# Patient Record
Sex: FEMALE | Race: WHITE | NOT HISPANIC OR LATINO | Employment: OTHER | URBAN - METROPOLITAN AREA
[De-identification: names, ages, dates, MRNs, and addresses within clinical notes are randomized per-mention and may not be internally consistent; named-entity substitution may affect disease eponyms.]

---

## 2017-01-25 ENCOUNTER — PATIENT OUTREACH (OUTPATIENT)
Dept: HEALTH INFORMATION MANAGEMENT | Facility: OTHER | Age: 67
End: 2017-01-25

## 2017-01-26 NOTE — PROGRESS NOTES
01/25/17 SCHEDULED AWV,     Health Maintenance Due   Topic Date Due   • IMM ZOSTER VACCINE  02/08/2008   • PFT SCREENING-FEV1 AND FEV/FVC RATIO / SPIROMETRY SHOULD BE PERFORMED ANNUALLY  04/10/2016

## 2017-02-07 ENCOUNTER — TELEPHONE (OUTPATIENT)
Dept: MEDICAL GROUP | Age: 67
End: 2017-02-07

## 2017-02-07 NOTE — TELEPHONE ENCOUNTER
ANNUAL WELLNESS VISIT PRE-VISIT PLANNING     1.  Reviewed last PCP office visit assessment and plan notes: Yes    2.  If any orders were placed last visit do we have Results/Consult Notes?        •  Labs? Yes order TSH       •  Imaging? Yes complete       •  Referrals? Yes complete    3.  Patient Care Coordination Note was updated with diagnosis information:  Yes    4.  Patient is due for these Health Maintenance Topics:   Health Maintenance Due   Topic Date Due   • IMM ZOSTER VACCINE  02/08/2008   • PFT SCREENING-FEV1 AND FEV/FVC RATIO / SPIROMETRY SHOULD BE PERFORMED ANNUALLY  04/10/2016             5.  Immunizations were updated in [employer] using [employer]?: Yes       •  Web [employer] Recommendations:  Td, Hep A, Hep B, Zoster       •  Is patient due for Tdap/Shingles? Yes.  If yes, was patient alerted of copay? Yes    6.  Patient has:       •   Diabetes: no       •   COPD: yes       •   CHF: no       •   Depression: no    7.  Updated Care Team with [employer] and all specialists?        •   Gait devices, O2, CPAP, etc: yes        •   Eye professional: yes       •   Other specialists (GYN, cardiology, endo, etc): yes    8.  Is patient in need of any refills prior to office visit? No       •    Separate refill encounter created?: no    9.  Patient was informed to arrive 15 min prior to their scheduled appointment and bring in their medication bottles? yes    10.  Patient was advised: “This is a free wellness visit. The provider will screen for medical conditions to help you stay healthy. If you have other concerns to address you may be asked to discuss these at a separate visit or there may be an additional fee.”  Yes

## 2017-02-13 ENCOUNTER — OFFICE VISIT (OUTPATIENT)
Dept: MEDICAL GROUP | Age: 67
End: 2017-02-13
Payer: MEDICARE

## 2017-02-13 VITALS
SYSTOLIC BLOOD PRESSURE: 102 MMHG | HEART RATE: 67 BPM | DIASTOLIC BLOOD PRESSURE: 78 MMHG | BODY MASS INDEX: 31.71 KG/M2 | HEIGHT: 63 IN | TEMPERATURE: 96.9 F | WEIGHT: 179 LBS | OXYGEN SATURATION: 92 %

## 2017-02-13 DIAGNOSIS — N81.6 RECTOCELE: ICD-10-CM

## 2017-02-13 DIAGNOSIS — H91.93 DECREASED HEARING, BILATERAL: ICD-10-CM

## 2017-02-13 DIAGNOSIS — E66.9 OBESITY (BMI 30-39.9): ICD-10-CM

## 2017-02-13 DIAGNOSIS — Z00.00 MEDICARE ANNUAL WELLNESS VISIT, SUBSEQUENT: Primary | ICD-10-CM

## 2017-02-13 DIAGNOSIS — E03.4 HYPOTHYROIDISM DUE TO ACQUIRED ATROPHY OF THYROID: ICD-10-CM

## 2017-02-13 DIAGNOSIS — J44.0 CHRONIC OBSTRUCTIVE PULMONARY DISEASE WITH ACUTE LOWER RESPIRATORY INFECTION (HCC): ICD-10-CM

## 2017-02-13 DIAGNOSIS — K57.90 DIVERTICULOSIS OF INTESTINE WITHOUT BLEEDING, UNSPECIFIED INTESTINAL TRACT LOCATION: ICD-10-CM

## 2017-02-13 DIAGNOSIS — E78.5 DYSLIPIDEMIA: ICD-10-CM

## 2017-02-13 PROCEDURE — 99999 PR NO CHARGE: CPT | Performed by: PHYSICIAN ASSISTANT

## 2017-02-13 PROCEDURE — G0439 PPPS, SUBSEQ VISIT: HCPCS | Performed by: PHYSICIAN ASSISTANT

## 2017-02-13 PROCEDURE — 1036F TOBACCO NON-USER: CPT | Performed by: PHYSICIAN ASSISTANT

## 2017-02-13 ASSESSMENT — PATIENT HEALTH QUESTIONNAIRE - PHQ9: CLINICAL INTERPRETATION OF PHQ2 SCORE: 0

## 2017-02-13 ASSESSMENT — PAIN SCALES - GENERAL: PAINLEVEL: NO PAIN

## 2017-02-13 NOTE — PROGRESS NOTES
Chief Complaint   Patient presents with   • Annual Wellness Visit         HPI:  Vaishali is a 67 y.o. female here for Medicare Annual Wellness Visit      URI  New problem since 2/4/17.  Recent illness 102-103 fever with nasal congestion and cough.   Taking benadryl and breathing treatments. Feels it almost gone.  Feels still a little congested.   +productive cough.       Patient Active Problem List    Diagnosis Date Noted   • Dyslipidemia 02/13/2017   • Obesity (BMI 30-39.9) 09/17/2016   • Diverticulosis 04/23/2015   • Rectocele 04/10/2015   • Cystocele 04/10/2015   • Decreased hearing 01/22/2015   • Hypothyroidism due to acquired atrophy of thyroid 01/21/2013   • COPD (chronic obstructive pulmonary disease) (CMS-[facility]) 01/20/2013       Current Outpatient Prescriptions   Medication Sig Dispense Refill   • levothyroxine (SYNTHROID) 75 MCG Tab Take 1 Tab by mouth every morning before breakfast. 90 Tab 1   • Cyanocobalamin (VITAMIN B-12) 1000 MCG Tab Take 1,000 mcg by mouth every day.     • vitamin D (CHOLECALCIFEROL) 1000 UNIT Tab Take 1,000 Units by mouth every day.     • IRON PO Take  by mouth.     • ipratropium-albuterol (DUONEB) 0.5-2.5 (3) MG/3ML nebulizer solution 3 mL by Nebulization route every four hours as needed for Shortness of Breath. 100 Vial 3   • albuterol (PROAIR HFA) 108 (90 BASE) MCG/ACT Aero Soln inhalation aerosol Inhale 2 Puffs by mouth every four hours as needed for Shortness of Breath. 1 Inhaler 3   • albuterol (PROVENTIL) 2.5mg/0.5ml NEBU 2.5 mg by Nebulization route every four hours as needed.       No current facility-administered medications for this visit.      The patient reports adherence to this regimen   Current supplements as per medication list.   Chronic narcotic pain medicines: no    Allergies: Nkda    Current social contact/activities: goes to ball games, sporting events, takes a ride, camp out with kids     Is patient current with immunizations?  no   If no, due for Shingles declines      She  reports that she quit smoking about 10 years ago. Her smoking use included Cigarettes. She has a 40 pack-year smoking history. She has never used smokeless tobacco. She reports that she drinks about 1.0 oz of alcohol per week. She reports that she does not use illicit drugs.  Counseling given: Yes        DPA/Advanced Directive:  Patient does not have an advanced directive. If no advanced directive exists, a packet and workshop information was provided    ROS:    Gait: Uses no assistive device   Ostomy: no   Other tubes: no   Amputations: no   Chronic oxygen use no   Last eye exam 2015   : Denies incontinence.       Screening:    COPD  1. Is patient under the care of a pulmonologist? no        2. Has patient ever completed a PFT or spirometry? yes       a. If yes, when? Can't remember    3.  If applicable, was CareTeam updated with oxygen/CPAP supplier? N\A    4. Has patient ever had instruction on inhaler technique by health care professional? Yes    5. Does patient have an action plan for when they have trouble breathing? N\A    6. Is patient interested in a referral to respiratory therapy for more information on COPD, inhaler technique, and/or information on establishing an action plan?  no      Depression Screening    Little interest or pleasure in doing things?  0 - not at all  Feeling down, depressed, or hopeless?  0 - not at all  Patient Health Questionnaire Score: 0    If depressive symptoms identified deferred to follow up visit unless specifically addressed in assessment and plan.    Screening for Cognitive Impairment    Three Minute Recall:  3/3 Apple, temitope, table  Draw clock face with all 12 numbers set to the hand to show 10 minutes past 11 o'clock  1 5/5  Cognitive concerns identified deferred for follow up unless specifically addressed in assessment and plan.    Fall Risk Assessment    Has the patient had two or more falls in the last year or any fall with injury in the last year?   No    Safety Assessment    Throw rugs on floor.  No  Handrails on all stairs.  Yes  Good lighting in all hallways.  Yes  Difficulty hearing.  Yes  Patient counseled about all safety risks that were identified.    Functional Assessment ADLs    Are there any barriers preventing you from cooking for yourself or meeting nutritional needs?  No.    Are there any barriers preventing you from driving safely or obtaining transportation?  No.    Are there any barriers preventing you from using a telephone or calling for help?  No.    Are there any barriers preventing you from shopping?  No.    Are there any barriers preventing you from taking care of your own finances?  No.    Are there any barriers preventing you from managing your medications?  No.    Are currently engaging any exercise or physical activity?  Yes.  Uses treadmill and stationary bike.    Health Maintenance Summary                IMM ZOSTER VACCINE Overdue 2/8/2008     PFT SCREENING-FEV1 AND FEV/FVC RATIO / SPIROMETRY SHOULD BE PERFORMED ANNUALLY Overdue 4/10/2016      Done 4/10/2015 See pulm consult in media-- 4/10/15 PFT performed in office.    Annual Wellness Visit Next Due 3/9/2017      Done 3/8/2016 Visit Dx: Medicare annual wellness visit, subsequent     Patient has more history with this topic...    MAMMOGRAM Next Due 11/1/2017      Done 11/1/2016 MA-MAMMO SCREENING BILAT W/TOMOSYNTHESIS W/CAD     Patient has more history with this topic...    BONE DENSITY Next Due 4/10/2020      Done 4/10/2015 DS-BONE DENSITY STUDY (DEXA)    IMM DTaP/Tdap/Td Vaccine Next Due 4/10/2025      Done 4/10/2015 Imm Admin: Tdap Vaccine    COLONOSCOPY Next Due 4/23/2025      Done 4/23/2015 AMB REFERRAL TO GI FOR COLONOSCOPY          Patient Care Team:  Vesna Davis PA-C as PCP - General (Family Medicine)  Skylar Nick M.D. (OB/Gyn)  Jared ORTIZ M.D. as Consulting Physician (Gastroenterology)  [facility] Phys[facility] ([facility])    Social  "History   Substance Use Topics   • Smoking status: Former Smoker -- 1.00 packs/day for 40 years     Types: Cigarettes     Quit date: 01/22/2007   • Smokeless tobacco: Never Used      Comment: 4 days   • Alcohol Use: 1.0 oz/week     2 Standard drinks or equivalent per week      Comment: wine     Family History   Problem Relation Age of Onset   • Alcohol/Drug Neg Hx    • Cancer Maternal Uncle    • Cancer Paternal Uncle    • Diabetes Sister      DMI   • Diabetes Brother      DM2. no meds   • Stroke Father    • Arthritis Son      Arthritis, gout   • Asthma Son    • Cancer Maternal Grandmother      Uterine   • Diabetes Paternal Grandmother      She  has a past medical history of Hypothyroid; Emphysema; Pneumonia; Osteoporosis; Urinary retention; and Anemia.   Past Surgical History   Procedure Laterality Date   • Other orthopedic surgery       R shoulder surgery           Exam:     Blood pressure 102/78, pulse 67, temperature 36.1 °C (96.9 °F), height 1.588 m (5' 2.5\"), weight 81.194 kg (179 lb), SpO2 92 %. Body mass index is 32.2 kg/(m^2).    Hearing poor.    Dentition fair  Alert, oriented in no acute distress.  Lungs CTAB without adventitious sounds but with decreased breath sounds  Eye contact is good, speech goal directed, affect calm      Assessment and Plan. The following treatment and monitoring plan is recommended:    1. Medicare annual wellness visit, subsequent -- Reviewed above screenings with patient. Discussed advanced directives at length. Packet given to complete along with information on workshops.     2. Chronic obstructive pulmonary disease with acute lower respiratory infection (CMS-HCC) -Stable. Discussed Spiriva--not using, not interested. Only needs neb with URIs. Prior to current URI--hasn't used in a year. CBC WITH DIFFERENTIAL  COMP METABOLIC PANEL   3. Cystocele, unspecified cystocele location - stable. Follow-up with gyn as directed.    4. Diverticulosis of intestine without bleeding, " unspecified intestinal tract location     5. Hypothyroidism due to acquired atrophy of thyroid -Stable. Continue current medicines. Monitor labs regularly. TSH WITH REFLEX TO FT4   6. Obesity (BMI 30-39.9) -Counseled on diet modification and exercise.  Patient identified as having weight management issue.  Appropriate orders and counseling given.   7. Rectocele  - stable. Follow-up with gyn as directed.    8. Decreased hearing, bilateral-- hearing aid on right . Stable follow-up with audiology as needed    9. Dyslipidemia -Stable. Continue lifestyle modifications. Monitor labs regularly. COMP METABOLIC PANEL    LIPID PROFILE     Services needed: No services needed at this time  Health Care Screening recommendations as per orders if indicated.  Referrals offered: PT/OT/Nutrition counseling/Behavioral Health/Smoking cessation as per orders if indicated.    Discussion today about general wellness and lifestyle habits:    · Prevent falls and reduce trip hazards; Cautioned about securing or removing rugs.  · Have a working fire alarm and carbon monoxide detector;   · Engage in regular physical activity and social activitie    Follow-up: Return in about 6 months (around 8/13/2017) for follow-up on labs. sooner if needed.

## 2017-02-13 NOTE — MR AVS SNAPSHOT
"        Vaishali Soares   2017 11:00 AM   Office Visit   MRN: [id number]    Department:  [address] [place] [address]   Dept Phone:  [phone]    Description:  Female : 1950   Provider:  Vesna Davis PA-C; [facility]            Reason for Visit     Annual Wellness Visit           Allergies as of 2017     Allergen Noted Reactions    Nkda [No Known Drug Allergy] 2008         You were diagnosed with     Medicare annual wellness visit, subsequent   [657690]  -  Primary     Chronic obstructive pulmonary disease with acute lower respiratory infection (CMS-Formerly [facility])   [067258]       Cystocele, unspecified cystocele location   [8111009]       Diverticulosis of intestine without bleeding, unspecified intestinal tract location   [0563139]       Hypothyroidism due to acquired atrophy of thyroid   [6918255]       Obesity (BMI 30-39.9)   [042623]       Rectocele   [618.04.ICD-9-CM]       Decreased hearing, bilateral   [2152275]       Dyslipidemia   [577489]         Vital Signs     Blood Pressure Pulse Temperature Height Weight Body Mass Index    102/78 mmHg 67 36.1 °C (96.9 °F) 1.588 m (5' 2.5\") 81.194 kg (179 lb) 32.20 kg/m2    Oxygen Saturation Smoking Status                92% Former Smoker          Basic Information     Date Of Birth Sex Race Ethnicity Preferred Language    1950 Female White Non- English      Your appointments     Aug 14, 2017  8:30 AM   Established Patient with Vesna Davis PA-C   [address] [address] [phone]           You will be receiving a confirmation call a few days before your appointment from our automated call confirmation system.              Problem List              ICD-10-CM Priority Class Noted - Resolved    COPD (chronic obstructive pulmonary disease) (CMS-Formerly [facility]) J44.9   2013 - Present    Hypothyroidism due to acquired atrophy of thyroid E03.4   2013 - Present    Decreased hearing " H91.90   1/22/2015 - Present    Rectocele N81.6   4/10/2015 - Present    Cystocele N81.10   4/10/2015 - Present    Diverticulosis K57.90   4/23/2015 - Present    Obesity (BMI 30-39.9) E66.9   9/17/2016 - Present    Dyslipidemia E78.5   2/13/2017 - Present      Health Maintenance        Date Due Completion Dates    IMM ZOSTER VACCINE 2/8/2008 ---    MAMMOGRAM 11/1/2017 11/1/2016, 5/15/2015, 4/10/2015, 1/22/2011 (Done)    Override on 1/22/2011: Done    BONE DENSITY 4/10/2020 4/10/2015    IMM DTaP/Tdap/Td Vaccine (2 - Td) 4/10/2025 4/10/2015    COLONOSCOPY 4/23/2025 4/23/2015            Current Immunizations     13-VALENT PCV PREVNAR 3/10/2015    Influenza TIV (IM) 1/20/2013  5:51 PM    Influenza Vaccine Adult HD 10/18/2016 11:12 AM, 11/2/2015  3:00 PM, 9/27/2014    Pneumococcal Vaccine (UF)Historical Data 10/1/2011    Pneumococcal polysaccharide vaccine (PPSV-23) 9/13/2016 10:59 AM    Tdap Vaccine 4/10/2015      Below and/or attached are the medications your provider expects you to take. Review all of your home medications and newly ordered medications with your provider and/or pharmacist. Follow medication instructions as directed by your provider and/or pharmacist. Please keep your medication list with you and share with your provider. Update the information when medications are discontinued, doses are changed, or new medications (including over-the-counter products) are added; and carry medication information at all times in the event of emergency situations     Allergies:  NKDA - (reactions not documented)               Medications  Valid as of: February 13, 2017 - 11:35 AM    Generic Name Brand Name Tablet Size Instructions for use    Albuterol Sulfate (Nebu Soln) PROVENTIL 2.5mg/0.5ml 2.5 mg by Nebulization route every four hours as needed.        Albuterol Sulfate (Aero Soln) albuterol 108 (90 BASE) MCG/ACT Inhale 2 Puffs by mouth every four hours as needed for Shortness of Breath.        Cholecalciferol (Tab)  cholecalciferol 1000 UNIT Take 1,000 Units by mouth every day.        Cyanocobalamin (Tab) vitamin B-12 1000 MCG Take 1,000 mcg by mouth every day.        Ipratropium-Albuterol (Solution) DUONEB 0.5-2.5 (3) MG/3ML 3 mL by Nebulization route every four hours as needed for Shortness of Breath.        Iron   Take  by mouth.        Levothyroxine Sodium (Tab) SYNTHROID 75 MCG Take 1 Tab by mouth every morning before breakfast.        .                 Medicines prescribed today were sent to:     [facility] [address] [place] [address], [place] - [address] [place] [address]    Phone: [phone] Fax: [phone]    Open 24 Hours?: No      Medication refill instructions:       If your prescription bottle indicates you have medication refills left, it is not necessary to call your provider’s office. Please contact your pharmacy and they will refill your medication.    If your prescription bottle indicates you do not have any refills left, you may request refills at any time through one of the following ways: The online [employer] system (except Urgent Care), by calling your provider’s office, or by asking your pharmacy to contact your provider’s office with a refill request. Medication refills are processed only during regular business hours and may not be available until the next business day. Your provider may request additional information or to have a follow-up visit with you prior to refilling your medication.   *Please Note: Medication refills are assigned a new Rx number when refilled electronically. Your pharmacy may indicate that no refills were authorized even though a new prescription for the same medication is available at the pharmacy. Please request the medicine by name with the pharmacy before contacting your provider for a refill.        Your To Do List     Future Labs/Procedures Complete By Expires    CBC WITH DIFFERENTIAL  As directed 2/14/2018    COMP METABOLIC PANEL  As directed 2/14/2018    LIPID  PROFILE  As directed 2/14/2018    TSH WITH REFLEX TO FT4  As directed 2/13/2018      Other Notes About Your Plan                           MyChart Status: Patient Declined

## 2017-05-02 DIAGNOSIS — E03.9 HYPOTHYROIDISM, UNSPECIFIED TYPE: ICD-10-CM

## 2017-05-02 RX ORDER — LEVOTHYROXINE SODIUM 0.07 MG/1
75 TABLET ORAL
Qty: 90 TAB | Refills: 0 | Status: SHIPPED | OUTPATIENT
Start: 2017-05-02 | End: 2017-08-14 | Stop reason: SDUPTHER

## 2017-08-08 ENCOUNTER — HOSPITAL ENCOUNTER (OUTPATIENT)
Dept: LAB | Facility: MEDICAL CENTER | Age: 67
End: 2017-08-08
Attending: PHYSICIAN ASSISTANT
Payer: MEDICARE

## 2017-08-08 DIAGNOSIS — E78.5 DYSLIPIDEMIA: ICD-10-CM

## 2017-08-08 DIAGNOSIS — J44.0 CHRONIC OBSTRUCTIVE PULMONARY DISEASE WITH ACUTE LOWER RESPIRATORY INFECTION (HCC): ICD-10-CM

## 2017-08-08 DIAGNOSIS — E03.4 HYPOTHYROIDISM DUE TO ACQUIRED ATROPHY OF THYROID: ICD-10-CM

## 2017-08-08 LAB
ALBUMIN SERPL BCP-MCNC: 4.1 G/DL (ref 3.2–4.9)
ALBUMIN/GLOB SERPL: 1.2 G/DL
ALP SERPL-CCNC: 59 U/L (ref 30–99)
ALT SERPL-CCNC: 15 U/L (ref 2–50)
ANION GAP SERPL CALC-SCNC: 8 MMOL/L (ref 0–11.9)
AST SERPL-CCNC: 18 U/L (ref 12–45)
BASOPHILS # BLD AUTO: 0.9 % (ref 0–1.8)
BASOPHILS # BLD: 0.05 K/UL (ref 0–0.12)
BILIRUB SERPL-MCNC: 0.9 MG/DL (ref 0.1–1.5)
BUN SERPL-MCNC: 12 MG/DL (ref 8–22)
CALCIUM SERPL-MCNC: 9.8 MG/DL (ref 8.5–10.5)
CHLORIDE SERPL-SCNC: 103 MMOL/L (ref 96–112)
CHOLEST SERPL-MCNC: 206 MG/DL (ref 100–199)
CO2 SERPL-SCNC: 27 MMOL/L (ref 20–33)
CREAT SERPL-MCNC: 0.66 MG/DL (ref 0.5–1.4)
EOSINOPHIL # BLD AUTO: 0.17 K/UL (ref 0–0.51)
EOSINOPHIL NFR BLD: 3 % (ref 0–6.9)
ERYTHROCYTE [DISTWIDTH] IN BLOOD BY AUTOMATED COUNT: 45.3 FL (ref 35.9–50)
GFR SERPL CREATININE-BSD FRML MDRD: >60 ML/MIN/1.73 M 2
GLOBULIN SER CALC-MCNC: 3.5 G/DL (ref 1.9–3.5)
GLUCOSE SERPL-MCNC: 86 MG/DL (ref 65–99)
HCT VFR BLD AUTO: 42 % (ref 37–47)
HDLC SERPL-MCNC: 46 MG/DL
HGB BLD-MCNC: 13.7 G/DL (ref 12–16)
IMM GRANULOCYTES # BLD AUTO: 0.01 K/UL (ref 0–0.11)
IMM GRANULOCYTES NFR BLD AUTO: 0.2 % (ref 0–0.9)
LDLC SERPL CALC-MCNC: 124 MG/DL
LYMPHOCYTES # BLD AUTO: 2.3 K/UL (ref 1–4.8)
LYMPHOCYTES NFR BLD: 40.6 % (ref 22–41)
MCH RBC QN AUTO: 30.5 PG (ref 27–33)
MCHC RBC AUTO-ENTMCNC: 32.6 G/DL (ref 33.6–35)
MCV RBC AUTO: 93.5 FL (ref 81.4–97.8)
MONOCYTES # BLD AUTO: 0.45 K/UL (ref 0–0.85)
MONOCYTES NFR BLD AUTO: 8 % (ref 0–13.4)
NEUTROPHILS # BLD AUTO: 2.68 K/UL (ref 2–7.15)
NEUTROPHILS NFR BLD: 47.3 % (ref 44–72)
NRBC # BLD AUTO: 0 K/UL
NRBC BLD AUTO-RTO: 0 /100 WBC
PLATELET # BLD AUTO: 273 K/UL (ref 164–446)
PMV BLD AUTO: 11.1 FL (ref 9–12.9)
POTASSIUM SERPL-SCNC: 4.3 MMOL/L (ref 3.6–5.5)
PROT SERPL-MCNC: 7.6 G/DL (ref 6–8.2)
RBC # BLD AUTO: 4.49 M/UL (ref 4.2–5.4)
SODIUM SERPL-SCNC: 138 MMOL/L (ref 135–145)
TRIGL SERPL-MCNC: 181 MG/DL (ref 0–149)
TSH SERPL DL<=0.005 MIU/L-ACNC: 2 UIU/ML (ref 0.3–3.7)
WBC # BLD AUTO: 5.7 K/UL (ref 4.8–10.8)

## 2017-08-08 PROCEDURE — 80053 COMPREHEN METABOLIC PANEL: CPT

## 2017-08-08 PROCEDURE — 80061 LIPID PANEL: CPT

## 2017-08-08 PROCEDURE — 85025 COMPLETE CBC W/AUTO DIFF WBC: CPT

## 2017-08-08 PROCEDURE — 36415 COLL VENOUS BLD VENIPUNCTURE: CPT

## 2017-08-08 PROCEDURE — 84443 ASSAY THYROID STIM HORMONE: CPT

## 2017-08-14 ENCOUNTER — OFFICE VISIT (OUTPATIENT)
Dept: MEDICAL GROUP | Age: 67
End: 2017-08-14
Payer: MEDICARE

## 2017-08-14 VITALS
BODY MASS INDEX: 31.54 KG/M2 | HEART RATE: 68 BPM | DIASTOLIC BLOOD PRESSURE: 68 MMHG | WEIGHT: 178 LBS | HEIGHT: 63 IN | RESPIRATION RATE: 19 BRPM | TEMPERATURE: 97.9 F | OXYGEN SATURATION: 91 % | SYSTOLIC BLOOD PRESSURE: 116 MMHG

## 2017-08-14 DIAGNOSIS — E66.9 OBESITY (BMI 30-39.9): ICD-10-CM

## 2017-08-14 DIAGNOSIS — E78.5 DYSLIPIDEMIA: ICD-10-CM

## 2017-08-14 DIAGNOSIS — Z12.11 COLON CANCER SCREENING: ICD-10-CM

## 2017-08-14 DIAGNOSIS — J42 CHRONIC BRONCHITIS, UNSPECIFIED CHRONIC BRONCHITIS TYPE (HCC): ICD-10-CM

## 2017-08-14 DIAGNOSIS — E03.9 HYPOTHYROIDISM, UNSPECIFIED TYPE: ICD-10-CM

## 2017-08-14 DIAGNOSIS — E03.4 HYPOTHYROIDISM DUE TO ACQUIRED ATROPHY OF THYROID: ICD-10-CM

## 2017-08-14 PROCEDURE — 99214 OFFICE O/P EST MOD 30 MIN: CPT | Performed by: PHYSICIAN ASSISTANT

## 2017-08-14 RX ORDER — LEVOTHYROXINE SODIUM 0.07 MG/1
75 TABLET ORAL
Qty: 90 TAB | Refills: 3 | Status: SHIPPED | OUTPATIENT
Start: 2017-08-14 | End: 2018-08-20 | Stop reason: SDUPTHER

## 2017-08-14 NOTE — MR AVS SNAPSHOT
"        Vaishali Leonond   2017 8:30 AM   Office Visit   MRN: [id number]    Department:  [address] [place] [address]   Dept Phone:  [phone]    Description:  Female : 1950   Provider:  Vesna Davis PA-C           Reason for Visit     Follow-Up 6mo fv    COPD     Thyroid Problem     Results Labs done 17      Allergies as of 2017     Allergen Noted Reactions    Nkda [No Known Drug Allergy] 2008         You were diagnosed with     Hypothyroidism due to acquired atrophy of thyroid   [7282424]       Dyslipidemia   [004092]       Chronic bronchitis, unspecified chronic bronchitis type (CMS-[facility])   [3410090]       Obesity (BMI 30-39.9)   [355596]       Hypothyroidism, unspecified type   [1098872]       Colon cancer screening   [415641]         Vital Signs     Blood Pressure Pulse Temperature Respirations Height Weight    116/68 mmHg 68 36.6 °C (97.9 °F) 19 1.588 m (5' 2.5\") 80.74 kg (178 lb)    Body Mass Index Oxygen Saturation Smoking Status             32.02 kg/m2 91% Former Smoker         Basic Information     Date Of Birth Sex Race Ethnicity Preferred Language    1950 Female White Non- English      Your appointments     Feb 15, 2018  9:30 AM   ANNUAL WELLNESS with Vesna Davis PA-C, [facility]    [address] [id number] [phone]              Problem List              ICD-10-CM Priority Class Noted - Resolved    COPD (chronic obstructive pulmonary disease) (CMS-HCC) J44.9   2013 - Present    Hypothyroidism due to acquired atrophy of thyroid E03.4   2013 - Present    Decreased hearing H91.90   2015 - Present    Rectocele N81.6   4/10/2015 - Present    Cystocele N81.10   4/10/2015 - Present    Diverticulosis K57.90   2015 - Present    Obesity (BMI 30-39.9) E66.9   2016 - Present    Dyslipidemia E78.5   2017 - Present      Health Maintenance        Date Due Completion Dates    COLON " CANCER SCREENING ANNUAL FIT 2/8/1998 ---    IMM ZOSTER VACCINE 2/8/2008 ---    IMM INFLUENZA (1) 9/1/2017 10/18/2016, 11/2/2015, 9/27/2014, 1/20/2013    MAMMOGRAM 11/1/2017 11/1/2016, 5/15/2015, 4/10/2015, 1/22/2011 (Done)    Override on 1/22/2011: Done    BONE DENSITY 4/10/2020 4/10/2015    IMM DTaP/Tdap/Td Vaccine (2 - Td) 4/10/2025 4/10/2015            Current Immunizations     13-VALENT PCV PREVNAR 3/10/2015    Influenza TIV (IM) 1/20/2013  5:51 PM    Influenza Vaccine Adult HD 10/18/2016 11:12 AM, 11/2/2015  3:00 PM, 9/27/2014    Pneumococcal Vaccine (UF)Historical Data 10/1/2011    Pneumococcal polysaccharide vaccine (PPSV-23) 9/13/2016 10:59 AM    Tdap Vaccine 4/10/2015      Below and/or attached are the medications your provider expects you to take. Review all of your home medications and newly ordered medications with your provider and/or pharmacist. Follow medication instructions as directed by your provider and/or pharmacist. Please keep your medication list with you and share with your provider. Update the information when medications are discontinued, doses are changed, or new medications (including over-the-counter products) are added; and carry medication information at all times in the event of emergency situations     Allergies:  NKDA - (reactions not documented)               Medications  Valid as of: August 14, 2017 -  9:15 AM    Generic Name Brand Name Tablet Size Instructions for use    Albuterol Sulfate (Nebu Soln) PROVENTIL 2.5mg/0.5ml 2.5 mg by Nebulization route every four hours as needed.        Albuterol Sulfate (Aero Soln) albuterol 108 (90 BASE) MCG/ACT Inhale 2 Puffs by mouth every four hours as needed for Shortness of Breath.        Cholecalciferol (Tab) cholecalciferol 1000 UNIT Take 1,000 Units by mouth every day.        Cyanocobalamin (Tab) vitamin B-12 1000 MCG Take 1,000 mcg by mouth every day.        Ipratropium-Albuterol (Solution) DUONEB 0.5-2.5 (3) MG/3ML 3 mL by Nebulization  route every four hours as needed for Shortness of Breath.        Iron   Take  by mouth.        Levothyroxine Sodium (Tab) SYNTHROID 75 MCG Take 1 Tab by mouth every morning before breakfast.        .                 Medicines prescribed today were sent to:     [facility] - [place], [place] - [address] [place] [address]    Phone: [phone] Fax: [phone]    Open 24 Hours?: No      Medication refill instructions:       If your prescription bottle indicates you have medication refills left, it is not necessary to call your provider’s office. Please contact your pharmacy and they will refill your medication.    If your prescription bottle indicates you do not have any refills left, you may request refills at any time through one of the following ways: The online [employer] system (except Urgent Care), by calling your provider’s office, or by asking your pharmacy to contact your provider’s office with a refill request. Medication refills are processed only during regular business hours and may not be available until the next business day. Your provider may request additional information or to have a follow-up visit with you prior to refilling your medication.   *Please Note: Medication refills are assigned a new Rx number when refilled electronically. Your pharmacy may indicate that no refills were authorized even though a new prescription for the same medication is available at the pharmacy. Please request the medicine by name with the pharmacy before contacting your provider for a refill.        Your To Do List     Future Labs/Procedures Complete By Expires    OCCULT BLOOD FECES IMMUNOASSAY  As directed 8/15/2018    TSH WITH REFLEX TO FT4  As directed 8/14/2018      Other Notes About Your Plan                           MyChart Status: Patient Declined

## 2017-08-14 NOTE — PROGRESS NOTES
"Subjective:     Chief Complaint   Patient presents with   • Follow-Up     6mo fv   • COPD   • Thyroid Problem   • Results     Labs done 8/8/17     Vaishali Soares is a 67 y.o. female here today for evaluation and management of:    Hypothyroidism due to acquired atrophy of thyroid  Stable. Currently taking levothyroxine 75 mcg daily as prescribed.  Denies palpitations, skin changes, temperature intolerance, or changes in bowel habits    Dyslipidemia/obesity  Reports diet is good but occasionally cheating with \"gravy.\" High in veggies  She is exercising regularly on stationary bike three times a day x 15 minutes. \"taking it slow\" for now but has intent to increase intensity.   She is not taking ASA daily.  She denies dizziness, claudication, or chest pain.    Chronic bronchitis, unspecified chronic bronchitis type (CMS-HCC)  Stable. Refuses to use inhalers  She does have nebulizer at home with duoneb bullets. Also has an albuterol HFA but not using.  She checks her oxygen regularly with her own pulse ox. Reports she sits around 92% regularly.  Environmental smoke has been bothering her some      ROS   Denies any recent fevers or chills. No nausea or vomiting. No diarrhea. No chest pains.  Some occasional shortness of breath. No lower extremity edema.    Allergies   Allergen Reactions   • Nkda [No Known Drug Allergy]        Current medicines (including changes today)  Current Outpatient Prescriptions   Medication Sig Dispense Refill   • levothyroxine (SYNTHROID) 75 MCG Tab Take 1 Tab by mouth every morning before breakfast. 90 Tab 0   • vitamin D (CHOLECALCIFEROL) 1000 UNIT Tab Take 1,000 Units by mouth every day.     • IRON PO Take  by mouth.     • albuterol (PROVENTIL) 2.5mg/0.5ml NEBU 2.5 mg by Nebulization route every four hours as needed.     • Cyanocobalamin (VITAMIN B-12) 1000 MCG Tab Take 1,000 mcg by mouth every day.     • ipratropium-albuterol (DUONEB) 0.5-2.5 (3) MG/3ML nebulizer solution 3 mL by " "Nebulization route every four hours as needed for Shortness of Breath. 100 Vial 3   • albuterol (PROAIR HFA) 108 (90 BASE) MCG/ACT Aero Soln inhalation aerosol Inhale 2 Puffs by mouth every four hours as needed for Shortness of Breath. 1 Inhaler 3     No current facility-administered medications for this visit.       Patient Active Problem List    Diagnosis Date Noted   • Dyslipidemia 02/13/2017   • Obesity (BMI 30-39.9) 09/17/2016   • Diverticulosis 04/23/2015   • Rectocele 04/10/2015   • Cystocele 04/10/2015   • Decreased hearing 01/22/2015   • Hypothyroidism due to acquired atrophy of thyroid 01/21/2013   • COPD (chronic obstructive pulmonary disease) (CMS-HCC) 01/20/2013       Family History   Problem Relation Age of Onset   • Alcohol/Drug Neg Hx    • Cancer Maternal Uncle    • Cancer Paternal Uncle    • Diabetes Sister      DMI   • Diabetes Brother      DM2. no meds   • Stroke Father    • Arthritis Son      Arthritis, gout   • Asthma Son    • Cancer Maternal Grandmother      Uterine   • Diabetes Paternal Grandmother           Objective:     Blood pressure 116/68, pulse 69, temperature 36.6 °C (97.9 °F), resp. rate 19, height 1.626 m (5' 4\"), weight 80.74 kg (178 lb), SpO2 88 %. Body mass index is 32.02 kg/(m^2).     Physical Exam:  Gen: Well developed, well nourished in no acute distress. Obese female.   Skin: Pink, warm, and dry  HEENT: conjunctiva non-injected, sclera non-icteric. EOMs intact.   Nasal mucosa without edema nor erythema. No facial tenderness  Pinna normal. TM pearly gray.   Oral mucous membranes pink and moist with no lesions.  Neck: Supple, trachea midline. No adenopathy or masses in the neck or supraclavicular regions.  Lungs: Effort is normal. Diminished breath sounds diffusely with scattered rhonchi. Declines neb treatment in office.  CV: regular rate and rhythm.  Abdomen: soft, nontender, + BS.   Ext: no edema, color normal, vascularity normal, temperature normal  Alert and oriented Eye " contact is good, speech goal directed, affect calm      Component      Latest Ref Rng 8/8/2017   WBC      4.8 - 10.8 K/uL 5.7   RBC      4.20 - 5.40 M/uL 4.49   Hemoglobin      12.0 - 16.0 g/dL 13.7   Hematocrit      37.0 - 47.0 % 42.0   MCV      81.4 - 97.8 fL 93.5   MCH      27.0 - 33.0 pg 30.5   MCHC      33.6 - 35.0 g/dL 32.6 (L)   RDW      35.9 - 50.0 fL 45.3   Platelet Count      164 - 446 K/uL 273   MPV      9.0 - 12.9 fL 11.1   Neutrophils-Polys      44.00 - 72.00 % 47.30   Lymphocytes      22.00 - 41.00 % 40.60   Monocytes      0.00 - 13.40 % 8.00   Eosinophils      0.00 - 6.90 % 3.00   Basophils      0.00 - 1.80 % 0.90   Immature Granulocytes      0.00 - 0.90 % 0.20   Nucleated RBC       0.00   Neutrophils (Absolute)      2.00 - 7.15 K/uL 2.68   Lymphs (Absolute)      1.00 - 4.80 K/uL 2.30   Monos (Absolute)      0.00 - 0.85 K/uL 0.45   Eos (Absolute)      0.00 - 0.51 K/uL 0.17   Baso (Absolute)      0.00 - 0.12 K/uL 0.05   Immature Granulocytes (abs)      0.00 - 0.11 K/uL 0.01   NRBC (Absolute)       0.00   Sodium      135 - 145 mmol/L 138   Potassium      3.6 - 5.5 mmol/L 4.3   Chloride      96 - 112 mmol/L 103   Co2      20 - 33 mmol/L 27   Anion Gap      0.0 - 11.9 8.0   Glucose      65 - 99 mg/dL 86   Bun      8 - 22 mg/dL 12   Creatinine      0.50 - 1.40 mg/dL 0.66   Calcium      8.5 - 10.5 mg/dL 9.8   AST(SGOT)      12 - 45 U/L 18   ALT(SGPT)      2 - 50 U/L 15   Alkaline Phosphatase      30 - 99 U/L 59   Total Bilirubin      0.1 - 1.5 mg/dL 0.9   Albumin      3.2 - 4.9 g/dL 4.1   Total Protein      6.0 - 8.2 g/dL 7.6   Globulin      1.9 - 3.5 g/dL 3.5   A-G Ratio       1.2   Cholesterol,Tot      100 - 199 mg/dL 206 (H)   Triglycerides      0 - 149 mg/dL 181 (H)   HDL      >=40 mg/dL 46   LDL      <100 mg/dL 124 (H)   GFR If African American      >60 mL/min/1.73 m 2 >60   GFR If Non African American      >60 mL/min/1.73 m 2 >60   TSH      0.300 - 3.700 uIU/mL 2.000   Reviewed and discussed above  labs with patient in office.      Assessment and Plan:   The following treatment plan was discussed:     1. Hypothyroidism due to acquired atrophy of thyroid -Stable. Continue current medicines. Monitor labs regularly.    2. Dyslipidemia -Stable. Continue lifestyle modifications. Monitor labs regularly.    3. Chronic bronchitis, unspecified chronic bronchitis type (CMS-HCC) - stable. Pt refuses inhalers. Discussed importance at great length. Advised she needs to be seen at first signs of illness as she is very susceptible to pneumonia. She verbalizes understanding but continues to refuse inhalers and PFT.    4. Obesity (BMI 30-39.9) -- Counseled on diet modification and exercise.   Offered medicare obesity referral--not interested at this time.  Patient identified as having weight management issue.  Appropriate orders and counseling given.   5. Colorectal cancer screening- declines repeat colonoscopy she had done in 2016 with poor prep. Interested in FIT. FIT ordered      - HM: Not interested in Shingles vaccination. Declines PFT.  -Any change or worsening of signs or symptoms, patient encouraged to follow-up or report to emergency room for further evaluation. Patient verbalizes understanding and agrees.    Followup: Return in about 6 months (around 2/14/2018) for AWV. sooner if needed.

## 2017-08-23 ENCOUNTER — HOSPITAL ENCOUNTER (OUTPATIENT)
Facility: MEDICAL CENTER | Age: 67
End: 2017-08-23
Attending: PHYSICIAN ASSISTANT
Payer: MEDICARE

## 2017-08-23 PROCEDURE — 82274 ASSAY TEST FOR BLOOD FECAL: CPT

## 2017-08-26 DIAGNOSIS — Z12.11 COLON CANCER SCREENING: ICD-10-CM

## 2017-08-27 LAB — HEMOCCULT STL QL IA: NEGATIVE

## 2017-10-10 ENCOUNTER — APPOINTMENT (OUTPATIENT)
Dept: SOCIAL WORK | Facility: CLINIC | Age: 67
End: 2017-10-10
Payer: MEDICARE

## 2017-10-10 PROCEDURE — 90662 IIV NO PRSV INCREASED AG IM: CPT | Performed by: REGISTERED NURSE

## 2017-10-10 PROCEDURE — G0008 ADMIN INFLUENZA VIRUS VAC: HCPCS | Performed by: REGISTERED NURSE

## 2018-03-06 ENCOUNTER — TELEPHONE (OUTPATIENT)
Dept: MEDICAL GROUP | Age: 68
End: 2018-03-06

## 2018-03-06 NOTE — TELEPHONE ENCOUNTER
Future Appointments       Provider Department Center    3/12/2018 3:10 PM Vesna Davis P.A.-C.; [facility] [address]          ANNUAL WELLNESS VISIT PRE-VISIT PLANNING WITHOUT OUTREACH    1.  Reviewed note from last office visit with PCP: YES    2.  If any orders were placed at last visit, do we have Results/Consult Notes?        •  Labs - Labs were not ordered at last office visit.  Note: If patient appointment is for lab review and patient did not complete labs, check with provider if OK to reschedule patient until labs completed.       •  Imaging - Imaging was not ordered at last office visit.       •  Referrals - No referrals were ordered at last office visit.    3.  Immunizations were updated in Epic using WebIZ?: Epic matches WebIZ       •  WebIZ Recommendations: ZOSTAVAX (Shingles)        •  Is patient due for Tdap? NO       •  Is patient due for Shingles? YES. Patient was notified of copay/out of pocket cost.     4.  Patient is due for the following Health Maintenance Topics:   Health Maintenance Due   Topic Date Due   • IMM ZOSTER VACCINE  02/08/2008   • PFT SCREENING-FEV1 AND FEV/FVC RATIO / SPIROMETRY SHOULD BE PERFORMED ANNUALLY  04/10/2016   • MAMMOGRAM  11/01/2017   • Annual Wellness Visit  02/14/2018           5.  Reviewed/Updated the following with patient:       •   Preferred Pharmacy? YES       •   Preferred Lab? YES       •   Preferred Communication? YES       •   Allergies? YES       •   Medications? YES. Was Abstract Encounter opened and chart updated? YES       •   Social History? YES. Was Abstract Encounter opened and chart updated? YES       •   Family History (document living status of immediate family members and if + hx of  cancer, diabetes, hypertension, hyperlipidemia, heart attack, stroke) YES. Was Abstract Encounter opened and chart updated? YES    6.  Care Team Updated:       •   DME Company (gait device, O2, CPAP, etc.): YES       •   Other  Specialists (eye doctor, derm, GYN, cardiology, endo, etc): YES    7.  Patient has the following Care Path diagnoses on Problem List:  COPD    1.  Is patient under the care of a pulmonologist? No.  2.  Has patient ever completed a PFT or spirometry? Yes, on 4/10/15.  3.  Is patient on oxygen or CPAP? No.  4.  Has patient ever had instruction on inhaler technique by health care professional? Yes  5.  Is patient interested in a referral to respiratory therapy for more information on COPD, inhaler technique, and/or information on establishing an action plan?  No, patient is NOT interested.      8.  MDX printed and highlighted for Provider? YES    9.  Patient was advised: “This is a free wellness visit. The provider will screen for medical conditions to help you stay healthy. If you have other concerns to address you may be asked to discuss these at a separate visit or there may be an additional fee.”     10.  Patient was informed to arrive 15 min prior to their scheduled appointment and bring in their medication bottles.

## 2018-03-07 ENCOUNTER — HOSPITAL ENCOUNTER (OUTPATIENT)
Dept: LAB | Facility: MEDICAL CENTER | Age: 68
End: 2018-03-07
Attending: PHYSICIAN ASSISTANT
Payer: MEDICARE

## 2018-03-07 DIAGNOSIS — E03.4 HYPOTHYROIDISM DUE TO ACQUIRED ATROPHY OF THYROID: ICD-10-CM

## 2018-03-07 LAB — TSH SERPL DL<=0.005 MIU/L-ACNC: 2.59 UIU/ML (ref 0.38–5.33)

## 2018-03-07 PROCEDURE — 36415 COLL VENOUS BLD VENIPUNCTURE: CPT

## 2018-03-07 PROCEDURE — 84443 ASSAY THYROID STIM HORMONE: CPT

## 2018-03-12 ENCOUNTER — OFFICE VISIT (OUTPATIENT)
Dept: MEDICAL GROUP | Age: 68
End: 2018-03-12
Payer: MEDICARE

## 2018-03-12 ENCOUNTER — HOSPITAL ENCOUNTER (OUTPATIENT)
Facility: MEDICAL CENTER | Age: 68
End: 2018-03-12
Attending: PHYSICIAN ASSISTANT
Payer: MEDICARE

## 2018-03-12 VITALS
SYSTOLIC BLOOD PRESSURE: 118 MMHG | BODY MASS INDEX: 33.42 KG/M2 | OXYGEN SATURATION: 92 % | WEIGHT: 181.6 LBS | DIASTOLIC BLOOD PRESSURE: 74 MMHG | TEMPERATURE: 99.3 F | HEIGHT: 62 IN | HEART RATE: 110 BPM

## 2018-03-12 DIAGNOSIS — N81.10 CYSTOCELE WITH RECTOCELE: ICD-10-CM

## 2018-03-12 DIAGNOSIS — K57.90 DIVERTICULOSIS OF INTESTINE WITHOUT BLEEDING, UNSPECIFIED INTESTINAL TRACT LOCATION: ICD-10-CM

## 2018-03-12 DIAGNOSIS — R82.998 SWEET URINE ODOR: ICD-10-CM

## 2018-03-12 DIAGNOSIS — Z12.31 ENCOUNTER FOR SCREENING MAMMOGRAM FOR BREAST CANCER: ICD-10-CM

## 2018-03-12 DIAGNOSIS — J41.8 MIXED SIMPLE AND MUCOPURULENT CHRONIC BRONCHITIS (HCC): ICD-10-CM

## 2018-03-12 DIAGNOSIS — N81.6 CYSTOCELE WITH RECTOCELE: ICD-10-CM

## 2018-03-12 DIAGNOSIS — Z00.00 MEDICARE ANNUAL WELLNESS VISIT, SUBSEQUENT: Primary | ICD-10-CM

## 2018-03-12 DIAGNOSIS — E03.4 HYPOTHYROIDISM DUE TO ACQUIRED ATROPHY OF THYROID: ICD-10-CM

## 2018-03-12 DIAGNOSIS — E66.9 OBESITY (BMI 30-39.9): ICD-10-CM

## 2018-03-12 DIAGNOSIS — E78.5 DYSLIPIDEMIA: ICD-10-CM

## 2018-03-12 DIAGNOSIS — H91.93 DECREASED HEARING OF BOTH EARS: ICD-10-CM

## 2018-03-12 LAB
APPEARANCE UR: NORMAL
BILIRUB UR STRIP-MCNC: NEGATIVE MG/DL
COLOR UR AUTO: YELLOW
GLUCOSE UR STRIP.AUTO-MCNC: NEGATIVE MG/DL
KETONES UR STRIP.AUTO-MCNC: NEGATIVE MG/DL
LEUKOCYTE ESTERASE UR QL STRIP.AUTO: NORMAL
NITRITE UR QL STRIP.AUTO: NEGATIVE
PH UR STRIP.AUTO: 5 [PH] (ref 5–8)
PROT UR QL STRIP: NEGATIVE MG/DL
RBC UR QL AUTO: NORMAL
SP GR UR STRIP.AUTO: 1
UROBILINOGEN UR STRIP-MCNC: NEGATIVE MG/DL

## 2018-03-12 PROCEDURE — 81002 URINALYSIS NONAUTO W/O SCOPE: CPT | Performed by: PHYSICIAN ASSISTANT

## 2018-03-12 PROCEDURE — G0439 PPPS, SUBSEQ VISIT: HCPCS | Performed by: PHYSICIAN ASSISTANT

## 2018-03-12 PROCEDURE — 87086 URINE CULTURE/COLONY COUNT: CPT

## 2018-03-12 ASSESSMENT — ACTIVITIES OF DAILY LIVING (ADL): BATHING_REQUIRES_ASSISTANCE: 0

## 2018-03-12 ASSESSMENT — PATIENT HEALTH QUESTIONNAIRE - PHQ9: CLINICAL INTERPRETATION OF PHQ2 SCORE: 0

## 2018-03-12 ASSESSMENT — PAIN SCALES - GENERAL: PAINLEVEL: NO PAIN

## 2018-03-12 NOTE — PROGRESS NOTES
Chief Complaint   Patient presents with   • Annual Wellness Visit     SCP         HPI:  Vaishali is a 68 y.o. here for Medicare Annual Wellness Visit    Urine  Reports she has some concerns regarding DM. States AM urine has sweet odor.     Patient Active Problem List    Diagnosis Date Noted   • Dyslipidemia 02/13/2017   • Obesity (BMI 30-39.9) 09/17/2016   • Diverticulosis 04/23/2015   • Rectocele 04/10/2015   • Cystocele 04/10/2015   • Decreased hearing 01/22/2015   • Hypothyroidism due to acquired atrophy of thyroid 01/21/2013   • COPD (chronic obstructive pulmonary disease) (CMS-[facility]) 01/20/2013       Current Outpatient Prescriptions   Medication Sig Dispense Refill   • levothyroxine (SYNTHROID) 75 MCG Tab Take 1 Tab by mouth every morning before breakfast. 90 Tab 3   • Cyanocobalamin (VITAMIN B-12) 1000 MCG Tab Take 1,000 mcg by mouth every day.     • vitamin D (CHOLECALCIFEROL) 1000 UNIT Tab Take 1,000 Units by mouth every day.     • IRON PO Take  by mouth.     • ipratropium-albuterol (DUONEB) 0.5-2.5 (3) MG/3ML nebulizer solution 3 mL by Nebulization route every four hours as needed for Shortness of Breath. 100 Vial 3   • albuterol (PROAIR HFA) 108 (90 BASE) MCG/ACT Aero Soln inhalation aerosol Inhale 2 Puffs by mouth every four hours as needed for Shortness of Breath. 1 Inhaler 3   • albuterol (PROVENTIL) 2.5mg/0.5ml NEBU 2.5 mg by Nebulization route every four hours as needed.       No current facility-administered medications for this visit.         Patient is taking medications as noted in medication list.  Current supplements as per medication list.     Allergies: Nkda [no known drug allergy]    Current social contact/activities: go out. Visit friends   Patient's perception of their health: good    Is patient current with immunizations? No, due for TDAP. Patient is interested in receiving NONE today.    She  reports that she quit smoking about 11 years ago. Her smoking use included Cigarettes. She has a  40.00 pack-year smoking history. She has never used smokeless tobacco. She reports that she drinks about 1.8 oz of alcohol per week . She reports that she does not use drugs.  Counseling given: Not Answered        DPA/Advanced directive: Patient does not have an Advanced Directive.  A packet and workshop information was given on Advanced Directives.    ROS:    Gait: Uses no assistive device   Ostomy: no   Other tubes: no   Amputations: no   Chronic oxygen use no   Last eye exam 2017   Wears hearing aids: no   : Denies any urinary leakage during the last 6 months      Screening:      Depression Screening  Little interest or pleasure in doing things?  0 - not at all  Feeling down, depressed, or hopeless? 0 - not at all  Patient Health Questionnaire Score: 0    No depressive symptoms identified.    Screening for Cognitive Impairment  Three Minute Recall (apple, watch, temitope)   /3 Table leader sunset  3/3  Draw clock face with all 12 numbers set to the hand to show 10 minutes past 11 o'clock  1 5/5  No cognitive concerns identified.    Fall Risk Assessment  Has the patient had two or more falls in the last year or any fall with injury in the last year?  No  No fall risk identified.    Safety Assessment  Throw rugs on floor.  Yes  Handrails on all stairs.  Yes  Good lighting in all hallways.  Yes  Difficulty hearing.  No  Patient counseled about all safety risks that were identified.    Functional Assessment ADLs  Are there any barriers preventing you from cooking for yourself or meeting nutritional needs?  No.    Are there any barriers preventing you from driving safely or obtaining transportation?  No.    Are there any barriers preventing you from using a telephone or calling for help?  No.    Are there any barriers preventing you from shopping?  No.    Are there any barriers preventing you from taking care of your own finances?  No.    Are there any barriers preventing you from managing your medications?  No.    Are  there any barriers preventing you from showering, bathing or dressing yourself?  No.    Are you currently engaging any exercise or physical activity?  Yes.  Bike and treadmill    Health Maintenance Summary                IMM ZOSTER VACCINE Overdue 2/8/2008     PFT SCREENING-FEV1 AND FEV/FVC RATIO / SPIROMETRY SHOULD BE PERFORMED ANNUALLY Overdue 4/10/2016      Done 4/10/2015 See pulm consult in media-- 4/10/15 PFT performed in office.    MAMMOGRAM Overdue 11/1/2017      Done 11/1/2016 MA-MAMMO SCREENING BILAT W/TOMOSYNTHESIS W/CAD     Patient has more history with this topic...    Annual Wellness Visit Overdue 2/14/2018      Done 2/13/2017 Visit Dx: Medicare annual wellness visit, subsequent     Patient has more history with this topic...    COLON CANCER SCREENING ANNUAL FIT Next Due 8/23/2018      Done 8/23/2017 OCCULT BLOOD FECES IMMUNOASSAY    BONE DENSITY Next Due 4/10/2020      Done 4/10/2015 DS-BONE DENSITY STUDY (DEXA)    IMM DTaP/Tdap/Td Vaccine Next Due 4/10/2025      Done 4/10/2015 Imm Admin: Tdap Vaccine          Patient Care Team:  V[facility]na Davis P.A.-C. as PCP - General (Family Medicine)  Skylar Ace M.D. (OB/Gyn)  Jared ORTIZ M.D. (Inactive) as Consulting Physician (Gastroenterology)  [facility] as Consulting Physician (Optometry)    Social History   Substance Use Topics   • Smoking status: Former Smoker     Packs/day: 1.00     Years: 40.00     Types: Cigarettes     Quit date: 1/22/2007   • Smokeless tobacco: Never Used      Comment: 4 days   • Alcohol use 1.8 oz/week     2 Standard drinks or equivalent, 1 Glasses of wine per week      Comment: wine     Family History   Problem Relation Age of Onset   • Diabetes Sister      DMI   • Diabetes Brother      DM2. no meds   • Stroke Father    • Cancer Maternal Grandmother      Uterine   • Diabetes Paternal Grandmother    • Cancer Maternal Uncle    • Cancer Paternal Uncle    • Arthritis Son      Arthritis, gout   • Asthma Son    •  "Alcohol/Drug Neg Hx      She  has a past medical history of Anemia; Emphysema; Hypothyroid; Osteoporosis; Pneumonia; and Urinary retention.   Past Surgical History:   Procedure Laterality Date   • OTHER ORTHOPEDIC SURGERY      R shoulder surgery           Exam:     Blood pressure 118/74, pulse (!) 110, temperature 37.4 °C (99.3 °F), height 1.575 m (5' 2\"), weight 82.4 kg (181 lb 9.6 oz), SpO2 92 %. Body mass index is 33.22 kg/m².    Hearing poor.    Dentition fair  Alert, oriented in no acute distress.  Eye contact is good, speech goal directed, affect calm      Assessment and Plan. The following treatment and monitoring plan is recommended:    1. Medicare annual wellness visit, subsequent-- Reviewed above screenings with patient. Discussed advanced directives at length. Packet given to complete along with information on workshops.     Annual Wellness Visit - Includes PPPS Subsequent ()   2. Chronic bronchitis, unspecified chronic bronchitis type (CMS-HCC) -Stable. Refuses to be on any sort of inhaler.  We will try and get her in for a pulmonary function test.   Annual Wellness Visit - Includes PPPS Subsequent ()   3. Sweet urine odor - UA normal in office.  We will send it off for culture as patient is concerned with the smell and cloudiness in the morning.   Annual Wellness Visit - Includes PPPS Subsequent ()   4. Dyslipidemia  Annual Wellness Visit - Includes PPPS Subsequent ()   5. Obesity (BMI 30-39.9) - Reports been exercising about 20 minutes minimum a day (bike or treadmill). Reports diet high in veggies, fish and chicken.  Patient identified as having weight management issue.  Appropriate orders and counseling given.    Annual Wellness Visit - Includes PPPS Subsequent ()   6. Diverticulosis of intestine without bleeding, unspecified intestinal tract location - stable.  Continue with high-fiber diet and water intake.   Annual Wellness Visit - Includes PPPS Subsequent ()   7. " Decreased hearing of both ears - Stable.  Refuses to do anything further about this.   Annual Wellness Visit - Includes PPPS Subsequent ()   8. Hypothyroidism due to acquired atrophy of thyroid -Stable. Continue current medicines. Monitor labs regularly.   Annual Wellness Visit - Includes PPPS Subsequent ()   9. Cystocele with rectocele - stable.  No longer using pessary as she is no longer doing any heavy lifting.   Annual Wellness Visit - Includes PPPS Subsequent ()       Services suggested: No services needed at this time  Health Care Screening: Age-appropriate preventive services recommended by USPTF and ACIP covered by Medicare were discussed today. Services ordered if indicated and agreed upon by the patient.  Referrals offered: PT/OT/Nutrition counseling/Behavioral Health/Smoking cessation as per orders if indicated.    Discussion today about general wellness and lifestyle habits:    · Prevent falls and reduce trip hazards; Cautioned about securing or removing rugs.  · Have a working fire alarm and carbon monoxide detector;   · Engage in regular physical activity and social activities       Follow-up: Return in about 6 months (around 9/12/2018) for lab review, sooner if needed.

## 2018-03-15 LAB
BACTERIA UR CULT: NORMAL
SIGNIFICANT IND 70042: NORMAL
SITE SITE: NORMAL
SOURCE SOURCE: NORMAL

## 2018-08-20 DIAGNOSIS — E03.9 HYPOTHYROIDISM, UNSPECIFIED TYPE: ICD-10-CM

## 2018-08-20 RX ORDER — LEVOTHYROXINE SODIUM 0.07 MG/1
75 TABLET ORAL
Qty: 90 TAB | Refills: 1 | Status: SHIPPED | OUTPATIENT
Start: 2018-08-20 | End: 2018-10-02 | Stop reason: SDUPTHER

## 2018-08-21 NOTE — TELEPHONE ENCOUNTER
Was the patient seen in the last year in this department? Yes    Does patient have an active prescription for medications requested? No     Received Request Via: Patient     Phone Number Called: [phone] (home)     Message: Pt left a voicemail requesting a refill on her thyroid medication. I have called her back and informed her that I have sent her request to [facility]. Once the medication is sent to the pharmacy she would like a call back notifying her that this has been done.    Left Message for patient to call back: no

## 2018-09-01 ENCOUNTER — HOSPITAL ENCOUNTER (EMERGENCY)
Facility: MEDICAL CENTER | Age: 68
End: 2018-09-01
Attending: EMERGENCY MEDICINE
Payer: MEDICARE

## 2018-09-01 ENCOUNTER — APPOINTMENT (OUTPATIENT)
Dept: RADIOLOGY | Facility: MEDICAL CENTER | Age: 68
End: 2018-09-01
Attending: EMERGENCY MEDICINE
Payer: MEDICARE

## 2018-09-01 VITALS
TEMPERATURE: 97.4 F | DIASTOLIC BLOOD PRESSURE: 66 MMHG | SYSTOLIC BLOOD PRESSURE: 142 MMHG | RESPIRATION RATE: 20 BRPM | WEIGHT: 175.71 LBS | BODY MASS INDEX: 32.33 KG/M2 | OXYGEN SATURATION: 90 % | HEART RATE: 101 BPM | HEIGHT: 62 IN

## 2018-09-01 DIAGNOSIS — N30.00 ACUTE CYSTITIS WITHOUT HEMATURIA: ICD-10-CM

## 2018-09-01 DIAGNOSIS — K80.20 CALCULUS OF GALLBLADDER WITHOUT CHOLECYSTITIS WITHOUT OBSTRUCTION: ICD-10-CM

## 2018-09-01 LAB
ALBUMIN SERPL BCP-MCNC: 4.2 G/DL (ref 3.2–4.9)
ALBUMIN/GLOB SERPL: 1.2 G/DL
ALP SERPL-CCNC: 65 U/L (ref 30–99)
ALT SERPL-CCNC: 27 U/L (ref 2–50)
ANION GAP SERPL CALC-SCNC: 10 MMOL/L (ref 0–11.9)
APPEARANCE UR: ABNORMAL
APTT PPP: 26.5 SEC (ref 24.7–36)
AST SERPL-CCNC: 32 U/L (ref 12–45)
BACTERIA #/AREA URNS HPF: ABNORMAL /HPF
BASOPHILS # BLD AUTO: 0.3 % (ref 0–1.8)
BASOPHILS # BLD: 0.04 K/UL (ref 0–0.12)
BILIRUB SERPL-MCNC: 1.6 MG/DL (ref 0.1–1.5)
BILIRUB UR QL STRIP.AUTO: NEGATIVE
BNP SERPL-MCNC: 48 PG/ML (ref 0–100)
BUN SERPL-MCNC: 12 MG/DL (ref 8–22)
CALCIUM SERPL-MCNC: 9.2 MG/DL (ref 8.4–10.2)
CASTS URNS QL MICRO: ABNORMAL /LPF
CHLORIDE SERPL-SCNC: 101 MMOL/L (ref 96–112)
CO2 SERPL-SCNC: 26 MMOL/L (ref 20–33)
COLOR UR: YELLOW
CREAT SERPL-MCNC: 0.75 MG/DL (ref 0.5–1.4)
EKG IMPRESSION: NORMAL
EOSINOPHIL # BLD AUTO: 0.13 K/UL (ref 0–0.51)
EOSINOPHIL NFR BLD: 1.1 % (ref 0–6.9)
ERYTHROCYTE [DISTWIDTH] IN BLOOD BY AUTOMATED COUNT: 42.8 FL (ref 35.9–50)
GLOBULIN SER CALC-MCNC: 3.5 G/DL (ref 1.9–3.5)
GLUCOSE SERPL-MCNC: 115 MG/DL (ref 65–99)
GLUCOSE UR STRIP.AUTO-MCNC: NEGATIVE MG/DL
HCT VFR BLD AUTO: 42.1 % (ref 37–47)
HGB BLD-MCNC: 13.9 G/DL (ref 12–16)
IMM GRANULOCYTES # BLD AUTO: 0.04 K/UL (ref 0–0.11)
IMM GRANULOCYTES NFR BLD AUTO: 0.3 % (ref 0–0.9)
INR PPP: 1.01 (ref 0.87–1.13)
KETONES UR STRIP.AUTO-MCNC: NEGATIVE MG/DL
LEUKOCYTE ESTERASE UR QL STRIP.AUTO: ABNORMAL
LIPASE SERPL-CCNC: 24 U/L (ref 7–58)
LYMPHOCYTES # BLD AUTO: 2.3 K/UL (ref 1–4.8)
LYMPHOCYTES NFR BLD: 20.1 % (ref 22–41)
MCH RBC QN AUTO: 30.2 PG (ref 27–33)
MCHC RBC AUTO-ENTMCNC: 33 G/DL (ref 33.6–35)
MCV RBC AUTO: 91.3 FL (ref 81.4–97.8)
MICRO URNS: ABNORMAL
MONOCYTES # BLD AUTO: 0.56 K/UL (ref 0–0.85)
MONOCYTES NFR BLD AUTO: 4.9 % (ref 0–13.4)
MUCOUS THREADS #/AREA URNS HPF: ABNORMAL /HPF
NEUTROPHILS # BLD AUTO: 8.39 K/UL (ref 2–7.15)
NEUTROPHILS NFR BLD: 73.3 % (ref 44–72)
NITRITE UR QL STRIP.AUTO: POSITIVE
NRBC # BLD AUTO: 0 K/UL
NRBC BLD-RTO: 0 /100 WBC
PH UR STRIP.AUTO: 5 [PH]
PLATELET # BLD AUTO: 287 K/UL (ref 164–446)
PMV BLD AUTO: 10.3 FL (ref 9–12.9)
POTASSIUM SERPL-SCNC: 3.8 MMOL/L (ref 3.6–5.5)
PROT SERPL-MCNC: 7.7 G/DL (ref 6–8.2)
PROT UR QL STRIP: NEGATIVE MG/DL
PROTHROMBIN TIME: 13.2 SEC (ref 12–14.6)
RBC # BLD AUTO: 4.61 M/UL (ref 4.2–5.4)
RBC UR QL AUTO: NEGATIVE
SODIUM SERPL-SCNC: 137 MMOL/L (ref 135–145)
SP GR UR STRIP.AUTO: 1.02
TROPONIN I SERPL-MCNC: <0.02 NG/ML (ref 0–0.04)
UNIDENT CRYS URNS QL MICRO: ABNORMAL /HPF
WBC # BLD AUTO: 11.5 K/UL (ref 4.8–10.8)
WBC #/AREA URNS HPF: ABNORMAL /HPF

## 2018-09-01 PROCEDURE — 84484 ASSAY OF TROPONIN QUANT: CPT

## 2018-09-01 PROCEDURE — 36415 COLL VENOUS BLD VENIPUNCTURE: CPT

## 2018-09-01 PROCEDURE — 83880 ASSAY OF NATRIURETIC PEPTIDE: CPT

## 2018-09-01 PROCEDURE — 700105 HCHG RX REV CODE 258: Performed by: EMERGENCY MEDICINE

## 2018-09-01 PROCEDURE — 85730 THROMBOPLASTIN TIME PARTIAL: CPT

## 2018-09-01 PROCEDURE — 80053 COMPREHEN METABOLIC PANEL: CPT

## 2018-09-01 PROCEDURE — 85025 COMPLETE CBC W/AUTO DIFF WBC: CPT

## 2018-09-01 PROCEDURE — 81001 URINALYSIS AUTO W/SCOPE: CPT

## 2018-09-01 PROCEDURE — 83690 ASSAY OF LIPASE: CPT

## 2018-09-01 PROCEDURE — 85610 PROTHROMBIN TIME: CPT

## 2018-09-01 PROCEDURE — 99284 EMERGENCY DEPT VISIT MOD MDM: CPT

## 2018-09-01 PROCEDURE — 93005 ELECTROCARDIOGRAM TRACING: CPT | Performed by: EMERGENCY MEDICINE

## 2018-09-01 PROCEDURE — 76705 ECHO EXAM OF ABDOMEN: CPT

## 2018-09-01 RX ORDER — CEFDINIR 300 MG/1
300 CAPSULE ORAL 2 TIMES DAILY
Qty: 14 CAP | Refills: 0 | Status: SHIPPED | OUTPATIENT
Start: 2018-09-01 | End: 2018-09-08

## 2018-09-01 RX ORDER — SODIUM CHLORIDE 9 MG/ML
500 INJECTION, SOLUTION INTRAVENOUS ONCE
Status: COMPLETED | OUTPATIENT
Start: 2018-09-01 | End: 2018-09-01

## 2018-09-01 RX ADMIN — SODIUM CHLORIDE 500 ML: 9 INJECTION, SOLUTION INTRAVENOUS at 15:20

## 2018-09-01 ASSESSMENT — PAIN SCALES - GENERAL: PAINLEVEL_OUTOF10: 3

## 2018-09-01 NOTE — ED PROVIDER NOTES
ED Provider Note    CHIEF COMPLAINT  Chief Complaint   Patient presents with   • Abdominal Pain        HPI  Vaishali Soares is a 68 y.o. female who presents to the ED with complaints of abdominal pain.  Patient states over the past 3 weeks she has been having intermittent episodes of abdominal pain primary in the epigastric region especially after she eats something.  Today she had an episode after she had a little bit of eggs and seemed to radiate straight to her back.  Because of this the patient was concerned and presented to the ED for evaluation.  Upon arrival she still describes a mild to over 10 type discomfort located centrally in her abdomen with radiation to her back.  Denies any nausea vomiting fevers chills or any other symptoms.  The patient did take 2 aspirins prior to arrival.    REVIEW OF SYSTEMS  See HPI for further details. All other systems are negative.     PAST MEDICAL HISTORY  Past Medical History:   Diagnosis Date   • Anemia    • Emphysema    • Hypothyroid    • Osteoporosis    • Pneumonia    • Urinary retention        FAMILY HISTORY  Family History   Problem Relation Age of Onset   • Diabetes Sister         DMI   • Diabetes Brother         DM2. no meds   • Stroke Father    • Cancer Maternal Grandmother         Uterine   • Diabetes Paternal Grandmother    • Cancer Maternal Uncle    • Cancer Paternal Uncle    • Arthritis Son         Arthritis, gout   • Asthma Son    • Alcohol/Drug Neg Hx      Patient's family history has been discussed and is been found to be noncontributory to his present illness  SOCIAL HISTORY  Social History     Social History   • Marital status: Single     Spouse name: N/A   • Number of children: N/A   • Years of education: N/A     Social History Main Topics   • Smoking status: Former Smoker     Packs/day: 1.00     Years: 40.00     Types: Cigarettes     Quit date: 1/22/2007   • Smokeless tobacco: Never Used      Comment: 4 days   • Alcohol use 1.8 oz/week     1 Glasses  "of wine, 2 Standard drinks or equivalent per week      Comment: Occasionally   • Drug use: No   • Sexual activity: No     Other Topics Concern   • Not on file     Social History Narrative    Lives by herself.     .     Has 4 children; one son has arthritis, gout and asthma.     Work: worker      [employer]  use      SURGICAL HISTORY  Past Surgical History:   Procedure Laterality Date   • OTHER ORTHOPEDIC SURGERY      R shoulder surgery       CURRENT MEDICATIONS  Home Medications     Reviewed by Jacoby Mohan (Pharmacy Tech) on 09/01/18 at 1431  Med List Status: Complete   Medication Last Dose Status   levothyroxine (SYNTHROID) 75 MCG Tab 9/1/2018 Active                ALLERGIES  Allergies   Allergen Reactions   • Nkda [No Known Drug Allergy]        PHYSICAL EXAM  VITAL SIGNS: /66   Pulse (!) 101   Temp 36.3 °C (97.4 °F)   Resp 20   Ht 1.575 m (5' 2\")   Wt 79.7 kg (175 lb 11.3 oz)   SpO2 90%   BMI 32.14 kg/m²    Pulse Oximetry was obtained. It showed a reading of Pulse Oximetry: 90 %.  I interpreted this as nonhypoxic.     Constitutional: Well developed, Well nourished, No acute distress, Non-toxic appearance.   HENT: Normocephalic, Atraumatic, Bilateral external ears normal, bilateral tympanic membranes normal, Oropharynx dry mucous membranes, No oral exudates, Nose normal.   Eyes: Pupils are equal round and react to light, extraocular motions are intact, conjunctiva is normal, there are no signs of exudate.   Neck: Supple, no cervical lymphadenopathy, no meningeal signs..   Lymphatic: No lymphadenopathy noted.   Cardiovascular: Regular rate and rhythm without murmurs gallops or rubs.   Thorax & Lungs: Lungs are clear to auscultation bilaterally, there are mild wheezes at the bases but otherwise no rales. Chest wall is nontender.  Abdomen: Soft mildly tender in the right upper quadrant region negative Camilo sign bowel sounds are present  Skin: Warm, Dry, No erythema,   Back: " No tenderness, No CVA tenderness.   Extremities: Intact distal pulses, no clubbing, no cyanosis, no edema, nontender.  Neurologic: Alert & oriented x 3, Normal motor function, Normal sensory function, No focal deficits noted.       Twelve-lead EKG interpreted below by myself  RADIOLOGY/PROCEDURES  US-GALLBLADDER   Final Result      Cholelithiasis.      Echogenic liver suggesting fatty infiltration.          Results for orders placed or performed during the hospital encounter of 09/01/18   CBC WITH DIFFERENTIAL   Result Value Ref Range    WBC 11.5 (H) 4.8 - 10.8 K/uL    RBC 4.61 4.20 - 5.40 M/uL    Hemoglobin 13.9 12.0 - 16.0 g/dL    Hematocrit 42.1 37.0 - 47.0 %    MCV 91.3 81.4 - 97.8 fL    MCH 30.2 27.0 - 33.0 pg    MCHC 33.0 (L) 33.6 - 35.0 g/dL    RDW 42.8 35.9 - 50.0 fL    Platelet Count 287 164 - 446 K/uL    MPV 10.3 9.0 - 12.9 fL    Neutrophils-Polys 73.30 (H) 44.00 - 72.00 %    Lymphocytes 20.10 (L) 22.00 - 41.00 %    Monocytes 4.90 0.00 - 13.40 %    Eosinophils 1.10 0.00 - 6.90 %    Basophils 0.30 0.00 - 1.80 %    Immature Granulocytes 0.30 0.00 - 0.90 %    Nucleated RBC 0.00 /100 WBC    Neutrophils (Absolute) 8.39 (H) 2.00 - 7.15 K/uL    Lymphs (Absolute) 2.30 1.00 - 4.80 K/uL    Monos (Absolute) 0.56 0.00 - 0.85 K/uL    Eos (Absolute) 0.13 0.00 - 0.51 K/uL    Baso (Absolute) 0.04 0.00 - 0.12 K/uL    Immature Granulocytes (abs) 0.04 0.00 - 0.11 K/uL    NRBC (Absolute) 0.00 K/uL   COMP METABOLIC PANEL   Result Value Ref Range    Sodium 137 135 - 145 mmol/L    Potassium 3.8 3.6 - 5.5 mmol/L    Chloride 101 96 - 112 mmol/L    Co2 26 20 - 33 mmol/L    Anion Gap 10.0 0.0 - 11.9    Glucose 115 (H) 65 - 99 mg/dL    Bun 12 8 - 22 mg/dL    Creatinine 0.75 0.50 - 1.40 mg/dL    Calcium 9.2 8.4 - 10.2 mg/dL    AST(SGOT) 32 12 - 45 U/L    ALT(SGPT) 27 2 - 50 U/L    Alkaline Phosphatase 65 30 - 99 U/L    Total Bilirubin 1.6 (H) 0.1 - 1.5 mg/dL    Albumin 4.2 3.2 - 4.9 g/dL    Total Protein 7.7 6.0 - 8.2 g/dL    Globulin  3.5 1.9 - 3.5 g/dL    A-G Ratio 1.2 g/dL   LIPASE   Result Value Ref Range    Lipase 24 7 - 58 U/L   URINALYSIS,CULTURE IF INDICATED   Result Value Ref Range    Color Yellow     Character Hazy (A)     Specific Gravity 1.025 <1.035    Ph 5.0 5.0 - 8.0    Glucose Negative Negative mg/dL    Ketones Negative Negative mg/dL    Protein Negative Negative mg/dL    Bilirubin Negative Negative    Nitrite Positive (A) Negative    Leukocyte Esterase Small (A) Negative    Occult Blood Negative Negative    Micro Urine Req Microscopic    TROPONIN   Result Value Ref Range    Troponin I <0.02 0.00 - 0.04 ng/mL   BNP   Result Value Ref Range    B Natriuretic Peptide 48 0 - 100 pg/mL   PT/INR   Result Value Ref Range    PT 13.2 12.0 - 14.6 sec    INR 1.01 0.87 - 1.13   PTT   Result Value Ref Range    APTT 26.5 24.7 - 36.0 sec   ESTIMATED GFR   Result Value Ref Range    GFR If African American >60 >60 mL/min/1.73 m 2    GFR If Non African American >60 >60 mL/min/1.73 m 2   URINE MICROSCOPIC (W/UA)   Result Value Ref Range    WBC 5-10 (A) /hpf    Bacteria Many (A) None /hpf    Mucous Threads Few /hpf    Urine Crystals Few Amorphous /hpf    Urine Casts 0-2 Hyaline /lpf   EKG (ER)   Result Value Ref Range    Report       [facility]    Test Date:  2018  Pt Name:    AMADOR CHEUNG                Department: [facility]  MRN:        [id number]                      [facility]  Gender:     Female                       Technician: [id number]  :        [id number]                   Requested By:AVIVA SERNA  Order #:    [id number]                    Reading MD: AVIVA SERNA MD    Measurements  Intervals                                Axis  Rate:       61                           P:          45  MN:         177                          QRS:        -5  QRSD:       110                          T:          -6  QT:         418  QTc:        421    Interpretive Statements  Sinus rhythm  Probable  left ventricular hypertrophy  Borderline T abnormalities, inferior leads  No previous ECG available for comparison  no acute findings  Electronically Signed On 9-1-2018 20:35:43 PDT by AVIVA SERNA MD           COURSE & MEDICAL DECISION MAKING  Pertinent Labs & Imaging studies reviewed. (See chart for details)  Patient presents ED for evaluation.  Initial concerns are for cholecystitis.  Ultrasound was obtained does show cholelithiasis but no other abnormalities.  Urine does have nitrate positive so she does appear to have urinary tract infection on further investigation she states that she has had some burning urine over the past couple days.  I did start the patient with a liter bolus of normal saline due to signs of DI duration she is feeling improved after treatment.  She has no abdominal pain at this time's at this point do not feel that she has cholecystitis.  I will start the patient on Omnicef for urinary tract infection.  Recommend for the patient to follow-up with Dr. Gordillo who is on for general surgery for outpatient evaluation of her cholelithiasis and possible cholecystectomy.  At this point patient is to return if there is any worsening symptoms otherwise follow-up as above.    FINAL IMPRESSION  1. Calculus of gallbladder without cholecystitis without obstruction    2. Acute cystitis without hematuria           The patient will return for new or worsening symptoms and is stable at the time of discharge.    The patient is referred to a primary physician for blood pressure management, diabetic screening, and for all other preventative health concerns.        DISPOSITION:  Patient will be discharged home in stable condition.    FOLLOW UP:  Mahesh Gordillo M.D.  [address]  [place] [address]  [phone]    Schedule an appointment as soon as possible for a visit  For further evaluation, Return if any symptoms worsen      OUTPATIENT MEDICATIONS:  Discharge Medication List as  of 9/1/2018  6:43 PM      START taking these medications    Details   cefdinir (OMNICEF) 300 MG Cap Take 1 Cap by mouth 2 times a day for 7 days., Disp-14 Cap, R-0, Print Rx Paper               Electronically signed by: Nghia Sheikh, 9/1/2018 2:41 PM

## 2018-09-01 NOTE — ED NOTES
Pt c/o RUQ pain radiating to back starting last night.  Pt c/o feeling nauseated, worse after eating or drinking anything.

## 2018-09-01 NOTE — ED NOTES
"C/O acute onset of diffuse abdominal pian and discomfort \"between my shoulder blades\" since this AM.  She denies N/V.   "

## 2018-09-02 NOTE — ED NOTES
Reviewed discharge instructions and prescription for Omnicef w/ pt, verbalized understanding to information provided including follow up care w/ Surgeon and return precautions, denied questions/concerns.  Pt ambulated from ED w/ family member.

## 2018-09-02 NOTE — DISCHARGE INSTRUCTIONS
Cholelithiasis  Cholelithiasis is a form of gallbladder disease in which gallstones form in the gallbladder. The gallbladder is an organ that stores bile. Bile is made in the liver, and it helps to digest fats. Gallstones begin as small crystals and slowly grow into stones. They may cause no symptoms until the gallbladder tightens (contracts) and a gallstone is blocking the duct (gallbladder attack), which can cause pain. Cholelithiasis is also referred to as gallstones.  There are two main types of gallstones:  · Cholesterol stones. These are made of hardened cholesterol and are usually yellow-green in color. They are the most common type of gallstone. Cholesterol is a white, waxy, fat-like substance that is made in the liver.  · Pigment stones. These are dark in color and are made of a red-yellow substance that forms when hemoglobin from red blood cells breaks down (bilirubin).  What are the causes?  This condition may be caused by an imbalance in the substances that bile is made of. This can happen if the bile:  · Has too much bilirubin.  · Has too much cholesterol.  · Does not have enough bile salts. These salts help the body absorb and digest fats.  In some cases, this condition can also be caused by the gallbladder not emptying completely or often enough.  What increases the risk?  The following factors may make you more likely to develop this condition:  · Being female.  · Having multiple pregnancies. Health care providers sometimes advise removing diseased gallbladders before future pregnancies.  · Eating a diet that is heavy in fried foods, fat, and refined carbohydrates, like white bread and white rice.  · Being obese.  · Being older than age 40.  · Prolonged use of medicines that contain female hormones (estrogen).  · Having diabetes mellitus.  · Rapidly losing weight.  · Having a family history of gallstones.  · Being of  or [place] descent.  · Having an intestinal disease such as Crohn  disease.  · Having metabolic syndrome.  · Having cirrhosis.  · Having severe types of anemia such as sickle cell anemia.  What are the signs or symptoms?  In most cases, there are no symptoms. These are known as silent gallstones. If a gallstone blocks the bile ducts, it can cause a gallbladder attack. The main symptom of a gallbladder attack is sudden pain in the upper right abdomen. The pain usually comes at night or after eating a large meal. The pain can last for one or several hours and can spread to the right shoulder or chest.  If the bile duct is blocked for more than a few hours, it can cause infection or inflammation of the gallbladder, liver, or pancreas, which may cause:  · Nausea.  · Vomiting.  · Abdominal pain that lasts for 5 hours or more.  · Fever or chills.  · Yellowing of the skin or the whites of the eyes (jaundice).  · Dark urine.  · Light-colored stools.  How is this diagnosed?  This condition may be diagnosed based on:  · A physical exam.  · Your medical history.  · An ultrasound of your gallbladder.  · CT scan.  · MRI.  · Blood tests to check for signs of infection or inflammation.  · A scan of your gallbladder and bile ducts (biliary system) using nonharmful radioactive material and special cameras that can see the radioactive material (cholescintigram). This test checks to see how your gallbladder contracts and whether bile ducts are blocked.  · Inserting a small tube with a camera on the end (endoscope) through your mouth to inspect bile ducts and check for blockages (endoscopic retrograde cholangiopancreatogram).  How is this treated?  Treatment for gallstones depends on the severity of the condition. Silent gallstones do not need treatment. If the gallstones cause a gallbladder attack or other symptoms, treatment may be required. Options for treatment include:  · Surgery to remove the gallbladder (cholecystectomy). This is the most common treatment.  · Medicines to dissolve gallstones.  These are most effective at treating small gallstones. You may need to take medicines for up to 6-12 months.  · Shock wave treatment (extracorporeal biliary lithotripsy). In this treatment, an ultrasound machine sends shock waves to the gallbladder to break gallstones into smaller pieces. These pieces can then be passed into the intestines or be dissolved by medicine. This is rarely used.  · Removing gallstones through endoscopic retrograde cholangiopancreatogram. A small basket can be attached to the endoscope and used to capture and remove gallstones.  Follow these instructions at home:  · Take over-the-counter and prescription medicines only as told by your health care provider.  · Maintain a healthy weight and follow a healthy diet. This includes:  ¨ Reducing fatty foods, such as fried food.  ¨ Reducing refined carbohydrates, like white bread and white rice.  ¨ Increasing fiber. Aim for foods like almonds, fruit, and beans.  · Keep all follow-up visits as told by your health care provider. This is important.  Contact a health care provider if:  · You think you have had a gallbladder attack.  · You have been diagnosed with silent gallstones and you develop abdominal pain or indigestion.  Get help right away if:  · You have pain from a gallbladder attack that lasts for more than 2 hours.  · You have abdominal pain that lasts for more than 5 hours.  · You have a fever or chills.  · You have persistent nausea and vomiting.  · You develop jaundice.  · You have dark urine or light-colored stools.  Summary  · Cholelithiasis (also called gallstones) is a form of gallbladder disease in which gallstones form in the gallbladder.  · This condition is caused by an imbalance in the substances that make up bile. This can happen if the bile has too much cholesterol, too much bilirubin, or not enough bile salts.  · You are more likely to develop this condition if you are female, pregnant, using medicines with estrogen, obese,  older than age 40, or have a family history of gallstones. You may also develop gallstones if you have diabetes, an intestinal disease, cirrhosis, or metabolic syndrome.  · Treatment for gallstones depends on the severity of the condition. Silent gallstones do not need treatment.  · If gallstones cause a gallbladder attack or other symptoms, treatment may be needed. The most common treatment is surgery to remove the gallbladder.  This information is not intended to replace advice given to you by your health care provider. Make sure you discuss any questions you have with your health care provider.  Document Released: 12/14/2006 Document Revised: 09/03/2017 Document Reviewed: 09/03/2017  [employer] Interactive Patient Education © 2017 [employer]    Urinary Tract Infection, Adult  Introduction  A urinary tract infection (UTI) is an infection of any part of the urinary tract. The urinary tract includes the:  · Kidneys.  · Ureters.  · Bladder.  · Urethra.  These organs make, store, and get rid of pee (urine) in the body.  Follow these instructions at home:  · Take over-the-counter and prescription medicines only as told by your doctor.  · If you were prescribed an antibiotic medicine, take it as told by your doctor. Do not stop taking the antibiotic even if you start to feel better.  · Avoid the following drinks:  ¨ Alcohol.  ¨ Caffeine.  ¨ Tea.  ¨ Carbonated drinks.  · Drink enough fluid to keep your pee clear or pale yellow.  · Keep all follow-up visits as told by your doctor. This is important.  · Make sure to:  ¨ Empty your bladder often and completely. Do not to hold pee for long periods of time.  ¨ Empty your bladder before and after sex.  ¨ Wipe from front to back after a bowel movement if you are female. Use each tissue one time when you wipe.  Contact a doctor if:  · You have back pain.  · You have a fever.  · You feel sick to your stomach (nauseous).  · You throw up (vomit).  · Your symptoms do not get better  after 3 days.  · Your symptoms go away and then come back.  Get help right away if:  · You have very bad back pain.  · You have very bad lower belly (abdominal) pain.  · You are throwing up and cannot keep down any medicines or water.  This information is not intended to replace advice given to you by your health care provider. Make sure you discuss any questions you have with your health care provider.  Document Released: 06/05/2009 Document Revised: 05/25/2017 Document Reviewed: 11/07/2016  © 2017 Elsevier

## 2018-09-10 ENCOUNTER — APPOINTMENT (OUTPATIENT)
Dept: ADMISSIONS | Facility: MEDICAL CENTER | Age: 68
End: 2018-09-10
Attending: SURGERY
Payer: MEDICARE

## 2018-09-10 NOTE — OR NURSING
Preadmit appt:  Patient instructed to continue regularly prescribed medications through day before surgery.  Instructed to take the following medications the day of surgery with a sip of water per anesthesia protocol: levothyroxine

## 2018-09-11 ENCOUNTER — HOSPITAL ENCOUNTER (OUTPATIENT)
Facility: MEDICAL CENTER | Age: 68
End: 2018-09-11
Attending: SURGERY | Admitting: SURGERY
Payer: MEDICARE

## 2018-09-11 VITALS
TEMPERATURE: 97.7 F | HEIGHT: 64 IN | BODY MASS INDEX: 29.47 KG/M2 | HEART RATE: 59 BPM | OXYGEN SATURATION: 91 % | RESPIRATION RATE: 16 BRPM | WEIGHT: 172.62 LBS

## 2018-09-11 LAB — PATHOLOGY CONSULT NOTE: NORMAL

## 2018-09-11 PROCEDURE — 160048 HCHG OR STATISTICAL LEVEL 1-5: Performed by: SURGERY

## 2018-09-11 PROCEDURE — 501583 HCHG TROCAR, THRD CAN&SEAL 5X100: Performed by: SURGERY

## 2018-09-11 PROCEDURE — 88304 TISSUE EXAM BY PATHOLOGIST: CPT

## 2018-09-11 PROCEDURE — 160035 HCHG PACU - 1ST 60 MINS PHASE I: Performed by: SURGERY

## 2018-09-11 PROCEDURE — 160028 HCHG SURGERY MINUTES - 1ST 30 MINS LEVEL 3: Performed by: SURGERY

## 2018-09-11 PROCEDURE — 502571 HCHG PACK, LAP CHOLE: Performed by: SURGERY

## 2018-09-11 PROCEDURE — 501838 HCHG SUTURE GENERAL: Performed by: SURGERY

## 2018-09-11 PROCEDURE — 501576 HCHG TROCAR, SMTH CAN&SEAL12: Performed by: SURGERY

## 2018-09-11 PROCEDURE — 160002 HCHG RECOVERY MINUTES (STAT): Performed by: SURGERY

## 2018-09-11 PROCEDURE — 501568 HCHG TROCAR, BLUNTPORT 12MM: Performed by: SURGERY

## 2018-09-11 PROCEDURE — 501582 HCHG TROCAR, THRD BLADED: Performed by: SURGERY

## 2018-09-11 PROCEDURE — 500800 HCHG LAPAROSCOPIC J/L HOOK: Performed by: SURGERY

## 2018-09-11 PROCEDURE — 160039 HCHG SURGERY MINUTES - EA ADDL 1 MIN LEVEL 3: Performed by: SURGERY

## 2018-09-11 PROCEDURE — A6402 STERILE GAUZE <= 16 SQ IN: HCPCS | Performed by: SURGERY

## 2018-09-11 PROCEDURE — A9270 NON-COVERED ITEM OR SERVICE: HCPCS

## 2018-09-11 PROCEDURE — 700102 HCHG RX REV CODE 250 W/ 637 OVERRIDE(OP)

## 2018-09-11 PROCEDURE — 160009 HCHG ANES TIME/MIN: Performed by: SURGERY

## 2018-09-11 PROCEDURE — 501572 HCHG TROCAR, SHIELD OBTU 5X100: Performed by: SURGERY

## 2018-09-11 PROCEDURE — 160046 HCHG PACU - 1ST 60 MINS PHASE II: Performed by: SURGERY

## 2018-09-11 PROCEDURE — 160025 RECOVERY II MINUTES (STATS): Performed by: SURGERY

## 2018-09-11 PROCEDURE — 700111 HCHG RX REV CODE 636 W/ 250 OVERRIDE (IP)

## 2018-09-11 PROCEDURE — A6407 PACKING STRIPS, NON-IMPREG: HCPCS | Performed by: SURGERY

## 2018-09-11 PROCEDURE — 700101 HCHG RX REV CODE 250

## 2018-09-11 PROCEDURE — 160036 HCHG PACU - EA ADDL 30 MINS PHASE I: Performed by: SURGERY

## 2018-09-11 RX ORDER — SODIUM CHLORIDE, SODIUM LACTATE, POTASSIUM CHLORIDE, CALCIUM CHLORIDE 600; 310; 30; 20 MG/100ML; MG/100ML; MG/100ML; MG/100ML
1000 INJECTION, SOLUTION INTRAVENOUS
Status: COMPLETED | OUTPATIENT
Start: 2018-09-11 | End: 2018-09-11

## 2018-09-11 RX ORDER — BUPIVACAINE HYDROCHLORIDE AND EPINEPHRINE 5; 5 MG/ML; UG/ML
INJECTION, SOLUTION EPIDURAL; INTRACAUDAL; PERINEURAL
Status: DISCONTINUED | OUTPATIENT
Start: 2018-09-11 | End: 2018-09-11 | Stop reason: HOSPADM

## 2018-09-11 RX ORDER — ALBUTEROL SULFATE 90 UG/1
AEROSOL, METERED RESPIRATORY (INHALATION)
Status: DISCONTINUED
Start: 2018-09-11 | End: 2018-09-11 | Stop reason: HOSPADM

## 2018-09-11 RX ADMIN — SODIUM CHLORIDE, SODIUM LACTATE, POTASSIUM CHLORIDE, CALCIUM CHLORIDE 1000 ML: 600; 310; 30; 20 INJECTION, SOLUTION INTRAVENOUS at 10:15

## 2018-09-11 ASSESSMENT — PAIN SCALES - GENERAL
PAINLEVEL_OUTOF10: 2
PAINLEVEL_OUTOF10: 0
PAINLEVEL_OUTOF10: ASSUMED PAIN PRESENT
PAINLEVEL_OUTOF10: 2
PAINLEVEL_OUTOF10: 0
PAINLEVEL_OUTOF10: 2
PAINLEVEL_OUTOF10: 2

## 2018-09-11 NOTE — OR SURGEON
Immediate Post OP Note    PreOp Diagnosis: symptomatic gallstones    PostOp Diagnosis:   same    Procedure(s):  AFSANEH BY LAPAROSCOPY - Wound Class: Clean Contaminated    Surgeon(s):  JAYA Vanegas M.D.    Anesthesiologist/Type of Anesthesia:  Anesthesiologist: Karl Ellington M.D./General    Surgical Staff:  Circulator: Gris Landaverde R.N.  Scrub Person: Jv Blake    Specimens removed if any:  * No specimens in log *    Estimated Blood Loss:       Findings:       Complications:           9/11/2018 12:03 PM Mahesh Gordillo M.D.

## 2018-09-11 NOTE — OR NURSING
1217- Report received from JAZMINE Santiago. Surgical dressings x 4 to abdomen C/D/I. Respirations even and unlabored. Airway in place. Not arousable on calling. VSS, see flowsheets.   1242- Patient waking up. Reporting soreness to abdominal only when coughing. No nausea at this time.   1314- Patient continues to decline need for pain medication, reporting slight soreness to abdomen. Education provided on breathing exercises. Patient demonstrates deep breathing and coughing exercise correctly. IS provided.   1328- Report to JAZMINE Muñiz. VSS, see flowsheets.

## 2018-09-11 NOTE — OR NURSING
1208 Pt to PACU from OR via gurney, respirations spontaneous and non-labored via OPA. Abdominal  surgical sites clean, dry and intact, pt not arousable on calling, VSS.  1217 Report to JAZMINE Key

## 2018-09-11 NOTE — DISCHARGE INSTRUCTIONS
ACTIVITY: Rest and take it easy for the first 24 hours.  A responsible adult is recommended to remain with you during that time.  It is normal to feel sleepy.  We encourage you to not do anything that requires balance, judgment or coordination.    MILD FLU-LIKE SYMPTOMS ARE NORMAL. YOU MAY EXPERIENCE GENERALIZED MUSCLE ACHES, THROAT IRRITATION, HEADACHE AND/OR SOME NAUSEA.    FOR 24 HOURS DO NOT:  Drive, operate machinery or run household appliances.  Drink beer or alcoholic beverages.   Make important decisions or sign legal documents.    SPECIAL INSTRUCTIONS: None.    DIET: To avoid nausea, slowly advance diet as tolerated, avoiding spicy or greasy foods for the first day.  Add more substantial food to your diet according to your physician's instructions.  INCREASE FLUIDS AND FIBER TO AVOID CONSTIPATION.    SURGICAL DRESSING/BATHING: Keep dressing clean, dry and intact until further instructed by surgeon.     FOLLOW-UP APPOINTMENT:  A follow-up appointment should be arranged with your doctor in 1-2 weeks; call to schedule.    You should CALL YOUR PHYSICIAN if you develop:  Fever greater than 101 degrees F.  Pain not relieved by medication, or persistent nausea or vomiting.  Excessive bleeding (blood soaking through dressing) or unexpected drainage from the wound.  Extreme redness or swelling around the incision site, drainage of pus or foul smelling drainage.  Inability to urinate or empty your bladder within 8 hours.  Problems with breathing or chest pain.    You should call 911 if you develop problems with breathing or chest pain.  If you are unable to contact your doctor or surgical center, you should go to the nearest emergency room or urgent care center.  Physician's telephone #: [phone] Dr. Gordillo    If any questions arise, call your doctor.  If your doctor is not available, please feel free to call the Surgical Center at [phone].  The Center is open Monday through Friday from 7AM to 7PM.   You can also call the HEALTH HOTLINE open 24 hours/day, 7 days/week and speak to a nurse at [phone], or toll free at [phone].    A registered nurse may call you a few days after your surgery to see how you are doing after your procedure.    MEDICATIONS: Resume taking daily medication.  Take prescribed pain medication with food.  If no medication is prescribed, you may take non-aspirin pain medication if needed.  PAIN MEDICATION CAN BE VERY CONSTIPATING.  Take a stool softener or laxative such as senokot, pericolace, or milk of magnesia if needed.    Prescription given for Norco.  Last pain medication given at N/A.    If your physician has prescribed pain medication that includes Acetaminophen (Tylenol), do not take additional Acetaminophen (Tylenol) while taking the prescribed medication.    Depression / Suicide Risk    As you are discharged from this [facility] facility, it is important to learn how to keep safe from harming yourself.    Recognize the warning signs:  · Abrupt changes in personality, positive or negative- including increase in energy   · Giving away possessions  · Change in eating patterns- significant weight changes-  positive or negative  · Change in sleeping patterns- unable to sleep or sleeping all the time   · Unwillingness or inability to communicate  · Depression  · Unusual sadness, discouragement and loneliness  · Talk of wanting to die  · Neglect of personal appearance   · Rebelliousness- reckless behavior  · Withdrawal from people/activities they love  · Confusion- inability to concentrate     If you or a loved one observes any of these behaviors or has concerns about self-harm, here's what you can do:  · Talk about it- your feelings and reasons for harming yourself  · Remove any means that you might use to hurt yourself (examples: pills, rope, extension cords, firearm)  · Get professional help from the community (Mental Health, Substance Abuse, psychological  counseling)  · Do not be alone:Call your Safe Contact- someone whom you trust who will be there for you.  · Call your local CRISIS HOTLINE 170-9995 or 958-682-6946  · Call your local Children's Mobile Crisis Response Team [place] (263) 759-0233 or www.Hitch  · Call the toll free National Suicide Prevention Hotlines   · National Suicide Prevention Lifeline 218-978-RYQJ (8730)  · National Hope Line Network 800-SUICIDE (725-3485)

## 2018-09-11 NOTE — OP REPORT
DATE OF SERVICE:  09/11/2018    OPERATING SURGEON:  Mahesh Jcaobson MD    ASSISTANT:  Tristan Potter MD    PROCEDURE:  Laparoscopic cholecystectomy.    ANESTHESIA:  General.    ESTIMATED BLOOD LOSS:  Less than 5 mL.    PREOPERATIVE DIAGNOSIS:  Symptomatic gallstones.    POSTOPERATIVE DIAGNOSIS:  Symptomatic gallstones.    SUMMARY:  This 68-year-old female recently went to the emergency room with   upper abdominal pain and was diagnosed as having symptomatic gallstones.  She   was referred for correction.    The risks have been explained.    DESCRIPTION OF PROCEDURE:  The patient was taken to the operating room and   satisfactory general anesthesia was induced via endotracheal intubation.    Patient was prepped and draped, local was instilled in the infraumbilical   position, a small transverse incision made.  A Veress needle was used for   insufflation.  A 12 mm port was inserted here as well as in the subxiphoid   position, two 5 mm ports in the midclavicular and midaxillary line.  The   gallbladder was retracted superiorly and laterally and dissection at the   safety window demonstrated cystic duct and artery, which were clipped and   divided.  The gallbladder was removed from the gallbladder fossa with the   cautery and removed from the abdominal cavity with an EndoCatch bag.  Right   upper quadrant was irrigated, fulgurated and sucked dry.  Ascencion was placed in   the gallbladder fossa.  All ports were removed.  The midline closed with 0   Vicryl.  The skin was closed with running 4-0 Vicryl subcuticular skin   stitches and Steri-Strips.  Wounds were dressed.  Patient sent to recovery   room in good condition.  Specimen sent to pathology labeled gallbladder.       ____________________________________     MAHESH JACOBSON MD    TER / NTS    DD:  09/11/2018 12:03:28  DT:  09/11/2018 12:17:28    D#:  [id number]  Job#:  [id number]    cc: Tristan Potter MD

## 2018-09-11 NOTE — OR NURSING
1337 in stage 2 alert x 3, states pain minimal, assisted with dressing encouraged deep breathing as sats 90-91 on room air.   1345 discharge instructions given to pt and her daughter, noted sats still 90-91 and pt stated she had her own pulse ox at home and that is what she always runs and sometimes in hi 80's, encouraged her to f/u with pcp for annual check and she agreed. Meets criteria for dc stage 2.

## 2018-09-27 ENCOUNTER — HOSPITAL ENCOUNTER (OUTPATIENT)
Dept: LAB | Facility: MEDICAL CENTER | Age: 68
End: 2018-09-27
Attending: PHYSICIAN ASSISTANT
Payer: MEDICARE

## 2018-09-27 DIAGNOSIS — J41.8 MIXED SIMPLE AND MUCOPURULENT CHRONIC BRONCHITIS (HCC): ICD-10-CM

## 2018-09-27 DIAGNOSIS — E78.5 DYSLIPIDEMIA: ICD-10-CM

## 2018-09-27 DIAGNOSIS — E03.4 HYPOTHYROIDISM DUE TO ACQUIRED ATROPHY OF THYROID: ICD-10-CM

## 2018-09-27 LAB
ALBUMIN SERPL BCP-MCNC: 4.2 G/DL (ref 3.2–4.9)
ALBUMIN/GLOB SERPL: 1 G/DL
ALP SERPL-CCNC: 94 U/L (ref 30–99)
ALT SERPL-CCNC: 28 U/L (ref 2–50)
ANION GAP SERPL CALC-SCNC: 10 MMOL/L (ref 0–11.9)
AST SERPL-CCNC: 29 U/L (ref 12–45)
BASOPHILS # BLD AUTO: 0.9 % (ref 0–1.8)
BASOPHILS # BLD: 0.05 K/UL (ref 0–0.12)
BILIRUB SERPL-MCNC: 0.9 MG/DL (ref 0.1–1.5)
BUN SERPL-MCNC: 13 MG/DL (ref 8–22)
CALCIUM SERPL-MCNC: 10 MG/DL (ref 8.5–10.5)
CHLORIDE SERPL-SCNC: 104 MMOL/L (ref 96–112)
CHOLEST SERPL-MCNC: 181 MG/DL (ref 100–199)
CO2 SERPL-SCNC: 28 MMOL/L (ref 20–33)
CREAT SERPL-MCNC: 0.73 MG/DL (ref 0.5–1.4)
EOSINOPHIL # BLD AUTO: 0.18 K/UL (ref 0–0.51)
EOSINOPHIL NFR BLD: 3.4 % (ref 0–6.9)
ERYTHROCYTE [DISTWIDTH] IN BLOOD BY AUTOMATED COUNT: 43.7 FL (ref 35.9–50)
FASTING STATUS PATIENT QL REPORTED: NORMAL
GLOBULIN SER CALC-MCNC: 4.3 G/DL (ref 1.9–3.5)
GLUCOSE SERPL-MCNC: 114 MG/DL (ref 65–99)
HCT VFR BLD AUTO: 42.6 % (ref 37–47)
HDLC SERPL-MCNC: 38 MG/DL
HGB BLD-MCNC: 13.8 G/DL (ref 12–16)
IMM GRANULOCYTES # BLD AUTO: 0.01 K/UL (ref 0–0.11)
IMM GRANULOCYTES NFR BLD AUTO: 0.2 % (ref 0–0.9)
LDLC SERPL CALC-MCNC: 111 MG/DL
LYMPHOCYTES # BLD AUTO: 1.98 K/UL (ref 1–4.8)
LYMPHOCYTES NFR BLD: 37.4 % (ref 22–41)
MCH RBC QN AUTO: 29.9 PG (ref 27–33)
MCHC RBC AUTO-ENTMCNC: 32.4 G/DL (ref 33.6–35)
MCV RBC AUTO: 92.4 FL (ref 81.4–97.8)
MONOCYTES # BLD AUTO: 0.5 K/UL (ref 0–0.85)
MONOCYTES NFR BLD AUTO: 9.4 % (ref 0–13.4)
NEUTROPHILS # BLD AUTO: 2.58 K/UL (ref 2–7.15)
NEUTROPHILS NFR BLD: 48.7 % (ref 44–72)
NRBC # BLD AUTO: 0 K/UL
NRBC BLD-RTO: 0 /100 WBC
PLATELET # BLD AUTO: 372 K/UL (ref 164–446)
PMV BLD AUTO: 10.5 FL (ref 9–12.9)
POTASSIUM SERPL-SCNC: 4.2 MMOL/L (ref 3.6–5.5)
PROT SERPL-MCNC: 8.5 G/DL (ref 6–8.2)
RBC # BLD AUTO: 4.61 M/UL (ref 4.2–5.4)
SODIUM SERPL-SCNC: 142 MMOL/L (ref 135–145)
T4 FREE SERPL-MCNC: 1.03 NG/DL (ref 0.53–1.43)
TRIGL SERPL-MCNC: 159 MG/DL (ref 0–149)
TSH SERPL DL<=0.005 MIU/L-ACNC: 0.23 UIU/ML (ref 0.38–5.33)
WBC # BLD AUTO: 5.3 K/UL (ref 4.8–10.8)

## 2018-09-27 PROCEDURE — 84439 ASSAY OF FREE THYROXINE: CPT

## 2018-09-27 PROCEDURE — 80061 LIPID PANEL: CPT

## 2018-09-27 PROCEDURE — 36415 COLL VENOUS BLD VENIPUNCTURE: CPT

## 2018-09-27 PROCEDURE — 84443 ASSAY THYROID STIM HORMONE: CPT

## 2018-09-27 PROCEDURE — 80053 COMPREHEN METABOLIC PANEL: CPT

## 2018-09-27 PROCEDURE — 85025 COMPLETE CBC W/AUTO DIFF WBC: CPT

## 2018-10-01 ENCOUNTER — TELEPHONE (OUTPATIENT)
Dept: MEDICAL GROUP | Age: 68
End: 2018-10-01

## 2018-10-01 NOTE — TELEPHONE ENCOUNTER
ESTABLISHED PATIENT PRE-VISIT PLANNING     Note: Patient will not be contacted if there is no indication to call.     1.  Reviewed notes from the last few office visits within the medical group: Yes    2.  If any orders were placed at last visit or intended to be done for this visit (i.e. 6 mos follow-up), do we have Results/Consult Notes?        •  Labs - Labs ordered, completed on 9/27/18 and results are in chart.   Note: If patient appointment is for lab review and patient did not complete labs, check with provider if OK to reschedule patient until labs completed.       •  Imaging - Imaging ordered, NOT completed. Patient advised to complete prior to next appointment.       •  Referrals - No referrals were ordered at last office visit.    3. Is this appointment scheduled as a Hospital Follow-Up? No    4.  Immunizations were updated in Epic using WebIZ?: Epic matches WebIZ       •  Web Iz Recommendations: FLU and ZOSTAVAX (Shingles)    5.  Patient is due for the following Health Maintenance Topics:   Health Maintenance Due   Topic Date Due   • IMM ZOSTER VACCINES (1 of 2) 02/08/1998   • MAMMOGRAM  11/01/2017   • COLON CANCER SCREENING ANNUAL FIT  08/23/2018   • IMM INFLUENZA (1) 09/01/2018           6.  MDX printed for Provider? NO    7.  Patient was NOT informed to arrive 15 min prior to their scheduled appointment and bring in their medication bottles.

## 2018-10-02 ENCOUNTER — OFFICE VISIT (OUTPATIENT)
Dept: MEDICAL GROUP | Age: 68
End: 2018-10-02
Payer: MEDICARE

## 2018-10-02 VITALS
HEART RATE: 98 BPM | OXYGEN SATURATION: 88 % | SYSTOLIC BLOOD PRESSURE: 110 MMHG | WEIGHT: 169.2 LBS | DIASTOLIC BLOOD PRESSURE: 78 MMHG | TEMPERATURE: 98.2 F | HEIGHT: 62 IN | BODY MASS INDEX: 31.14 KG/M2

## 2018-10-02 DIAGNOSIS — Z12.12 SCREENING FOR COLORECTAL CANCER: ICD-10-CM

## 2018-10-02 DIAGNOSIS — E66.9 OBESITY (BMI 30-39.9): ICD-10-CM

## 2018-10-02 DIAGNOSIS — E03.9 HYPOTHYROIDISM, UNSPECIFIED TYPE: ICD-10-CM

## 2018-10-02 DIAGNOSIS — Z12.11 SCREENING FOR COLORECTAL CANCER: ICD-10-CM

## 2018-10-02 DIAGNOSIS — J44.1 COPD WITH ACUTE EXACERBATION (HCC): Primary | ICD-10-CM

## 2018-10-02 DIAGNOSIS — Z12.31 ENCOUNTER FOR SCREENING MAMMOGRAM FOR BREAST CANCER: ICD-10-CM

## 2018-10-02 PROCEDURE — 99214 OFFICE O/P EST MOD 30 MIN: CPT | Performed by: PHYSICIAN ASSISTANT

## 2018-10-02 RX ORDER — AMOXICILLIN 500 MG/1
1000 CAPSULE ORAL 3 TIMES DAILY
Qty: 42 CAP | Refills: 0 | Status: SHIPPED | OUTPATIENT
Start: 2018-10-02 | End: 2018-10-09

## 2018-10-02 RX ORDER — PREDNISONE 10 MG/1
TABLET ORAL
Qty: 32 TAB | Refills: 0 | Status: SHIPPED | OUTPATIENT
Start: 2018-10-02 | End: 2018-10-17

## 2018-10-02 RX ORDER — AZITHROMYCIN 250 MG/1
TABLET, FILM COATED ORAL
Qty: 6 TAB | Refills: 0 | Status: SHIPPED | OUTPATIENT
Start: 2018-10-02 | End: 2018-10-08

## 2018-10-02 RX ORDER — LEVOTHYROXINE SODIUM 0.05 MG/1
50 TABLET ORAL
Qty: 90 TAB | Refills: 1 | Status: SHIPPED | OUTPATIENT
Start: 2018-10-02 | End: 2019-01-07 | Stop reason: SDUPTHER

## 2018-10-02 NOTE — PROGRESS NOTES
"  Chief Complaint   Patient presents with   • Hypothyroidism     Lab review   • Fever     x3 days   • Cough     x3 days   • Nasal Congestion     x3 days        HPI: Patient is a 68 y.o. female complaining of 3 days of illness including: cough, shortness of breath, fever.   Mucus is: thick.  Similarly ill exposures: yes--mother has been at hospital.   Treatments tried: none  She  reports that she quit smoking about 11 years ago. Her smoking use included Cigarettes. She has a 40.00 pack-year smoking history. She has never used smokeless tobacco..     Hypothyroidism-- taking levothyroxine 75 mcg daily. Reports some tachycardia.    ROS:  No fever, nausea, changes in bowel movements or skin rash.   Reports some racing heart symptoms.     I reviewed the patient's medications, allergies and medical history:  Current Outpatient Prescriptions   Medication Sig Dispense Refill   • amoxicillin (AMOXIL) 500 MG Cap Take 2 Caps by mouth 3 times a day for 7 days. 42 Cap 0   • azithromycin (ZITHROMAX Z-SARAH) 250 MG Tab Take 2 tabs (500 mg) on day one, then 1 tab (250 mg) on days 2-5 6 Tab 0   • predniSONE (DELTASONE) 10 MG Tab Take orally as follows: 40mg X 3 Days, 30mg X 3 Days, 20mg X 3 Days, 10mg X 3 Days, 5mg X 3 Days, then discontinue. 32 Tab 0   • levothyroxine (SYNTHROID) 75 MCG Tab Take 1 Tab by mouth every morning before breakfast. 90 Tab 1     No current facility-administered medications for this visit.      Nkda [no known drug allergy]  Past Medical History:   Diagnosis Date   • Anemia    • Anesthesia     PONV   • Emphysema     COPD   • Hypothyroid    • Osteoporosis    • Pneumonia 2015   • Urinary retention         EXAM:  Vitals:    10/02/18 0826 10/02/18 0846   BP: 110/78    BP Location: Right arm    Patient Position: Sitting    BP Cuff Size: Adult    Pulse: (!) 101 98   Temp: 36.8 °C (98.2 °F)    TempSrc: Temporal    SpO2: (!) 86% 88%   Weight: 76.7 kg (169 lb 3.2 oz)    Height: 1.575 m (5' 2\")      Gen: alert, No " conversational dyspnea. No audible wheeze, nontoxic.  PERRL, conjunctiva noninjected. No photophobia. No eye discharge.  Ears: normal pinnae. TM: pearly gray bilaterally  Nose: Nares patent with thin mucus. Mucosa edematous with erythema.  Sinuses non tender over maxillary / frontal sinuses  Throat: erythematous injection. No exudate.   Neck: supple, with  no adenopathy in the neck or supraclavicular regions  CV: Regular rate and rhythm.  Lungs: Effort is normal.  diminished breath sounds diffusely. Declines neb Tx in office  Abdomen soft. No HSM.   Skin: warm and dry. No rash.     Results for orders placed or performed during the hospital encounter of 09/27/18   CBC WITH DIFFERENTIAL   Result Value Ref Range    WBC 5.3 4.8 - 10.8 K/uL    RBC 4.61 4.20 - 5.40 M/uL    Hemoglobin 13.8 12.0 - 16.0 g/dL    Hematocrit 42.6 37.0 - 47.0 %    MCV 92.4 81.4 - 97.8 fL    MCH 29.9 27.0 - 33.0 pg    MCHC 32.4 (L) 33.6 - 35.0 g/dL    RDW 43.7 35.9 - 50.0 fL    Platelet Count 372 164 - 446 K/uL    MPV 10.5 9.0 - 12.9 fL    Neutrophils-Polys 48.70 44.00 - 72.00 %    Lymphocytes 37.40 22.00 - 41.00 %    Monocytes 9.40 0.00 - 13.40 %    Eosinophils 3.40 0.00 - 6.90 %    Basophils 0.90 0.00 - 1.80 %    Immature Granulocytes 0.20 0.00 - 0.90 %    Nucleated RBC 0.00 /100 WBC    Neutrophils (Absolute) 2.58 2.00 - 7.15 K/uL    Lymphs (Absolute) 1.98 1.00 - 4.80 K/uL    Monos (Absolute) 0.50 0.00 - 0.85 K/uL    Eos (Absolute) 0.18 0.00 - 0.51 K/uL    Baso (Absolute) 0.05 0.00 - 0.12 K/uL    Immature Granulocytes (abs) 0.01 0.00 - 0.11 K/uL    NRBC (Absolute) 0.00 K/uL   LIPID PROFILE   Result Value Ref Range    Cholesterol,Tot 181 100 - 199 mg/dL    Triglycerides 159 (H) 0 - 149 mg/dL    HDL 38 (A) >=40 mg/dL     (H) <100 mg/dL   TSH WITH REFLEX TO FT4   Result Value Ref Range    TSH 0.230 (L) 0.380 - 5.330 uIU/mL   COMP METABOLIC PANEL   Result Value Ref Range    Sodium 142 135 - 145 mmol/L    Potassium 4.2 3.6 - 5.5 mmol/L     Chloride 104 96 - 112 mmol/L    Co2 28 20 - 33 mmol/L    Anion Gap 10.0 0.0 - 11.9    Glucose 114 (H) 65 - 99 mg/dL    Bun 13 8 - 22 mg/dL    Creatinine 0.73 0.50 - 1.40 mg/dL    Calcium 10.0 8.5 - 10.5 mg/dL    AST(SGOT) 29 12 - 45 U/L    ALT(SGPT) 28 2 - 50 U/L    Alkaline Phosphatase 94 30 - 99 U/L    Total Bilirubin 0.9 0.1 - 1.5 mg/dL    Albumin 4.2 3.2 - 4.9 g/dL    Total Protein 8.5 (H) 6.0 - 8.2 g/dL    Globulin 4.3 (H) 1.9 - 3.5 g/dL    A-G Ratio 1.0 g/dL   FASTING STATUS   Result Value Ref Range    Fasting Status Fasting    ESTIMATED GFR   Result Value Ref Range    GFR If African American >60 >60 mL/min/1.73 m 2    GFR If Non African American >60 >60 mL/min/1.73 m 2   FREE THYROXINE   Result Value Ref Range    Free T-4 1.03 0.53 - 1.43 ng/dL     Reviewed and discussed above labs with patient in office.      ASSESSMENT/PLAN:     1. COPD with acute exacerbation (HCC) -1. Educated patient that majority of upper respiratory infections are viral and do not need antibiotics, however, with her untreated COPD (by choice)will treat with antibiotics and steroids.  2. Follow-up in office or urgent care for worsening symptoms, difficulty breathing, lack of expected recovery, or should new symptoms or problems arise.    2. Hypothyroidism, unspecified type -Overtreated. Will decrease levothyroxine to 50 mcg and recheck labs in 6 weeks.   levothyroxine (SYNTHROID) 50 MCG Tab    TSH WITH REFLEX TO FT4   3. Screening for colorectal cancer -Educated on importance of colonoscopies for screening of colon cancer. Patient declines. Cologuard discussed and ordered.   COLOGUARD (FIT DNA)   4. Encounter for screening mammogram for breast cancer -Educated on importance of mammograms for breast cancer screening and current recommendations. Mammogram ordered. MA-SCREENING MAMMO BILAT W/TOMOSYNTHESIS W/CAD   5. Obesity (BMI 30-39.9) -Counseled on diet modification and return to exercise once she is feeling better.    Patient  identified as having weight management issue.  Appropriate orders and counseling given.     Follow-up: Return in about 2 months (around 12/2/2018) for lab review, sooner if needed.

## 2018-10-25 ENCOUNTER — IMMUNIZATION (OUTPATIENT)
Dept: SOCIAL WORK | Facility: CLINIC | Age: 68
End: 2018-10-25
Payer: MEDICARE

## 2018-10-25 DIAGNOSIS — Z23 NEED FOR VACCINATION: ICD-10-CM

## 2018-10-25 PROCEDURE — G0008 ADMIN INFLUENZA VIRUS VAC: HCPCS | Performed by: REGISTERED NURSE

## 2018-10-25 PROCEDURE — 90662 IIV NO PRSV INCREASED AG IM: CPT | Performed by: REGISTERED NURSE

## 2018-10-30 DIAGNOSIS — R19.5 POSITIVE COLORECTAL CANCER SCREENING USING COLOGUARD TEST: ICD-10-CM

## 2018-11-01 ENCOUNTER — TELEPHONE (OUTPATIENT)
Dept: MEDICAL GROUP | Age: 68
End: 2018-11-01

## 2018-11-01 NOTE — TELEPHONE ENCOUNTER
Please advise pt to discuss with GI-- since this is a diagnostic colonoscopy, they will have a consultation with her first.

## 2018-11-01 NOTE — TELEPHONE ENCOUNTER
Notes recorded by Jassi Rodriguez'dar on 10/30/2018 at 2:39 PM PDT  Pt notified of results.    After speaking with patient she informed she would need something to put her to sleep if she has colonoscopy done. She previously attempted to have one done and she didn't last 10 min in the procedure. Can she get something to help her?     ------    Notes recorded by Vesna Davis P.A.-C. on 10/30/2018 at 10:56 AM PDT  Please call and let pt know her cologuard screening was positive. This means she needs a diagnostic colonoscopy to rule out cancer. I have sent a referral to GI for positive test.

## 2018-11-01 NOTE — TELEPHONE ENCOUNTER
Phone Number Called: [phone] (home)       Message: Called spoke with patient in regards to Vesna Davis message to speak with GI first. Patient is aware and will wait until they contact her. I explained to patient to make sure she follows up with GI as well and not wait until they contact her.     Left Message for patient to call back: no

## 2019-01-05 ENCOUNTER — TELEPHONE (OUTPATIENT)
Dept: MEDICAL GROUP | Age: 69
End: 2019-01-05

## 2019-01-05 NOTE — TELEPHONE ENCOUNTER
ESTABLISHED PATIENT PRE-VISIT PLANNING     Patient was NOT contacted to complete PVP.     Note: Patient will not be contacted if there is no indication to call.     1.  Reviewed notes from the last few office visits within the medical group: Yes    2.  If any orders were placed at last visit or intended to be done for this visit (i.e. 6 mos follow-up), do we have Results/Consult Notes?        •  Labs - Patient is coming in for cough   Note: If patient appointment is for lab review and patient did not complete labs, check with provider if OK to reschedule patient until labs completed.       •  Imaging - Imaging ordered, NOT completed. Patient advised to complete prior to next appointment.       •  Referrals - Referral ordered, patient was seen and consult notes are in chart. Care Teams updated  YES.    3. Is this appointment scheduled as a Hospital Follow-Up? No    4.  Immunizations were updated in Epic using WebIZ?: Epic matches WebIZ       •  Web Iz Recommendations: SHINGRIX (Shingles)    5.  Patient is due for the following Health Maintenance Topics:   Health Maintenance Due   Topic Date Due   • IMM ZOSTER VACCINES (1 of 2) 02/08/1998   • MAMMOGRAM  11/01/2017           6. Orders for overdue Health Maintenance topics pended in Pre-Charting? Order already placed on 10/18/18    7.  AHA (MDX) form printed for Provider? YES    8.  Patient was NOT informed to arrive 15 min prior to their scheduled appointment and bring in their medication bottles.

## 2019-01-07 ENCOUNTER — OFFICE VISIT (OUTPATIENT)
Dept: MEDICAL GROUP | Age: 69
End: 2019-01-07
Payer: MEDICARE

## 2019-01-07 ENCOUNTER — HOSPITAL ENCOUNTER (OUTPATIENT)
Dept: RADIOLOGY | Facility: MEDICAL CENTER | Age: 69
End: 2019-01-07
Attending: INTERNAL MEDICINE
Payer: MEDICARE

## 2019-01-07 VITALS
BODY MASS INDEX: 30.91 KG/M2 | TEMPERATURE: 97.3 F | HEART RATE: 107 BPM | SYSTOLIC BLOOD PRESSURE: 126 MMHG | WEIGHT: 168 LBS | DIASTOLIC BLOOD PRESSURE: 78 MMHG | OXYGEN SATURATION: 93 % | HEIGHT: 62 IN

## 2019-01-07 DIAGNOSIS — E66.9 OBESITY (BMI 30-39.9): ICD-10-CM

## 2019-01-07 DIAGNOSIS — J20.9 ACUTE BRONCHITIS, UNSPECIFIED ORGANISM: ICD-10-CM

## 2019-01-07 DIAGNOSIS — R05.9 COUGH: ICD-10-CM

## 2019-01-07 DIAGNOSIS — H91.90 DECREASED HEARING, UNSPECIFIED LATERALITY: ICD-10-CM

## 2019-01-07 DIAGNOSIS — E03.4 HYPOTHYROIDISM DUE TO ACQUIRED ATROPHY OF THYROID: ICD-10-CM

## 2019-01-07 DIAGNOSIS — J41.8 MIXED SIMPLE AND MUCOPURULENT CHRONIC BRONCHITIS (HCC): ICD-10-CM

## 2019-01-07 DIAGNOSIS — E78.5 DYSLIPIDEMIA: ICD-10-CM

## 2019-01-07 PROBLEM — R19.5 POSITIVE COLORECTAL CANCER SCREENING USING COLOGUARD TEST: Status: RESOLVED | Noted: 2018-10-30 | Resolved: 2019-01-07

## 2019-01-07 LAB
FLUAV+FLUBV AG SPEC QL IA: NEGATIVE
INT CON NEG: NEGATIVE
INT CON POS: POSITIVE

## 2019-01-07 PROCEDURE — 87804 INFLUENZA ASSAY W/OPTIC: CPT | Performed by: INTERNAL MEDICINE

## 2019-01-07 PROCEDURE — 8041 PR SCP AHA: Performed by: INTERNAL MEDICINE

## 2019-01-07 PROCEDURE — 71046 X-RAY EXAM CHEST 2 VIEWS: CPT

## 2019-01-07 PROCEDURE — 99214 OFFICE O/P EST MOD 30 MIN: CPT | Performed by: INTERNAL MEDICINE

## 2019-01-07 RX ORDER — LEVOTHYROXINE SODIUM 0.05 MG/1
50 TABLET ORAL
Qty: 90 TAB | Refills: 1 | Status: SHIPPED | OUTPATIENT
Start: 2019-01-07 | End: 2019-03-28 | Stop reason: SDUPTHER

## 2019-01-07 RX ORDER — AMOXICILLIN AND CLAVULANATE POTASSIUM 875; 125 MG/1; MG/1
1 TABLET, FILM COATED ORAL 2 TIMES DAILY
Qty: 20 TAB | Refills: 1 | Status: SHIPPED | OUTPATIENT
Start: 2019-01-07 | End: 2019-05-14

## 2019-01-07 RX ORDER — METHYLPREDNISOLONE 4 MG/1
TABLET ORAL
Qty: 1 KIT | Refills: 0 | Status: SHIPPED | OUTPATIENT
Start: 2019-01-07 | End: 2019-05-14

## 2019-01-07 ASSESSMENT — ENCOUNTER SYMPTOMS
COUGH: 1
WHEEZING: 1
NEUROLOGICAL NEGATIVE: 1
EYES NEGATIVE: 1
BACK PAIN: 1
ABDOMINAL PAIN: 1
NAUSEA: 1
CARDIOVASCULAR NEGATIVE: 1
PSYCHIATRIC NEGATIVE: 1
SORE THROAT: 0
FEVER: 1

## 2019-01-07 ASSESSMENT — PATIENT HEALTH QUESTIONNAIRE - PHQ9: CLINICAL INTERPRETATION OF PHQ2 SCORE: 0

## 2019-01-07 NOTE — PATIENT INSTRUCTIONS
Today, your Healthcare Provider discussed the following recommendations:    1. Exercise and Physical Activity  According to the American Heart Association, it is recommended to engage in physical activity regularly and to aim for 150 minutes of moderate-intensity aerobic activity per week.  Your Healthcare Provider may have recommended taking the stairs instead of the elevator, starting or maintaining a walking program or strength-training program.    2. Emotional Well-being  Mental and emotional well-being is essential to overall health.  Your Healthcare Provider may have encouraged you to build strong, positive relationships with family and friends, become more involved in your community (by volunteering or joining a spiritual community), or focus on self-care.    3. Fall and Injury Prevention  To prevent falls and injuries and also improve your balance, your Healthcare Provider may have suggested that you use a cane or walker, start an exercise of physical therapy program, or have your vision and/or hearing tested.    4. Urinary Leakage (Urinary Incontinence)  To control or manage the leakage of urine, your Healthcare Provider may have recommended you start bladder training exercises (such as Kegel exercises), a trial of a medication or a referral to see a specialist to discuss surgical options.

## 2019-01-07 NOTE — PROGRESS NOTES
Annual Health Assessment Questions:    1.  Are you currently engaging in any exercise or physical activity? Yes    2.  How would you describe your mood or emotional well-being today? good    3.  Have you had any falls in the last year? No    4.  Have you noticed any problems with your balance or had difficulty walking? No    5.  In the last six months have you experienced any leakage of urine? No    6. DPA/Advanced Directive: Patient does not have an Advanced Directive.  A packet and workshop information was given on Advanced Directives.

## 2019-01-07 NOTE — PROGRESS NOTES
"Subjective:     Vaishali Soares is a 68 y.o. Female patient of CYRUS Waters, who presents with Cough (productive x1 month)    HPI  The patient presents today for evaluation of a productive cough onset one month ago. Associated symptoms include intermittent, low-grade fevers ( °F) upper back pain, wheezing, nausea and abdominal pain. Patient has a breathing machine at home which she uses to help her breathe. She states that her nausea and abdominal pain is triggered after eating. Additionally, she states that her back pain feels \"heavy.\" She denies any sore throat. She has already received her influenza vaccination this year. Patient recently had a colonoscopy which was negative. Patient quit smoking over ten years ago. She reports that she was also ill in October, 2018 and was prescribed antibiotics and steroids by Vesna Davis which resolved all her symptoms. Patient uses 1 hearing aid in her right ear and reports that she can hear a little bit out of her left ear.     In addition, the patient is here for follow up of chronic medical problems listed below. The patient is compliant with their medications and is having no side effects from them.      History of hypothyroidism currently treated with levothyroxine 50 mcg QD. TSH drawn on 9/27/18 reported a decreased value of 0.230.     History of dyslipidemia not currently controlled with any medications. Lipid panel performed on 9/27/18 reported a cholesterol of 181, elevated triglycerides of 159, decreased HDL of 38, and elevated LDL of 111.      Patient Active Problem List   Diagnosis   • Mixed simple and mucopurulent chronic bronchitis (HCC)   • Hypothyroidism due to acquired atrophy of thyroid   • Decreased hearing- uses hearing aids   • Cystocele with rectocele   • Diverticulosis   • Obesity (BMI 30-39.9)   • Dyslipidemia       Outpatient Medications Prior to Visit   Medication Sig Dispense Refill   • levothyroxine (SYNTHROID) 50 MCG Tab Take 1 Tab " "by mouth every morning before breakfast. 90 Tab 1     No facility-administered medications prior to visit.         Allergies   Allergen Reactions   • Nkda [No Known Drug Allergy]        Review of Systems   Constitutional: Positive for fever (Low Grade).   HENT: Negative for sore throat.    Eyes: Negative.    Respiratory: Positive for cough and wheezing.    Cardiovascular: Negative.    Gastrointestinal: Positive for abdominal pain and nausea.   Genitourinary: Negative.    Musculoskeletal: Positive for back pain (Upper).   Skin: Negative.    Neurological: Negative.    Endo/Heme/Allergies: Negative.    Psychiatric/Behavioral: Negative.    All other systems reviewed and are negative.         Objective:     /78 (BP Location: Right arm, Patient Position: Sitting)   Pulse (!) 107   Temp 36.3 °C (97.3 °F) (Temporal)   Ht 1.575 m (5' 2.01\")   Wt 76.2 kg (168 lb)   SpO2 93%   BMI 30.72 kg/m²     Physical Exam   Constitutional: Oriented to person, place, and time. Appears well-developed and well-nourished. No distress.   Head: Normocephalic and atraumatic.   Right Ear: External ear normal.   Left Ear: External ear normal.   Nose: Nose normal.   Mouth/Throat: Oropharynx is clear and moist. No oropharyngeal exudate.   Eyes: Pupils are equal, round, and reactive to light. Conjunctivae and EOM are normal. Right eye exhibits no discharge. Left eye exhibits no discharge. No scleral icterus.   Neck: Normal range of motion. Neck supple. No JVD present. No tracheal deviation present. No thyromegaly present.   Cardiovascular: Normal rate, regular rhythm, normal heart sounds and intact distal pulses.  Exam reveals no gallop and no friction rub.    No murmur heard.  Pulmonary/Chest: Effort normal. No stridor. Wheezes present. No rales. No tenderness.   Abdominal: Soft. Bowel sounds are normal. No distension and no mass. There is no tenderness. There is no rebound and no guarding. No hernia.   Musculoskeletal: Normal range of " motion No edema or tenderness.   Lymphadenopathy: No cervical adenopathy.   Neurological: Alert and oriented to person, place, and time. Normal reflexes. Normal reflexes. No cranial nerve deficit. Normal muscle tone. Coordination normal.   Skin: Skin is warm and dry. No rash noted. Not diaphoretic. No erythema. No pallor.   Psychiatric: Normal mood and affect. Behavior is normal. Judgment and thought content normal.   Nursing note and vitals reviewed.    No visits with results within 1 Month(s) from this visit.   Latest known visit with results is:   Hospital Outpatient Visit on 09/27/2018   Component Date Value   • WBC 09/27/2018 5.3    • RBC 09/27/2018 4.61    • Hemoglobin 09/27/2018 13.8    • Hematocrit 09/27/2018 42.6    • MCV 09/27/2018 92.4    • MCH 09/27/2018 29.9    • MCHC 09/27/2018 32.4*   • RDW 09/27/2018 43.7    • Platelet Count 09/27/2018 372    • MPV 09/27/2018 10.5    • Neutrophils-Polys 09/27/2018 48.70    • Lymphocytes 09/27/2018 37.40    • Monocytes 09/27/2018 9.40    • Eosinophils 09/27/2018 3.40    • Basophils 09/27/2018 0.90    • Immature Granulocytes 09/27/2018 0.20    • Nucleated RBC 09/27/2018 0.00    • Neutrophils (Absolute) 09/27/2018 2.58    • Lymphs (Absolute) 09/27/2018 1.98    • Monos (Absolute) 09/27/2018 0.50    • Eos (Absolute) 09/27/2018 0.18    • Baso (Absolute) 09/27/2018 0.05    • Immature Granulocytes (a* 09/27/2018 0.01    • NRBC (Absolute) 09/27/2018 0.00    • Cholesterol,Tot 09/27/2018 181    • Triglycerides 09/27/2018 159*   • HDL 09/27/2018 38*   • LDL 09/27/2018 111*   • TSH 09/27/2018 0.230*   • Sodium 09/27/2018 142    • Potassium 09/27/2018 4.2    • Chloride 09/27/2018 104    • Co2 09/27/2018 28    • Anion Gap 09/27/2018 10.0    • Glucose 09/27/2018 114*   • Bun 09/27/2018 13    • Creatinine 09/27/2018 0.73    • Calcium 09/27/2018 10.0    • AST(SGOT) 09/27/2018 29    • ALT(SGPT) 09/27/2018 28    • Alkaline Phosphatase 09/27/2018 94    • Total Bilirubin 09/27/2018 0.9   "  • Albumin 09/27/2018 4.2    • Total Protein 09/27/2018 8.5*   • Globulin 09/27/2018 4.3*   • A-G Ratio 09/27/2018 1.0    • Fasting Status 09/27/2018 Fasting    • GFR If  09/27/2018 >60    • GFR If Non  [employer]i* 09/27/2018 >60    • Free T-4 09/27/2018 1.03       Lab Results   Component Value Date/Time    HBA1C 5.9 01/22/2013 03:40 AM     Lab Results   Component Value Date/Time    SODIUM 142 09/27/2018 08:39 AM    POTASSIUM 4.2 09/27/2018 08:39 AM    CHLORIDE 104 09/27/2018 08:39 AM    CO2 28 09/27/2018 08:39 AM    GLUCOSE 114 (H) 09/27/2018 08:39 AM    BUN 13 09/27/2018 08:39 AM    CREATININE 0.73 09/27/2018 08:39 AM    CREATININE 0.7 05/13/2006 08:40 PM    ALKPHOSPHAT 94 09/27/2018 08:39 AM    ASTSGOT 29 09/27/2018 08:39 AM    ALTSGPT 28 09/27/2018 08:39 AM    TBILIRUBIN 0.9 09/27/2018 08:39 AM     Lab Results   Component Value Date/Time    INR 1.01 09/01/2018 02:20 PM    INR 1.04 05/13/2006 08:40 PM     Lab Results   Component Value Date/Time    CHOLSTRLTOT 181 09/27/2018 08:39 AM     (H) 09/27/2018 08:39 AM    HDL 38 (A) 09/27/2018 08:39 AM    TRIGLYCERIDE 159 (H) 09/27/2018 08:39 AM       No results found for: TESTOSTERONE  No results found for: TSH  Lab Results   Component Value Date/Time    FREET4 1.03 09/27/2018 08:39 AM    FREET4 0.76 04/14/2016 09:38 AM     No results found for: URICACID  No components found for: VITB12  Lab Results   Component Value Date/Time    25HYDROXY 40 09/16/2014 09:50 AM    25HYDROXY 40 06/06/2013 12:26 PM          Assessment/Plan:     1. cough fever  Ordered chest x-ray to evaluate patient's complaints of a \"heavy, upper back pain.\"   - POCT Influenza A/B  - DX-CHEST-2 VIEWS; Future    2. Acute bronchitis, unspecified organism  Patient will be treated with antibiotics and steroids.   - amoxicillin-clavulanate (AUGMENTIN) 875-125 MG Tab; Take 1 Tab by mouth 2 times a day.  Dispense: 20 Tab; Refill: 1  - MethylPREDNISolone (MEDROL DOSEPAK) 4 MG Tablet " Therapy Pack; Medrol dosepack as directed  Dispense: 1 Kit; Refill: 0  - DX-CHEST-2 VIEWS; Future    3. Mixed simple and mucopurulent chronic bronchitis (HCC)  Under good control. Continue same regimen.     4. Hypothyroidism due to acquired atrophy of thyroid  Under good control. Continue same regimen. TSH drawn on 9/27/18 reported a decreased value of 0.230.   - levothyroxine (SYNTHROID) 50 MCG Tab; Take 1 Tab by mouth every morning before breakfast.  Dispense: 90 Tab; Refill: 1  - TSH; Future    5. Obesity (BMI 30-39.9)  Patient counseled to exercise, diet, and lose 15 lbs.      6. Dyslipidemia  Under good control. Continue same regimen. Lipid panel performed on 9/27/18 reported a cholesterol of 181, elevated triglycerides of 159, decreased HDL of 38, and elevated LDL of 111. Patient counseled to exercise, diet, and lose 15 lbs.    - COMP METABOLIC PANEL; Future  - Lipid Profile; Future  - CBC WITH DIFFERENTIAL; Future    7. Decreased hearing, unspecified laterality  Under good control. Continue same regimen.      Follow-up in 4 months with CYRUS Houser.     40 minute face-to-face encounter took place today.  More than half of this time was spent in the coordination of care of the above problems, as well as counseling.    ISalas (Scribe), am scribing for, and in the presence of, Josiah Valle M.D.    Electronically signed by: Salas Santacruz (Scribe), 1/7/2019    Josiah HANSON M.D., personally performed the services described in this documentation, as scribed by Salas Santacruz in my presence, and it is both accurate and complete.

## 2019-01-08 ENCOUNTER — TELEPHONE (OUTPATIENT)
Dept: MEDICAL GROUP | Age: 69
End: 2019-01-08

## 2019-01-08 NOTE — TELEPHONE ENCOUNTER
Phone Number Called: [phone] (home)       Message: LVM for patient to call us back. Patient needs to be notified of providers result     Left Message for patient to call back: yes      ----- Message from Josiah Valle M.D. sent at 1/7/2019  6:24 PM PST -----  Unremarkable chest x-ray except for chronic scarring in the left lower lobe consistent with old scarring, and which is been present since at least 2013 when it was seen on CT of her chest, and evaluated by the pulmonary doctors..  If her cough persists she will need to be referred back to the pulmonary doctors.  This may be secondary to a chronic bronchiectasis which is a chronic distention of the bronchial tubes in that part of the lung from old scarring or infection.

## 2019-03-28 DIAGNOSIS — E03.4 HYPOTHYROIDISM DUE TO ACQUIRED ATROPHY OF THYROID: ICD-10-CM

## 2019-03-28 RX ORDER — LEVOTHYROXINE SODIUM 0.05 MG/1
50 TABLET ORAL
Qty: 90 TAB | Refills: 0 | Status: SHIPPED | OUTPATIENT
Start: 2019-03-28 | End: 2019-05-14 | Stop reason: SDUPTHER

## 2019-04-22 ENCOUNTER — HOSPITAL ENCOUNTER (OUTPATIENT)
Dept: RADIOLOGY | Facility: MEDICAL CENTER | Age: 69
End: 2019-04-22
Attending: PHYSICIAN ASSISTANT
Payer: MEDICARE

## 2019-04-22 DIAGNOSIS — Z12.31 ENCOUNTER FOR SCREENING MAMMOGRAM FOR BREAST CANCER: ICD-10-CM

## 2019-04-22 PROCEDURE — 77063 BREAST TOMOSYNTHESIS BI: CPT

## 2019-05-09 ENCOUNTER — HOSPITAL ENCOUNTER (OUTPATIENT)
Dept: LAB | Facility: MEDICAL CENTER | Age: 69
End: 2019-05-09
Attending: INTERNAL MEDICINE
Payer: MEDICARE

## 2019-05-09 DIAGNOSIS — E03.4 HYPOTHYROIDISM DUE TO ACQUIRED ATROPHY OF THYROID: ICD-10-CM

## 2019-05-09 DIAGNOSIS — E78.5 DYSLIPIDEMIA: ICD-10-CM

## 2019-05-09 LAB
ALBUMIN SERPL BCP-MCNC: 4.4 G/DL (ref 3.2–4.9)
ALBUMIN/GLOB SERPL: 1.4 G/DL
ALP SERPL-CCNC: 74 U/L (ref 30–99)
ALT SERPL-CCNC: 18 U/L (ref 2–50)
ANION GAP SERPL CALC-SCNC: 5 MMOL/L (ref 0–11.9)
AST SERPL-CCNC: 18 U/L (ref 12–45)
BASOPHILS # BLD AUTO: 0.9 % (ref 0–1.8)
BASOPHILS # BLD: 0.06 K/UL (ref 0–0.12)
BILIRUB SERPL-MCNC: 0.9 MG/DL (ref 0.1–1.5)
BUN SERPL-MCNC: 15 MG/DL (ref 8–22)
CALCIUM SERPL-MCNC: 9.6 MG/DL (ref 8.5–10.5)
CHLORIDE SERPL-SCNC: 105 MMOL/L (ref 96–112)
CHOLEST SERPL-MCNC: 225 MG/DL (ref 100–199)
CO2 SERPL-SCNC: 28 MMOL/L (ref 20–33)
CREAT SERPL-MCNC: 0.79 MG/DL (ref 0.5–1.4)
EOSINOPHIL # BLD AUTO: 0.17 K/UL (ref 0–0.51)
EOSINOPHIL NFR BLD: 2.5 % (ref 0–6.9)
ERYTHROCYTE [DISTWIDTH] IN BLOOD BY AUTOMATED COUNT: 45.3 FL (ref 35.9–50)
FASTING STATUS PATIENT QL REPORTED: NORMAL
GLOBULIN SER CALC-MCNC: 3.2 G/DL (ref 1.9–3.5)
GLUCOSE SERPL-MCNC: 108 MG/DL (ref 65–99)
HCT VFR BLD AUTO: 43.6 % (ref 37–47)
HDLC SERPL-MCNC: 41 MG/DL
HGB BLD-MCNC: 13.8 G/DL (ref 12–16)
IMM GRANULOCYTES # BLD AUTO: 0.01 K/UL (ref 0–0.11)
IMM GRANULOCYTES NFR BLD AUTO: 0.1 % (ref 0–0.9)
LDLC SERPL CALC-MCNC: 125 MG/DL
LYMPHOCYTES # BLD AUTO: 2.67 K/UL (ref 1–4.8)
LYMPHOCYTES NFR BLD: 38.7 % (ref 22–41)
MCH RBC QN AUTO: 30.2 PG (ref 27–33)
MCHC RBC AUTO-ENTMCNC: 31.7 G/DL (ref 33.6–35)
MCV RBC AUTO: 95.4 FL (ref 81.4–97.8)
MONOCYTES # BLD AUTO: 0.5 K/UL (ref 0–0.85)
MONOCYTES NFR BLD AUTO: 7.2 % (ref 0–13.4)
NEUTROPHILS # BLD AUTO: 3.49 K/UL (ref 2–7.15)
NEUTROPHILS NFR BLD: 50.6 % (ref 44–72)
NRBC # BLD AUTO: 0 K/UL
NRBC BLD-RTO: 0 /100 WBC
PLATELET # BLD AUTO: 295 K/UL (ref 164–446)
PMV BLD AUTO: 10.4 FL (ref 9–12.9)
POTASSIUM SERPL-SCNC: 4.2 MMOL/L (ref 3.6–5.5)
PROT SERPL-MCNC: 7.6 G/DL (ref 6–8.2)
RBC # BLD AUTO: 4.57 M/UL (ref 4.2–5.4)
SODIUM SERPL-SCNC: 138 MMOL/L (ref 135–145)
TRIGL SERPL-MCNC: 295 MG/DL (ref 0–149)
TSH SERPL DL<=0.005 MIU/L-ACNC: 18.14 UIU/ML (ref 0.38–5.33)
WBC # BLD AUTO: 6.9 K/UL (ref 4.8–10.8)

## 2019-05-09 PROCEDURE — 36415 COLL VENOUS BLD VENIPUNCTURE: CPT

## 2019-05-09 PROCEDURE — 80061 LIPID PANEL: CPT

## 2019-05-09 PROCEDURE — 85025 COMPLETE CBC W/AUTO DIFF WBC: CPT

## 2019-05-09 PROCEDURE — 80053 COMPREHEN METABOLIC PANEL: CPT

## 2019-05-09 PROCEDURE — 84443 ASSAY THYROID STIM HORMONE: CPT

## 2019-05-14 ENCOUNTER — OFFICE VISIT (OUTPATIENT)
Dept: MEDICAL GROUP | Age: 69
End: 2019-05-14
Payer: MEDICARE

## 2019-05-14 VITALS
BODY MASS INDEX: 33.71 KG/M2 | TEMPERATURE: 98.7 F | OXYGEN SATURATION: 94 % | SYSTOLIC BLOOD PRESSURE: 115 MMHG | HEART RATE: 81 BPM | DIASTOLIC BLOOD PRESSURE: 72 MMHG | HEIGHT: 62 IN | WEIGHT: 183.2 LBS

## 2019-05-14 DIAGNOSIS — Z00.00 MEDICARE ANNUAL WELLNESS VISIT, SUBSEQUENT: Primary | ICD-10-CM

## 2019-05-14 DIAGNOSIS — E03.4 HYPOTHYROIDISM DUE TO ACQUIRED ATROPHY OF THYROID: ICD-10-CM

## 2019-05-14 DIAGNOSIS — R73.01 IFG (IMPAIRED FASTING GLUCOSE): ICD-10-CM

## 2019-05-14 DIAGNOSIS — J41.8 MIXED SIMPLE AND MUCOPURULENT CHRONIC BRONCHITIS (HCC): ICD-10-CM

## 2019-05-14 DIAGNOSIS — E66.9 OBESITY (BMI 30-39.9): ICD-10-CM

## 2019-05-14 DIAGNOSIS — H91.93 DECREASED HEARING OF BOTH EARS: ICD-10-CM

## 2019-05-14 DIAGNOSIS — E78.5 DYSLIPIDEMIA: ICD-10-CM

## 2019-05-14 DIAGNOSIS — F43.21 GRIEF: ICD-10-CM

## 2019-05-14 DIAGNOSIS — K57.90 DIVERTICULOSIS OF INTESTINE WITHOUT BLEEDING, UNSPECIFIED INTESTINAL TRACT LOCATION: ICD-10-CM

## 2019-05-14 DIAGNOSIS — M85.89 OSTEOPENIA OF MULTIPLE SITES: ICD-10-CM

## 2019-05-14 PROCEDURE — 99214 OFFICE O/P EST MOD 30 MIN: CPT | Mod: 25 | Performed by: PHYSICIAN ASSISTANT

## 2019-05-14 PROCEDURE — G0439 PPPS, SUBSEQ VISIT: HCPCS | Performed by: PHYSICIAN ASSISTANT

## 2019-05-14 RX ORDER — LEVOTHYROXINE SODIUM 0.07 MG/1
75 TABLET ORAL
Qty: 90 TAB | Refills: 0 | Status: SHIPPED | OUTPATIENT
Start: 2019-05-14 | End: 2019-07-15 | Stop reason: SDUPTHER

## 2019-05-14 ASSESSMENT — PATIENT HEALTH QUESTIONNAIRE - PHQ9: CLINICAL INTERPRETATION OF PHQ2 SCORE: 0

## 2019-05-14 ASSESSMENT — PAIN SCALES - GENERAL: PAINLEVEL: NO PAIN

## 2019-05-14 ASSESSMENT — ACTIVITIES OF DAILY LIVING (ADL): BATHING_REQUIRES_ASSISTANCE: 0

## 2019-05-14 ASSESSMENT — ENCOUNTER SYMPTOMS: GENERAL WELL-BEING: GOOD

## 2019-05-14 NOTE — PROGRESS NOTES
Chief Complaint   Patient presents with   • Annual Wellness Visit     annual         HPI:  Vaishali Soares is a 69 y.o. here for Medicare Annual Wellness Visit     Hypothyroid/Dyslipidemia/Grief  uncontrolled. Currently taking levothyroxine 50 mcg daily as prescribed.  Denies palpitations, skin changes, temperature intolerance, or changes in bowel habits. + weight gain and fatigue. No depression, however, still very angry and grieving loss of her mother in the fall. She has not been exercising. Diet is fair.  She denies dizziness, claudication, or chest pain.  Depression Screen (PHQ-2/PHQ-9) 3/12/2018 1/7/2019 5/14/2019   PHQ-2 Total Score - - -   PHQ-2 Total Score 0 0 0       Interpretation of PHQ-9 Total Score   Score Severity   1-4 No Depression   5-9 Mild Depression   10-14 Moderate Depression   15-19 Moderately Severe Depression   20-27 Severe Depression              Patient Active Problem List    Diagnosis Date Noted   • Osteopenia of multiple sites 05/14/2019   • IFG (impaired fasting glucose) 05/14/2019   • Grief 05/14/2019   • Dyslipidemia 02/13/2017   • Obesity (BMI 30-39.9) 09/17/2016   • Diverticulosis 04/23/2015   • Cystocele with rectocele 04/10/2015   • Decreased hearing of both ears--hearing aids 01/22/2015   • Hypothyroidism due to acquired atrophy of thyroid 01/21/2013   • Mixed simple and mucopurulent chronic bronchitis (HCC) 01/20/2013       Current Outpatient Prescriptions   Medication Sig Dispense Refill   • levothyroxine (SYNTHROID) 75 MCG Tab Take 1 Tab by mouth Every morning on an empty stomach. 90 Tab 0     No current facility-administered medications for this visit.             Current supplements as per medication list.       Allergies: Nkda [no known drug allergy] and Seasonal    Current social contact/activities: spends time with family, goes out with friends and out to eat     She  reports that she quit smoking about 12 years ago. Her smoking use included Cigarettes. She has a 40.00  pack-year smoking history. She has never used smokeless tobacco. She reports that she drinks about 1.8 oz of alcohol per week . She reports that she does not use drugs.      DPA/Advanced Directive:  Patient does not have an Advanced Directive.  A packet and workshop information was given on Advanced Directives.    ROS:    Gait: Uses no assistive device  Ostomy: No  Other tubes: No  Amputations: No  Chronic oxygen use: No  Last eye exam: 2017  Wears hearing aids: Yes -- only on right ear today. Using mother's for left on occasion because too expensive.  : Denies any urinary leakage during the last 6 months    Screening:    Depression Screening    Little interest or pleasure in doing things?  0 - not at all  Feeling down, depressed , or hopeless? 0 - not at all  Patient Health Questionnaire Score:  0    No depressive symptoms identified. She is grieving the loss of her mother. More angry than anything.      Screening for Cognitive Impairment  Three Minute Recall (village, kitchen, baby) 3/3    [place] clock face with all 12 numbers and set the hands to show 10 past 10.  Yes    No cognitive concerns identified.    Fall Risk Assessment  Has the patient had two or more falls in the last year or any fall with injury in the last year?  No    Safety Assessment  Throw rugs on floor.  No  Handrails on all stairs.  No  Good lighting in all hallways.  Yes  Difficulty hearing.  Yes  Patient counseled about all safety risks that were identified.    Functional Assessment ADLs    Are there any barriers preventing you from cooking for yourself or meeting nutritional needs?  No.    Are there any barriers preventing you from driving safely or obtaining transportation?  No.    Are there any barriers preventing you from using a telephone or calling for help?  No.    Are there any barriers preventing you from shopping?  No.    Are there any barriers preventing you from taking care of your own finances?  No.    Are there any barriers  preventing you from managing your medications?  No.    Are there any barriers preventing you from showering, bathing or dressing yourself? No.    Are you currently engaging in any exercise or physical activity?  Yes.  Yard work, walks  What is your perception of your health?  Good.      Health Maintenance Summary                IMM ZOSTER VACCINES Overdue 2/8/1998     Annual Wellness Visit Overdue 3/13/2019      Done 3/12/2018 Visit Dx: Medicare annual wellness visit, subsequent     Patient has more history with this topic...    BONE DENSITY Next Due 4/10/2020      Done 4/10/2015 DS-BONE DENSITY STUDY (DEXA)    MAMMOGRAM Next Due 4/22/2020      Done 4/22/2019 MA-SCREENING MAMMO BILAT W/TOMOSYNTHESIS W/CAD     Patient has more history with this topic...    COLOGUARD STOOL DNA Next Due 10/15/2021      Done 10/15/2018 COLOGUARD COLON CANCER SCREENING    IMM DTaP/Tdap/Td Vaccine Next Due 4/10/2025      Done 4/10/2015 Imm Admin: Tdap Vaccine          Patient Care Team:  Vesna Davis P.A.-C. as PCP - General (Family Medicine)  Skylar Nick M.D. (OB/Gyn)  Jared ORTIZ M.D. (Inactive) as Consulting Physician (Gastroenterology)  [facility] as Consulting Physician (Optometry)      Social History   Substance Use Topics   • Smoking status: Former Smoker     Packs/day: 1.00     Years: 40.00     Types: Cigarettes     Quit date: 1/22/2007   • Smokeless tobacco: Never Used      Comment: 4 days   • Alcohol use 1.8 oz/week     1 Glasses of wine, 2 Standard drinks or equivalent per week      Comment: rare glass of wine     Family History   Problem Relation Age of Onset   • Diabetes Sister         DMI   • Diabetes Brother         DM2. no meds   • Stroke Father    • Cancer Maternal Grandmother         Uterine   • Diabetes Paternal Grandmother    • Cancer Maternal Uncle    • Cancer Paternal Uncle    • Arthritis Son         Arthritis, gout   • Asthma Son    • Alcohol/Drug Neg Hx      She  has a past medical  "history of Anemia; Anesthesia; Emphysema; Hypothyroid; Osteoporosis; Pneumonia (2015); and Urinary retention.   Past Surgical History:   Procedure Laterality Date   • AFSANEH BY LAPAROSCOPY Bilateral 9/11/2018    Procedure: AFSANEH BY LAPAROSCOPY;  Surgeon: Mahesh Gordillo M.D.;  Location: SURGERY [facility];  Service: General   • OTHER ORTHOPEDIC SURGERY      R shoulder surgery       Exam:   /72 (BP Location: Left arm, Patient Position: Sitting, BP Cuff Size: Adult)   Pulse 81   Temp 37.1 °C (98.7 °F)   Ht 1.575 m (5' 2\")   Wt 83.1 kg (183 lb 3.2 oz)   SpO2 94%  Body mass index is 33.51 kg/m².    Hearing poor.    Dentition fair    Gen: Well developed, well nourished in no acute distress.   Skin: Pink, warm, and dry  HEENT: conjunctiva non-injected, sclera non-icteric. EOMs intact.   Nasal mucosa without edema nor erythema. No facial tenderness  Pinna normal. TM pearly gray on left. Hearing aid intact on right.  Oral mucous membranes pink and moist with no lesions.  Neck: Supple, trachea midline. No adenopathy or masses in the neck or supraclavicular regions.  Lungs: Effort is normal.  Diminished breath sounds diffusely.  Some expiratory wheezing present bilaterally   CV: regular rate and rhythm.  Ext: no edema, color normal, vascularity normal, temperature normal  Alert and oriented Eye contact is good, speech goal directed, affect calm      Assessment and Plan. The following treatment and monitoring plan is recommended:    1. Medicare annual wellness visit, subsequent -- Reviewed above screenings with patient. Discussed advanced directives at length. Packet given to complete along with information on workshops.     2. Dyslipidemia - worsening. Likely secondary to thyroid uncontrolled. Continue to monitor.     3. Hypothyroidism due to acquired atrophy of thyroid - uncontrolled. Increase levothyroxine to 75 mcg. Labs in 6 weeks. levothyroxine (SYNTHROID) 75 MCG Tab    TSH WITH REFLEX TO FT4    FREE " THYROXINE    TRIIDOTHYRONINE    THYROID PEROXIDASE  (TPO) AB   4. Mixed simple and mucopurulent chronic bronchitis (HCC) - stable. Declines treatment with inhalers.  Declines PFT    5. Obesity (BMI 30-39.9) -Patient's body mass index is 33.51 kg/m². Exercise and nutrition counseling were performed at this visit.discussed weight loss will be easier once we get her thyroid back under control    6. Decreased hearing of both ears -continue using hearing aids.  She is not interested in further studies.    7. Diverticulosis of intestine without bleeding, unspecified intestinal tract location -stable.  Continue high-fiber diet and regular water intake.    8. Osteopenia of multiple sites -stable..osteo    9. IFG (impaired fasting glucose) -Stable. Continue lifestyle modifications. Monitor labs regularly.    10. Grief - stable.  Patient is grieving the loss of her mother.  She declines any therapeutic services at this time.  She declines any need for intervention.        Services suggested: No services needed at this time  Health Care Screening: Age-appropriate preventive services recommended by USPTF and ACIP covered by Medicare were discussed today. Services ordered if indicated and agreed upon by the patient.  Referrals offered: Community-based lifestyle interventions to reduce health risks and promote self-management and wellness, fall prevention, nutrition, physical activity, tobacco-use cessation, weight loss, and mental health services as per orders if indicated.    Discussion today about general wellness and lifestyle habits:    · Prevent falls and reduce trip hazards; Cautioned about securing or removing rugs.  · Have a working fire alarm and carbon monoxide detector;   · Engage in regular physical activity and social activities     Follow-up: Return in about 2 months (around 7/14/2019). for follow-up on thyroid and grief, sooner if needed.

## 2019-06-03 ENCOUNTER — TELEPHONE (OUTPATIENT)
Dept: MEDICAL GROUP | Age: 69
End: 2019-06-03

## 2019-06-03 NOTE — TELEPHONE ENCOUNTER
1. Caller Name: Vaishali                                         Call Back Number: [phone] (home)       Patient approves a detailed voicemail message: yes    2. What are the patient's symptoms (location & severity)? Vertigo, dizzy spells off and on, also coughing. Patient requesting antibiotics and steroid (if needed).     3. Is this a new symptom No    4. When did it start? Ongoing since last appointment, per patient Vesna said if she needed anything to call the office.     5. Action taken per Active Symptom Guide: Patient is willing to come in for an appointment if Vesna would like her to be seen, please advise.    6. Patient agrees to recommended action per Active Symptom Escalation Protocol.

## 2019-06-03 NOTE — TELEPHONE ENCOUNTER
Phone Number Called: [phone] (home)     Call outcome: spoke to patient regarding message below    Message: Patient schedule with Vesna for the appt below.    Future Appointments       Provider Department Center    6/4/2019 9:00 AM Vesna Davis P.A.-C. [facility] [address]

## 2019-06-04 ENCOUNTER — OFFICE VISIT (OUTPATIENT)
Dept: MEDICAL GROUP | Age: 69
End: 2019-06-04
Payer: MEDICARE

## 2019-06-04 VITALS
HEART RATE: 72 BPM | WEIGHT: 181.2 LBS | OXYGEN SATURATION: 96 % | HEIGHT: 62 IN | DIASTOLIC BLOOD PRESSURE: 80 MMHG | TEMPERATURE: 97.5 F | BODY MASS INDEX: 33.34 KG/M2 | SYSTOLIC BLOOD PRESSURE: 120 MMHG

## 2019-06-04 DIAGNOSIS — J01.40 ACUTE NON-RECURRENT PANSINUSITIS: ICD-10-CM

## 2019-06-04 DIAGNOSIS — J44.1 COPD EXACERBATION (HCC): ICD-10-CM

## 2019-06-04 PROCEDURE — 94761 N-INVAS EAR/PLS OXIMETRY MLT: CPT | Performed by: PHYSICIAN ASSISTANT

## 2019-06-04 PROCEDURE — 94640 AIRWAY INHALATION TREATMENT: CPT | Performed by: PHYSICIAN ASSISTANT

## 2019-06-04 PROCEDURE — 99214 OFFICE O/P EST MOD 30 MIN: CPT | Mod: 25 | Performed by: PHYSICIAN ASSISTANT

## 2019-06-04 RX ORDER — ALBUTEROL SULFATE 2.5 MG/3ML
1.25 SOLUTION RESPIRATORY (INHALATION) ONCE
Status: COMPLETED | OUTPATIENT
Start: 2019-06-04 | End: 2019-06-04

## 2019-06-04 RX ORDER — DOXYCYCLINE 100 MG/1
100 CAPSULE ORAL 2 TIMES DAILY
Qty: 10 CAP | Refills: 0 | Status: SHIPPED | OUTPATIENT
Start: 2019-06-04 | End: 2019-06-09

## 2019-06-04 RX ORDER — PREDNISONE 10 MG/1
TABLET ORAL
Qty: 32 TAB | Refills: 0 | Status: SHIPPED | OUTPATIENT
Start: 2019-06-04 | End: 2019-06-19

## 2019-06-04 RX ORDER — IPRATROPIUM BROMIDE AND ALBUTEROL SULFATE 2.5; .5 MG/3ML; MG/3ML
3 SOLUTION RESPIRATORY (INHALATION) ONCE
Status: DISCONTINUED | OUTPATIENT
Start: 2019-06-04 | End: 2019-06-04

## 2019-06-04 RX ADMIN — Medication 0.13 MG: at 09:37

## 2019-06-04 RX ADMIN — ALBUTEROL SULFATE 1.25 MG: 2.5 SOLUTION RESPIRATORY (INHALATION) at 09:36

## 2019-06-04 NOTE — PROGRESS NOTES
"  SUBJECTIVE  Chief Complaint   Patient presents with   • Cough     x2 days    • Headache     Pt states right side pain   • Nasal Congestion     x3 weeks       HPI: Vaishali Soares is a 69 y.o. female presenting with Cough (x2 days ); Headache (Pt states right side pain); and Nasal Congestion (x3 weeks)       This is a new problem.   Reports nasal congestion for a few weeks.  Right sided head/ facial pressure over the last two days. States getting some vertigo as a result of congestion.  States had vertigo last year without illness--resolved on its own. Wondering why it happens.  Reports productive cough over the last two days. Feels sinus infection is \"dropping\" into chest.     Treatments tried: none      ROS  See HPI  No fever.   No productive cough.   No skin rashes.  No N/V/D    She  reports that she quit smoking about 12 years ago. Her smoking use included Cigarettes. She has a 40.00 pack-year smoking history. She has never used smokeless tobacco.    Allergies   Allergen Reactions   • Nkda [No Known Drug Allergy]    • Seasonal      Sneezing and runny nose       Current Outpatient Prescriptions on File Prior to Visit   Medication Sig Dispense Refill   • levothyroxine (SYNTHROID) 75 MCG Tab Take 1 Tab by mouth Every morning on an empty stomach. 90 Tab 0     No current facility-administered medications on file prior to visit.          OBJECTIVE  Vitals:    06/04/19 0904 06/04/19 0932   BP: 120/80    BP Location: Left arm    Patient Position: Sitting    BP Cuff Size: Adult    Pulse: 73 72   Temp: 36.4 °C (97.5 °F)    TempSrc: Temporal    SpO2: 92% 96%   Weight: 82.2 kg (181 lb 3.2 oz)    Height: 1.575 m (5' 2\")      Body mass index is 33.14 kg/m².       Physical Exam  Gen: alert, No conversational dyspnea. No audible wheeze, nontoxic. Obese female.   PERRL, conjunctiva noninjected. No photophobia. No eye discharge.  Ears: normal pinnae. TM: pearly gray bilaterally  Nose: Nares patent with thin mucus. Mucosa " edematous with erythema.  Sinuses tender over maxillary / frontal sinuses on right side  Throat: erythematous injection. No exudate.   Neck: supple, with  no adenopathy in the neck or supraclavicular regions  CV: Regular rate and rhythm.  Lungs: Effort is normal.  Diffuse rhonchi. Nebulizer: Nebulized DuoNebtreatment in office. Patient tolerated procedure well. Lungs with improvement in rhonchi and breath sounds following treatment. Patient reports improvement in dyspnea. Oxygen saturation improved from 92% pre to 96% post.  Abdomen soft. No HSM.   Skin: warm and dry. No rash.    A/P  1. Acute non-recurrent pansinusitis  - Discussed over-the-counter medications to use for symptomatic relief. Keep well-hydrated and rest.  - Twice daily use of nasal saline rinse or Neti-Pot encouraged.  - OTC anti-pyretics and decongestants as needed.  - Follow-up for worsening symptoms, difficulty breathing, lack of expected recovery, or should new symptoms or problems arise.  - doxycycline (MONODOX) 100 MG capsule; Take 1 Cap by mouth 2 times a day for 5 days.  Dispense: 10 Cap; Refill: 0    2. COPD exacerbation (HCC)  - Prednisnose taper, doxy (sinusitis and pneumonia coverage). Tolerated duoneb well in office--encouraged home neb use. She will not use inhalers but will consider neb.   - predniSONE (DELTASONE) 10 MG Tab; Take orally as follows: 40mg X 3 Days, 30mg X 3 Days, 20mg X 3 Days, 10mg X 3 Days, 5mg X 3 Days, then discontinue.  Dispense: 32 Tab; Refill: 0  - doxycycline (MONODOX) 100 MG capsule; Take 1 Cap by mouth 2 times a day for 5 days.  Dispense: 10 Cap; Refill: 0    Followup if no improvement in symptoms in 1-2 weeks, sooner if any worsening symptoms.

## 2019-07-11 ENCOUNTER — HOSPITAL ENCOUNTER (OUTPATIENT)
Dept: LAB | Facility: MEDICAL CENTER | Age: 69
End: 2019-07-11
Attending: PHYSICIAN ASSISTANT
Payer: MEDICARE

## 2019-07-11 DIAGNOSIS — E03.9 HYPOTHYROIDISM, UNSPECIFIED TYPE: ICD-10-CM

## 2019-07-11 DIAGNOSIS — E03.4 HYPOTHYROIDISM DUE TO ACQUIRED ATROPHY OF THYROID: ICD-10-CM

## 2019-07-11 LAB
T3 SERPL-MCNC: 89.9 NG/DL (ref 60–181)
T4 FREE SERPL-MCNC: 0.82 NG/DL (ref 0.53–1.43)
THYROPEROXIDASE AB SERPL-ACNC: 97.8 IU/ML (ref 0–9)
TSH SERPL DL<=0.005 MIU/L-ACNC: 2.68 UIU/ML (ref 0.38–5.33)

## 2019-07-11 PROCEDURE — 84480 ASSAY TRIIODOTHYRONINE (T3): CPT

## 2019-07-11 PROCEDURE — 36415 COLL VENOUS BLD VENIPUNCTURE: CPT

## 2019-07-11 PROCEDURE — 84443 ASSAY THYROID STIM HORMONE: CPT

## 2019-07-11 PROCEDURE — 86376 MICROSOMAL ANTIBODY EACH: CPT

## 2019-07-11 PROCEDURE — 84439 ASSAY OF FREE THYROXINE: CPT

## 2019-07-12 ENCOUNTER — TELEPHONE (OUTPATIENT)
Dept: MEDICAL GROUP | Age: 69
End: 2019-07-12

## 2019-07-12 NOTE — TELEPHONE ENCOUNTER
ESTABLISHED PATIENT PRE-VISIT PLANNING     Patient was NOT contacted to complete PVP.     Note: Patient will not be contacted if there is no indication to call.     1.  Reviewed notes from the last few office visits within the medical group: Yes    2.  If any orders were placed at last visit or intended to be done for this visit (i.e. 6 mos follow-up), do we have Results/Consult Notes?        •  Labs - Labs ordered, completed on 7/19 and results are in chart.   Note: If patient appointment is for lab review and patient did not complete labs, check with provider if OK to reschedule patient until labs completed.       •  Imaging - Imaging was not ordered at last office visit.       •  Referrals - No referrals were ordered at last office visit.    3. Is this appointment scheduled as a Hospital Follow-Up? No    4.  Immunizations were updated in Epic using WebIZ?: Epic matches WebIZ       •  Web Iz Recommendations: SHINGRIX (Shingles)    5.  Patient is due for the following Health Maintenance Topics:   Health Maintenance Due   Topic Date Due   • HEPATITIS C SCREENING  1950   • IMM ZOSTER VACCINES (1 of 2) 02/08/1998       - Patient is up-to-date on all Health Maintenance topics. No records have been requested at this time.    6. Orders for overdue Health Maintenance topics pended in Pre-Charting? N\A    7.  AHA (MDX) form printed for Provider? No, already completed    8.  Patient was NOT informed to arrive 15 min prior to their scheduled appointment and bring in their medication bottles.

## 2019-07-15 ENCOUNTER — OFFICE VISIT (OUTPATIENT)
Dept: MEDICAL GROUP | Age: 69
End: 2019-07-15
Payer: MEDICARE

## 2019-07-15 VITALS
OXYGEN SATURATION: 91 % | HEART RATE: 83 BPM | BODY MASS INDEX: 33.16 KG/M2 | WEIGHT: 180.2 LBS | DIASTOLIC BLOOD PRESSURE: 80 MMHG | TEMPERATURE: 97 F | HEIGHT: 62 IN | SYSTOLIC BLOOD PRESSURE: 118 MMHG

## 2019-07-15 DIAGNOSIS — F43.21 GRIEF: ICD-10-CM

## 2019-07-15 DIAGNOSIS — E78.5 DYSLIPIDEMIA: ICD-10-CM

## 2019-07-15 DIAGNOSIS — Z11.59 NEED FOR HEPATITIS C SCREENING TEST: ICD-10-CM

## 2019-07-15 DIAGNOSIS — J41.8 MIXED SIMPLE AND MUCOPURULENT CHRONIC BRONCHITIS (HCC): ICD-10-CM

## 2019-07-15 DIAGNOSIS — R73.01 IFG (IMPAIRED FASTING GLUCOSE): ICD-10-CM

## 2019-07-15 DIAGNOSIS — E03.4 HYPOTHYROIDISM DUE TO ACQUIRED ATROPHY OF THYROID: ICD-10-CM

## 2019-07-15 PROCEDURE — 99214 OFFICE O/P EST MOD 30 MIN: CPT | Performed by: PHYSICIAN ASSISTANT

## 2019-07-15 RX ORDER — LEVOTHYROXINE SODIUM 0.07 MG/1
75 TABLET ORAL
Qty: 90 TAB | Refills: 1 | Status: SHIPPED | OUTPATIENT
Start: 2019-07-15 | End: 2020-03-24 | Stop reason: SDUPTHER

## 2019-07-15 NOTE — PROGRESS NOTES
Subjective:     Chief Complaint   Patient presents with   • Hypothyroidism     Follow up on dose adjustment     Vaishali Soares is a 69 y.o. female here today for evaluation and management of:    Hypothyroidism due to acquired atrophy of thyroid  Stable. Currently taking levothyroxine levothyroxine 75 mcg Mcg daily as prescribed.  Denies palpitations, skin changes, temperature intolerance, or changes in bowel habits.  Reports she is hoping to lose some weight soon.  Wt Readings from Last 4 Encounters:   07/15/19 81.7 kg (180 lb 3.2 oz)   06/04/19 82.2 kg (181 lb 3.2 oz)   05/14/19 83.1 kg (183 lb 3.2 oz)   01/07/19 76.2 kg (168 lb)        IFG (impaired fasting glucose)/ Dyslipidemia  Not currently taking any medications for her cholesterol.  Reports diet has been fair  She is not exercising regularly but has been active in her yard. Has plans to restart exercise.  She denies dizziness, claudication, or chest pain.      COPD  Adequately controlled.  She is not taking any inhalers or using any medications.  States she does have some rhinitis type symptoms since she is going to use Benadryl as that works best for her.  She denies any sedation from the Benadryl.  She will not take any medications.  She is unsure if she tried Singulair in the past but is not interested.  Denies recent or current exacerbation.  She reports some shortness of breath when she is trying to overly exert and reports productive cough.  No chest pain.  She admits to an audible wheeze.  She refuses oxygen therapy.    Grief  She is still very upset about the loss of her mother.  Her children are pushing her to take care of herself and start exercising.  Denies SI/HI.     ROS   Denies any recent fevers or chills. No nausea or vomiting. No diarrhea. No chest pains or shortness of breath. No lower extremity edema.    Allergies   Allergen Reactions   • Nkda [No Known Drug Allergy]    • Seasonal      Sneezing and runny nose       Current medicines  "(including changes today)  Current Outpatient Prescriptions   Medication Sig Dispense Refill   • levothyroxine (SYNTHROID) 75 MCG Tab Take 1 Tab by mouth Every morning on an empty stomach. 90 Tab 1     No current facility-administered medications for this visit.        Patient Active Problem List    Diagnosis Date Noted   • Osteopenia of multiple sites 05/14/2019   • IFG (impaired fasting glucose) 05/14/2019   • Grief 05/14/2019   • Dyslipidemia 02/13/2017   • Obesity (BMI 30-39.9) 09/17/2016   • Diverticulosis 04/23/2015   • Cystocele with rectocele 04/10/2015   • Decreased hearing of both ears--hearing aids 01/22/2015   • Hypothyroidism due to acquired atrophy of thyroid 01/21/2013   • Mixed simple and mucopurulent chronic bronchitis (HCC) 01/20/2013       Family History   Problem Relation Age of Onset   • Diabetes Sister         DMI   • Diabetes Brother         DM2. no meds   • Stroke Father    • Cancer Maternal Grandmother         Uterine   • Diabetes Paternal Grandmother    • Cancer Maternal Uncle    • Cancer Paternal Uncle    • Arthritis Son         Arthritis, gout   • Asthma Son    • Alcohol/Drug Neg Hx           Objective:     Vitals:    07/15/19 1031   BP: 118/80   BP Location: Left arm   Patient Position: Sitting   BP Cuff Size: Adult   Pulse: 83   Temp: 36.1 °C (97 °F)   TempSrc: Temporal   SpO2: 91%   Weight: 81.7 kg (180 lb 3.2 oz)   Height: 1.575 m (5' 2\")     Body mass index is 32.96 kg/m².     Physical Exam:  Gen: Well developed, well nourished in no acute distress. Obese female.  Skin: Pink, warm, and dry  HEENT: conjunctiva non-injected, sclera non-icteric. EOMs intact.   Nasal mucosa without edema nor erythema. No facial tenderness  Pinna normal. TM pearly gray.   Oral mucous membranes pink and moist with no lesions.  Neck: Supple, trachea midline. No adenopathy or masses in the neck or supraclavicular regions.  Lungs: Effort is normal. Diffuse wheezing throughout.  Abdomen: soft, nontender, + BS. " No HSM.  No CVAT  Ext: no edema, color normal, vascularity normal, temperature normal  Alert and oriented Eye contact is good, speech goal directed, affect calm    Results for orders placed or performed during the hospital encounter of 07/11/19   TSH WITH REFLEX TO FT4   Result Value Ref Range    TSH 2.680 0.380 - 5.330 uIU/mL   FREE THYROXINE   Result Value Ref Range    Free T-4 0.82 0.53 - 1.43 ng/dL   TRIIDOTHYRONINE   Result Value Ref Range    T3 89.9 60.0 - 181.0 ng/dL   THYROID PEROXIDASE  (TPO) AB   Result Value Ref Range    Microsomal -Tpo- Abs 97.8 (H) 0.0 - 9.0 IU/mL     Reviewed and discussed above labs with patient in office.      Assessment and Plan:   The following treatment plan was discussed:     1. Hypothyroidism due to acquired atrophy of thyroid  -Stable. Continue current medicines. Monitor labs regularly.  - Prior to this recent dose adjustment she was on 50 mcg for just a very short time when we discover that was not therapeutic.  She was on 75 mcg prior to that and was slightly overtreated.  If this occurs again, we will consider keeping her on the 75 mcg and just eliminating a day out of the regimen.  - TSH WITH REFLEX TO FT4; Future  - levothyroxine (SYNTHROID) 75 MCG Tab; Take 1 Tab by mouth Every morning on an empty stomach.  Dispense: 90 Tab; Refill: 1    2. IFG (impaired fasting glucose)  - Stable. Encouraged her to restart exercising--she plans to. She will watch her diet as well.   - Comp Metabolic Panel; Future    3. Dyslipidemia  - Stable. Encouraged her to restart exercising--she plans to. She will watch her diet as well.   - Comp Metabolic Panel; Future  - CBC WITH DIFFERENTIAL; Future  - Lipid Profile; Future    4. Grief  - she is still grieving the loss of her mother. She is doing better and ready to start taking care of herself.   -provided her with phone number to call at [facility] regarding her concerns with her mom's care towards the end. She hasn't felt closure on this yet.  -  "offered grief classes--not interested.  - continue to monitor for depression.    5. Mixed simple and mucopurulent chronic bronchitis (HCC)  - uncontrolled. She refuses to treat. She strongly believes either the inhalers do not help her or they make her worse. Trying to get her to take singulair at least for her allergies with some hope of overlap to her lungs.   - \"I will think about it.\"      - HM: hep c screening with next labs.  -Any change or worsening of signs or symptoms, patient encouraged to follow-up or report to emergency room for further evaluation. Patient verbalizes understanding and agrees.    Followup: Return in about 6 months (around 1/15/2020) for AWV. and lab review. Sooner if needed.             "

## 2019-10-05 ENCOUNTER — IMMUNIZATION (OUTPATIENT)
Dept: SOCIAL WORK | Facility: CLINIC | Age: 69
End: 2019-10-05
Payer: MEDICARE

## 2019-10-05 DIAGNOSIS — Z23 NEED FOR VACCINATION: ICD-10-CM

## 2019-10-05 PROCEDURE — 90662 IIV NO PRSV INCREASED AG IM: CPT | Performed by: REGISTERED NURSE

## 2019-10-05 PROCEDURE — G0008 ADMIN INFLUENZA VIRUS VAC: HCPCS | Performed by: REGISTERED NURSE

## 2019-11-07 ENCOUNTER — PATIENT OUTREACH (OUTPATIENT)
Dept: HEALTH INFORMATION MANAGEMENT | Facility: OTHER | Age: 69
End: 2019-11-07

## 2019-11-07 NOTE — PROGRESS NOTES
called mbr to introduce myself to her as her SCP PA and unable to review chart as she was not home. requested I send her my information to her address.

## 2020-01-10 ENCOUNTER — HOSPITAL ENCOUNTER (OUTPATIENT)
Dept: LAB | Facility: MEDICAL CENTER | Age: 70
End: 2020-01-10
Attending: PHYSICIAN ASSISTANT
Payer: MEDICARE

## 2020-01-10 DIAGNOSIS — E03.4 HYPOTHYROIDISM DUE TO ACQUIRED ATROPHY OF THYROID: ICD-10-CM

## 2020-01-10 DIAGNOSIS — R73.01 IFG (IMPAIRED FASTING GLUCOSE): ICD-10-CM

## 2020-01-10 DIAGNOSIS — E78.5 DYSLIPIDEMIA: ICD-10-CM

## 2020-01-10 DIAGNOSIS — Z11.59 NEED FOR HEPATITIS C SCREENING TEST: ICD-10-CM

## 2020-01-10 LAB
ALBUMIN SERPL BCP-MCNC: 4.3 G/DL (ref 3.2–4.9)
ALBUMIN/GLOB SERPL: 1.4 G/DL
ALP SERPL-CCNC: 66 U/L (ref 30–99)
ALT SERPL-CCNC: 17 U/L (ref 2–50)
ANION GAP SERPL CALC-SCNC: 12 MMOL/L (ref 0–11.9)
AST SERPL-CCNC: 18 U/L (ref 12–45)
BASOPHILS # BLD AUTO: 0.9 % (ref 0–1.8)
BASOPHILS # BLD: 0.06 K/UL (ref 0–0.12)
BILIRUB SERPL-MCNC: 1.2 MG/DL (ref 0.1–1.5)
BUN SERPL-MCNC: 12 MG/DL (ref 8–22)
CALCIUM SERPL-MCNC: 9 MG/DL (ref 8.5–10.5)
CHLORIDE SERPL-SCNC: 102 MMOL/L (ref 96–112)
CHOLEST SERPL-MCNC: 211 MG/DL (ref 100–199)
CO2 SERPL-SCNC: 27 MMOL/L (ref 20–33)
CREAT SERPL-MCNC: 0.78 MG/DL (ref 0.5–1.4)
EOSINOPHIL # BLD AUTO: 0.12 K/UL (ref 0–0.51)
EOSINOPHIL NFR BLD: 1.7 % (ref 0–6.9)
ERYTHROCYTE [DISTWIDTH] IN BLOOD BY AUTOMATED COUNT: 45.1 FL (ref 35.9–50)
FASTING STATUS PATIENT QL REPORTED: NORMAL
GLOBULIN SER CALC-MCNC: 3.1 G/DL (ref 1.9–3.5)
GLUCOSE SERPL-MCNC: 91 MG/DL (ref 65–99)
HCT VFR BLD AUTO: 43.2 % (ref 37–47)
HDLC SERPL-MCNC: 39 MG/DL
HGB BLD-MCNC: 13.6 G/DL (ref 12–16)
IMM GRANULOCYTES # BLD AUTO: 0.01 K/UL (ref 0–0.11)
IMM GRANULOCYTES NFR BLD AUTO: 0.1 % (ref 0–0.9)
LDLC SERPL CALC-MCNC: 133 MG/DL
LYMPHOCYTES # BLD AUTO: 2.4 K/UL (ref 1–4.8)
LYMPHOCYTES NFR BLD: 34.6 % (ref 22–41)
MCH RBC QN AUTO: 29.7 PG (ref 27–33)
MCHC RBC AUTO-ENTMCNC: 31.5 G/DL (ref 33.6–35)
MCV RBC AUTO: 94.3 FL (ref 81.4–97.8)
MONOCYTES # BLD AUTO: 0.49 K/UL (ref 0–0.85)
MONOCYTES NFR BLD AUTO: 7.1 % (ref 0–13.4)
NEUTROPHILS # BLD AUTO: 3.86 K/UL (ref 2–7.15)
NEUTROPHILS NFR BLD: 55.6 % (ref 44–72)
NRBC # BLD AUTO: 0 K/UL
NRBC BLD-RTO: 0 /100 WBC
PLATELET # BLD AUTO: 315 K/UL (ref 164–446)
PMV BLD AUTO: 10.1 FL (ref 9–12.9)
POTASSIUM SERPL-SCNC: 4 MMOL/L (ref 3.6–5.5)
PROT SERPL-MCNC: 7.4 G/DL (ref 6–8.2)
RBC # BLD AUTO: 4.58 M/UL (ref 4.2–5.4)
SODIUM SERPL-SCNC: 141 MMOL/L (ref 135–145)
TRIGL SERPL-MCNC: 194 MG/DL (ref 0–149)
TSH SERPL DL<=0.005 MIU/L-ACNC: 3.87 UIU/ML (ref 0.38–5.33)
WBC # BLD AUTO: 6.9 K/UL (ref 4.8–10.8)

## 2020-01-10 PROCEDURE — 85025 COMPLETE CBC W/AUTO DIFF WBC: CPT

## 2020-01-10 PROCEDURE — 36415 COLL VENOUS BLD VENIPUNCTURE: CPT

## 2020-01-10 PROCEDURE — 80061 LIPID PANEL: CPT

## 2020-01-10 PROCEDURE — 80053 COMPREHEN METABOLIC PANEL: CPT

## 2020-01-10 PROCEDURE — 84443 ASSAY THYROID STIM HORMONE: CPT

## 2020-01-10 PROCEDURE — 86803 HEPATITIS C AB TEST: CPT

## 2020-01-11 LAB — HCV AB SER QL: NEGATIVE

## 2020-01-14 ENCOUNTER — OFFICE VISIT (OUTPATIENT)
Dept: MEDICAL GROUP | Age: 70
End: 2020-01-14
Payer: MEDICARE

## 2020-01-14 VITALS
BODY MASS INDEX: 32.97 KG/M2 | DIASTOLIC BLOOD PRESSURE: 72 MMHG | HEIGHT: 62 IN | HEART RATE: 96 BPM | TEMPERATURE: 99 F | SYSTOLIC BLOOD PRESSURE: 110 MMHG | OXYGEN SATURATION: 94 % | WEIGHT: 179.2 LBS

## 2020-01-14 DIAGNOSIS — E66.9 OBESITY (BMI 30-39.9): ICD-10-CM

## 2020-01-14 DIAGNOSIS — J01.40 ACUTE NON-RECURRENT PANSINUSITIS: ICD-10-CM

## 2020-01-14 DIAGNOSIS — E78.5 DYSLIPIDEMIA: ICD-10-CM

## 2020-01-14 DIAGNOSIS — E03.4 HYPOTHYROIDISM DUE TO ACQUIRED ATROPHY OF THYROID: ICD-10-CM

## 2020-01-14 DIAGNOSIS — J41.8 MIXED SIMPLE AND MUCOPURULENT CHRONIC BRONCHITIS (HCC): ICD-10-CM

## 2020-01-14 PROBLEM — R73.01 IFG (IMPAIRED FASTING GLUCOSE): Status: RESOLVED | Noted: 2019-05-14 | Resolved: 2020-01-14

## 2020-01-14 PROCEDURE — 99214 OFFICE O/P EST MOD 30 MIN: CPT | Performed by: PHYSICIAN ASSISTANT

## 2020-01-14 PROCEDURE — 8041 PR SCP AHA: Performed by: PHYSICIAN ASSISTANT

## 2020-01-14 RX ORDER — AMOXICILLIN AND CLAVULANATE POTASSIUM 875; 125 MG/1; MG/1
1 TABLET, FILM COATED ORAL 2 TIMES DAILY
Qty: 10 TAB | Refills: 0 | Status: SHIPPED | OUTPATIENT
Start: 2020-01-14 | End: 2021-03-29 | Stop reason: SDUPTHER

## 2020-01-14 SDOH — HEALTH STABILITY: MENTAL HEALTH: HOW OFTEN DO YOU HAVE A DRINK CONTAINING ALCOHOL?: MONTHLY OR LESS

## 2020-01-14 SDOH — HEALTH STABILITY: MENTAL HEALTH: HOW OFTEN DO YOU HAVE 6 OR MORE DRINKS ON ONE OCCASION?: NEVER

## 2020-01-14 SDOH — HEALTH STABILITY: MENTAL HEALTH: HOW MANY STANDARD DRINKS CONTAINING ALCOHOL DO YOU HAVE ON A TYPICAL DAY?: 1 OR 2

## 2020-01-14 ASSESSMENT — PATIENT HEALTH QUESTIONNAIRE - PHQ9: CLINICAL INTERPRETATION OF PHQ2 SCORE: 0

## 2020-01-14 NOTE — PROGRESS NOTES
Subjective:     Chief Complaint   Patient presents with   • Follow-Up   • Thyroid Problem     Vaishali Soares is a 69 y.o. female here today for evaluation and management of:    Acute sinusitis  Reports 'severe' nasal congestion and sinus pain.  Requesting antibiotic. States she has had a fever. She has sinus headaches.   Cough is nonproductive; denies shortness of breath.  Took benadryl this morning--helps dry it up some.    Mixed simple and mucopurulent chronic bronchitis (HCC)  She is not taking any inhalers or using any medications.   Denies recent or current exacerbation even with sinusitis.  She reports some shortness of breath when she is trying to overly exert and reports productive cough but does not wish to do anything about his.  No chest pain.  She admits to an audible wheeze most of the time.    She refuses oxygen therapy.  Declines PFT.    Dyslipidemia/Obesity (BMI 30-39.9)  Not currently taking any medications for her cholesterol nor is she interested.  Reports diet has been fair-- carb, protein and some veggies. Veggies has been slacking some. States she has had more fast food over the last few months.   She is not exercising regularly reports she has been busy with her kids. Plans to restart the treadmill and bicycle at home.   She denies dizziness, claudication, or chest pain.    Hypothyroidism due to acquired atrophy of thyroid  Stable. Currently taking levothyroxine 75 mcg daily as prescribed.  Denies palpitations, skin changes, temperature intolerance, or changes in bowel habits    ROS   Denies any recent fevers or chills.   No nausea or vomiting. No diarrhea.   No chest pains.   No lower extremity edema.    Allergies   Allergen Reactions   • Nkda [No Known Drug Allergy]    • Seasonal      Sneezing and runny nose       Current medicines (including changes today)  Current Outpatient Medications   Medication Sig Dispense Refill   • Cyanocobalamin (VITAMIN B-12 PO) Take  by mouth.     •  "Cholecalciferol (VITAMIN D PO) Take  by mouth.     • amoxicillin-clavulanate (AUGMENTIN) 875-125 MG Tab Take 1 Tab by mouth 2 times a day for 5 days. 10 Tab 0   • levothyroxine (SYNTHROID) 75 MCG Tab Take 1 Tab by mouth Every morning on an empty stomach. 90 Tab 1     No current facility-administered medications for this visit.        Patient Active Problem List    Diagnosis Date Noted   • Osteopenia of multiple sites 05/14/2019   • Grief 05/14/2019   • Dyslipidemia 02/13/2017   • Obesity (BMI 30-39.9) 09/17/2016   • Diverticulosis 04/23/2015   • Cystocele with rectocele 04/10/2015   • Decreased hearing of both ears--hearing aids 01/22/2015   • Hypothyroidism due to acquired atrophy of thyroid 01/21/2013   • Mixed simple and mucopurulent chronic bronchitis (HCC) 01/20/2013       Family History   Problem Relation Age of Onset   • Diabetes Sister         DMI   • Diabetes Brother         DM2. no meds   • Stroke Father    • Cancer Maternal Grandmother         Uterine   • Diabetes Paternal Grandmother    • Cancer Maternal Uncle    • Cancer Paternal Uncle    • Arthritis Son         Arthritis, gout   • Asthma Son    • Alcohol/Drug Neg Hx           Objective:     Vitals:    01/14/20 1100 01/14/20 1102   BP: 110/72    BP Location: Right arm    Patient Position: Sitting    BP Cuff Size: Adult    Pulse: (!) 104 96   Temp: 37.2 °C (99 °F)    TempSrc: Temporal    SpO2: 94%    Weight: 81.3 kg (179 lb 3.2 oz)    Height: 1.575 m (5' 2\")      Body mass index is 32.78 kg/m².     Physical Exam:  Gen: alert, No conversational dyspnea. No audible wheeze, nontoxic.  PERRL, conjunctiva noninjected. No photophobia. No eye discharge.  Ears: normal pinnae. TM: pearly gray bilaterally  Nose: Nares patent with thin,  yellow mucus. Mucosa edematous with erythema.  Sinuses non tender over maxillary / frontal sinuses  Throat: erythematous injection. No exudate.   Neck: supple, with  no adenopathy in the neck or supraclavicular regions  CV: Regular " rate and rhythm.  Lungs: Effort is normal.  diminished breath sounds diffusely but without adventitious sounds.  Abdomen soft. No HSM.   Skin: warm and dry. No rash.    Results for orders placed or performed during the hospital encounter of 01/10/20   HEP C VIRUS ANTIBODY   Result Value Ref Range    Hepatitis C Antibody Negative Negative   TSH WITH REFLEX TO FT4   Result Value Ref Range    TSH 3.870 0.380 - 5.330 uIU/mL   Lipid Profile   Result Value Ref Range    Cholesterol,Tot 211 (H) 100 - 199 mg/dL    Triglycerides 194 (H) 0 - 149 mg/dL    HDL 39 (A) >=40 mg/dL     (H) <100 mg/dL   CBC WITH DIFFERENTIAL   Result Value Ref Range    WBC 6.9 4.8 - 10.8 K/uL    RBC 4.58 4.20 - 5.40 M/uL    Hemoglobin 13.6 12.0 - 16.0 g/dL    Hematocrit 43.2 37.0 - 47.0 %    MCV 94.3 81.4 - 97.8 fL    MCH 29.7 27.0 - 33.0 pg    MCHC 31.5 (L) 33.6 - 35.0 g/dL    RDW 45.1 35.9 - 50.0 fL    Platelet Count 315 164 - 446 K/uL    MPV 10.1 9.0 - 12.9 fL    Neutrophils-Polys 55.60 44.00 - 72.00 %    Lymphocytes 34.60 22.00 - 41.00 %    Monocytes 7.10 0.00 - 13.40 %    Eosinophils 1.70 0.00 - 6.90 %    Basophils 0.90 0.00 - 1.80 %    Immature Granulocytes 0.10 0.00 - 0.90 %    Nucleated RBC 0.00 /100 WBC    Neutrophils (Absolute) 3.86 2.00 - 7.15 K/uL    Lymphs (Absolute) 2.40 1.00 - 4.80 K/uL    Monos (Absolute) 0.49 0.00 - 0.85 K/uL    Eos (Absolute) 0.12 0.00 - 0.51 K/uL    Baso (Absolute) 0.06 0.00 - 0.12 K/uL    Immature Granulocytes (abs) 0.01 0.00 - 0.11 K/uL    NRBC (Absolute) 0.00 K/uL   Comp Metabolic Panel   Result Value Ref Range    Sodium 141 135 - 145 mmol/L    Potassium 4.0 3.6 - 5.5 mmol/L    Chloride 102 96 - 112 mmol/L    Co2 27 20 - 33 mmol/L    Anion Gap 12.0 (H) 0.0 - 11.9    Glucose 91 65 - 99 mg/dL    Bun 12 8 - 22 mg/dL    Creatinine 0.78 0.50 - 1.40 mg/dL    Calcium 9.0 8.5 - 10.5 mg/dL    AST(SGOT) 18 12 - 45 U/L    ALT(SGPT) 17 2 - 50 U/L    Alkaline Phosphatase 66 30 - 99 U/L    Total Bilirubin 1.2 0.1 - 1.5  mg/dL    Albumin 4.3 3.2 - 4.9 g/dL    Total Protein 7.4 6.0 - 8.2 g/dL    Globulin 3.1 1.9 - 3.5 g/dL    A-G Ratio 1.4 g/dL   FASTING STATUS   Result Value Ref Range    Fasting Status Fasting    ESTIMATED GFR   Result Value Ref Range    GFR If African American >60 >60 mL/min/1.73 m 2    GFR If Non African American >60 >60 mL/min/1.73 m 2   Reviewed and discussed above labs with patient in office.      Assessment and Plan:   The following treatment plan was discussed:     1. Mixed simple and mucopurulent chronic bronchitis (HCC)  Not adequately controlled but patient refuses any intervention. She does have nebulizer at home if needed emergently. Refuses steroids at this time.    2. Obesity (BMI 30-39.9)  - Encouraged diet high in fruits, vegetables, and fiber. And a diet low in salt, refined carbohydrates, cholesterol, saturated fat, and trans fatty acids.    - Encouraged  a minimum of 30 minutes of moderate intensity aerobic exercise (eg, brisk walking) is recommended on five days each week. Or 20 minutes of vigorous-intensity aerobic exercise (eg, jogging) on three days each week.   - Patient identified as having weight management issue.  Appropriate orders and counseling given.    3. Dyslipidemia  Not adequately controlled. Plans to make lifestyle modification. Discussed primary prevent of ASCVD for the next 4 years but would rather make lifestyle changes.  - REVIEWED: The 10-year ASCVD risk score ([place] ALEXANDER Jr., et al., 2013) is: 8.5%    4. Hypothyroidism due to acquired atrophy of thyroid  Stable. Continue current medicines. Monitor labs regularly.    5. Acute non-recurrent pansinusitis  - Discussed over-the-counter medications to use for symptomatic relief. Keep well-hydrated and rest.  - Educated patient that on natural course of benign viral URI's.   - Twice daily use of nasal saline rinse or Neti-Pot encouraged.  - OTC anti-pyretics and decongestants as needed.  - Follow-up for worsening symptoms, difficulty  breathing, lack of expected recovery, or should new symptoms or problems arise.  -  amoxicillin-clavulanate (AUGMENTIN) 875-125 MG Tab; Take 1 Tab by mouth 2 times a day for 5 days.  Dispense: 10 Tab; Refill: 0    Health Maintenance: Shingrix unavailable--not interested.  Declines PFT.    Followup: Return in about 6 months (around 7/14/2020) for lab review, sooner if needed.

## 2020-03-24 DIAGNOSIS — E03.4 HYPOTHYROIDISM DUE TO ACQUIRED ATROPHY OF THYROID: ICD-10-CM

## 2020-03-24 RX ORDER — LEVOTHYROXINE SODIUM 0.07 MG/1
75 TABLET ORAL
Qty: 90 TAB | Refills: 1 | Status: SHIPPED | OUTPATIENT
Start: 2020-03-24 | End: 2020-10-01 | Stop reason: SDUPTHER

## 2020-08-06 ENCOUNTER — HOSPITAL ENCOUNTER (OUTPATIENT)
Dept: LAB | Facility: MEDICAL CENTER | Age: 70
End: 2020-08-06
Attending: PHYSICIAN ASSISTANT
Payer: MEDICARE

## 2020-08-06 DIAGNOSIS — J01.40 ACUTE NON-RECURRENT PANSINUSITIS: ICD-10-CM

## 2020-08-06 DIAGNOSIS — E78.5 DYSLIPIDEMIA: ICD-10-CM

## 2020-08-06 DIAGNOSIS — J41.8 MIXED SIMPLE AND MUCOPURULENT CHRONIC BRONCHITIS (HCC): ICD-10-CM

## 2020-08-06 DIAGNOSIS — E03.4 HYPOTHYROIDISM DUE TO ACQUIRED ATROPHY OF THYROID: ICD-10-CM

## 2020-08-06 LAB
ALBUMIN SERPL BCP-MCNC: 4.2 G/DL (ref 3.2–4.9)
ALBUMIN/GLOB SERPL: 1.3 G/DL
ALP SERPL-CCNC: 76 U/L (ref 30–99)
ALT SERPL-CCNC: 24 U/L (ref 2–50)
ANION GAP SERPL CALC-SCNC: 12 MMOL/L (ref 7–16)
AST SERPL-CCNC: 21 U/L (ref 12–45)
BASOPHILS # BLD AUTO: 0.8 % (ref 0–1.8)
BASOPHILS # BLD: 0.05 K/UL (ref 0–0.12)
BILIRUB SERPL-MCNC: 0.8 MG/DL (ref 0.1–1.5)
BUN SERPL-MCNC: 13 MG/DL (ref 8–22)
CALCIUM SERPL-MCNC: 9.2 MG/DL (ref 8.5–10.5)
CHLORIDE SERPL-SCNC: 103 MMOL/L (ref 96–112)
CHOLEST SERPL-MCNC: 183 MG/DL (ref 100–199)
CO2 SERPL-SCNC: 25 MMOL/L (ref 20–33)
CREAT SERPL-MCNC: 0.59 MG/DL (ref 0.5–1.4)
EOSINOPHIL # BLD AUTO: 0.13 K/UL (ref 0–0.51)
EOSINOPHIL NFR BLD: 2.1 % (ref 0–6.9)
ERYTHROCYTE [DISTWIDTH] IN BLOOD BY AUTOMATED COUNT: 46.6 FL (ref 35.9–50)
FASTING STATUS PATIENT QL REPORTED: NORMAL
GLOBULIN SER CALC-MCNC: 3.2 G/DL (ref 1.9–3.5)
GLUCOSE SERPL-MCNC: 103 MG/DL (ref 65–99)
HCT VFR BLD AUTO: 42.9 % (ref 37–47)
HDLC SERPL-MCNC: 36 MG/DL
HGB BLD-MCNC: 13.3 G/DL (ref 12–16)
IMM GRANULOCYTES # BLD AUTO: 0.01 K/UL (ref 0–0.11)
IMM GRANULOCYTES NFR BLD AUTO: 0.2 % (ref 0–0.9)
LDLC SERPL CALC-MCNC: 107 MG/DL
LYMPHOCYTES # BLD AUTO: 2.48 K/UL (ref 1–4.8)
LYMPHOCYTES NFR BLD: 39.4 % (ref 22–41)
MCH RBC QN AUTO: 29.6 PG (ref 27–33)
MCHC RBC AUTO-ENTMCNC: 31 G/DL (ref 33.6–35)
MCV RBC AUTO: 95.5 FL (ref 81.4–97.8)
MONOCYTES # BLD AUTO: 0.44 K/UL (ref 0–0.85)
MONOCYTES NFR BLD AUTO: 7 % (ref 0–13.4)
NEUTROPHILS # BLD AUTO: 3.18 K/UL (ref 2–7.15)
NEUTROPHILS NFR BLD: 50.5 % (ref 44–72)
NRBC # BLD AUTO: 0 K/UL
NRBC BLD-RTO: 0 /100 WBC
PLATELET # BLD AUTO: 311 K/UL (ref 164–446)
PMV BLD AUTO: 10.6 FL (ref 9–12.9)
POTASSIUM SERPL-SCNC: 4.1 MMOL/L (ref 3.6–5.5)
PROT SERPL-MCNC: 7.4 G/DL (ref 6–8.2)
RBC # BLD AUTO: 4.49 M/UL (ref 4.2–5.4)
SODIUM SERPL-SCNC: 140 MMOL/L (ref 135–145)
TRIGL SERPL-MCNC: 199 MG/DL (ref 0–149)
TSH SERPL DL<=0.005 MIU/L-ACNC: 0.81 UIU/ML (ref 0.38–5.33)
WBC # BLD AUTO: 6.3 K/UL (ref 4.8–10.8)

## 2020-08-06 PROCEDURE — 85025 COMPLETE CBC W/AUTO DIFF WBC: CPT

## 2020-08-06 PROCEDURE — 36415 COLL VENOUS BLD VENIPUNCTURE: CPT

## 2020-08-06 PROCEDURE — 84443 ASSAY THYROID STIM HORMONE: CPT

## 2020-08-06 PROCEDURE — 80061 LIPID PANEL: CPT

## 2020-08-06 PROCEDURE — 80053 COMPREHEN METABOLIC PANEL: CPT

## 2020-08-10 ENCOUNTER — OFFICE VISIT (OUTPATIENT)
Dept: MEDICAL GROUP | Age: 70
End: 2020-08-10
Payer: MEDICARE

## 2020-08-10 VITALS
DIASTOLIC BLOOD PRESSURE: 76 MMHG | HEIGHT: 62 IN | SYSTOLIC BLOOD PRESSURE: 110 MMHG | HEART RATE: 88 BPM | WEIGHT: 182.2 LBS | OXYGEN SATURATION: 90 % | TEMPERATURE: 99 F | BODY MASS INDEX: 33.53 KG/M2

## 2020-08-10 DIAGNOSIS — E66.9 OBESITY (BMI 30-39.9): ICD-10-CM

## 2020-08-10 DIAGNOSIS — R73.01 IFG (IMPAIRED FASTING GLUCOSE): ICD-10-CM

## 2020-08-10 DIAGNOSIS — K04.7 DENTAL ABSCESS: ICD-10-CM

## 2020-08-10 DIAGNOSIS — E78.5 DYSLIPIDEMIA: ICD-10-CM

## 2020-08-10 DIAGNOSIS — Z12.31 ENCOUNTER FOR SCREENING MAMMOGRAM FOR BREAST CANCER: ICD-10-CM

## 2020-08-10 DIAGNOSIS — N95.1 MENOPAUSAL STATE: ICD-10-CM

## 2020-08-10 DIAGNOSIS — E03.4 HYPOTHYROIDISM DUE TO ACQUIRED ATROPHY OF THYROID: ICD-10-CM

## 2020-08-10 DIAGNOSIS — Z78.0 POSTMENOPAUSAL STATUS (AGE-RELATED) (NATURAL): ICD-10-CM

## 2020-08-10 DIAGNOSIS — J41.8 MIXED SIMPLE AND MUCOPURULENT CHRONIC BRONCHITIS (HCC): ICD-10-CM

## 2020-08-10 PROCEDURE — 99214 OFFICE O/P EST MOD 30 MIN: CPT | Performed by: PHYSICIAN ASSISTANT

## 2020-08-10 RX ORDER — AMOXICILLIN 500 MG/1
500 CAPSULE ORAL 3 TIMES DAILY
Qty: 21 CAP | Refills: 0 | Status: SHIPPED | OUTPATIENT
Start: 2020-08-10 | End: 2020-08-17

## 2020-08-10 ASSESSMENT — FIBROSIS 4 INDEX: FIB4 SCORE: 0.99

## 2020-08-10 NOTE — PROGRESS NOTES
Subjective:     Chief Complaint   Patient presents with   • Follow-Up   • Thyroid Problem   • Dyslipidemia   • Results     Labs done 8/6/20     Vaishali Soares is a 70 y.o. female here today for evaluation and management of:     Dental abscess  New problem  Reports a couple days of tooth pain and swelling in area.  No fever or chills.  States pain radiates to ear and down to enlarged lymphnode in neck on right side (just distal to tooth). Needs work done, trying to find a new dentist.    Hypothyroidism due to acquired atrophy of thyroid  Stable. Currently taking levothyroxine 75mcg daily as prescribed. Different manufacture so concerned.   Denies palpitations, skin changes, temperature intolerance, or changes in bowel habits    Mixed simple and mucopurulent chronic bronchitis (HCC)  Not using inhalers or any medications. .  Denies recent exacerbation.   Denies current shortness of breath, chest pain, or cough.  She is not on oxygen therapy.  Last PFT: refuses    Obesity (BMI 30-39.9)/Dyslipidemia/IFG (impaired fasting glucose)  Diet has been fair. Has been eating more veggies. Not exercising.  Denies dizziness, claudication, chest pain.   Denies polyuria, polydipsia, blurred vision, or neuropathies.    Wt Readings from Last 4 Encounters:   08/10/20 82.6 kg (182 lb 3.2 oz)   01/14/20 81.3 kg (179 lb 3.2 oz)   07/15/19 81.7 kg (180 lb 3.2 oz)   06/04/19 82.2 kg (181 lb 3.2 oz)         ROS   No recent illness. No headache. No diarrhea. No chest pains or shortness of breath. No lower extremity edema.    No Known Allergies    Current medicines (including changes today)  Current Outpatient Medications   Medication Sig Dispense Refill   • amoxicillin (AMOXIL) 500 MG Cap Take 1 Cap by mouth 3 times a day for 7 days. 21 Cap 0   • levothyroxine (SYNTHROID) 75 MCG Tab Take 1 Tab by mouth Every morning on an empty stomach. 90 Tab 1   • Cyanocobalamin (VITAMIN B-12 PO) Take  by mouth.     • Cholecalciferol (VITAMIN D PO)  "Take  by mouth.       No current facility-administered medications for this visit.        Patient Active Problem List    Diagnosis Date Noted   • Osteopenia of multiple sites 05/14/2019   • Grief 05/14/2019   • Dyslipidemia 02/13/2017   • Obesity (BMI 30-39.9) 09/17/2016   • Diverticulosis 04/23/2015   • Cystocele with rectocele 04/10/2015   • Decreased hearing of both ears--hearing aids 01/22/2015   • Hypothyroidism due to acquired atrophy of thyroid 01/21/2013   • Mixed simple and mucopurulent chronic bronchitis (HCC) 01/20/2013       Family History   Problem Relation Age of Onset   • Diabetes Sister         DMI   • Diabetes Brother         DM2. no meds   • Stroke Father    • Cancer Maternal Grandmother         Uterine   • Diabetes Paternal Grandmother    • Cancer Maternal Uncle    • Cancer Paternal Uncle    • Arthritis Son         Arthritis, gout   • Asthma Son    • Alcohol/Drug Neg Hx           Objective:     Vitals:    08/10/20 1120   BP: 110/76   BP Location: Right arm   Patient Position: Sitting   BP Cuff Size: Adult   Pulse: 88   Temp: 37.2 °C (99 °F)   TempSrc: Temporal   SpO2: 90%   Weight: 82.6 kg (182 lb 3.2 oz)   Height: 1.575 m (5' 2\")     Body mass index is 33.32 kg/m².     Physical Exam:  Gen: Well developed, well nourished in no acute distress. Obese female.  Skin: Pink, warm, and dry  HEENT: conjunctiva non-injected, sclera non-icteric. EOMs intact.   Nasal mucosa without edema nor erythema. No facial tenderness  Pinna normal. TM pearly gray.   Significant erythema surrounding bottom molar on right. Otherwise oral mucous membranes pink and moist with no lesions.  Neck: Supple, trachea midline. No adenopathy or masses in the neck or supraclavicular regions.  Lungs: Effort is normal. Clear to auscultation bilaterally with good excursion.  CV: regular rate and rhythm.  Abdomen: soft, nontender, + BS.  Ext: no edema, color normal, vascularity normal, temperature normal  Alert and oriented Eye contact " is good, speech goal directed, affect calm    Results for orders placed or performed during the hospital encounter of 08/06/20   TSH WITH REFLEX TO FT4   Result Value Ref Range    TSH 0.815 0.380 - 5.330 uIU/mL   Lipid Profile   Result Value Ref Range    Cholesterol,Tot 183 100 - 199 mg/dL    Triglycerides 199 (H) 0 - 149 mg/dL    HDL 36 (A) >=40 mg/dL     (H) <100 mg/dL   Comp Metabolic Panel   Result Value Ref Range    Sodium 140 135 - 145 mmol/L    Potassium 4.1 3.6 - 5.5 mmol/L    Chloride 103 96 - 112 mmol/L    Co2 25 20 - 33 mmol/L    Anion Gap 12.0 7.0 - 16.0    Glucose 103 (H) 65 - 99 mg/dL    Bun 13 8 - 22 mg/dL    Creatinine 0.59 0.50 - 1.40 mg/dL    Calcium 9.2 8.5 - 10.5 mg/dL    AST(SGOT) 21 12 - 45 U/L    ALT(SGPT) 24 2 - 50 U/L    Alkaline Phosphatase 76 30 - 99 U/L    Total Bilirubin 0.8 0.1 - 1.5 mg/dL    Albumin 4.2 3.2 - 4.9 g/dL    Total Protein 7.4 6.0 - 8.2 g/dL    Globulin 3.2 1.9 - 3.5 g/dL    A-G Ratio 1.3 g/dL   CBC WITH DIFFERENTIAL   Result Value Ref Range    WBC 6.3 4.8 - 10.8 K/uL    RBC 4.49 4.20 - 5.40 M/uL    Hemoglobin 13.3 12.0 - 16.0 g/dL    Hematocrit 42.9 37.0 - 47.0 %    MCV 95.5 81.4 - 97.8 fL    MCH 29.6 27.0 - 33.0 pg    MCHC 31.0 (L) 33.6 - 35.0 g/dL    RDW 46.6 35.9 - 50.0 fL    Platelet Count 311 164 - 446 K/uL    MPV 10.6 9.0 - 12.9 fL    Neutrophils-Polys 50.50 44.00 - 72.00 %    Lymphocytes 39.40 22.00 - 41.00 %    Monocytes 7.00 0.00 - 13.40 %    Eosinophils 2.10 0.00 - 6.90 %    Basophils 0.80 0.00 - 1.80 %    Immature Granulocytes 0.20 0.00 - 0.90 %    Nucleated RBC 0.00 /100 WBC    Neutrophils (Absolute) 3.18 2.00 - 7.15 K/uL    Lymphs (Absolute) 2.48 1.00 - 4.80 K/uL    Monos (Absolute) 0.44 0.00 - 0.85 K/uL    Eos (Absolute) 0.13 0.00 - 0.51 K/uL    Baso (Absolute) 0.05 0.00 - 0.12 K/uL    Immature Granulocytes (abs) 0.01 0.00 - 0.11 K/uL    NRBC (Absolute) 0.00 K/uL   FASTING STATUS   Result Value Ref Range    Fasting Status Fasting    ESTIMATED GFR    Result Value Ref Range    GFR If African American >60 >60 mL/min/1.73 m 2    GFR If Non African American >60 >60 mL/min/1.73 m 2     Reviewed and discussed above labs with patient in office.      Assessment and Plan:   The following treatment plan was discussed:     1. Dental abscess  Antibiotics sent to pharmacy. Must get in with dentist urgently.  - amoxicillin (AMOXIL) 500 MG Cap; Take 1 Cap by mouth 3 times a day for 7 days.  Dispense: 21 Cap; Refill: 0  Any change or worsening of signs or symptoms, patient encouraged to follow-up or report to emergency room for further evaluation. Patient verbalizes understanding and agrees.     2. Hypothyroidism due to acquired atrophy of thyroid  Stable. Continue current medicines. Monitor labs regularly.  - TSH WITH REFLEX TO FT4; Future    3. Mixed simple and mucopurulent chronic bronchitis (HCC)  Not adequately controlled, however, patient will not allow any additional treatment.  - CBC WITH DIFFERENTIAL; Future    4. Obesity (BMI 30-39.9)  - Encouraged diet high in fruits, vegetables, and fiber. And a diet low in salt, refined carbohydrates, cholesterol, saturated fat, and trans fatty acids.    - Encouraged  a minimum of 30 minutes of moderate intensity aerobic exercise (eg, brisk walking) is recommended on five days each week. Or 20 minutes of vigorous-intensity aerobic exercise (eg, jogging) on three days each week.       5. Dyslipidemia  Lifestyle modifications encouraged.  The 10-year ASCVD risk score ([place] DC Jr., et al., 2013) is: 9.1%    - Comp Metabolic Panel; Future  - Lipid Profile; Future    6. IFG (impaired fasting glucose)  See #4  - Comp Metabolic Panel; Future    7. Postmenopausal status (age-related) (natural)  8. Menopausal state  DEXA to be done with mammo.  -Discussed weight bearing exercises, taking calcium 1500 mg daily, adequate vitamin D.   - DS-BONE DENSITY STUDY (DEXA)    9. Encounter for screening mammogram for breast cancer  Educated on  importance of mammograms for breast cancer screening and current recommendations. Mammogram ordered.  - MA-SCREENING MAMMO BILAT W/TOMOSYNTHESIS W/CAD; Future    Health Maintenance: Shingrix unavailable.       Followup: Return in about 6 months (around 2/10/2021) for lab review, sooner if needed.

## 2020-10-01 DIAGNOSIS — E03.4 HYPOTHYROIDISM DUE TO ACQUIRED ATROPHY OF THYROID: ICD-10-CM

## 2020-10-01 RX ORDER — LEVOTHYROXINE SODIUM 0.07 MG/1
75 TABLET ORAL
Qty: 90 TAB | Refills: 3 | Status: SHIPPED | OUTPATIENT
Start: 2020-10-01 | End: 2021-09-14 | Stop reason: SDUPTHER

## 2021-01-15 DIAGNOSIS — Z23 NEED FOR VACCINATION: ICD-10-CM

## 2021-02-12 ENCOUNTER — HOSPITAL ENCOUNTER (OUTPATIENT)
Dept: LAB | Facility: MEDICAL CENTER | Age: 71
End: 2021-02-12
Attending: PHYSICIAN ASSISTANT
Payer: MEDICARE

## 2021-02-12 DIAGNOSIS — E78.5 DYSLIPIDEMIA: ICD-10-CM

## 2021-02-12 DIAGNOSIS — J41.8 MIXED SIMPLE AND MUCOPURULENT CHRONIC BRONCHITIS (HCC): ICD-10-CM

## 2021-02-12 DIAGNOSIS — R73.01 IFG (IMPAIRED FASTING GLUCOSE): ICD-10-CM

## 2021-02-12 DIAGNOSIS — E03.4 HYPOTHYROIDISM DUE TO ACQUIRED ATROPHY OF THYROID: ICD-10-CM

## 2021-02-12 LAB
ALBUMIN SERPL BCP-MCNC: 4.4 G/DL (ref 3.2–4.9)
ALBUMIN/GLOB SERPL: 1.2 G/DL
ALP SERPL-CCNC: 84 U/L (ref 30–99)
ALT SERPL-CCNC: 18 U/L (ref 2–50)
ANION GAP SERPL CALC-SCNC: 10 MMOL/L (ref 7–16)
AST SERPL-CCNC: 21 U/L (ref 12–45)
BASOPHILS # BLD AUTO: 0.8 % (ref 0–1.8)
BASOPHILS # BLD: 0.07 K/UL (ref 0–0.12)
BILIRUB SERPL-MCNC: 0.9 MG/DL (ref 0.1–1.5)
BUN SERPL-MCNC: 13 MG/DL (ref 8–22)
CALCIUM SERPL-MCNC: 9.7 MG/DL (ref 8.5–10.5)
CHLORIDE SERPL-SCNC: 102 MMOL/L (ref 96–112)
CHOLEST SERPL-MCNC: 221 MG/DL (ref 100–199)
CO2 SERPL-SCNC: 28 MMOL/L (ref 20–33)
CREAT SERPL-MCNC: 0.77 MG/DL (ref 0.5–1.4)
EOSINOPHIL # BLD AUTO: 0.22 K/UL (ref 0–0.51)
EOSINOPHIL NFR BLD: 2.6 % (ref 0–6.9)
ERYTHROCYTE [DISTWIDTH] IN BLOOD BY AUTOMATED COUNT: 45 FL (ref 35.9–50)
FASTING STATUS PATIENT QL REPORTED: NORMAL
GLOBULIN SER CALC-MCNC: 3.7 G/DL (ref 1.9–3.5)
GLUCOSE SERPL-MCNC: 106 MG/DL (ref 65–99)
HCT VFR BLD AUTO: 45.2 % (ref 37–47)
HDLC SERPL-MCNC: 41 MG/DL
HGB BLD-MCNC: 14.2 G/DL (ref 12–16)
IMM GRANULOCYTES # BLD AUTO: 0.03 K/UL (ref 0–0.11)
IMM GRANULOCYTES NFR BLD AUTO: 0.4 % (ref 0–0.9)
LDLC SERPL CALC-MCNC: 132 MG/DL
LYMPHOCYTES # BLD AUTO: 3.1 K/UL (ref 1–4.8)
LYMPHOCYTES NFR BLD: 36.3 % (ref 22–41)
MCH RBC QN AUTO: 29.5 PG (ref 27–33)
MCHC RBC AUTO-ENTMCNC: 31.4 G/DL (ref 33.6–35)
MCV RBC AUTO: 94 FL (ref 81.4–97.8)
MONOCYTES # BLD AUTO: 0.6 K/UL (ref 0–0.85)
MONOCYTES NFR BLD AUTO: 7 % (ref 0–13.4)
NEUTROPHILS # BLD AUTO: 4.53 K/UL (ref 2–7.15)
NEUTROPHILS NFR BLD: 52.9 % (ref 44–72)
NRBC # BLD AUTO: 0 K/UL
NRBC BLD-RTO: 0 /100 WBC
PLATELET # BLD AUTO: 360 K/UL (ref 164–446)
PMV BLD AUTO: 10.7 FL (ref 9–12.9)
POTASSIUM SERPL-SCNC: 4.3 MMOL/L (ref 3.6–5.5)
PROT SERPL-MCNC: 8.1 G/DL (ref 6–8.2)
RBC # BLD AUTO: 4.81 M/UL (ref 4.2–5.4)
SODIUM SERPL-SCNC: 140 MMOL/L (ref 135–145)
TRIGL SERPL-MCNC: 240 MG/DL (ref 0–149)
TSH SERPL DL<=0.005 MIU/L-ACNC: 3.44 UIU/ML (ref 0.38–5.33)
WBC # BLD AUTO: 8.6 K/UL (ref 4.8–10.8)

## 2021-02-12 PROCEDURE — 84443 ASSAY THYROID STIM HORMONE: CPT

## 2021-02-12 PROCEDURE — 85025 COMPLETE CBC W/AUTO DIFF WBC: CPT

## 2021-02-12 PROCEDURE — 36415 COLL VENOUS BLD VENIPUNCTURE: CPT

## 2021-02-12 PROCEDURE — 80053 COMPREHEN METABOLIC PANEL: CPT

## 2021-02-12 PROCEDURE — 80061 LIPID PANEL: CPT

## 2021-02-12 NOTE — PROGRESS NOTES
ESTABLISHED PATIENT PRE-VISIT PLANNING     Called and spoke to patient. Offered Virtual Visit-pt declined. Pt ok'd AWV. Advised office visit scheduled Tuesday, 2/16/21@8:00AM Provider Vesna May -[address]. Labs done.    Patient was contacted to complete PVP.     Note: Patient will not be contacted if there is no indication to call.     1.  Reviewed notes from the last few office visits within the medical group: Yes    2.  If any orders were placed at last visit or intended to be done for this visit (i.e. 6 mos follow-up), do we have Results/Consult Notes?         •  Labs - Labs ordered, completed on 02/12/21 and results are in chart. In process  Note: If patient appointment is for lab review and patient did not complete labs, check with provider if OK to reschedule patient until labs completed.       •  Imaging - Imaging ordered, NOT completed. Patient advised to complete prior to next appointment.       •  Referrals - No referrals were ordered at last office visit.    3. Is this appointment scheduled as a Hospital Follow-Up? No    4.  Immunizations were updated in Epic using Reconcile Outside Information activity? Yes    5.  Patient is due for the following Health Maintenance Topics:   Health Maintenance Due   Topic Date Due   • COVID-19 Vaccine (1 of 2) 02/08/1966   • IMM ZOSTER VACCINES (1 of 2) 02/08/1998   • Annual Pulmonary Function Test / Spirometry  04/10/2016   • BONE DENSITY  04/10/2020   • MAMMOGRAM  04/22/2020   • Annual Wellness Visit  05/14/2020   • IMM INFLUENZA (1) 09/01/2020       6.  AHA (Pulse8) form printed for Provider? Yes

## 2021-02-16 ENCOUNTER — OFFICE VISIT (OUTPATIENT)
Dept: MEDICAL GROUP | Age: 71
End: 2021-02-16
Payer: MEDICARE

## 2021-02-16 VITALS
HEIGHT: 62 IN | SYSTOLIC BLOOD PRESSURE: 122 MMHG | BODY MASS INDEX: 32.97 KG/M2 | WEIGHT: 179.2 LBS | HEART RATE: 88 BPM | OXYGEN SATURATION: 96 % | DIASTOLIC BLOOD PRESSURE: 78 MMHG | TEMPERATURE: 98.2 F

## 2021-02-16 DIAGNOSIS — E78.5 DYSLIPIDEMIA: ICD-10-CM

## 2021-02-16 DIAGNOSIS — J41.8 MIXED SIMPLE AND MUCOPURULENT CHRONIC BRONCHITIS (HCC): ICD-10-CM

## 2021-02-16 DIAGNOSIS — H91.93 DECREASED HEARING OF BOTH EARS: ICD-10-CM

## 2021-02-16 DIAGNOSIS — Z23 NEED FOR VACCINATION: ICD-10-CM

## 2021-02-16 DIAGNOSIS — Z12.31 ENCOUNTER FOR SCREENING MAMMOGRAM FOR BREAST CANCER: ICD-10-CM

## 2021-02-16 DIAGNOSIS — R73.01 IFG (IMPAIRED FASTING GLUCOSE): ICD-10-CM

## 2021-02-16 DIAGNOSIS — E66.9 OBESITY (BMI 30-39.9): ICD-10-CM

## 2021-02-16 DIAGNOSIS — E03.4 HYPOTHYROIDISM DUE TO ACQUIRED ATROPHY OF THYROID: ICD-10-CM

## 2021-02-16 DIAGNOSIS — M85.89 OSTEOPENIA OF MULTIPLE SITES: ICD-10-CM

## 2021-02-16 DIAGNOSIS — Z00.00 MEDICARE ANNUAL WELLNESS VISIT, SUBSEQUENT: Primary | ICD-10-CM

## 2021-02-16 PROCEDURE — G0008 ADMIN INFLUENZA VIRUS VAC: HCPCS | Performed by: PHYSICIAN ASSISTANT

## 2021-02-16 PROCEDURE — 90662 IIV NO PRSV INCREASED AG IM: CPT | Performed by: PHYSICIAN ASSISTANT

## 2021-02-16 PROCEDURE — G0439 PPPS, SUBSEQ VISIT: HCPCS | Performed by: PHYSICIAN ASSISTANT

## 2021-02-16 ASSESSMENT — ACTIVITIES OF DAILY LIVING (ADL): BATHING_REQUIRES_ASSISTANCE: 0

## 2021-02-16 ASSESSMENT — FIBROSIS 4 INDEX: FIB4 SCORE: 1

## 2021-02-16 ASSESSMENT — ENCOUNTER SYMPTOMS: GENERAL WELL-BEING: GOOD

## 2021-02-16 ASSESSMENT — PATIENT HEALTH QUESTIONNAIRE - PHQ9: CLINICAL INTERPRETATION OF PHQ2 SCORE: 0

## 2021-02-16 NOTE — PATIENT INSTRUCTIONS
To sign up for COVID vaccine:  A [facility] account is preferred to assure notification, consent, scheduling, reminders and documentation for vaccine distribution. If you or your loved one does not have a computer or cell phone, and needs special assistance, we are pleased to provide support at [phone] to assist them in scheduling an appointment to receive the vaccine.   Because [facility]’s appointments are based on vaccine availability, our Call Center will only be able to schedule if appointment times are currently available. Thank you for your patience as call volumes are very high during this time.

## 2021-02-16 NOTE — PROGRESS NOTES
Chief Complaint   Patient presents with   • Annual Wellness Visit         HPI:  Vaishali is a 71 y.o. here for Medicare Annual Wellness Visit    Reports doing well.   Hasn't been eating as many veggies as she was in the summer. Denies any physical activity.  Has been staying home due to COVID. Reports unsure about if she will receive the vaccine due to past reactions vaccines when she was younger (small pox, polio, etc.)    Reports breathing has been good. Not using any albuterol--has neb at home.      Patient Active Problem List    Diagnosis Date Noted   • Osteopenia of multiple sites 05/14/2019   • Dyslipidemia 02/13/2017   • Obesity (BMI 30-39.9) 09/17/2016   • Diverticulosis 04/23/2015   • Cystocele with rectocele 04/10/2015   • Decreased hearing of both ears--hearing aids 01/22/2015   • Hypothyroidism due to acquired atrophy of thyroid 01/21/2013   • Mixed simple and mucopurulent chronic bronchitis (HCC) 01/20/2013       Current Outpatient Medications   Medication Sig Dispense Refill   • diphenhydrAMINE HCl (BENADRYL ALLERGY PO) Take  by mouth as needed.     • levothyroxine (SYNTHROID) 75 MCG Tab Take 1 Tab by mouth Every morning on an empty stomach. 90 Tab 3   • Cyanocobalamin (VITAMIN B-12 PO) Take  by mouth.     • Cholecalciferol (VITAMIN D PO) Take  by mouth.       No current facility-administered medications for this visit.        Patient is taking medications as noted in medication list.  Current supplements as per medication list.     Allergies: Seasonal    Current social contact/activities: Visiting with neighbors and family      Is patient current with immunizations? No, due for FLU and SHINGRIX (Shingles). Patient is interested in receiving FLU today.    She  reports that she quit smoking about 14 years ago. Her smoking use included cigarettes. She has a 40.00 pack-year smoking history. She has never used smokeless tobacco. She reports previous alcohol use of about 1.8 oz of alcohol per week. She  reports that she does not use drugs.    DPA/Advanced directive: Patient does not have an Advanced Directive.  A packet and workshop information was given on Advanced Directives.    ROS:    Gait: Uses no assistive device   Ostomy: No   Other tubes: No   Amputations: No   Chronic oxygen use No   Last eye exam 1/2018   Wears hearing aids: Yes   : Denies any urinary leakage during the last 6 months    Screening:  Depression Screening  Little interest or pleasure in doing things?  0 - not at all  Feeling down, depressed, or hopeless? 0 - not at all  Patient Health Questionnaire Score: 0  No depressive symptoms identified.    Screening for Cognitive Impairment  Three Minute Recall (river, stepan, finger)  3/3    Abdon clock face with all 12 numbers and set the hands to show 10 past 11.  Yes 5/5  No cognitive concerns identified.    Fall Risk Assessment  Has the patient had two or more falls in the last year or any fall with injury in the last year?  No  No fall risk identified.    Safety Assessment  Throw rugs on floor.  Yes  Handrails on all stairs.  Yes  Good lighting in all hallways.  Yes  Difficulty hearing.  No  Patient counseled about all safety risks that were identified.    Functional Assessment ADLs  Are there any barriers preventing you from cooking for yourself or meeting nutritional needs?  No.    Are there any barriers preventing you from driving safely or obtaining transportation?  No.    Are there any barriers preventing you from using a telephone or calling for help?  No.    Are there any barriers preventing you from shopping?  No.    Are there any barriers preventing you from taking care of your own finances?  No.    Are there any barriers preventing you from managing your medications?  No.    Are there any barriers preventing you from showering, bathing or dressing yourself?  No.    Are you currently engaging in any exercise or physical activity?  Yes.  Stationary bike and treadmill    What is your  perception of your health?  Good.    Health Maintenance Summary                COVID-19 Vaccine Overdue 1966     IMM ZOSTER VACCINES Overdue 1998     Annual Pulmonary Function Test / Spirometry Overdue 4/10/2016      Done 4/10/2015 See pulm consult in media-- 4/10/15 PFT performed in office.    BONE DENSITY Overdue 4/10/2020      Done 4/10/2015 DS-BONE DENSITY STUDY (DEXA)    MAMMOGRAM Overdue 2020      Done 2019 MA-SCREENING MAMMO BILAT W/TOMOSYNTHESIS W/CAD     Patient has more history with this topic...    Annual Wellness Visit Overdue 2020      Done 2019 LOS: [place] ANNUAL WELLNESS VISIT-INCLUDES PPPS SUBSEQUE*     Patient has more history with this topic...    IMM INFLUENZA Overdue 2020      Done 10/5/2019 Imm Admin: Influenza Vaccine Adult HD     Patient has more history with this topic...    COLOGUARD STOOL DNA Next Due 10/15/2021      Done 10/15/2018 COLOGUARD COLON CANCER SCREENING    IMM DTaP/Tdap/Td Vaccine Next Due 4/10/2025      Done 4/10/2015 Imm Admin: Tdap Vaccine          Patient Care Team:  Vesna Davis P.A.-C. as PCP - General (Family Medicine)  Skylar Nick M.D. (OB/Gyn)  Jared ORTIZ M.D. (Inactive) as Consulting Physician (Gastroenterology)  [facility] as Consulting Physician (Optometry)  Leonie Emmanuel    Social History     Tobacco Use   • Smoking status: Former Smoker     Packs/day: 1.00     Years: 40.00     Pack years: 40.00     Types: Cigarettes     Quit date: 2007     Years since quittin.0   • Smokeless tobacco: Never Used   Substance Use Topics   • Alcohol use: Not Currently     Alcohol/week: 1.8 oz     Types: 1 Glasses of wine, 2 Standard drinks or equivalent per week     Comment: rare glass of wine   • Drug use: No     Family History   Problem Relation Age of Onset   • Diabetes Sister         DMI   • Other Sister         Kidney Failure   • Diabetes Brother         DM2. no meds   • Stroke Father 54   • Cancer Maternal  "Grandmother         Uterine   • Diabetes Paternal Grandmother    • No Known Problems Mother    • No Known Problems Maternal Grandfather    • No Known Problems Paternal Grandfather    • Cancer Maternal Uncle    • Cancer Paternal Uncle    • Arthritis Son         Arthritis, gout   • Asthma Son    • Alcohol/Drug Neg Hx      She  has a past medical history of Anemia, Anesthesia, Emphysema, Hypothyroid, Osteoporosis, Pneumonia (2015), and Urinary retention.   Past Surgical History:   Procedure Laterality Date   • AFSANEH BY LAPAROSCOPY Bilateral 9/11/2018    Procedure: AFSANEH BY LAPAROSCOPY;  Surgeon: Mahesh Gordillo M.D.;  Location: SURGERY [facility];  Service: General   • OTHER ORTHOPEDIC SURGERY      R shoulder surgery         Exam:   /78 (BP Location: Left arm, Patient Position: Sitting, BP Cuff Size: Adult)   Pulse 88   Temp 36.8 °C (98.2 °F) (Temporal)   Ht 1.575 m (5' 2\")   Wt 81.3 kg (179 lb 3.2 oz)   SpO2 96%  Body mass index is 32.78 kg/m².    Hearing poor.  Hearing aids in place  Dentition fair  Constitutional: Alert, no distress, well-groomed.  Skin: Warm, dry, good turgor.  Eye: Equal, round and reactive, conjunctiva clear, lids normal.  ENMT: Lips without lesions, good dentition, moist mucous membranes.  Neck: Trachea midline, no masses, no thyromegaly.  Cardiovascular: Regular rate and rhythm.  Chest: Effort normal. Right side clear to ausculation. Left side with scattered rhonchi.  Psych: Alert and oriented x3, normal affect and mood.    Results for orders placed or performed during the hospital encounter of 02/12/21   TSH WITH REFLEX TO FT4   Result Value Ref Range    TSH 3.440 0.380 - 5.330 uIU/mL   Lipid Profile   Result Value Ref Range    Cholesterol,Tot 221 (H) 100 - 199 mg/dL    Triglycerides 240 (H) 0 - 149 mg/dL    HDL 41 >=40 mg/dL     (H) <100 mg/dL   CBC WITH DIFFERENTIAL   Result Value Ref Range    WBC 8.6 4.8 - 10.8 K/uL    RBC 4.81 4.20 - 5.40 M/uL    Hemoglobin " 14.2 12.0 - 16.0 g/dL    Hematocrit 45.2 37.0 - 47.0 %    MCV 94.0 81.4 - 97.8 fL    MCH 29.5 27.0 - 33.0 pg    MCHC 31.4 (L) 33.6 - 35.0 g/dL    RDW 45.0 35.9 - 50.0 fL    Platelet Count 360 164 - 446 K/uL    MPV 10.7 9.0 - 12.9 fL    Neutrophils-Polys 52.90 44.00 - 72.00 %    Lymphocytes 36.30 22.00 - 41.00 %    Monocytes 7.00 0.00 - 13.40 %    Eosinophils 2.60 0.00 - 6.90 %    Basophils 0.80 0.00 - 1.80 %    Immature Granulocytes 0.40 0.00 - 0.90 %    Nucleated RBC 0.00 /100 WBC    Neutrophils (Absolute) 4.53 2.00 - 7.15 K/uL    Lymphs (Absolute) 3.10 1.00 - 4.80 K/uL    Monos (Absolute) 0.60 0.00 - 0.85 K/uL    Eos (Absolute) 0.22 0.00 - 0.51 K/uL    Baso (Absolute) 0.07 0.00 - 0.12 K/uL    Immature Granulocytes (abs) 0.03 0.00 - 0.11 K/uL    NRBC (Absolute) 0.00 K/uL   Comp Metabolic Panel   Result Value Ref Range    Sodium 140 135 - 145 mmol/L    Potassium 4.3 3.6 - 5.5 mmol/L    Chloride 102 96 - 112 mmol/L    Co2 28 20 - 33 mmol/L    Anion Gap 10.0 7.0 - 16.0    Glucose 106 (H) 65 - 99 mg/dL    Bun 13 8 - 22 mg/dL    Creatinine 0.77 0.50 - 1.40 mg/dL    Calcium 9.7 8.5 - 10.5 mg/dL    AST(SGOT) 21 12 - 45 U/L    ALT(SGPT) 18 2 - 50 U/L    Alkaline Phosphatase 84 30 - 99 U/L    Total Bilirubin 0.9 0.1 - 1.5 mg/dL    Albumin 4.4 3.2 - 4.9 g/dL    Total Protein 8.1 6.0 - 8.2 g/dL    Globulin 3.7 (H) 1.9 - 3.5 g/dL    A-G Ratio 1.2 g/dL   FASTING STATUS   Result Value Ref Range    Fasting Status Fasting    ESTIMATED GFR   Result Value Ref Range    GFR If African American >60 >60 mL/min/1.73 m 2    GFR If Non African American >60 >60 mL/min/1.73 m 2    Reviewed and discussed above labs with patient in office.      Assessment and Plan. The following treatment and monitoring plan is recommended:      1. Medicare annual wellness visit, subsequent  - Reviewed above screenings with patient. Discussed advanced directives at length. Packet given to complete along with information on workshops.      2. Mixed simple and  mucopurulent chronic bronchitis (HCC)  Chronic, stable. Patient declines treatment with inhalers (she does albuterol neb at home). She declines PFT.  - CBC WITH DIFFERENTIAL; Future    3. Hypothyroidism due to acquired atrophy of thyroid  Stable. Continue current medicines. Monitor labs regularly.   - TSH WITH REFLEX TO FT4; Future    4. Obesity (BMI 30-39.9)  - Encouraged diet high in fruits, vegetables, and fiber. And a diet low in salt, refined carbohydrates, cholesterol, saturated fat, and trans fatty acids.    - Encouraged  a minimum of 30 minutes of moderate intensity aerobic exercise (eg, brisk walking) is recommended on five days each week. Or 20 minutes of vigorous-intensity aerobic exercise (eg, jogging) on three days each week.    - Patient identified as having weight management issue.  Appropriate orders and counseling given.    5. IFG (impaired fasting glucose)  Stable. Continue lifestyle modifications. Monitor labs regularly.   - Comp Metabolic Panel; Future  - HEMOGLOBIN A1C; Future    6. Dyslipidemia  Stable. Continue lifestyle modifications. Monitor labs regularly. She will work on increasing veggie and fiber intake. Decrease sugars in diet. Increase physical activity--she has exercise bike and treadmill at home.  The 10-year ASCVD risk score ([place] DC Jr., et al., 2013) is: 12.4% --declines treatment.  - Comp Metabolic Panel; Future  - Lipid Profile; Future    7. Decreased hearing of both ears--hearing aids  Stable. Continue with hearing aid use regularly.     8. Osteopenia of multiple sites  Due for DEXA-- ordered last visit. Reminded to complete.  -Discussed weight bearing exercises, taking calcium 1500 mg daily, adequate vitamin D.      9. Need for vaccination  VIS given. Informed consent obtained. Vaccine administered in office.   - Influenza Vaccine, High Dose (65+ Only)  - Discussed COVID vaccine at great length. She does not wish to receive at this time. More info added to AVS if she changes  her mind.     10. Encounter for screening mammogram for breast cancer  Educated on importance of mammograms for breast cancer screening and current recommendations. Mammogram ordered.  - MA-SCREENING MAMMO BILAT W/TOMOSYNTHESIS W/CAD; Future    -AHA form completed for insurance    Services suggested: No services needed at this time  Health Care Screening recommendations as per orders if indicated.  Referrals offered: PT/OT/Nutrition counseling/Behavioral Health/Smoking cessation as per orders if indicated.    Discussion today about general wellness and lifestyle habits:    · Prevent falls and reduce trip hazards; Cautioned about securing or removing rugs.  · Have a working fire alarm and carbon monoxide detector;   · Engage in regular physical activity and social activities       Follow-up: Return in about 6 months (around 8/16/2021) for lab review, sooner if needed.

## 2021-03-29 DIAGNOSIS — J01.40 ACUTE NON-RECURRENT PANSINUSITIS: ICD-10-CM

## 2021-03-29 NOTE — TELEPHONE ENCOUNTER
Received request via: Patient    Was the patient seen in the last year in this department? Yes    Does the patient have an active prescription (recently filled or refills available) for medication(s) requested? No     Patient states that her tooth infection has come back and she has not been able to get into a dentist as of yet.

## 2021-03-30 RX ORDER — AMOXICILLIN AND CLAVULANATE POTASSIUM 875; 125 MG/1; MG/1
1 TABLET, FILM COATED ORAL 2 TIMES DAILY
Qty: 10 TABLET | Refills: 0 | Status: SHIPPED | OUTPATIENT
Start: 2021-03-30 | End: 2021-04-04

## 2021-04-23 ENCOUNTER — HOSPITAL ENCOUNTER (OUTPATIENT)
Dept: RADIOLOGY | Facility: MEDICAL CENTER | Age: 71
End: 2021-04-23
Attending: PHYSICIAN ASSISTANT
Payer: MEDICARE

## 2021-04-23 DIAGNOSIS — Z12.31 ENCOUNTER FOR SCREENING MAMMOGRAM FOR BREAST CANCER: ICD-10-CM

## 2021-04-23 PROCEDURE — 77063 BREAST TOMOSYNTHESIS BI: CPT

## 2021-04-23 PROCEDURE — 77080 DXA BONE DENSITY AXIAL: CPT

## 2021-06-22 ENCOUNTER — TELEPHONE (OUTPATIENT)
Dept: HEALTH INFORMATION MANAGEMENT | Facility: OTHER | Age: 71
End: 2021-06-22

## 2021-06-22 NOTE — TELEPHONE ENCOUNTER
Received request via: Patient    Was the patient seen in the last year in this department? Yes    Does the patient have an active prescription (recently filled or refills available) for medication(s) requested? No     Pt is requesting a refill of amoxicillin-clavulanate (AUGMENTIN) 875-125 MG tab for her tooth ache. She said the tooth ache radiates all the way up into her ear.

## 2021-06-23 RX ORDER — AMOXICILLIN 500 MG/1
500 CAPSULE ORAL 3 TIMES DAILY
Qty: 21 CAPSULE | Refills: 0 | Status: SHIPPED | OUTPATIENT
Start: 2021-06-23 | End: 2021-06-30

## 2021-06-23 NOTE — TELEPHONE ENCOUNTER
Sent over a prescription. If no improvement in 24-48 hours, she should be seen.  Catalina, can you review dental benefits with patient?

## 2021-06-23 NOTE — TELEPHONE ENCOUNTER
"Called and spoke to pt. She is c/o a dental abscess. She stated she has had this problem in the past and the amoxicillin clears it up. Pt stated she needs all of her bottom teeth pulled and needs dentures but is having a hard time finding a dentist to do this as it's very expensive and she is not sure she has the funds.   She stated some of her teeth on the bottom are very sensitive to temperature and the pain radiates down to her gums and jaw. She denies any fever, open wound in her mouth or drainage from mouth.   Pt is requesting amoxicillin. Has used before for dental abscess and \"worked wonders\"   "

## 2021-08-10 ENCOUNTER — TELEPHONE (OUTPATIENT)
Dept: MEDICAL GROUP | Age: 71
End: 2021-08-10

## 2021-08-10 NOTE — TELEPHONE ENCOUNTER
ESTABLISHED PATIENT PRE-VISIT PLANNING   Tuesday, 08/10/2021@3:32PM:    Phone Number Called: [phone] (home)   Call outcome: Spoke to patient regarding message below.  Message: Advised appointment scheduled with Provider Vesna May, Monday, 8/16/2021@1:30PM, [address]. Patient declined virtual Visit. Request patient arrive 10-15 minutes early for check-in. Patient states will complete 12-hour fasting labs at a [facility] on Wed. 8/10/2021.      Patient was contacted to complete PVP.     Note: Patient will not be contacted if there is no indication to call.     1.  Reviewed notes from the last few office visits within the medical group: Yes    2.  If any orders were placed at last visit or intended to be done for this visit (i.e. 6 mos follow-up), do we have Results/Consult Notes?         •  Labs - Labs ordered, NOT completed. Patient advised to complete prior to next appointment.  Note: If patient appointment is for lab review and patient did not complete labs, check with provider if OK to reschedule patient until labs completed.       •  Imaging - Imaging ordered, completed and results are in chart.       •  Referrals - No referrals were ordered at last office visit.    3. Is this appointment scheduled as a Hospital Follow-Up? No    4.  Immunizations were updated in Epic using Reconcile Outside Information activity? Yes    5.  Patient is due for the following Health Maintenance Topics:   Health Maintenance Due   Topic Date Due   • COVID-19 Vaccine (1) Never done   • IMM ZOSTER VACCINES (1 of 2) Never done   • Annual Pulmonary Function Test / Spirometry  04/10/2016     6.  AHA (Pulse8) form printed for Provider? No, patient does not have any open alerts

## 2021-08-11 ENCOUNTER — HOSPITAL ENCOUNTER (OUTPATIENT)
Dept: LAB | Facility: MEDICAL CENTER | Age: 71
End: 2021-08-11
Attending: PHYSICIAN ASSISTANT
Payer: MEDICARE

## 2021-08-11 DIAGNOSIS — E03.4 HYPOTHYROIDISM DUE TO ACQUIRED ATROPHY OF THYROID: ICD-10-CM

## 2021-08-11 DIAGNOSIS — E78.5 DYSLIPIDEMIA: ICD-10-CM

## 2021-08-11 DIAGNOSIS — R73.01 IFG (IMPAIRED FASTING GLUCOSE): ICD-10-CM

## 2021-08-11 DIAGNOSIS — J41.8 MIXED SIMPLE AND MUCOPURULENT CHRONIC BRONCHITIS (HCC): ICD-10-CM

## 2021-08-11 LAB
ALBUMIN SERPL BCP-MCNC: 4.5 G/DL (ref 3.2–4.9)
ALBUMIN/GLOB SERPL: 1.3 G/DL
ALP SERPL-CCNC: 90 U/L (ref 30–99)
ALT SERPL-CCNC: 25 U/L (ref 2–50)
ANION GAP SERPL CALC-SCNC: 12 MMOL/L (ref 7–16)
AST SERPL-CCNC: 24 U/L (ref 12–45)
BASOPHILS # BLD AUTO: 0.8 % (ref 0–1.8)
BASOPHILS # BLD: 0.06 K/UL (ref 0–0.12)
BILIRUB SERPL-MCNC: 0.9 MG/DL (ref 0.1–1.5)
BUN SERPL-MCNC: 9 MG/DL (ref 8–22)
CALCIUM SERPL-MCNC: 9.9 MG/DL (ref 8.5–10.5)
CHLORIDE SERPL-SCNC: 102 MMOL/L (ref 96–112)
CHOLEST SERPL-MCNC: 223 MG/DL (ref 100–199)
CO2 SERPL-SCNC: 27 MMOL/L (ref 20–33)
CREAT SERPL-MCNC: 0.67 MG/DL (ref 0.5–1.4)
EOSINOPHIL # BLD AUTO: 0.24 K/UL (ref 0–0.51)
EOSINOPHIL NFR BLD: 3.3 % (ref 0–6.9)
ERYTHROCYTE [DISTWIDTH] IN BLOOD BY AUTOMATED COUNT: 44.9 FL (ref 35.9–50)
EST. AVERAGE GLUCOSE BLD GHB EST-MCNC: 117 MG/DL
FASTING STATUS PATIENT QL REPORTED: NORMAL
GLOBULIN SER CALC-MCNC: 3.6 G/DL (ref 1.9–3.5)
GLUCOSE SERPL-MCNC: 103 MG/DL (ref 65–99)
HBA1C MFR BLD: 5.7 % (ref 4–5.6)
HCT VFR BLD AUTO: 45.2 % (ref 37–47)
HDLC SERPL-MCNC: 40 MG/DL
HGB BLD-MCNC: 14.6 G/DL (ref 12–16)
IMM GRANULOCYTES # BLD AUTO: 0.02 K/UL (ref 0–0.11)
IMM GRANULOCYTES NFR BLD AUTO: 0.3 % (ref 0–0.9)
LDLC SERPL CALC-MCNC: 126 MG/DL
LYMPHOCYTES # BLD AUTO: 3.15 K/UL (ref 1–4.8)
LYMPHOCYTES NFR BLD: 42.9 % (ref 22–41)
MCH RBC QN AUTO: 30.4 PG (ref 27–33)
MCHC RBC AUTO-ENTMCNC: 32.3 G/DL (ref 33.6–35)
MCV RBC AUTO: 94 FL (ref 81.4–97.8)
MONOCYTES # BLD AUTO: 0.5 K/UL (ref 0–0.85)
MONOCYTES NFR BLD AUTO: 6.8 % (ref 0–13.4)
NEUTROPHILS # BLD AUTO: 3.38 K/UL (ref 2–7.15)
NEUTROPHILS NFR BLD: 45.9 % (ref 44–72)
NRBC # BLD AUTO: 0 K/UL
NRBC BLD-RTO: 0 /100 WBC
PLATELET # BLD AUTO: 322 K/UL (ref 164–446)
PMV BLD AUTO: 10.4 FL (ref 9–12.9)
POTASSIUM SERPL-SCNC: 4.3 MMOL/L (ref 3.6–5.5)
PROT SERPL-MCNC: 8.1 G/DL (ref 6–8.2)
RBC # BLD AUTO: 4.81 M/UL (ref 4.2–5.4)
SODIUM SERPL-SCNC: 141 MMOL/L (ref 135–145)
TRIGL SERPL-MCNC: 283 MG/DL (ref 0–149)
TSH SERPL DL<=0.005 MIU/L-ACNC: 2.05 UIU/ML (ref 0.38–5.33)
WBC # BLD AUTO: 7.4 K/UL (ref 4.8–10.8)

## 2021-08-11 PROCEDURE — 83036 HEMOGLOBIN GLYCOSYLATED A1C: CPT

## 2021-08-11 PROCEDURE — 85025 COMPLETE CBC W/AUTO DIFF WBC: CPT

## 2021-08-11 PROCEDURE — 80053 COMPREHEN METABOLIC PANEL: CPT

## 2021-08-11 PROCEDURE — 80061 LIPID PANEL: CPT

## 2021-08-11 PROCEDURE — 36415 COLL VENOUS BLD VENIPUNCTURE: CPT

## 2021-08-11 PROCEDURE — 84443 ASSAY THYROID STIM HORMONE: CPT

## 2021-08-16 ENCOUNTER — PATIENT OUTREACH (OUTPATIENT)
Dept: HEALTH INFORMATION MANAGEMENT | Facility: OTHER | Age: 71
End: 2021-08-16

## 2021-08-16 ENCOUNTER — OFFICE VISIT (OUTPATIENT)
Dept: MEDICAL GROUP | Age: 71
End: 2021-08-16
Payer: MEDICARE

## 2021-08-16 VITALS
DIASTOLIC BLOOD PRESSURE: 60 MMHG | SYSTOLIC BLOOD PRESSURE: 110 MMHG | OXYGEN SATURATION: 93 % | HEART RATE: 99 BPM | BODY MASS INDEX: 32.2 KG/M2 | WEIGHT: 175 LBS | HEIGHT: 62 IN | TEMPERATURE: 97.6 F

## 2021-08-16 DIAGNOSIS — E03.4 HYPOTHYROIDISM DUE TO ACQUIRED ATROPHY OF THYROID: ICD-10-CM

## 2021-08-16 DIAGNOSIS — J41.8 MIXED SIMPLE AND MUCOPURULENT CHRONIC BRONCHITIS (HCC): ICD-10-CM

## 2021-08-16 DIAGNOSIS — E78.5 DYSLIPIDEMIA: ICD-10-CM

## 2021-08-16 DIAGNOSIS — M85.89 OSTEOPENIA OF MULTIPLE SITES: ICD-10-CM

## 2021-08-16 DIAGNOSIS — R73.01 IFG (IMPAIRED FASTING GLUCOSE): ICD-10-CM

## 2021-08-16 PROCEDURE — 99214 OFFICE O/P EST MOD 30 MIN: CPT | Performed by: PHYSICIAN ASSISTANT

## 2021-08-16 ASSESSMENT — FIBROSIS 4 INDEX: FIB4 SCORE: 1.09
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
## 2021-09-14 DIAGNOSIS — E03.4 HYPOTHYROIDISM DUE TO ACQUIRED ATROPHY OF THYROID: ICD-10-CM

## 2021-09-14 RX ORDER — LEVOTHYROXINE SODIUM 0.07 MG/1
75 TABLET ORAL
Qty: 90 TABLET | Refills: 3 | Status: SHIPPED | OUTPATIENT
Start: 2021-09-14 | End: 2022-09-14 | Stop reason: SDUPTHER

## 2021-10-21 ENCOUNTER — TELEPHONE (OUTPATIENT)
Dept: MEDICAL GROUP | Age: 71
End: 2021-10-21

## 2021-10-22 RX ORDER — AMOXICILLIN 500 MG/1
500 CAPSULE ORAL 3 TIMES DAILY
Qty: 21 CAPSULE | Refills: 0 | Status: SHIPPED | OUTPATIENT
Start: 2021-10-22 | End: 2021-10-29

## 2021-11-16 ENCOUNTER — TELEPHONE (OUTPATIENT)
Dept: MEDICAL GROUP | Age: 71
End: 2021-11-16

## 2021-11-16 RX ORDER — AMOXICILLIN 500 MG/1
500 CAPSULE ORAL 3 TIMES DAILY
Qty: 21 CAPSULE | Refills: 0 | Status: SHIPPED | OUTPATIENT
Start: 2021-11-16 | End: 2021-11-23

## 2021-11-16 NOTE — TELEPHONE ENCOUNTER
1. Caller Name: Vaishali Soares                        Call Back Number: [phone]         How would the patient prefer to be contacted with a response: Phone call OK to leave a detailed message    Patient is requesting antibiotics. She has a dentisit appointment on Friday and the dentist will not pull her tooth due to the swelling that she currently has in her mouth. Please advise

## 2021-11-16 NOTE — TELEPHONE ENCOUNTER
Done- please notify patient. Really need her to be seen in the future. Definitely if no improvement over the next 48 hours.

## 2021-11-17 NOTE — TELEPHONE ENCOUNTER
Phone Number Called: [phone]    Call outcome: Spoke to patient regarding message below.    Message: Informed pt of PCP note.

## 2022-01-17 ENCOUNTER — TELEPHONE (OUTPATIENT)
Dept: HEALTH INFORMATION MANAGEMENT | Facility: OTHER | Age: 72
End: 2022-01-17
Payer: MEDICARE

## 2022-02-07 ENCOUNTER — HOSPITAL ENCOUNTER (OUTPATIENT)
Facility: MEDICAL CENTER | Age: 72
End: 2022-02-07
Attending: FAMILY MEDICINE
Payer: MEDICARE

## 2022-02-07 ENCOUNTER — OFFICE VISIT (OUTPATIENT)
Dept: URGENT CARE | Facility: CLINIC | Age: 72
End: 2022-02-07
Payer: MEDICARE

## 2022-02-07 VITALS
BODY MASS INDEX: 31.92 KG/M2 | SYSTOLIC BLOOD PRESSURE: 118 MMHG | WEIGHT: 187 LBS | RESPIRATION RATE: 20 BRPM | TEMPERATURE: 98.2 F | DIASTOLIC BLOOD PRESSURE: 64 MMHG | HEIGHT: 64 IN | OXYGEN SATURATION: 90 % | HEART RATE: 111 BPM

## 2022-02-07 DIAGNOSIS — R05.9 COUGH: ICD-10-CM

## 2022-02-07 DIAGNOSIS — Z20.822 SUSPECTED COVID-19 VIRUS INFECTION: ICD-10-CM

## 2022-02-07 DIAGNOSIS — J22 ACUTE RESPIRATORY INFECTION: ICD-10-CM

## 2022-02-07 DIAGNOSIS — J44.1 COPD WITH ACUTE EXACERBATION (HCC): ICD-10-CM

## 2022-02-07 PROCEDURE — 99215 OFFICE O/P EST HI 40 MIN: CPT | Mod: CS | Performed by: FAMILY MEDICINE

## 2022-02-07 PROCEDURE — U0005 INFEC AGEN DETEC AMPLI PROBE: HCPCS

## 2022-02-07 PROCEDURE — U0003 INFECTIOUS AGENT DETECTION BY NUCLEIC ACID (DNA OR RNA); SEVERE ACUTE RESPIRATORY SYNDROME CORONAVIRUS 2 (SARS-COV-2) (CORONAVIRUS DISEASE [COVID-19]), AMPLIFIED PROBE TECHNIQUE, MAKING USE OF HIGH THROUGHPUT TECHNOLOGIES AS DESCRIBED BY CMS-2020-01-R: HCPCS

## 2022-02-07 RX ORDER — BENZONATATE 100 MG/1
100 CAPSULE ORAL 3 TIMES DAILY PRN
Qty: 30 CAPSULE | Refills: 0 | Status: SHIPPED | OUTPATIENT
Start: 2022-02-07 | End: 2022-04-26

## 2022-02-07 RX ORDER — METHYLPREDNISOLONE 4 MG/1
TABLET ORAL
Qty: 21 TABLET | Refills: 0 | Status: SHIPPED | OUTPATIENT
Start: 2022-02-07 | End: 2022-04-26

## 2022-02-07 RX ORDER — OXYCODONE HYDROCHLORIDE AND ACETAMINOPHEN 5; 325 MG/1; MG/1
TABLET ORAL
COMMUNITY
Start: 2021-12-03 | End: 2022-02-07

## 2022-02-07 RX ORDER — IBUPROFEN 600 MG/1
TABLET ORAL
COMMUNITY
Start: 2021-12-03 | End: 2022-10-20

## 2022-02-07 RX ORDER — AMOXICILLIN 500 MG/1
CAPSULE ORAL
COMMUNITY
Start: 2021-12-03 | End: 2022-02-07

## 2022-02-07 RX ORDER — DOXYCYCLINE HYCLATE 100 MG
100 TABLET ORAL 2 TIMES DAILY
Qty: 14 TABLET | Refills: 0 | Status: SHIPPED | OUTPATIENT
Start: 2022-02-07 | End: 2022-02-14

## 2022-02-07 ASSESSMENT — ENCOUNTER SYMPTOMS
DIZZINESS: 1
NAUSEA: 0
MYALGIAS: 1
VOMITING: 0
SORE THROAT: 0
CHILLS: 0
SHORTNESS OF BREATH: 0
WHEEZING: 0
COUGH: 1
FEVER: 1

## 2022-02-07 ASSESSMENT — FIBROSIS 4 INDEX: FIB4 SCORE: 1.09

## 2022-02-07 NOTE — PROGRESS NOTES
Subjective:   Vaishali Soares is a 71 y.o. female who presents for Fever (x1week), Headache (x1 week), Loss of Appetite (x1 week), and Cough (x1 week )        URI   Chronicity: Complains of cough, subjective fever, fatigue over the past week. The current episode started in the past 7 days. The problem has been unchanged. Maximum temperature: Subjective fever. Associated symptoms include congestion and coughing. Pertinent negatives include no nausea, rash, sore throat, vomiting or wheezing. Associated symptoms comments: There has been community-wide COVID-19 exposure, the patient reports known direct COVID-19 exposure    Patient reports a history of COPD with no recent changes and frequency of albuterol use, patient states baseline oxygen saturation 86-88 on room air and states this morning was 96%    States uses home oxygen generator intermittently, denies recent dyspnea or subjective wheezing    The patient reports previous symptoms with previous bronchitis and COPD exacerbation which resolved with antibiotics. She has tried acetaminophen (Relative rest, fluids) for the symptoms. The treatment provided mild relief.     PMH:  has a past medical history of Anemia, Anesthesia, Emphysema, Hypothyroid, Osteoporosis, Pneumonia (2015), and Urinary retention.  MEDS:   Current Outpatient Medications:   •  ibuprofen (MOTRIN) 600 MG Tab, , Disp: , Rfl:   •  doxycycline (VIBRAMYCIN) 100 MG Tab, Take 1 Tablet by mouth 2 times a day for 7 days., Disp: 14 Tablet, Rfl: 0  •  methylPREDNISolone (MEDROL DOSEPAK) 4 MG Tablet Therapy Pack, Follow schedule on package instructions., Disp: 21 Tablet, Rfl: 0  •  benzonatate (TESSALON) 100 MG Cap, Take 1 Capsule by mouth 3 times a day as needed for Cough., Disp: 30 Capsule, Rfl: 0  •  levothyroxine (SYNTHROID) 75 MCG Tab, Take 1 Tablet by mouth every morning on an empty stomach., Disp: 90 Tablet, Rfl: 3  •  diphenhydrAMINE HCl (BENADRYL ALLERGY PO), Take  by mouth as needed., Disp: ,  "Rfl:   •  Cyanocobalamin (VITAMIN B-12 PO), Take  by mouth., Disp: , Rfl:   •  Cholecalciferol (VITAMIN D PO), Take  by mouth., Disp: , Rfl:   •  oxyCODONE-acetaminophen (PERCOCET) 5-325 MG Tab, , Disp: , Rfl:   ALLERGIES: No Known Allergies  SURGHX:   Past Surgical History:   Procedure Laterality Date   • AFSANEH BY LAPAROSCOPY Bilateral 9/11/2018    Procedure: AFSANEH BY LAPAROSCOPY;  Surgeon: Mahesh Gordillo M.D.;  Location: SURGERY [facility];  Service: General   • OTHER ORTHOPEDIC SURGERY      R shoulder surgery     SOCHX:  reports that she quit smoking about 15 years ago. Her smoking use included cigarettes. She has a 40.00 pack-year smoking history. She has never used smokeless tobacco. She reports current alcohol use. She reports that she does not use drugs.  FH:   Family History   Problem Relation Age of Onset   • Diabetes Sister         DMI   • Other Sister         Kidney Failure   • Diabetes Brother         DM2. no meds   • Stroke Father 54   • Cancer Maternal Grandmother         Uterine   • Diabetes Paternal Grandmother    • No Known Problems Mother    • No Known Problems Maternal Grandfather    • No Known Problems Paternal Grandfather    • Cancer Maternal Uncle    • Cancer Paternal Uncle    • Arthritis Son         Arthritis, gout   • Asthma Son    • Alcohol/Drug Neg Hx      Review of Systems   Constitutional: Positive for fever and malaise/fatigue. Negative for chills.   HENT: Positive for congestion. Negative for sore throat.    Respiratory: Positive for cough. Negative for shortness of breath (Denies dyspnea) and wheezing.    Gastrointestinal: Negative for nausea and vomiting.   Musculoskeletal: Positive for myalgias.   Skin: Negative for rash.   Neurological: Positive for dizziness.        Objective:   /64   Pulse (!) 111   Temp 36.8 °C (98.2 °F) (Temporal)   Resp 20   Ht 1.626 m (5' 4\")   Wt 84.8 kg (187 lb)   SpO2 90%   BMI 32.10 kg/m²   Physical Exam  Vitals and nursing note " reviewed.   Constitutional:       General: She is not in acute distress.     Appearance: She is well-developed.   HENT:      Head: Normocephalic and atraumatic.      Right Ear: External ear normal.      Left Ear: External ear normal.      Nose: Rhinorrhea present.      Mouth/Throat:      Mouth: Mucous membranes are moist.      Pharynx: Posterior oropharyngeal erythema present. No oropharyngeal exudate.   Eyes:      Conjunctiva/sclera: Conjunctivae normal.   Cardiovascular:      Rate and Rhythm: Regular rhythm. Tachycardia present.   Pulmonary:      Effort: Pulmonary effort is normal. No respiratory distress.      Breath sounds: Wheezing (Expiratory) and rhonchi (Few) present.      Comments: Speaking full sentences  Abdominal:      General: There is no distension.   Musculoskeletal:         General: Normal range of motion.   Skin:     General: Skin is warm and dry.   Neurological:      General: No focal deficit present.      Mental Status: She is alert and oriented to person, place, and time. Mental status is at baseline.      Gait: Gait (gait at baseline) normal.   Psychiatric:         Judgment: Judgment normal.           Assessment/Plan:   1. Acute respiratory infection  - doxycycline (VIBRAMYCIN) 100 MG Tab; Take 1 Tablet by mouth 2 times a day for 7 days.  Dispense: 14 Tablet; Refill: 0    2. COPD with acute exacerbation (HCC)  - methylPREDNISolone (MEDROL DOSEPAK) 4 MG Tablet Therapy Pack; Follow schedule on package instructions.  Dispense: 21 Tablet; Refill: 0    3. Suspected COVID-19 virus infection  - SARS-CoV-2 PCR (24 hour In-House): Collect NP swab in VTM; Future    4. Cough  - benzonatate (TESSALON) 100 MG Cap; Take 1 Capsule by mouth 3 times a day as needed for Cough.  Dispense: 30 Capsule; Refill: 0    Other orders  - ibuprofen (MOTRIN) 600 MG Tab  - oxyCODONE-acetaminophen (PERCOCET) 5-325 MG Tab;  (Patient not taking: Reported on 2/7/2022)    Medical decision-making/course: The patient remained  afebrile, relatively hemodynamically with tachycardia noted and oxygen saturation at baseline and neurologically stable with no evidence of respiratory compromise throughout the urgent care course.  There was no immediate clinical indication for the necessity of emergency department evaluation or inpatient admission and the patient was amendable to a trial of outpatient management and specifically requested to defer inpatient management at this time.        Medical Decision Making/Course:  In the course of preparing for this visit with review of the pertinent past medical history, recent and past clinic visits, current medications, and performing chart, immunization, medical history and medication reconciliation, and in the further course of obtaining the current history pertinent to the clinic visit today, performing an exam and evaluation, ordering and independently evaluating labs, tests  , and/or procedures, prescribing any recommended new medications as noted above, providing any pertinent counseling and education and recommending further coordination of care, at least  40 minutes of total time were spent during this encounter.      Discussed close monitoring, return precautions, and supportive measures of maintaining adequate fluid hydration and caloric intake, relative rest and symptom management as needed for pain and/or fever.    Differential diagnosis, natural history, supportive care, and indications for immediate follow-up discussed.     Advised the patient to follow-up with the primary care physician for recheck, reevaluation, and consideration of further management.    Please note that this dictation was created using voice recognition software. I have worked with consultants from the vendor as well as technical experts from [employer] to optimize the interface. I have made every reasonable attempt to correct obvious errors, but I expect that there are errors of grammar and possibly content that I  did not discover before finalizing the note.

## 2022-02-07 NOTE — PATIENT INSTRUCTIONS
Bronchitis  Bronchitis is a problem of the air tubes leading to your lungs. This problem makes it hard for air to get in and out of the lungs. You may cough a lot because your air tubes are narrow. Going without care can cause lasting (chronic) bronchitis.  HOME CARE   · Drink enough fluids to keep your pee (urine) clear or pale yellow.  · Use a cool mist humidifier.  · Quit smoking if you smoke. If you keep smoking, the bronchitis might not get better.  · Only take medicine as told by your doctor.  GET HELP RIGHT AWAY IF:   · Coughing keeps you awake.  · You start to wheeze.  · You become more sick or weak.  · You have a hard time breathing or get short of breath.  · You cough up blood.  · Coughing lasts more than 2 weeks.  · You have a fever.  · Your baby is older than 3 months with a rectal temperature of 102° F (38.9° C) or higher.  · Your baby is 3 months old or younger with a rectal temperature of 100.4° F (38° C) or higher.  MAKE SURE YOU:  · Understand these instructions.  · Will watch your condition.  · Will get help right away if you are not doing well or get worse.  Document Released: 06/05/2009 Document Revised: 03/11/2013 Document Reviewed: 11/19/2010  [employer]® Patient Information ©2014 [employer] ([employer].

## 2022-02-08 DIAGNOSIS — Z20.822 SUSPECTED COVID-19 VIRUS INFECTION: ICD-10-CM

## 2022-02-08 LAB
COVID ORDER STATUS COVID19: NORMAL
SARS-COV-2 RNA RESP QL NAA+PROBE: DETECTED
SPECIMEN SOURCE: ABNORMAL

## 2022-02-21 ENCOUNTER — HOSPITAL ENCOUNTER (OUTPATIENT)
Facility: MEDICAL CENTER | Age: 72
End: 2022-02-21
Attending: OBSTETRICS & GYNECOLOGY | Admitting: OBSTETRICS & GYNECOLOGY
Payer: MEDICARE

## 2022-04-21 ENCOUNTER — HOSPITAL ENCOUNTER (OUTPATIENT)
Dept: LAB | Facility: MEDICAL CENTER | Age: 72
End: 2022-04-21
Attending: PHYSICIAN ASSISTANT
Payer: MEDICARE

## 2022-04-21 DIAGNOSIS — R73.01 IFG (IMPAIRED FASTING GLUCOSE): ICD-10-CM

## 2022-04-21 DIAGNOSIS — E78.5 DYSLIPIDEMIA: ICD-10-CM

## 2022-04-21 DIAGNOSIS — E03.4 HYPOTHYROIDISM DUE TO ACQUIRED ATROPHY OF THYROID: ICD-10-CM

## 2022-04-21 LAB
ALBUMIN SERPL BCP-MCNC: 4.5 G/DL (ref 3.2–4.9)
ALBUMIN/GLOB SERPL: 1.5 G/DL
ALP SERPL-CCNC: 83 U/L (ref 30–99)
ALT SERPL-CCNC: 25 U/L (ref 2–50)
ANION GAP SERPL CALC-SCNC: 11 MMOL/L (ref 7–16)
AST SERPL-CCNC: 30 U/L (ref 12–45)
BASOPHILS # BLD AUTO: 0.7 % (ref 0–1.8)
BASOPHILS # BLD: 0.05 K/UL (ref 0–0.12)
BILIRUB SERPL-MCNC: 0.8 MG/DL (ref 0.1–1.5)
BUN SERPL-MCNC: 13 MG/DL (ref 8–22)
CALCIUM SERPL-MCNC: 9.5 MG/DL (ref 8.5–10.5)
CHLORIDE SERPL-SCNC: 103 MMOL/L (ref 96–112)
CHOLEST SERPL-MCNC: 217 MG/DL (ref 100–199)
CO2 SERPL-SCNC: 26 MMOL/L (ref 20–33)
CREAT SERPL-MCNC: 0.57 MG/DL (ref 0.5–1.4)
CREAT UR-MCNC: 93.7 MG/DL
EOSINOPHIL # BLD AUTO: 0.12 K/UL (ref 0–0.51)
EOSINOPHIL NFR BLD: 1.8 % (ref 0–6.9)
ERYTHROCYTE [DISTWIDTH] IN BLOOD BY AUTOMATED COUNT: 46.3 FL (ref 35.9–50)
EST. AVERAGE GLUCOSE BLD GHB EST-MCNC: 126 MG/DL
FASTING STATUS PATIENT QL REPORTED: NORMAL
GFR SERPLBLD CREATININE-BSD FMLA CKD-EPI: 95 ML/MIN/1.73 M 2
GLOBULIN SER CALC-MCNC: 3.1 G/DL (ref 1.9–3.5)
GLUCOSE SERPL-MCNC: 100 MG/DL (ref 65–99)
HBA1C MFR BLD: 6 % (ref 4–5.6)
HCT VFR BLD AUTO: 45 % (ref 37–47)
HDLC SERPL-MCNC: 40 MG/DL
HGB BLD-MCNC: 14.2 G/DL (ref 12–16)
IMM GRANULOCYTES # BLD AUTO: 0.01 K/UL (ref 0–0.11)
IMM GRANULOCYTES NFR BLD AUTO: 0.1 % (ref 0–0.9)
LDLC SERPL CALC-MCNC: 132 MG/DL
LYMPHOCYTES # BLD AUTO: 2.57 K/UL (ref 1–4.8)
LYMPHOCYTES NFR BLD: 37.7 % (ref 22–41)
MCH RBC QN AUTO: 29.2 PG (ref 27–33)
MCHC RBC AUTO-ENTMCNC: 31.6 G/DL (ref 33.6–35)
MCV RBC AUTO: 92.6 FL (ref 81.4–97.8)
MICROALBUMIN UR-MCNC: <1.2 MG/DL
MICROALBUMIN/CREAT UR: NORMAL MG/G (ref 0–30)
MONOCYTES # BLD AUTO: 0.61 K/UL (ref 0–0.85)
MONOCYTES NFR BLD AUTO: 9 % (ref 0–13.4)
NEUTROPHILS # BLD AUTO: 3.45 K/UL (ref 2–7.15)
NEUTROPHILS NFR BLD: 50.7 % (ref 44–72)
NRBC # BLD AUTO: 0 K/UL
NRBC BLD-RTO: 0 /100 WBC
PLATELET # BLD AUTO: 340 K/UL (ref 164–446)
PMV BLD AUTO: 10 FL (ref 9–12.9)
POTASSIUM SERPL-SCNC: 4.5 MMOL/L (ref 3.6–5.5)
PROT SERPL-MCNC: 7.6 G/DL (ref 6–8.2)
RBC # BLD AUTO: 4.86 M/UL (ref 4.2–5.4)
SODIUM SERPL-SCNC: 140 MMOL/L (ref 135–145)
TRIGL SERPL-MCNC: 223 MG/DL (ref 0–149)
TSH SERPL DL<=0.005 MIU/L-ACNC: 0.69 UIU/ML (ref 0.38–5.33)
WBC # BLD AUTO: 6.8 K/UL (ref 4.8–10.8)

## 2022-04-21 PROCEDURE — 82043 UR ALBUMIN QUANTITATIVE: CPT

## 2022-04-21 PROCEDURE — 83036 HEMOGLOBIN GLYCOSYLATED A1C: CPT

## 2022-04-21 PROCEDURE — 84443 ASSAY THYROID STIM HORMONE: CPT

## 2022-04-21 PROCEDURE — 85025 COMPLETE CBC W/AUTO DIFF WBC: CPT

## 2022-04-21 PROCEDURE — 80061 LIPID PANEL: CPT

## 2022-04-21 PROCEDURE — 82570 ASSAY OF URINE CREATININE: CPT

## 2022-04-21 PROCEDURE — 80053 COMPREHEN METABOLIC PANEL: CPT

## 2022-04-21 PROCEDURE — 36415 COLL VENOUS BLD VENIPUNCTURE: CPT

## 2022-04-26 ENCOUNTER — OFFICE VISIT (OUTPATIENT)
Dept: MEDICAL GROUP | Age: 72
End: 2022-04-26
Payer: MEDICARE

## 2022-04-26 VITALS
OXYGEN SATURATION: 97 % | WEIGHT: 176 LBS | BODY MASS INDEX: 30.05 KG/M2 | HEIGHT: 64 IN | SYSTOLIC BLOOD PRESSURE: 112 MMHG | HEART RATE: 80 BPM | DIASTOLIC BLOOD PRESSURE: 64 MMHG | TEMPERATURE: 98.7 F

## 2022-04-26 DIAGNOSIS — J41.8 MIXED SIMPLE AND MUCOPURULENT CHRONIC BRONCHITIS (HCC): ICD-10-CM

## 2022-04-26 DIAGNOSIS — E78.5 DYSLIPIDEMIA: ICD-10-CM

## 2022-04-26 DIAGNOSIS — F43.21 GRIEF: ICD-10-CM

## 2022-04-26 DIAGNOSIS — Z12.31 ENCOUNTER FOR SCREENING MAMMOGRAM FOR BREAST CANCER: ICD-10-CM

## 2022-04-26 DIAGNOSIS — M85.89 OSTEOPENIA OF MULTIPLE SITES: ICD-10-CM

## 2022-04-26 DIAGNOSIS — D36.9 MULTIPLE ADENOMATOUS POLYPS: ICD-10-CM

## 2022-04-26 DIAGNOSIS — E66.9 OBESITY (BMI 30-39.9): ICD-10-CM

## 2022-04-26 DIAGNOSIS — E03.4 HYPOTHYROIDISM DUE TO ACQUIRED ATROPHY OF THYROID: ICD-10-CM

## 2022-04-26 DIAGNOSIS — R73.01 IFG (IMPAIRED FASTING GLUCOSE): ICD-10-CM

## 2022-04-26 PROCEDURE — 99214 OFFICE O/P EST MOD 30 MIN: CPT | Performed by: PHYSICIAN ASSISTANT

## 2022-04-26 RX ORDER — TIOTROPIUM BROMIDE 18 UG/1
18 CAPSULE ORAL; RESPIRATORY (INHALATION) DAILY
Qty: 90 CAPSULE | Refills: 3 | Status: SHIPPED | OUTPATIENT
Start: 2022-04-26 | End: 2022-05-09 | Stop reason: SDUPTHER

## 2022-04-26 ASSESSMENT — FIBROSIS 4 INDEX: FIB4 SCORE: 1.31

## 2022-04-26 ASSESSMENT — PATIENT HEALTH QUESTIONNAIRE - PHQ9: CLINICAL INTERPRETATION OF PHQ2 SCORE: 0

## 2022-04-26 NOTE — PROGRESS NOTES
Subjective:     Chief Complaint   Patient presents with   • Lab Results   • COPD     Vaishali Soares is a 72 y.o. female here today for evaluation and management of:    Hypothyroidism due to acquired atrophy of thyroid  Stable. Taking meds as prescribed. Tolerating well. No side effects.     Mixed simple and mucopurulent chronic bronchitis (HCC)  Denies any recent exacerbations. Not using any inhalers.  Declines inhaled steroids or spiriva.     States she is noticing increase in congestion with wind. Benadryl is helping some.   Reports COVID infection in February. 8 days of fever (102F).    IFG (impaired fasting glucose)/Dyslipidemia/Obesity  Admits to increase in candy.   States she is not interested in treating cholesterol at all.  Reports vegetable intake is good.   Reports she eats a lot of rice-- not so much bread or pasta.   Hasn't been exercising for quite some time. States she is planning to get back on her bike.  She is still grieving quite a bit between son and her mother's passing.     Osteopenia of multiple sites  She is supplementing Vitamin D.  No recent falls.   Balance-- She is not concerned. Declines intervention.   DEXA 4/23/21    Grief  Son passed away from MI in October.     Current medicines (including changes today)  Current Outpatient Medications   Medication Sig Dispense Refill   • tiotropium (SPIRIVA HANDIHALER) 18 MCG Cap Place 1 Capsule into inhaler and inhale every day. 90 Capsule 3   • fluticasone-salmeterol (ADVAIR) 500-50 MCG/DOSE AEROSOL POWDER, BREATH ACTIVATED Inhale 1 Puff every 12 hours. 60 Each 3   • ibuprofen (MOTRIN) 600 MG Tab      • levothyroxine (SYNTHROID) 75 MCG Tab Take 1 Tablet by mouth every morning on an empty stomach. 90 Tablet 3   • diphenhydrAMINE HCl (BENADRYL ALLERGY PO) Take  by mouth as needed.     • Cyanocobalamin (VITAMIN B-12 PO) Take  by mouth.     • Cholecalciferol (VITAMIN D PO) Take  by mouth.       No current facility-administered medications for  "this visit.        Objective:     Vitals:    04/26/22 0832   BP: 112/64   BP Location: Left arm   Patient Position: Sitting   BP Cuff Size: Adult   Pulse: 80   Temp: 37.1 °C (98.7 °F)   TempSrc: Temporal   SpO2: 97%   Weight: 79.8 kg (176 lb)   Height: 1.626 m (5' 4\")     Body mass index is 30.21 kg/m².     Physical Exam:  Constitutional: Alert, no distress, well-groomed.  Skin: Warm, dry, good turgor, no rashes in visible areas.  Eye: Equal, round and reactive, conjunctiva clear, lids normal.  ENMT: Lips without lesions, good dentition, moist mucous membranes.  Neck: Trachea midline, no masses, no thyromegaly.  Cardiovascular: Regular rate and rhythm.  Chest: Effort normal. Rhonchi and wheezing throughout.  Abdomen: Soft, no gross masses.  MSK: Normal gait, moves all extremities.  Neuro: Grossly non-focal. No cranial nerve deficit. Strength and sensation intact.   Psych: Alert and oriented x3, normal affect and mood.    Results for orders placed or performed during the hospital encounter of 04/21/22   HEMOGLOBIN A1C   Result Value Ref Range    Glycohemoglobin 6.0 (H) 4.0 - 5.6 %    Est Avg Glucose 126 mg/dL   MICROALBUMIN CREAT RATIO URINE   Result Value Ref Range    Creatinine, Urine 93.70 mg/dL    Microalbumin, Urine Random <1.2 mg/dL    Micro Alb Creat Ratio see below 0 - 30 mg/g   TSH WITH REFLEX TO FT4   Result Value Ref Range    TSH 0.690 0.380 - 5.330 uIU/mL   Lipid Profile   Result Value Ref Range    Cholesterol,Tot 217 (H) 100 - 199 mg/dL    Triglycerides 223 (H) 0 - 149 mg/dL    HDL 40 >=40 mg/dL     (H) <100 mg/dL   CBC WITH DIFFERENTIAL   Result Value Ref Range    WBC 6.8 4.8 - 10.8 K/uL    RBC 4.86 4.20 - 5.40 M/uL    Hemoglobin 14.2 12.0 - 16.0 g/dL    Hematocrit 45.0 37.0 - 47.0 %    MCV 92.6 81.4 - 97.8 fL    MCH 29.2 27.0 - 33.0 pg    MCHC 31.6 (L) 33.6 - 35.0 g/dL    RDW 46.3 35.9 - 50.0 fL    Platelet Count 340 164 - 446 K/uL    MPV 10.0 9.0 - 12.9 fL    Neutrophils-Polys 50.70 44.00 - " 72.00 %    Lymphocytes 37.70 22.00 - 41.00 %    Monocytes 9.00 0.00 - 13.40 %    Eosinophils 1.80 0.00 - 6.90 %    Basophils 0.70 0.00 - 1.80 %    Immature Granulocytes 0.10 0.00 - 0.90 %    Nucleated RBC 0.00 /100 WBC    Neutrophils (Absolute) 3.45 2.00 - 7.15 K/uL    Lymphs (Absolute) 2.57 1.00 - 4.80 K/uL    Monos (Absolute) 0.61 0.00 - 0.85 K/uL    Eos (Absolute) 0.12 0.00 - 0.51 K/uL    Baso (Absolute) 0.05 0.00 - 0.12 K/uL    Immature Granulocytes (abs) 0.01 0.00 - 0.11 K/uL    NRBC (Absolute) 0.00 K/uL   Comp Metabolic Panel   Result Value Ref Range    Sodium 140 135 - 145 mmol/L    Potassium 4.5 3.6 - 5.5 mmol/L    Chloride 103 96 - 112 mmol/L    Co2 26 20 - 33 mmol/L    Anion Gap 11.0 7.0 - 16.0    Glucose 100 (H) 65 - 99 mg/dL    Bun 13 8 - 22 mg/dL    Creatinine 0.57 0.50 - 1.40 mg/dL    Calcium 9.5 8.5 - 10.5 mg/dL    AST(SGOT) 30 12 - 45 U/L    ALT(SGPT) 25 2 - 50 U/L    Alkaline Phosphatase 83 30 - 99 U/L    Total Bilirubin 0.8 0.1 - 1.5 mg/dL    Albumin 4.5 3.2 - 4.9 g/dL    Total Protein 7.6 6.0 - 8.2 g/dL    Globulin 3.1 1.9 - 3.5 g/dL    A-G Ratio 1.5 g/dL   FASTING STATUS   Result Value Ref Range    Fasting Status Fasting    ESTIMATED GFR   Result Value Ref Range    GFR (CKD-EPI) 95 >60 mL/min/1.73 m 2      Reviewed and discussed above labs with patient in visit.      Assessment and Plan:   The following treatment plan was discussed:     1. Hypothyroidism due to acquired atrophy of thyroid  Chronic, stable. Continue current medicines. Monitor labs regularly.    2. Mixed simple and mucopurulent chronic bronchitis (HCC)  Chronic, uncontrolled. Discussed at great length the importance of treatment beyond albuterol. Will send in Spiriva and Advair.  Discussed potential side effects of medication with patient. We will follow-up in 1 month.  - tiotropium (SPIRIVA HANDIHALER) 18 MCG Cap; Place 1 Capsule into inhaler and inhale every day.  Dispense: 90 Capsule; Refill: 3  - fluticasone-salmeterol (ADVAIR)  500-50 MCG/DOSE AEROSOL POWDER, BREATH ACTIVATED; Inhale 1 Puff every 12 hours.  Dispense: 60 Each; Refill: 3     3. IFG (impaired fasting glucose)  Chronic, stable. Continue lifestyle modifications. Monitor labs regularly.     4. Dyslipidemia  Chronic, worsening. Patient declines any treatment. Counseled on diet modification and exercise.    5. Obesity  Obesity Counseling  - Patient identified as having weight management issue.  Appropriate orders and counseling given.       6. Osteopenia of multiple sites  Chronic, stable. Continue lifestyle modifications and supplements. Discussed fall prevention.    7. Grief  Stable. Watch for signs of depression. Encouraged her to start exercising to help with mental health.     8. Encounter for screening mammogram for breast cancer  Educated on importance of mammograms for breast cancer screening and current recommendations. Mammogram ordered.     9. Multiple Adenomatous Polyps  Overdue for diagnostic colonoscopy. Will work on getting her back in. Discussed importance of getting completed due to pre-cancerous polyps.   - REFERRAL TO GI FOR COLONOSCOPY    Health Maintenance: Declines vaccines and PFT. AHA form completed for health plan.    Followup: Return in about 1 month (around 5/26/2022) for follow-up on COPD (added inhalers), sooner if needed.

## 2022-04-27 ENCOUNTER — TELEPHONE (OUTPATIENT)
Dept: MEDICAL GROUP | Age: 72
End: 2022-04-27

## 2022-04-27 DIAGNOSIS — J41.8 MIXED SIMPLE AND MUCOPURULENT CHRONIC BRONCHITIS (HCC): ICD-10-CM

## 2022-04-27 NOTE — TELEPHONE ENCOUNTER
"1. Caller Name: Vaishali Soarse                          Call Back Number: [phone] (home)         How would the patient prefer to be contacted with a response: Phone call OK to leave a detailed message  SPOKE WITH PATIENT IN PERSON.    Per patient medication prescribed was \"too expensive, I cancelled the whole order\". Would like to know an alternative?     "

## 2022-04-28 NOTE — TELEPHONE ENCOUNTER
Phone Number Called: [phone] (home)       Call outcome: Spoke to patient regarding message below.    Message: Spoke to pt and asked her which inhaler was expensive she stated they were both too expensive and needs something else. Pt stated she is taking her son's albuterol inhaler and that, that is normally covered by insurance. Please advise.

## 2022-05-09 RX ORDER — TIOTROPIUM BROMIDE 18 UG/1
18 CAPSULE ORAL; RESPIRATORY (INHALATION) DAILY
Qty: 30 CAPSULE | Refills: 0 | Status: SHIPPED | OUTPATIENT
Start: 2022-05-09 | End: 2022-05-24

## 2022-05-09 RX ORDER — FLUTICASONE PROPIONATE AND SALMETEROL 500; 50 UG/1; UG/1
1 POWDER RESPIRATORY (INHALATION) EVERY 12 HOURS
Qty: 60 EACH | Refills: 0 | Status: SHIPPED | OUTPATIENT
Start: 2022-05-09 | End: 2022-06-20 | Stop reason: SDUPTHER

## 2022-05-24 ENCOUNTER — PHARMACY VISIT (OUTPATIENT)
Dept: PHARMACY | Facility: MEDICAL CENTER | Age: 72
End: 2022-05-24
Payer: COMMERCIAL

## 2022-05-24 ENCOUNTER — OFFICE VISIT (OUTPATIENT)
Dept: MEDICAL GROUP | Age: 72
End: 2022-05-24
Payer: MEDICARE

## 2022-05-24 VITALS
HEART RATE: 89 BPM | TEMPERATURE: 97.7 F | DIASTOLIC BLOOD PRESSURE: 70 MMHG | HEIGHT: 64 IN | SYSTOLIC BLOOD PRESSURE: 110 MMHG | WEIGHT: 179.6 LBS | OXYGEN SATURATION: 95 % | BODY MASS INDEX: 30.66 KG/M2

## 2022-05-24 DIAGNOSIS — J41.8 MIXED SIMPLE AND MUCOPURULENT CHRONIC BRONCHITIS (HCC): ICD-10-CM

## 2022-05-24 DIAGNOSIS — F43.21 GRIEF: ICD-10-CM

## 2022-05-24 DIAGNOSIS — J30.2 SEASONAL ALLERGIES: ICD-10-CM

## 2022-05-24 PROCEDURE — RXMED WILLOW AMBULATORY MEDICATION CHARGE: Performed by: PHYSICIAN ASSISTANT

## 2022-05-24 PROCEDURE — 99214 OFFICE O/P EST MOD 30 MIN: CPT | Performed by: PHYSICIAN ASSISTANT

## 2022-05-24 RX ORDER — TIOTROPIUM BROMIDE INHALATION SPRAY 3.12 UG/1
5 SPRAY, METERED RESPIRATORY (INHALATION) DAILY
Qty: 4 G | Refills: 0 | Status: SHIPPED | OUTPATIENT
Start: 2022-05-24 | End: 2022-06-20 | Stop reason: SDUPTHER

## 2022-05-24 RX ORDER — TIOTROPIUM BROMIDE 18 UG/1
18 CAPSULE ORAL; RESPIRATORY (INHALATION) DAILY
Qty: 30 CAPSULE | Refills: 0 | Status: SHIPPED | OUTPATIENT
Start: 2022-05-24 | End: 2022-05-24

## 2022-05-24 ASSESSMENT — FIBROSIS 4 INDEX: FIB4 SCORE: 1.31

## 2022-05-24 NOTE — PROGRESS NOTES
"  Subjective:     Chief Complaint   Patient presents with   • Medication Management     COPD inhalers   • COPD     Vaishali Soares is a 72 y.o. female here today for evaluation and management of:    Mixed simple and mucopurulent chronic bronchitis (HCC)/Seasonal allergies  Patient was not able to  spiriva or advair inhalers due to cost.   She has started using albuterol daily and has noticed improvement.  She is working more in the yard so reports a lot more seasonal allergies. Reports that she takes benadryl daily.     Grief  Reports that she has not been exercising over last few years--first grieving loss of mother and more recently her son.  Making it a point to get out and exercise.  Plans to restart formal exercise with her equipment.     Current medicines (including changes today)  Current Outpatient Medications   Medication Sig Dispense Refill   • tiotropium (SPIRIVA RESPIMAT) 2.5 mcg/Act Aero Soln Inhale 2 Inhalation every day. 4 g 0   • fluticasone-salmeterol (ADVAIR) 500-50 MCG/ACT AEROSOL POWDER, BREATH ACTIVATED Inhale 1 Puff every 12 hours. 60 Each 0   • ibuprofen (MOTRIN) 600 MG Tab      • levothyroxine (SYNTHROID) 75 MCG Tab Take 1 Tablet by mouth every morning on an empty stomach. 90 Tablet 3   • diphenhydrAMINE HCl (BENADRYL ALLERGY PO) Take  by mouth as needed.     • Cyanocobalamin (VITAMIN B-12 PO) Take  by mouth.     • Cholecalciferol (VITAMIN D PO) Take  by mouth.       No current facility-administered medications for this visit.        Objective:     Vitals:    05/24/22 0729   BP: 110/70   BP Location: Right arm   Patient Position: Sitting   BP Cuff Size: Adult   Pulse: 89   Temp: 36.5 °C (97.7 °F)   TempSrc: Temporal   SpO2: 95%   Weight: 81.5 kg (179 lb 9.6 oz)   Height: 1.626 m (5' 4\")     Body mass index is 30.83 kg/m².     Physical Exam:  Constitutional: Alert, no distress, well-groomed.  Skin: Warm, dry, good turgor, no rashes in visible areas.  Eye: Equal, round and reactive, " conjunctiva clear, lids normal.  ENMT: Lips without lesions, good dentition, moist mucous membranes.  Neck: Trachea midline, no masses, no thyromegaly.  Cardiovascular: Regular rate and rhythm.  Chest: Effort normal. Clear to auscultation throughout. Scattered rhonchi  Neuro: Grossly non-focal. No cranial nerve deficit. Strength and sensation intact.   Psych: Alert and oriented x3, normal affect and mood.    Assessment and Plan:   The following treatment plan was discussed:     1. Mixed simple and mucopurulent chronic bronchitis (HCC)  Chronic, exacerbated. Will at least get her a sample of spiriva from the [facility]. She will continue with albuterol use. Discussed difference between inhalers and importance of maintenance.   Follow-up 1 month.   - tiotropium (SPIRIVA RESPIMAT) 2.5 mcg/Act Aero Soln; Inhale 2 Inhalation every day.  Dispense: 4 g; Refill: 0    2. Seasonal allergies  Chronic, encouraged daily antihistamine. Nasal steroid if desired.    3. Grief  Chronic, improving. Encouraged her to take care of herself--healthy eating and more physical activity. She is maintaining social interactions with family.   Patient's body mass index is 30.83 kg/m². Exercise and nutrition counseling were performed at this visit.     Health Maintenance: Due for PFT. Will work on getting her scheduled. She is hesitant to a lot of intervention at this time. Reminded about breast and colon cancer screenings previously ordered.    Followup: Return in about 1 month (around 6/24/2022) for follow-up on spiriva, sooner if needed.

## 2022-06-20 ENCOUNTER — OFFICE VISIT (OUTPATIENT)
Dept: MEDICAL GROUP | Age: 72
End: 2022-06-20
Payer: MEDICARE

## 2022-06-20 VITALS
HEIGHT: 64 IN | BODY MASS INDEX: 30.87 KG/M2 | HEART RATE: 85 BPM | TEMPERATURE: 97.3 F | WEIGHT: 180.8 LBS | DIASTOLIC BLOOD PRESSURE: 72 MMHG | SYSTOLIC BLOOD PRESSURE: 108 MMHG | OXYGEN SATURATION: 94 %

## 2022-06-20 DIAGNOSIS — J41.8 MIXED SIMPLE AND MUCOPURULENT CHRONIC BRONCHITIS (HCC): ICD-10-CM

## 2022-06-20 PROCEDURE — 99213 OFFICE O/P EST LOW 20 MIN: CPT | Performed by: PHYSICIAN ASSISTANT

## 2022-06-20 RX ORDER — TIOTROPIUM BROMIDE INHALATION SPRAY 3.12 UG/1
5 SPRAY, METERED RESPIRATORY (INHALATION) DAILY
Qty: 8 G | Refills: 0 | Status: SHIPPED | OUTPATIENT
Start: 2022-06-20 | End: 2023-04-10

## 2022-06-20 RX ORDER — FLUTICASONE PROPIONATE AND SALMETEROL 500; 50 UG/1; UG/1
1 POWDER RESPIRATORY (INHALATION) EVERY 12 HOURS
Qty: 60 EACH | Refills: 3 | Status: SHIPPED | OUTPATIENT
Start: 2022-06-20 | End: 2023-04-10

## 2022-06-20 RX ORDER — TIOTROPIUM BROMIDE INHALATION SPRAY 3.12 UG/1
5 SPRAY, METERED RESPIRATORY (INHALATION) DAILY
Qty: 8 G | Refills: 0 | Status: SHIPPED | OUTPATIENT
Start: 2022-06-20 | End: 2022-06-20

## 2022-06-20 RX ORDER — FLUTICASONE PROPIONATE AND SALMETEROL 500; 50 UG/1; UG/1
1 POWDER RESPIRATORY (INHALATION) EVERY 12 HOURS
Qty: 60 EACH | Refills: 0 | Status: SHIPPED | OUTPATIENT
Start: 2022-06-20 | End: 2022-06-20

## 2022-06-20 ASSESSMENT — FIBROSIS 4 INDEX: FIB4 SCORE: 1.31

## 2022-06-20 NOTE — PROGRESS NOTES
"  Subjective:     Chief Complaint   Patient presents with   • COPD     Vaishali Soares is a 72 y.o. female here today for evaluation and management of:    Mixed simple and mucopurulent chronic bronchitis (HCC)  Patient reports she was able to  spiriva and is about prison through her samples. Noticing improvement in her breathing.  She is pleased with results.   States she has not been able to pick-up advair as it was not available when she went.   Stable. Currently using inhalers as prescribed.  She denies side effects.  Denies recent or current exacerbation.   Denies current shortness of breath, chest pain, or cough. She is noticing that wheezing is essentially gone.       Current medicines (including changes today)  Current Outpatient Medications   Medication Sig Dispense Refill   • fluticasone-salmeterol (ADVAIR DISKUS) 500-50 MCG/ACT AEROSOL POWDER, BREATH ACTIVATED Inhale 1 Puff every 12 hours. 60 Each 3   • tiotropium (SPIRIVA RESPIMAT) 2.5 mcg/Act Aero Soln Inhale 2 Inhalation every day. 8 g 0   • ibuprofen (MOTRIN) 600 MG Tab      • levothyroxine (SYNTHROID) 75 MCG Tab Take 1 Tablet by mouth every morning on an empty stomach. 90 Tablet 3   • diphenhydrAMINE HCl (BENADRYL ALLERGY PO) Take  by mouth as needed.     • Cyanocobalamin (VITAMIN B-12 PO) Take  by mouth.     • Cholecalciferol (VITAMIN D PO) Take  by mouth.       No current facility-administered medications for this visit.        Objective:     Vitals:    06/20/22 1219   BP: 108/72   BP Location: Right arm   Patient Position: Sitting   BP Cuff Size: Adult   Pulse: 85   Temp: 36.3 °C (97.3 °F)   TempSrc: Temporal   SpO2: 94%   Weight: 82 kg (180 lb 12.8 oz)   Height: 1.626 m (5' 4\")     Body mass index is 31.03 kg/m².     Physical Exam:  Constitutional: Alert, no distress, well-groomed.  Skin: Warm, dry, good turgor, no rashes in visible areas.  Eye: Equal, round and reactive, conjunctiva clear, lids normal.  ENMT: Lips without lesions, good " dentition, moist mucous membranes.  Neck: Trachea midline, no masses, no thyromegaly.  Cardiovascular: Regular rate and rhythm.  Chest: Effort normal. Clear to auscultation throughout. No adventitious sounds.   Abdomen: Soft, no gross masses.  MSK: Normal gait, moves all extremities.  Neuro: Grossly non-focal. No cranial nerve deficit. Strength and sensation intact.   Psych: Alert and oriented x3, normal affect and mood.      Assessment and Plan:   The following treatment plan was discussed:     1. Mixed simple and mucopurulent chronic bronchitis (HCC)  Chronic, stable/improved. Continue current medicines-- will add Advair. Checked with pharmacy should be cheaper than anticipated. More samples for spiriva sent to pharmacy .   - tiotropium (SPIRIVA RESPIMAT) 2.5 mcg/Act Aero Soln; Inhale 2 Inhalation every day.  Dispense: 8 g; Refill: 0  - fluticasone-salmeterol (ADVAIR DISKUS) 500-50 MCG/ACT AEROSOL POWDER, BREATH ACTIVATED; Inhale 1 Puff every 12 hours.  Dispense: 60 Each; Refill: 3    Health Maintenance: Reminded to complete colonoscopy and mammogram. She will schedule.     Followup: Return in about 3 months (around 9/20/2022) for follow-up on COPD, sooner if needed.

## 2022-07-25 ENCOUNTER — HOSPITAL ENCOUNTER (OUTPATIENT)
Dept: RADIOLOGY | Facility: MEDICAL CENTER | Age: 72
End: 2022-07-25
Attending: PHYSICIAN ASSISTANT
Payer: MEDICARE

## 2022-07-25 DIAGNOSIS — Z12.31 ENCOUNTER FOR SCREENING MAMMOGRAM FOR BREAST CANCER: ICD-10-CM

## 2022-07-25 PROCEDURE — 77063 BREAST TOMOSYNTHESIS BI: CPT

## 2022-08-10 ENCOUNTER — TELEPHONE (OUTPATIENT)
Dept: MEDICAL GROUP | Age: 72
End: 2022-08-10
Payer: MEDICARE

## 2022-08-10 DIAGNOSIS — K04.7 DENTAL INFECTION: ICD-10-CM

## 2022-08-10 NOTE — TELEPHONE ENCOUNTER
Walk-in pt:  1. Caller Name: Vaishali Soares                        Call Back Number: [phone] (home)     2. Message: Pt came in person stating that she has a tooth abscess and is requesting antibiotics from pcp note left on desk from [facility]. Please advise.    3. Patient approves office to leave a detailed voicemail/MyChart message: yes

## 2022-08-16 RX ORDER — AMOXICILLIN 500 MG/1
500 CAPSULE ORAL 3 TIMES DAILY
Qty: 21 CAPSULE | Refills: 0 | Status: SHIPPED | OUTPATIENT
Start: 2022-08-16 | End: 2022-08-23

## 2022-08-17 NOTE — TELEPHONE ENCOUNTER
Phone Number Called: [phone] (home)     Call outcome: Left detailed message for patient. Informed to call back with any additional questions.    Message: Detailed message left letting the patient know that an antibiotic was sent to her pharmacy.

## 2022-09-13 DIAGNOSIS — E03.4 HYPOTHYROIDISM DUE TO ACQUIRED ATROPHY OF THYROID: ICD-10-CM

## 2022-09-13 RX ORDER — LEVOTHYROXINE SODIUM 0.07 MG/1
75 TABLET ORAL
Qty: 90 TABLET | Refills: 0 | Status: CANCELLED | OUTPATIENT
Start: 2022-09-13

## 2022-09-13 NOTE — TELEPHONE ENCOUNTER
Received request via: Patient - Left a voicemail requesting the medication to be refilled.    Was the patient seen in the last year in this department? Yes    Does the patient have an active prescription (recently filled or refills available) for medication(s) requested? No

## 2022-09-14 DIAGNOSIS — E03.4 HYPOTHYROIDISM DUE TO ACQUIRED ATROPHY OF THYROID: ICD-10-CM

## 2022-09-14 RX ORDER — LEVOTHYROXINE SODIUM 0.07 MG/1
75 TABLET ORAL
Qty: 90 TABLET | Refills: 3 | Status: SHIPPED | OUTPATIENT
Start: 2022-09-14 | End: 2023-01-30 | Stop reason: SDUPTHER

## 2022-09-19 ENCOUNTER — OFFICE VISIT (OUTPATIENT)
Dept: MEDICAL GROUP | Age: 72
End: 2022-09-19
Payer: MEDICARE

## 2022-09-19 ENCOUNTER — TELEPHONE (OUTPATIENT)
Dept: MEDICAL GROUP | Age: 72
End: 2022-09-19

## 2022-09-19 VITALS
WEIGHT: 179 LBS | HEIGHT: 64 IN | SYSTOLIC BLOOD PRESSURE: 118 MMHG | DIASTOLIC BLOOD PRESSURE: 66 MMHG | BODY MASS INDEX: 30.56 KG/M2 | HEART RATE: 89 BPM | OXYGEN SATURATION: 91 % | TEMPERATURE: 97.7 F

## 2022-09-19 DIAGNOSIS — E03.4 HYPOTHYROIDISM DUE TO ACQUIRED ATROPHY OF THYROID: ICD-10-CM

## 2022-09-19 DIAGNOSIS — R73.01 IFG (IMPAIRED FASTING GLUCOSE): ICD-10-CM

## 2022-09-19 DIAGNOSIS — J41.8 MIXED SIMPLE AND MUCOPURULENT CHRONIC BRONCHITIS (HCC): ICD-10-CM

## 2022-09-19 DIAGNOSIS — E78.5 DYSLIPIDEMIA: ICD-10-CM

## 2022-09-19 DIAGNOSIS — K04.7 DENTAL INFECTION: ICD-10-CM

## 2022-09-19 PROCEDURE — 99214 OFFICE O/P EST MOD 30 MIN: CPT | Performed by: PHYSICIAN ASSISTANT

## 2022-09-19 ASSESSMENT — FIBROSIS 4 INDEX: FIB4 SCORE: 1.31
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
## 2022-09-28 NOTE — TELEPHONE ENCOUNTER
Hi, we don't have access to CRMs on the clinical side. Will you provide an update once CRM is resolved?

## 2022-10-19 ENCOUNTER — APPOINTMENT (OUTPATIENT)
Dept: RADIOLOGY | Facility: IMAGING CENTER | Age: 72
End: 2022-10-19
Attending: STUDENT IN AN ORGANIZED HEALTH CARE EDUCATION/TRAINING PROGRAM
Payer: MEDICARE

## 2022-10-19 ENCOUNTER — OFFICE VISIT (OUTPATIENT)
Dept: URGENT CARE | Facility: CLINIC | Age: 72
End: 2022-10-19
Payer: MEDICARE

## 2022-10-19 VITALS
SYSTOLIC BLOOD PRESSURE: 122 MMHG | OXYGEN SATURATION: 91 % | RESPIRATION RATE: 18 BRPM | TEMPERATURE: 97.9 F | HEART RATE: 113 BPM | BODY MASS INDEX: 30.73 KG/M2 | WEIGHT: 180 LBS | DIASTOLIC BLOOD PRESSURE: 88 MMHG | HEIGHT: 64 IN

## 2022-10-19 DIAGNOSIS — J44.1 COPD WITH ACUTE EXACERBATION (HCC): ICD-10-CM

## 2022-10-19 DIAGNOSIS — R05.1 ACUTE COUGH: ICD-10-CM

## 2022-10-19 LAB
EXTERNAL QUALITY CONTROL: NORMAL
FLUAV+FLUBV AG SPEC QL IA: NEGATIVE
INT CON NEG: NORMAL
INT CON NEG: NORMAL
INT CON POS: NORMAL
INT CON POS: NORMAL
SARS-COV+SARS-COV-2 AG RESP QL IA.RAPID: NEGATIVE

## 2022-10-19 PROCEDURE — 87804 INFLUENZA ASSAY W/OPTIC: CPT | Performed by: STUDENT IN AN ORGANIZED HEALTH CARE EDUCATION/TRAINING PROGRAM

## 2022-10-19 PROCEDURE — 87426 SARSCOV CORONAVIRUS AG IA: CPT | Performed by: STUDENT IN AN ORGANIZED HEALTH CARE EDUCATION/TRAINING PROGRAM

## 2022-10-19 PROCEDURE — 71046 X-RAY EXAM CHEST 2 VIEWS: CPT | Mod: TC,FY | Performed by: STUDENT IN AN ORGANIZED HEALTH CARE EDUCATION/TRAINING PROGRAM

## 2022-10-19 PROCEDURE — 99214 OFFICE O/P EST MOD 30 MIN: CPT | Mod: CS | Performed by: STUDENT IN AN ORGANIZED HEALTH CARE EDUCATION/TRAINING PROGRAM

## 2022-10-19 RX ORDER — METHYLPREDNISOLONE 4 MG/1
TABLET ORAL
Qty: 21 TABLET | Refills: 0 | Status: SHIPPED | OUTPATIENT
Start: 2022-10-19 | End: 2023-01-30

## 2022-10-19 RX ORDER — ALBUTEROL SULFATE 90 UG/1
2 AEROSOL, METERED RESPIRATORY (INHALATION) EVERY 4 HOURS PRN
Qty: 8.5 G | Refills: 0 | Status: SHIPPED | OUTPATIENT
Start: 2022-10-19 | End: 2023-04-10

## 2022-10-19 RX ORDER — AZITHROMYCIN 250 MG/1
TABLET, FILM COATED ORAL
Qty: 6 TABLET | Refills: 0 | Status: SHIPPED | OUTPATIENT
Start: 2022-10-19 | End: 2023-01-30

## 2022-10-19 ASSESSMENT — ENCOUNTER SYMPTOMS
SHORTNESS OF BREATH: 1
SORE THROAT: 0
COUGH: 1
CHILLS: 0
HEMOPTYSIS: 0
FEVER: 1
HEADACHES: 1

## 2022-10-19 ASSESSMENT — COPD QUESTIONNAIRES: COPD: 1

## 2022-10-19 ASSESSMENT — FIBROSIS 4 INDEX: FIB4 SCORE: 1.31

## 2022-10-19 NOTE — PROGRESS NOTES
"Subjective     Vaishali Soares is a 72 y.o. female who presents with Pneumonia (X4 days, cough, headache, sinus pressure )            Vaishali is a 72-year-old female who presents today with cough, sinus pressure, headache and fever for 4 days.  Cough has been productive with increased sputum production.  Patient states that she thinks it is pneumonia.  Patient states she gets pneumonia every fall.  Patient states fever has been as high as 101-102F at home. She has a history of PNA and COPD.  She has been experiencing some shortness of breath but states that she is normally short of breath in [place] to COPD.  Patient denies any sick contacts or close contacts experiencing similar symptoms.  Requesting chest x-ray as she believes she has pneumonia.    Cough  This is a new problem. The current episode started in the past 7 days. The problem has been unchanged. The cough is Productive of sputum. Associated symptoms include a fever, headaches, nasal congestion and shortness of breath. Pertinent negatives include no chest pain, chills, ear pain, hemoptysis or sore throat. Her past medical history is significant for COPD and pneumonia.     Review of Systems   Constitutional:  Positive for fever. Negative for chills and malaise/fatigue.   HENT:  Negative for congestion, ear pain and sore throat.    Respiratory:  Positive for cough, sputum production and shortness of breath. Negative for hemoptysis.    Cardiovascular:  Negative for chest pain, palpitations and orthopnea.   Neurological:  Positive for headaches.   All other systems reviewed and are negative.           Objective     /88   Pulse (!) 113   Temp 36.6 °C (97.9 °F) (Temporal)   Resp 18   Ht 1.626 m (5' 4\")   Wt 81.6 kg (180 lb)   SpO2 91%   BMI 30.90 kg/m²      Physical Exam  Vitals reviewed.   Constitutional:       Appearance: Normal appearance.   HENT:      Head: Normocephalic and atraumatic.      Right Ear: Tympanic membrane, ear canal and external " ear normal.      Left Ear: Tympanic membrane, ear canal and external ear normal.      Nose: Nose normal.      Mouth/Throat:      Mouth: Mucous membranes are moist.      Pharynx: Oropharynx is clear. Posterior oropharyngeal erythema present.   Eyes:      Extraocular Movements: Extraocular movements intact.      Conjunctiva/sclera: Conjunctivae normal.      Pupils: Pupils are equal, round, and reactive to light.   Cardiovascular:      Rate and Rhythm: Normal rate and regular rhythm.   Pulmonary:      Effort: Pulmonary effort is normal.      Breath sounds: Normal breath sounds.   Skin:     General: Skin is warm and dry.   Neurological:      General: No focal deficit present.      Mental Status: She is alert. Mental status is at baseline.                           RADIOLOGY RESULTS   DX-CHEST-2 VIEWS    Result Date: 10/19/2022  10/19/2022 2:44 PM HISTORY/REASON FOR EXAM:  Shortness of Breath, cough. TECHNIQUE/EXAM DESCRIPTION AND NUMBER OF VIEWS: Two views of the chest. COMPARISON:  1/7/2019 FINDINGS: The mediastinal and cardiac silhouette is unremarkable. The pulmonary vascularity is within normal limits. There is increased hazy parenchymal opacity in the left lower lobe posteriorly. There is diffuse interstitial prominence in the lower lung zones similar on the right side compared to the prior study. There is no significant pleural effusion. There is no visible pneumothorax. There are no acute bony abnormalities. There is postoperative change of the right proximal humerus.     1.  Interval increase in the hazy interstitial opacity in the left lower lobe suspicious for pneumonitis. 2.  There is underlying probable bibasilar interstitial lung disease which is similar on the right side.            Assessment & Plan        1. Acute cough  - POCT SARS-COV Antigen: NEGATIVE  - POCT Influenza A/B: NEGATIVE  - Referral to Pulmonary and Sleep Medicine    2. COPD with acute exacerbation (HCC)  - DX-CHEST-2 VIEWS  - Referral to  Pulmonary and Sleep Medicine  - azithromycin (ZITHROMAX) 250 MG Tab; Take 2 tablets (500 mg) PO on day 1 and then take 1 tablet (250 mg) daily on 2-5  Dispense: 6 Tablet; Refill: 0  - methylPREDNISolone (MEDROL DOSEPAK) 4 MG Tablet Therapy Pack; Per  directions on package  Dispense: 21 Tablet; Refill: 0  - albuterol (PROAIR HFA) 108 (90 Base) MCG/ACT Aero Soln inhalation aerosol; Inhale 2 Puffs every four hours as needed for Shortness of Breath.  Dispense: 8.5 g; Refill: 0     DX-CHEST-2 VIEWS PER RADIOLOGY:  1.  Interval increase in the hazy interstitial opacity in the left lower lobe suspicious for pneumonitis. 2.  There is underlying probable bibasilar interstitial lung disease which is similar on the right side    POCT SARS-COV Antigen: NEGATIVE  POCT Influenza A/B: NEGATIVE  SpO2 is at baseline form previous visits. 91% on room air.    Patient to start azithromycin and medrol dose pack and given prescription for albuterol inhaler to use as needed.  Patient given strict ER precautions.  Patient to follow-up with primary care provider.  Patient given referral to pulmonology.    I personally reviewed prior external notes and test results pertinent to today's visit.    Differential diagnoses, supportive care, and indications for immediate follow-up discussed with patient. Pathogenesis of diagnosis discussed including typical length and natural progression.      Instructed to return to urgent care or nearest emergency department if symptoms fail to improve, for any change in condition, further concerns, or new concerning symptoms.    Patient states understanding and agrees with the plan of care and discharge instructions.

## 2022-10-20 ASSESSMENT — ENCOUNTER SYMPTOMS
PALPITATIONS: 0
ORTHOPNEA: 0
SPUTUM PRODUCTION: 1

## 2022-12-09 ENCOUNTER — DOCUMENTATION (OUTPATIENT)
Dept: HEALTH INFORMATION MANAGEMENT | Facility: OTHER | Age: 72
End: 2022-12-09
Payer: MEDICARE

## 2023-01-26 ENCOUNTER — HOSPITAL ENCOUNTER (OUTPATIENT)
Dept: LAB | Facility: MEDICAL CENTER | Age: 73
End: 2023-01-26
Attending: PHYSICIAN ASSISTANT
Payer: MEDICARE

## 2023-01-26 DIAGNOSIS — E78.5 DYSLIPIDEMIA: ICD-10-CM

## 2023-01-26 DIAGNOSIS — R73.01 IFG (IMPAIRED FASTING GLUCOSE): ICD-10-CM

## 2023-01-26 DIAGNOSIS — E03.4 HYPOTHYROIDISM DUE TO ACQUIRED ATROPHY OF THYROID: ICD-10-CM

## 2023-01-26 DIAGNOSIS — J41.8 MIXED SIMPLE AND MUCOPURULENT CHRONIC BRONCHITIS (HCC): ICD-10-CM

## 2023-01-26 LAB
ALBUMIN SERPL BCP-MCNC: 4.3 G/DL (ref 3.2–4.9)
ALBUMIN/GLOB SERPL: 1.2 G/DL
ALP SERPL-CCNC: 81 U/L (ref 30–99)
ALT SERPL-CCNC: 19 U/L (ref 2–50)
ANION GAP SERPL CALC-SCNC: 9 MMOL/L (ref 7–16)
AST SERPL-CCNC: 23 U/L (ref 12–45)
BASOPHILS # BLD AUTO: 0.7 % (ref 0–1.8)
BASOPHILS # BLD: 0.06 K/UL (ref 0–0.12)
BILIRUB SERPL-MCNC: 0.8 MG/DL (ref 0.1–1.5)
BUN SERPL-MCNC: 10 MG/DL (ref 8–22)
CALCIUM ALBUM COR SERPL-MCNC: 9.4 MG/DL (ref 8.5–10.5)
CALCIUM SERPL-MCNC: 9.6 MG/DL (ref 8.5–10.5)
CHLORIDE SERPL-SCNC: 100 MMOL/L (ref 96–112)
CHOLEST SERPL-MCNC: 236 MG/DL (ref 100–199)
CO2 SERPL-SCNC: 29 MMOL/L (ref 20–33)
CREAT SERPL-MCNC: 0.65 MG/DL (ref 0.5–1.4)
EOSINOPHIL # BLD AUTO: 0.2 K/UL (ref 0–0.51)
EOSINOPHIL NFR BLD: 2.3 % (ref 0–6.9)
ERYTHROCYTE [DISTWIDTH] IN BLOOD BY AUTOMATED COUNT: 46.5 FL (ref 35.9–50)
EST. AVERAGE GLUCOSE BLD GHB EST-MCNC: 123 MG/DL
FASTING STATUS PATIENT QL REPORTED: NORMAL
GFR SERPLBLD CREATININE-BSD FMLA CKD-EPI: 92 ML/MIN/1.73 M 2
GLOBULIN SER CALC-MCNC: 3.5 G/DL (ref 1.9–3.5)
GLUCOSE SERPL-MCNC: 109 MG/DL (ref 65–99)
HBA1C MFR BLD: 5.9 % (ref 4–5.6)
HCT VFR BLD AUTO: 43 % (ref 37–47)
HDLC SERPL-MCNC: 46 MG/DL
HGB BLD-MCNC: 13.8 G/DL (ref 12–16)
IMM GRANULOCYTES # BLD AUTO: 0.02 K/UL (ref 0–0.11)
IMM GRANULOCYTES NFR BLD AUTO: 0.2 % (ref 0–0.9)
LDLC SERPL CALC-MCNC: 147 MG/DL
LYMPHOCYTES # BLD AUTO: 3.01 K/UL (ref 1–4.8)
LYMPHOCYTES NFR BLD: 34.8 % (ref 22–41)
MCH RBC QN AUTO: 29.7 PG (ref 27–33)
MCHC RBC AUTO-ENTMCNC: 32.1 G/DL (ref 33.6–35)
MCV RBC AUTO: 92.7 FL (ref 81.4–97.8)
MONOCYTES # BLD AUTO: 0.53 K/UL (ref 0–0.85)
MONOCYTES NFR BLD AUTO: 6.1 % (ref 0–13.4)
NEUTROPHILS # BLD AUTO: 4.82 K/UL (ref 2–7.15)
NEUTROPHILS NFR BLD: 55.9 % (ref 44–72)
NRBC # BLD AUTO: 0 K/UL
NRBC BLD-RTO: 0 /100 WBC
PLATELET # BLD AUTO: 370 K/UL (ref 164–446)
PMV BLD AUTO: 10 FL (ref 9–12.9)
POTASSIUM SERPL-SCNC: 4.1 MMOL/L (ref 3.6–5.5)
PROT SERPL-MCNC: 7.8 G/DL (ref 6–8.2)
RBC # BLD AUTO: 4.64 M/UL (ref 4.2–5.4)
SODIUM SERPL-SCNC: 138 MMOL/L (ref 135–145)
TRIGL SERPL-MCNC: 217 MG/DL (ref 0–149)
TSH SERPL DL<=0.005 MIU/L-ACNC: 4.48 UIU/ML (ref 0.38–5.33)
WBC # BLD AUTO: 8.6 K/UL (ref 4.8–10.8)

## 2023-01-26 PROCEDURE — 36415 COLL VENOUS BLD VENIPUNCTURE: CPT

## 2023-01-26 PROCEDURE — 80061 LIPID PANEL: CPT

## 2023-01-26 PROCEDURE — 84443 ASSAY THYROID STIM HORMONE: CPT

## 2023-01-26 PROCEDURE — 83036 HEMOGLOBIN GLYCOSYLATED A1C: CPT

## 2023-01-26 PROCEDURE — 80053 COMPREHEN METABOLIC PANEL: CPT

## 2023-01-26 PROCEDURE — 85025 COMPLETE CBC W/AUTO DIFF WBC: CPT

## 2023-01-30 ENCOUNTER — OFFICE VISIT (OUTPATIENT)
Dept: MEDICAL GROUP | Age: 73
End: 2023-01-30
Payer: MEDICARE

## 2023-01-30 VITALS
HEIGHT: 64 IN | SYSTOLIC BLOOD PRESSURE: 120 MMHG | TEMPERATURE: 98.4 F | BODY MASS INDEX: 31.07 KG/M2 | HEART RATE: 92 BPM | OXYGEN SATURATION: 95 % | RESPIRATION RATE: 16 BRPM | WEIGHT: 182 LBS | DIASTOLIC BLOOD PRESSURE: 78 MMHG

## 2023-01-30 DIAGNOSIS — E66.9 OBESITY (BMI 30-39.9): ICD-10-CM

## 2023-01-30 DIAGNOSIS — Z00.00 MEDICARE ANNUAL WELLNESS VISIT, SUBSEQUENT: ICD-10-CM

## 2023-01-30 DIAGNOSIS — R73.01 IFG (IMPAIRED FASTING GLUCOSE): ICD-10-CM

## 2023-01-30 DIAGNOSIS — Z86.010 HX OF COLONIC POLYPS: ICD-10-CM

## 2023-01-30 DIAGNOSIS — Z12.12 SCREENING FOR COLORECTAL CANCER: ICD-10-CM

## 2023-01-30 DIAGNOSIS — Z12.11 SCREENING FOR COLORECTAL CANCER: ICD-10-CM

## 2023-01-30 DIAGNOSIS — J41.8 MIXED SIMPLE AND MUCOPURULENT CHRONIC BRONCHITIS (HCC): ICD-10-CM

## 2023-01-30 DIAGNOSIS — E78.5 DYSLIPIDEMIA: ICD-10-CM

## 2023-01-30 DIAGNOSIS — E03.4 HYPOTHYROIDISM DUE TO ACQUIRED ATROPHY OF THYROID: ICD-10-CM

## 2023-01-30 PROCEDURE — G0439 PPPS, SUBSEQ VISIT: HCPCS | Performed by: PHYSICIAN ASSISTANT

## 2023-01-30 RX ORDER — LEVOTHYROXINE SODIUM 88 UG/1
88 TABLET ORAL
Qty: 30 TABLET | Refills: 1 | Status: SHIPPED | OUTPATIENT
Start: 2023-01-30 | End: 2023-03-21 | Stop reason: SDUPTHER

## 2023-01-30 ASSESSMENT — ENCOUNTER SYMPTOMS: GENERAL WELL-BEING: GOOD

## 2023-01-30 ASSESSMENT — PATIENT HEALTH QUESTIONNAIRE - PHQ9: CLINICAL INTERPRETATION OF PHQ2 SCORE: 0

## 2023-01-30 ASSESSMENT — ACTIVITIES OF DAILY LIVING (ADL): BATHING_REQUIRES_ASSISTANCE: 0

## 2023-01-30 ASSESSMENT — FIBROSIS 4 INDEX: FIB4 SCORE: 1.055312375804584123

## 2023-01-30 NOTE — PROGRESS NOTES
"Chief Complaint   Patient presents with    Annual Exam     Lab review        HPI:  Vaishali is a 72 y.o. here for Medicare Annual Wellness Visit    Never picked up inhalers--confusion due to cost. See enc from 10/12/22. Confusion with insurance. Breathing has been OK. Recent exacerbation 10/19/22. She is using some inhaler that her son received-- describes it as a \"powder\" doesn't use daily.    Reports some fatigue, depressed mood, hair falling out and constipation. Taking levothyroxine 75 mcg daily.     States diet has suffered. No exercise.      Patient Active Problem List    Diagnosis Date Noted    Osteopenia of multiple sites 05/14/2019    Dyslipidemia 02/13/2017    Obesity (BMI 30-39.9) 09/17/2016    Diverticulosis 04/23/2015    Cystocele with rectocele 04/10/2015    Decreased hearing of both ears--hearing aids 01/22/2015    Hypothyroidism due to acquired atrophy of thyroid 01/21/2013    Mixed simple and mucopurulent chronic bronchitis (HCC) 01/20/2013       Current Outpatient Medications   Medication Sig Dispense Refill    levothyroxine (SYNTHROID) 88 MCG Tab Take 1 Tablet by mouth every morning on an empty stomach. 30 Tablet 1    albuterol (PROAIR HFA) 108 (90 Base) MCG/ACT Aero Soln inhalation aerosol Inhale 2 Puffs every four hours as needed for Shortness of Breath. 8.5 g 0    fluticasone-salmeterol (ADVAIR DISKUS) 500-50 MCG/ACT AEROSOL POWDER, BREATH ACTIVATED Inhale 1 Puff every 12 hours. 60 Each 3    tiotropium (SPIRIVA RESPIMAT) 2.5 mcg/Act Aero Soln Inhale 2 Inhalation every day. 8 g 0    Cyanocobalamin (VITAMIN B-12 PO) Take  by mouth.      Cholecalciferol (VITAMIN D PO) Take  by mouth.       No current facility-administered medications for this visit.        Patient is taking medications as noted in medication list.  Current supplements as per medication list.     Allergies: Patient has no known allergies.    Current social contact/activities:      Is patient current with immunizations? Yes.    She  " reports that she quit smoking about 16 years ago. Her smoking use included cigarettes. She has a 40.00 pack-year smoking history. She has never used smokeless tobacco. She reports current alcohol use. She reports that she does not use drugs.  Counseling given: Not Answered      ROS:    Gait: Uses no assistive device   Ostomy: No   Other tubes: No   Amputations: No   Chronic oxygen use No   Last eye exam  unknown  Wears hearing aids: Yes   : Denies any urinary leakage during the last 6 months    Screening:    Depression Screening  Little interest or pleasure in doing things?  0 - not at all  Feeling down, depressed, or hopeless? 0 - not at all  Patient Health Questionnaire Score: 0    No depressive symptoms identified.     Screening for Cognitive Impairment  Three Minute Recall (daughter, heaven, mountain)  3/3    Abdon clock face with all 12 numbers and set the hands to show 10 past 11.  Yes    No cognitive concerns identified.     Fall Risk Assessment  Has the patient had two or more falls in the last year or any fall with injury in the last year?  No  No fall risk identified.     Safety Assessment  Throw rugs on floor.  No  Handrails on all stairs.  No  Good lighting in all hallways.  Yes  Difficulty hearing.  Yes  Patient counseled about all safety risks that were identified.    Functional Assessment ADLs  Are there any barriers preventing you from cooking for yourself or meeting nutritional needs?  No.    Are there any barriers preventing you from driving safely or obtaining transportation?  No.    Are there any barriers preventing you from using a telephone or calling for help?  No.    Are there any barriers preventing you from shopping?  No.    Are there any barriers preventing you from taking care of your own finances?  No.    Are there any barriers preventing you from managing your medications?  No.    Are there any barriers preventing you from showering, bathing or dressing yourself?  No.    Are you  currently engaging in any exercise or physical activity?  Yes.     What is your perception of your health?  Good.    Advance Care Planning  Do you have an Advance Directive, Living Will, Durable Power of , or POLST? No               Health Maintenance Summary            Ordered - COLORECTAL CANCER SCREENING (COLONOSCOPY - Every 3 Years) Ordered on 1/30/2023 11/29/2018  REFERRAL TO GI FOR COLONOSCOPY    10/15/2018  COLOGUARD COLON CANCER SCREENING    08/23/2017  OCCULT BLOOD FECES IMMUNOASSAY    04/23/2015  AMB REFERRAL TO GI FOR COLONOSCOPY              Postponed - COVID-19 Vaccine (1) Postponed until 4/26/2023      No completion history exists for this topic.              Postponed - IMM ZOSTER VACCINES (1 of 2) Postponed until 6/30/2023      No completion history exists for this topic.              Postponed - IMM INFLUENZA (1) Postponed until 9/19/2023 02/16/2021  Imm Admin: Influenza Vaccine Adult HD    10/05/2019  Imm Admin: Influenza Vaccine Adult HD    10/25/2018  Imm Admin: Influenza Vaccine Adult HD    10/10/2017  Imm Admin: Influenza Vaccine Adult HD    10/18/2016  Imm Admin: Influenza Vaccine Adult HD    Only the first 5 history entries have been loaded, but more history exists.              Postponed - Annual Pulmonary Function Test / Spirometry (Yearly) Postponed until 9/19/2023      04/10/2015  Done - See pulm consult in media-- 4/10/15 PFT performed in office.              BONE DENSITY (Every 2 Years) Next due on 4/23/2023 04/23/2021  DS-BONE DENSITY STUDY (DEXA)    04/10/2015  DS-BONE DENSITY STUDY (DEXA)              Annual Wellness Visit (Every 366 Days) Next due on 1/31/2024 01/30/2023  Visit Dx: Medicare annual wellness visit, subsequent    01/30/2023  Level of Service: ANNUAL WELLNESS VISIT-INCLUDES PPPS SUBSEQUE*    02/16/2021  Level of Service: [place] ANNUAL WELLNESS VISIT-INCLUDES PPPS SUBSEQUE*    02/16/2021  Visit Dx: Medicare annual wellness visit, subsequent     2019  Level of Service: [place] ANNUAL WELLNESS VISIT-INCLUDES PPPS SUBSEQUE*    Only the first 5 history entries have been loaded, but more history exists.              MAMMOGRAM (Every 2 Years) Next due on 2022  MA-SCREENING MAMMO BILAT W/TOMOSYNTHESIS W/CAD    2021  MA-SCREENING MAMMO BILAT W/TOMOSYNTHESIS W/CAD    2019  MA-SCREENING MAMMO BILAT W/TOMOSYNTHESIS W/CAD    2016  MA-MAMMO SCREENING BILAT W/KAYLEY W/CAD    05/15/2015  MA-DIAGNOSTIC DIGITAL MAMMO-UNILAT    Only the first 5 history entries have been loaded, but more history exists.              IMM DTaP/Tdap/Td Vaccine (2 - Td or Tdap) Next due on 4/10/2025      04/10/2015  Imm Admin: Tdap Vaccine              IMM PNEUMOCOCCAL VACCINE: 65+ Years (Series Information) Completed      2016  Imm Admin: Pneumococcal polysaccharide vaccine (PPSV-23)    03/10/2015  Imm Admin: Pneumococcal Conjugate Vaccine (Prevnar/PCV-13)    10/01/2011  Imm Admin: Pneumococcal Vaccine (UF) - HISTORICAL DATA              HEPATITIS C SCREENING  Completed      01/10/2020  HEP C VIRUS ANTIBODY              IMM MENINGOCOCCAL ACWY VACCINE (Series Information) Aged Out      No completion history exists for this topic.              Discontinued - IMM HEP B VACCINE  Discontinued      No completion history exists for this topic.                    Patient Care Team:  Vesna Davis P.A.-C. as PCP - General (Family Medicine)  Vesna Davis P.A.-C. as PCP - [facility] Paneled  Skylar Nick M.D. (Obstetrics & Gynecology)  Jared ORTIZ M.D. (Inactive) as Consulting Physician (Gastroenterology)  [facility] as Consulting Physician (Optometry)    Social History     Tobacco Use    Smoking status: Former     Packs/day: 1.00     Years: 40.00     Pack years: 40.00     Types: Cigarettes     Quit date: 2007     Years since quittin.0    Smokeless tobacco: Never   Vaping Use    Vaping Use: Never used   Substance Use Topics     Alcohol use: Yes     Comment: rare     Drug use: No     Family History   Problem Relation Age of Onset    Diabetes Sister         DMI    Other Sister         Kidney Failure    Diabetes Brother         DM2. no meds    Stroke Father 54    Cancer Maternal Grandmother         Uterine    Diabetes Paternal Grandmother     No Known Problems Mother     No Known Problems Maternal Grandfather     No Known Problems Paternal Grandfather     Cancer Maternal Uncle     Cancer Paternal Uncle     Arthritis Son         Arthritis, gout    Asthma Son     Alcohol/Drug Neg Hx      She  has a past medical history of Anemia, Anesthesia, Emphysema, Hypothyroid, Osteoporosis, Pneumonia (2015), and Urinary retention.   Past Surgical History:   Procedure Laterality Date    AFSANEH BY LAPAROSCOPY Bilateral 9/11/2018    Procedure: AFSANEH BY LAPAROSCOPY;  Surgeon: Mahesh Gordillo M.D.;  Location: SURGERY [facility];  Service: General    OTHER ORTHOPEDIC SURGERY      R shoulder surgery       Results for orders placed or performed during the hospital encounter of 01/26/23   HEMOGLOBIN A1C   Result Value Ref Range    Glycohemoglobin 5.9 (H) 4.0 - 5.6 %    Est Avg Glucose 123 mg/dL   TSH WITH REFLEX TO FT4   Result Value Ref Range    TSH 4.480 0.380 - 5.330 uIU/mL   Lipid Profile   Result Value Ref Range    Cholesterol,Tot 236 (H) 100 - 199 mg/dL    Triglycerides 217 (H) 0 - 149 mg/dL    HDL 46 >=40 mg/dL     (H) <100 mg/dL   CBC WITH DIFFERENTIAL   Result Value Ref Range    WBC 8.6 4.8 - 10.8 K/uL    RBC 4.64 4.20 - 5.40 M/uL    Hemoglobin 13.8 12.0 - 16.0 g/dL    Hematocrit 43.0 37.0 - 47.0 %    MCV 92.7 81.4 - 97.8 fL    MCH 29.7 27.0 - 33.0 pg    MCHC 32.1 (L) 33.6 - 35.0 g/dL    RDW 46.5 35.9 - 50.0 fL    Platelet Count 370 164 - 446 K/uL    MPV 10.0 9.0 - 12.9 fL    Neutrophils-Polys 55.90 44.00 - 72.00 %    Lymphocytes 34.80 22.00 - 41.00 %    Monocytes 6.10 0.00 - 13.40 %    Eosinophils 2.30 0.00 - 6.90 %    Basophils 0.70 0.00  "- 1.80 %    Immature Granulocytes 0.20 0.00 - 0.90 %    Nucleated RBC 0.00 /100 WBC    Neutrophils (Absolute) 4.82 2.00 - 7.15 K/uL    Lymphs (Absolute) 3.01 1.00 - 4.80 K/uL    Monos (Absolute) 0.53 0.00 - 0.85 K/uL    Eos (Absolute) 0.20 0.00 - 0.51 K/uL    Baso (Absolute) 0.06 0.00 - 0.12 K/uL    Immature Granulocytes (abs) 0.02 0.00 - 0.11 K/uL    NRBC (Absolute) 0.00 K/uL   Comp Metabolic Panel   Result Value Ref Range    Sodium 138 135 - 145 mmol/L    Potassium 4.1 3.6 - 5.5 mmol/L    Chloride 100 96 - 112 mmol/L    Co2 29 20 - 33 mmol/L    Anion Gap 9.0 7.0 - 16.0    Glucose 109 (H) 65 - 99 mg/dL    Bun 10 8 - 22 mg/dL    Creatinine 0.65 0.50 - 1.40 mg/dL    Calcium 9.6 8.5 - 10.5 mg/dL    AST(SGOT) 23 12 - 45 U/L    ALT(SGPT) 19 2 - 50 U/L    Alkaline Phosphatase 81 30 - 99 U/L    Total Bilirubin 0.8 0.1 - 1.5 mg/dL    Albumin 4.3 3.2 - 4.9 g/dL    Total Protein 7.8 6.0 - 8.2 g/dL    Globulin 3.5 1.9 - 3.5 g/dL    A-G Ratio 1.2 g/dL   FASTING STATUS   Result Value Ref Range    Fasting Status Fasting    CORRECTED CALCIUM   Result Value Ref Range    Correct Calcium 9.4 8.5 - 10.5 mg/dL   ESTIMATED GFR   Result Value Ref Range    GFR (CKD-EPI) 92 >60 mL/min/1.73 m 2      Reviewed and discussed above labs with patient in visit.      Exam:   /78 (BP Location: Right arm, Patient Position: Sitting, BP Cuff Size: Adult)   Pulse 92   Temp 36.9 °C (98.4 °F) (Temporal)   Resp 16   Ht 1.626 m (5' 4\")   Wt 82.6 kg (182 lb)   SpO2 95%  Body mass index is 31.24 kg/m².    Hearing good.    Dentition good  Alert, oriented in no acute distress.  Eye contact is good, speech goal directed, affect calm      Assessment and Plan. The following treatment and monitoring plan is recommended:    1. Medicare annual wellness visit, subsequent  - Reviewed above screenings with patient. Discussed advanced directives at length. Packet given to complete along with information on workshops.      2. Mixed simple and mucopurulent " chronic bronchitis (HCC)  Chronic, uncontrolled. She denies any intervention. Discussed costly inhalers through [facility] pharmacy (see pharmacy encounter from 10/12/22.) She does not wish to move forward with treatment at this time given her limited income. She reports that she has some inhaler she is using that was her son's. I advised that I do not know the inhaler is appropriate with her and should only take meds prescribed to her.     3. Hypothyroidism due to acquired atrophy of thyroid  Chronic, exacerbated. TSH of 4.480. Will increase levothyroxine from 75 mcg to 88 mcg for goal of TSH 1-2. Recheck TSH in 6 weeks after starting new dose.  - levothyroxine (SYNTHROID) 88 MCG Tab; Take 1 Tablet by mouth every morning on an empty stomach.  Dispense: 30 Tablet; Refill: 1  - TSH WITH REFLEX TO FT4; Future    4. Dyslipidemia  5. IFG (impaired fasting glucose)  6. Obesity (BMI 30-39.9)  Chronic, exacerbated. Encouraged lifestyle modifications. Monitor labs regularly.     7. Screening for colorectal cancer  8. Hx of colonic polyps  Educated on importance of colonoscopies for screening of colon cancer--she is on a 3 year recall due to precancerous polyps on 2018 colonoscopy.  Referral placed to GI for colonoscopy.    - Referral to GI for Colonoscopy      Services suggested: No services needed at this time  Health Care Screening recommendations as per orders if indicated.  Referrals offered: PT/OT/Nutrition counseling/Behavioral Health/Smoking cessation as per orders if indicated.    Discussion today about general wellness and lifestyle habits:    Prevent falls and reduce trip hazards; Cautioned about securing or removing rugs.  Have a working fire alarm and carbon monoxide detector;   Engage in regular physical activity and social activities     Follow-up: Return in about 6 weeks (around 3/13/2023) for lab review and to establish care with new PCP, sooner if needed.

## 2023-03-21 ENCOUNTER — HOSPITAL ENCOUNTER (OUTPATIENT)
Dept: LAB | Facility: MEDICAL CENTER | Age: 73
End: 2023-03-21
Attending: PHYSICIAN ASSISTANT
Payer: MEDICARE

## 2023-03-21 ENCOUNTER — OFFICE VISIT (OUTPATIENT)
Dept: MEDICAL GROUP | Age: 73
End: 2023-03-21
Payer: MEDICARE

## 2023-03-21 VITALS
WEIGHT: 191 LBS | BODY MASS INDEX: 32.61 KG/M2 | HEIGHT: 64 IN | HEART RATE: 98 BPM | SYSTOLIC BLOOD PRESSURE: 120 MMHG | DIASTOLIC BLOOD PRESSURE: 86 MMHG | OXYGEN SATURATION: 92 % | RESPIRATION RATE: 16 BRPM | TEMPERATURE: 97 F

## 2023-03-21 DIAGNOSIS — Z76.89 ESTABLISHING CARE WITH NEW DOCTOR, ENCOUNTER FOR: ICD-10-CM

## 2023-03-21 DIAGNOSIS — J41.8 MIXED SIMPLE AND MUCOPURULENT CHRONIC BRONCHITIS (HCC): ICD-10-CM

## 2023-03-21 DIAGNOSIS — R53.83 OTHER FATIGUE: ICD-10-CM

## 2023-03-21 DIAGNOSIS — E78.5 DYSLIPIDEMIA: ICD-10-CM

## 2023-03-21 DIAGNOSIS — R22.1 MASS OF NECK: ICD-10-CM

## 2023-03-21 DIAGNOSIS — E03.4 HYPOTHYROIDISM DUE TO ACQUIRED ATROPHY OF THYROID: ICD-10-CM

## 2023-03-21 LAB
25(OH)D3 SERPL-MCNC: 46 NG/ML (ref 30–100)
BASOPHILS # BLD AUTO: 0.7 % (ref 0–1.8)
BASOPHILS # BLD: 0.06 K/UL (ref 0–0.12)
EOSINOPHIL # BLD AUTO: 0.19 K/UL (ref 0–0.51)
EOSINOPHIL NFR BLD: 2.2 % (ref 0–6.9)
ERYTHROCYTE [DISTWIDTH] IN BLOOD BY AUTOMATED COUNT: 45.3 FL (ref 35.9–50)
HCT VFR BLD AUTO: 42.8 % (ref 37–47)
HGB BLD-MCNC: 13.4 G/DL (ref 12–16)
IMM GRANULOCYTES # BLD AUTO: 0.02 K/UL (ref 0–0.11)
IMM GRANULOCYTES NFR BLD AUTO: 0.2 % (ref 0–0.9)
LYMPHOCYTES # BLD AUTO: 2.66 K/UL (ref 1–4.8)
LYMPHOCYTES NFR BLD: 30.7 % (ref 22–41)
MCH RBC QN AUTO: 29.4 PG (ref 27–33)
MCHC RBC AUTO-ENTMCNC: 31.3 G/DL (ref 33.6–35)
MCV RBC AUTO: 93.9 FL (ref 81.4–97.8)
MONOCYTES # BLD AUTO: 0.65 K/UL (ref 0–0.85)
MONOCYTES NFR BLD AUTO: 7.5 % (ref 0–13.4)
NEUTROPHILS # BLD AUTO: 5.08 K/UL (ref 2–7.15)
NEUTROPHILS NFR BLD: 58.7 % (ref 44–72)
NRBC # BLD AUTO: 0 K/UL
NRBC BLD-RTO: 0 /100 WBC
PLATELET # BLD AUTO: 363 K/UL (ref 164–446)
PMV BLD AUTO: 10.1 FL (ref 9–12.9)
RBC # BLD AUTO: 4.56 M/UL (ref 4.2–5.4)
TSH SERPL DL<=0.005 MIU/L-ACNC: 0.92 UIU/ML (ref 0.38–5.33)
WBC # BLD AUTO: 8.7 K/UL (ref 4.8–10.8)

## 2023-03-21 PROCEDURE — 82306 VITAMIN D 25 HYDROXY: CPT

## 2023-03-21 PROCEDURE — 84443 ASSAY THYROID STIM HORMONE: CPT

## 2023-03-21 PROCEDURE — 36415 COLL VENOUS BLD VENIPUNCTURE: CPT

## 2023-03-21 PROCEDURE — 85025 COMPLETE CBC W/AUTO DIFF WBC: CPT

## 2023-03-21 PROCEDURE — 99214 OFFICE O/P EST MOD 30 MIN: CPT | Performed by: PHYSICIAN ASSISTANT

## 2023-03-21 RX ORDER — LEVOTHYROXINE SODIUM 88 UG/1
88 TABLET ORAL
Qty: 90 TABLET | Refills: 3 | Status: SHIPPED | OUTPATIENT
Start: 2023-03-21 | End: 2023-04-24 | Stop reason: SDUPTHER

## 2023-03-21 ASSESSMENT — FIBROSIS 4 INDEX: FIB4 SCORE: 1.07

## 2023-03-21 NOTE — PROGRESS NOTES
cc: Establish care nodule on neck    Subjective:     HPI  Vaishali Soares is a 73 y.o. female presenting to [facility].  Its been about 2 months since she has been seen by primary care provider.  Her hearing aids are broken, so she is having difficulties hearing, making our conversation a little bit difficult today.    She does have history of hypothyroidism.  Previous PCP increased levothyroxine from 75 mcg's to 88 mcg.  She has been on this dose for about 2 months now.  This was done as she was noting some fatigue.  She states she is convinced that the pharmacy is not putting levothyroxine in her tablets, as she is still feeling moderately fatigued.  She does not sleep well at night.  Is not interested in sleep aids.  States she does not sore, no witnessed apneic episodes.    She also has COPD.  She does have a Theron prescription on her med list, however this medication has not been affordable for her.  She uses an albuterol inhaler or nebulizer treatment when she becomes short of breath, this happens maybe once a week if that.    She has concerns of a nodule on the right side of her neck.  Feels like its been there for about 2 months.  Is convinced that the new medication is because of this.  Denies dysphagia.    Review of systems:  See above.       Current Outpatient Medications:     levothyroxine (SYNTHROID) 88 MCG Tab, Take 1 Tablet by mouth every morning on an empty stomach., Disp: 90 Tablet, Rfl: 3    albuterol (PROAIR HFA) 108 (90 Base) MCG/ACT Aero Soln inhalation aerosol, Inhale 2 Puffs every four hours as needed for Shortness of Breath., Disp: 8.5 g, Rfl: 0    fluticasone-salmeterol (ADVAIR DISKUS) 500-50 MCG/ACT AEROSOL POWDER, BREATH ACTIVATED, Inhale 1 Puff every 12 hours., Disp: 60 Each, Rfl: 3    tiotropium (SPIRIVA RESPIMAT) 2.5 mcg/Act Aero Soln, Inhale 2 Inhalation every day., Disp: 8 g, Rfl: 0    Cyanocobalamin (VITAMIN B-12 PO), Take  by mouth., Disp: , Rfl:     Cholecalciferol  "(VITAMIN D PO), Take  by mouth., Disp: , Rfl:     Allergies, past medical history, past surgical history, family history, social history reviewed and updated    Objective:     Vitals: /86 (BP Location: Left arm, Patient Position: Sitting, BP Cuff Size: Adult)   Pulse 98   Temp 36.1 °C (97 °F) (Temporal)   Resp 16   Ht 1.626 m (5' 4\")   Wt 86.6 kg (191 lb)   SpO2 92%   BMI 32.79 kg/m²   General: Alert, pleasant, NAD  HEENT: Normocephalic. Neck supple.  Fairly moderate sized nodule on the right side of the neck, just above the thyroid, similar-appearing mass on the left.  Possibly on the thyroid.   No cervical or supraclavicular lymphadenopathy. No carotid bruits   Heart: Regular rate and rhythm.  S1 and S2 normal.  No murmurs appreciated.  Respiratory: Normal respiratory effort.  Clear to auscultation bilaterally.  Skin: Warm, dry, no rashes.  Extremities: No leg edema.  Radial pulses 2+ symmetric  Psych:  Affect/mood is normal, judgement is good, memory is intact, grooming is appropriate.    Assessment/Plan:     Vaishali was seen today for establish care and thyroid problem.    Diagnoses and all orders for this visit:    Mixed simple and mucopurulent chronic bronchitis (HCC)  This point is not using any maintenance inhaler.  Continue with albuterol as needed.  Denies maintenance inhaler    Hypothyroidism due to acquired atrophy of thyroid  -Has been taking 88 mcg's of levothyroxine.  Will check TSH levels.  Will adjust medication if needed pending results  -     TSH; Future  -     levothyroxine (SYNTHROID) 88 MCG Tab; Take 1 Tablet by mouth every morning on an empty stomach.    Other fatigue  -Unsure of exact etiology.  Will check CBC and vitamin D, as well as thyroid level.  -     CBC WITH DIFFERENTIAL; Future  -     VITAMIN D,25 HYDROXY (DEFICIENCY); Future    Mass of neck  -She does have 2 palpable masses in the neck, questionably on the thyroid.  Will obtain ultrasound.  -     US-SOFT TISSUES OF HEAD " - NECK; Future  -     US-THYROID; Future    Establishing care with new doctor, encounter for  -Previous records reviewed    Dyslipidemia  -Mildly elevated.  Working on lifestyle modifications.      Return in about 6 weeks (around 5/2/2023) for Imaging/Lab Review.

## 2023-03-28 ENCOUNTER — HOSPITAL ENCOUNTER (OUTPATIENT)
Dept: RADIOLOGY | Facility: MEDICAL CENTER | Age: 73
End: 2023-03-28
Attending: PHYSICIAN ASSISTANT
Payer: MEDICARE

## 2023-03-28 DIAGNOSIS — R22.1 MASS OF NECK: ICD-10-CM

## 2023-03-28 PROCEDURE — 76536 US EXAM OF HEAD AND NECK: CPT

## 2023-04-04 ENCOUNTER — TELEPHONE (OUTPATIENT)
Dept: MEDICAL GROUP | Age: 73
End: 2023-04-04
Payer: MEDICARE

## 2023-04-04 NOTE — TELEPHONE ENCOUNTER
----- Message from Chantelle Beltran P.A.-C. sent at 3/23/2023  8:03 AM PDT -----  Please let patient know that her CBC is normal.  No signs of anemia.  Her thyroid level is within normal limits.  I would like her to continue on 88 mcg's of levothyroxine.  Vitamin D level is also normal.

## 2023-04-04 NOTE — TELEPHONE ENCOUNTER
Vaishali went to the office today, she asking to change her Levothyroxine prescription from 88 MG to 75 MG. She said her thyroid went bad. Please send to [facility] and if you have questions to call her.

## 2023-04-04 NOTE — TELEPHONE ENCOUNTER
Phone Number Called: [phone]    Call outcome: Did not leave a detailed message. Requested patient to call back.    Message: Called and left voicemail stating to give us a call back for lab results

## 2023-04-05 NOTE — TELEPHONE ENCOUNTER
Phone Number Called: [phone]      Call outcome: Spoke to patient regarding message below.    Message: patient called back and I informed her of her lab results. She stated that she wants to reduce her Levothyroxine to 75 instead of the 88.

## 2023-04-06 NOTE — TELEPHONE ENCOUNTER
Phone Number Called: [phone]      Call outcome: Spoke to patient regarding message below.    Message: Called and spoke with patient and informed her to continue the 88 mcg of her levothyroxine.

## 2023-04-10 ENCOUNTER — APPOINTMENT (OUTPATIENT)
Dept: RADIOLOGY | Facility: MEDICAL CENTER | Age: 73
DRG: 871 | End: 2023-04-10
Attending: PEDIATRICS
Payer: MEDICARE

## 2023-04-10 ENCOUNTER — APPOINTMENT (OUTPATIENT)
Dept: RADIOLOGY | Facility: MEDICAL CENTER | Age: 73
DRG: 871 | End: 2023-04-10
Attending: EMERGENCY MEDICINE
Payer: MEDICARE

## 2023-04-10 ENCOUNTER — HOSPITAL ENCOUNTER (INPATIENT)
Facility: MEDICAL CENTER | Age: 73
LOS: 2 days | DRG: 871 | End: 2023-04-12
Attending: EMERGENCY MEDICINE | Admitting: HOSPITALIST
Payer: MEDICARE

## 2023-04-10 DIAGNOSIS — J18.9 PNEUMONIA OF RIGHT LOWER LOBE DUE TO INFECTIOUS ORGANISM: ICD-10-CM

## 2023-04-10 DIAGNOSIS — N39.0 ACUTE UTI: ICD-10-CM

## 2023-04-10 DIAGNOSIS — A41.9 SEPSIS, DUE TO UNSPECIFIED ORGANISM, UNSPECIFIED WHETHER ACUTE ORGAN DYSFUNCTION PRESENT (HCC): ICD-10-CM

## 2023-04-10 DIAGNOSIS — J18.9 PNEUMONIA OF LEFT LOWER LOBE DUE TO INFECTIOUS ORGANISM: ICD-10-CM

## 2023-04-10 DIAGNOSIS — D72.828 OTHER ELEVATED WHITE BLOOD CELL (WBC) COUNT: ICD-10-CM

## 2023-04-10 DIAGNOSIS — R09.02 HYPOXIA: ICD-10-CM

## 2023-04-10 PROBLEM — J96.01 ACUTE RESPIRATORY FAILURE WITH HYPOXIA (HCC): Status: ACTIVE | Noted: 2023-04-10

## 2023-04-10 LAB
ALBUMIN SERPL BCP-MCNC: 4.3 G/DL (ref 3.2–4.9)
ALBUMIN/GLOB SERPL: 1.1 G/DL
ALP SERPL-CCNC: 107 U/L (ref 30–99)
ALT SERPL-CCNC: 24 U/L (ref 2–50)
ANION GAP SERPL CALC-SCNC: 13 MMOL/L (ref 7–16)
APPEARANCE UR: ABNORMAL
AST SERPL-CCNC: 29 U/L (ref 12–45)
BACTERIA #/AREA URNS HPF: ABNORMAL /HPF
BASOPHILS # BLD AUTO: 0.3 % (ref 0–1.8)
BASOPHILS # BLD: 0.06 K/UL (ref 0–0.12)
BILIRUB SERPL-MCNC: 1.5 MG/DL (ref 0.1–1.5)
BILIRUB UR QL STRIP.AUTO: ABNORMAL
BUN SERPL-MCNC: 13 MG/DL (ref 8–22)
CALCIUM ALBUM COR SERPL-MCNC: 9 MG/DL (ref 8.5–10.5)
CALCIUM SERPL-MCNC: 9.2 MG/DL (ref 8.4–10.2)
CHLORIDE SERPL-SCNC: 97 MMOL/L (ref 96–112)
CO2 SERPL-SCNC: 24 MMOL/L (ref 20–33)
COLOR UR: YELLOW
CREAT SERPL-MCNC: 0.77 MG/DL (ref 0.5–1.4)
EKG IMPRESSION: NORMAL
EOSINOPHIL # BLD AUTO: 0.01 K/UL (ref 0–0.51)
EOSINOPHIL NFR BLD: 0 % (ref 0–6.9)
EPI CELLS #/AREA URNS HPF: ABNORMAL /HPF
ERYTHROCYTE [DISTWIDTH] IN BLOOD BY AUTOMATED COUNT: 44.3 FL (ref 35.9–50)
FLUAV RNA SPEC QL NAA+PROBE: NEGATIVE
FLUBV RNA SPEC QL NAA+PROBE: NEGATIVE
GFR SERPLBLD CREATININE-BSD FMLA CKD-EPI: 80 ML/MIN/1.73 M 2
GLOBULIN SER CALC-MCNC: 3.8 G/DL (ref 1.9–3.5)
GLUCOSE SERPL-MCNC: 122 MG/DL (ref 65–99)
GLUCOSE UR STRIP.AUTO-MCNC: NEGATIVE MG/DL
HCT VFR BLD AUTO: 41.6 % (ref 37–47)
HGB BLD-MCNC: 13.7 G/DL (ref 12–16)
IMM GRANULOCYTES # BLD AUTO: 0.09 K/UL (ref 0–0.11)
IMM GRANULOCYTES NFR BLD AUTO: 0.4 % (ref 0–0.9)
KETONES UR STRIP.AUTO-MCNC: >=80 MG/DL
LACTATE SERPL-SCNC: 1.3 MMOL/L (ref 0.5–2)
LEUKOCYTE ESTERASE UR QL STRIP.AUTO: ABNORMAL
LYMPHOCYTES # BLD AUTO: 2.02 K/UL (ref 1–4.8)
LYMPHOCYTES NFR BLD: 8.7 % (ref 22–41)
MCH RBC QN AUTO: 30 PG (ref 27–33)
MCHC RBC AUTO-ENTMCNC: 32.9 G/DL (ref 33.6–35)
MCV RBC AUTO: 91.2 FL (ref 81.4–97.8)
MICRO URNS: ABNORMAL
MONOCYTES # BLD AUTO: 1.05 K/UL (ref 0–0.85)
MONOCYTES NFR BLD AUTO: 4.5 % (ref 0–13.4)
MUCOUS THREADS #/AREA URNS HPF: ABNORMAL /HPF
NEUTROPHILS # BLD AUTO: 19.98 K/UL (ref 2–7.15)
NEUTROPHILS NFR BLD: 86.1 % (ref 44–72)
NITRITE UR QL STRIP.AUTO: POSITIVE
NRBC # BLD AUTO: 0 K/UL
NRBC BLD-RTO: 0 /100 WBC
PH UR STRIP.AUTO: 5.5 [PH] (ref 5–8)
PLATELET # BLD AUTO: 331 K/UL (ref 164–446)
PMV BLD AUTO: 9.8 FL (ref 9–12.9)
POTASSIUM SERPL-SCNC: 4.1 MMOL/L (ref 3.6–5.5)
PROT SERPL-MCNC: 8.1 G/DL (ref 6–8.2)
PROT UR QL STRIP: NEGATIVE MG/DL
RBC # BLD AUTO: 4.56 M/UL (ref 4.2–5.4)
RBC # URNS HPF: ABNORMAL /HPF
RBC UR QL AUTO: ABNORMAL
RSV RNA SPEC QL NAA+PROBE: NEGATIVE
SARS-COV-2 RNA RESP QL NAA+PROBE: NOTDETECTED
SODIUM SERPL-SCNC: 134 MMOL/L (ref 135–145)
SP GR UR STRIP.AUTO: 1.02
SPECIMEN SOURCE: NORMAL
WBC # BLD AUTO: 23.2 K/UL (ref 4.8–10.8)
WBC #/AREA URNS HPF: ABNORMAL /HPF

## 2023-04-10 PROCEDURE — 700105 HCHG RX REV CODE 258: Performed by: HOSPITALIST

## 2023-04-10 PROCEDURE — 71045 X-RAY EXAM CHEST 1 VIEW: CPT

## 2023-04-10 PROCEDURE — A9270 NON-COVERED ITEM OR SERVICE: HCPCS | Performed by: EMERGENCY MEDICINE

## 2023-04-10 PROCEDURE — 96365 THER/PROPH/DIAG IV INF INIT: CPT

## 2023-04-10 PROCEDURE — 87077 CULTURE AEROBIC IDENTIFY: CPT

## 2023-04-10 PROCEDURE — 770020 HCHG ROOM/CARE - TELE (206)

## 2023-04-10 PROCEDURE — 85025 COMPLETE CBC W/AUTO DIFF WBC: CPT

## 2023-04-10 PROCEDURE — 94760 N-INVAS EAR/PLS OXIMETRY 1: CPT

## 2023-04-10 PROCEDURE — 0241U HCHG SARS-COV-2 COVID-19 NFCT DS RESP RNA 4 TRGT MIC: CPT

## 2023-04-10 PROCEDURE — 99223 1ST HOSP IP/OBS HIGH 75: CPT | Mod: AI | Performed by: HOSPITALIST

## 2023-04-10 PROCEDURE — 83605 ASSAY OF LACTIC ACID: CPT

## 2023-04-10 PROCEDURE — 81001 URINALYSIS AUTO W/SCOPE: CPT

## 2023-04-10 PROCEDURE — 99291 CRITICAL CARE FIRST HOUR: CPT

## 2023-04-10 PROCEDURE — 700105 HCHG RX REV CODE 258: Performed by: EMERGENCY MEDICINE

## 2023-04-10 PROCEDURE — 87186 SC STD MICRODIL/AGAR DIL: CPT

## 2023-04-10 PROCEDURE — 36415 COLL VENOUS BLD VENIPUNCTURE: CPT

## 2023-04-10 PROCEDURE — C9803 HOPD COVID-19 SPEC COLLECT: HCPCS | Performed by: HOSPITALIST

## 2023-04-10 PROCEDURE — 87040 BLOOD CULTURE FOR BACTERIA: CPT

## 2023-04-10 PROCEDURE — 700102 HCHG RX REV CODE 250 W/ 637 OVERRIDE(OP): Performed by: EMERGENCY MEDICINE

## 2023-04-10 PROCEDURE — 93005 ELECTROCARDIOGRAM TRACING: CPT | Performed by: EMERGENCY MEDICINE

## 2023-04-10 PROCEDURE — 87086 URINE CULTURE/COLONY COUNT: CPT

## 2023-04-10 PROCEDURE — 700111 HCHG RX REV CODE 636 W/ 250 OVERRIDE (IP): Performed by: EMERGENCY MEDICINE

## 2023-04-10 PROCEDURE — 80053 COMPREHEN METABOLIC PANEL: CPT

## 2023-04-10 PROCEDURE — 700111 HCHG RX REV CODE 636 W/ 250 OVERRIDE (IP): Performed by: HOSPITALIST

## 2023-04-10 RX ORDER — ACETAMINOPHEN 325 MG/1
650 TABLET ORAL EVERY 6 HOURS PRN
Status: DISCONTINUED | OUTPATIENT
Start: 2023-04-10 | End: 2023-04-12 | Stop reason: HOSPADM

## 2023-04-10 RX ORDER — POLYETHYLENE GLYCOL 3350 17 G/17G
1 POWDER, FOR SOLUTION ORAL
Status: DISCONTINUED | OUTPATIENT
Start: 2023-04-10 | End: 2023-04-12 | Stop reason: HOSPADM

## 2023-04-10 RX ORDER — SODIUM CHLORIDE, SODIUM LACTATE, POTASSIUM CHLORIDE, AND CALCIUM CHLORIDE .6; .31; .03; .02 G/100ML; G/100ML; G/100ML; G/100ML
500 INJECTION, SOLUTION INTRAVENOUS
Status: DISCONTINUED | OUTPATIENT
Start: 2023-04-10 | End: 2023-04-12 | Stop reason: HOSPADM

## 2023-04-10 RX ORDER — AZITHROMYCIN 250 MG/1
500 TABLET, FILM COATED ORAL DAILY
Status: COMPLETED | OUTPATIENT
Start: 2023-04-11 | End: 2023-04-12

## 2023-04-10 RX ORDER — LEVOTHYROXINE SODIUM 0.07 MG/1
75 TABLET ORAL
Status: DISCONTINUED | OUTPATIENT
Start: 2023-04-11 | End: 2023-04-12 | Stop reason: HOSPADM

## 2023-04-10 RX ORDER — LEVOTHYROXINE SODIUM 88 UG/1
88 TABLET ORAL
Status: DISCONTINUED | OUTPATIENT
Start: 2023-04-11 | End: 2023-04-10

## 2023-04-10 RX ORDER — ONDANSETRON 2 MG/ML
4 INJECTION INTRAMUSCULAR; INTRAVENOUS EVERY 4 HOURS PRN
Status: DISCONTINUED | OUTPATIENT
Start: 2023-04-10 | End: 2023-04-12 | Stop reason: HOSPADM

## 2023-04-10 RX ORDER — AMOXICILLIN 250 MG
2 CAPSULE ORAL 2 TIMES DAILY
Status: DISCONTINUED | OUTPATIENT
Start: 2023-04-10 | End: 2023-04-12 | Stop reason: HOSPADM

## 2023-04-10 RX ORDER — GUAIFENESIN/DEXTROMETHORPHAN 100-10MG/5
10 SYRUP ORAL EVERY 6 HOURS PRN
Status: DISCONTINUED | OUTPATIENT
Start: 2023-04-10 | End: 2023-04-12 | Stop reason: HOSPADM

## 2023-04-10 RX ORDER — ENOXAPARIN SODIUM 100 MG/ML
40 INJECTION SUBCUTANEOUS DAILY
Status: DISCONTINUED | OUTPATIENT
Start: 2023-04-10 | End: 2023-04-12 | Stop reason: HOSPADM

## 2023-04-10 RX ORDER — BISACODYL 10 MG
10 SUPPOSITORY, RECTAL RECTAL
Status: DISCONTINUED | OUTPATIENT
Start: 2023-04-10 | End: 2023-04-12 | Stop reason: HOSPADM

## 2023-04-10 RX ORDER — SODIUM CHLORIDE, SODIUM LACTATE, POTASSIUM CHLORIDE, AND CALCIUM CHLORIDE .6; .31; .03; .02 G/100ML; G/100ML; G/100ML; G/100ML
30 INJECTION, SOLUTION INTRAVENOUS ONCE
Status: COMPLETED | OUTPATIENT
Start: 2023-04-10 | End: 2023-04-10

## 2023-04-10 RX ORDER — AZITHROMYCIN 250 MG/1
500 TABLET, FILM COATED ORAL ONCE
Status: COMPLETED | OUTPATIENT
Start: 2023-04-10 | End: 2023-04-10

## 2023-04-10 RX ORDER — ONDANSETRON 4 MG/1
4 TABLET, ORALLY DISINTEGRATING ORAL EVERY 4 HOURS PRN
Status: DISCONTINUED | OUTPATIENT
Start: 2023-04-10 | End: 2023-04-12 | Stop reason: HOSPADM

## 2023-04-10 RX ORDER — SODIUM CHLORIDE 9 MG/ML
INJECTION, SOLUTION INTRAVENOUS CONTINUOUS
Status: DISCONTINUED | OUTPATIENT
Start: 2023-04-10 | End: 2023-04-12 | Stop reason: HOSPADM

## 2023-04-10 RX ORDER — SODIUM CHLORIDE, SODIUM LACTATE, POTASSIUM CHLORIDE, CALCIUM CHLORIDE 600; 310; 30; 20 MG/100ML; MG/100ML; MG/100ML; MG/100ML
INJECTION, SOLUTION INTRAVENOUS CONTINUOUS
Status: DISCONTINUED | OUTPATIENT
Start: 2023-04-10 | End: 2023-04-11

## 2023-04-10 RX ADMIN — AMPICILLIN AND SULBACTAM 3 G: 1; 2 INJECTION, POWDER, FOR SOLUTION INTRAMUSCULAR; INTRAVENOUS at 15:36

## 2023-04-10 RX ADMIN — AMPICILLIN AND SULBACTAM 3 G: 1; 2 INJECTION, POWDER, FOR SOLUTION INTRAMUSCULAR; INTRAVENOUS at 22:17

## 2023-04-10 RX ADMIN — SODIUM CHLORIDE, POTASSIUM CHLORIDE, SODIUM LACTATE AND CALCIUM CHLORIDE: 600; 310; 30; 20 INJECTION, SOLUTION INTRAVENOUS at 18:15

## 2023-04-10 RX ADMIN — AZITHROMYCIN 500 MG: 250 TABLET, FILM COATED ORAL at 15:33

## 2023-04-10 RX ADMIN — SODIUM CHLORIDE, POTASSIUM CHLORIDE, SODIUM LACTATE AND CALCIUM CHLORIDE 1641 ML: 600; 310; 30; 20 INJECTION, SOLUTION INTRAVENOUS at 16:03

## 2023-04-10 RX ADMIN — SODIUM CHLORIDE: 9 INJECTION, SOLUTION INTRAVENOUS at 22:17

## 2023-04-10 RX ADMIN — SODIUM CHLORIDE, POTASSIUM CHLORIDE, SODIUM LACTATE AND CALCIUM CHLORIDE: 600; 310; 30; 20 INJECTION, SOLUTION INTRAVENOUS at 20:24

## 2023-04-10 ASSESSMENT — COGNITIVE AND FUNCTIONAL STATUS - GENERAL
DRESSING REGULAR UPPER BODY CLOTHING: A LITTLE
DAILY ACTIVITIY SCORE: 22
MOBILITY SCORE: 21
MOVING TO AND FROM BED TO CHAIR: A LITTLE
CLIMB 3 TO 5 STEPS WITH RAILING: A LITTLE
TURNING FROM BACK TO SIDE WHILE IN FLAT BAD: A LITTLE
DRESSING REGULAR LOWER BODY CLOTHING: A LITTLE
SUGGESTED CMS G CODE MODIFIER MOBILITY: CJ
SUGGESTED CMS G CODE MODIFIER DAILY ACTIVITY: CJ

## 2023-04-10 ASSESSMENT — FIBROSIS 4 INDEX
FIB4 SCORE: 1.09
FIB4 SCORE: 1.34

## 2023-04-10 ASSESSMENT — PATIENT HEALTH QUESTIONNAIRE - PHQ9
SUM OF ALL RESPONSES TO PHQ9 QUESTIONS 1 AND 2: 0
1. LITTLE INTEREST OR PLEASURE IN DOING THINGS: NOT AT ALL
2. FEELING DOWN, DEPRESSED, IRRITABLE, OR HOPELESS: NOT AT ALL

## 2023-04-10 ASSESSMENT — LIFESTYLE VARIABLES
ALCOHOL_USE: NO
EVER FELT BAD OR GUILTY ABOUT YOUR DRINKING: NO
TOTAL SCORE: 0
TOTAL SCORE: 0
AVERAGE NUMBER OF DAYS PER WEEK YOU HAVE A DRINK CONTAINING ALCOHOL: 0
HAVE YOU EVER FELT YOU SHOULD CUT DOWN ON YOUR DRINKING: NO
CONSUMPTION TOTAL: NEGATIVE
ON A TYPICAL DAY WHEN YOU DRINK ALCOHOL HOW MANY DRINKS DO YOU HAVE: 0
HOW MANY TIMES IN THE PAST YEAR HAVE YOU HAD 5 OR MORE DRINKS IN A DAY: 0
HAVE PEOPLE ANNOYED YOU BY CRITICIZING YOUR DRINKING: NO
EVER HAD A DRINK FIRST THING IN THE MORNING TO STEADY YOUR NERVES TO GET RID OF A HANGOVER: NO
TOTAL SCORE: 0

## 2023-04-10 ASSESSMENT — COPD QUESTIONNAIRES
HAVE YOU SMOKED AT LEAST 100 CIGARETTES IN YOUR ENTIRE LIFE: YES
DURING THE PAST 4 WEEKS HOW MUCH DID YOU FEEL SHORT OF BREATH: SOME OF THE TIME
DO YOU EVER COUGH UP ANY MUCUS OR PHLEGM?: YES, EVERY DAY

## 2023-04-10 ASSESSMENT — ENCOUNTER SYMPTOMS
FLANK PAIN: 0
CHILLS: 0
BRUISES/BLEEDS EASILY: 0
VOMITING: 0
PALPITATIONS: 1
MYALGIAS: 0
SHORTNESS OF BREATH: 1
COUGH: 1
EYE DISCHARGE: 0
STRIDOR: 0
ABDOMINAL PAIN: 0
FEVER: 1
FOCAL WEAKNESS: 0
EYE REDNESS: 0
NERVOUS/ANXIOUS: 0

## 2023-04-10 ASSESSMENT — PAIN DESCRIPTION - PAIN TYPE: TYPE: ACUTE PAIN

## 2023-04-10 NOTE — H&P
Hospital Medicine History & Physical Note    Date of Service  4/10/2023    Primary Care Physician  Chantelle Beltran P.A.-C.    Consultants  None     Code Status  Full Code    Chief Complaint  Chief Complaint   Patient presents with    Fever    Shortness of Breath    N/V     Has been ill the last 3 -4 days    fevers, N/V  SOB     Cough     Cough      Weakness     History of Presenting Illness  Vaishali Soares is a 73 y.o. female with a past medical history of chronic obstructive pulmonary disease not on oxygen at baseline, hypothyroidismwho presented 4/10/2023 with generalized weakness and shortness of breath for the past 3 to 4 days.  Shortness of breath is mostly with exertion.  She denies having, extremity pain redness or swelling.  She reports having fevers and chills.       I discussed the plan of care with emergency department physician and the patient.     Review of Systems  Review of Systems   Constitutional:  Positive for fever and malaise/fatigue. Negative for chills.   Eyes:  Negative for discharge and redness.   Respiratory:  Positive for cough and shortness of breath. Negative for stridor.    Cardiovascular:  Positive for palpitations. Negative for chest pain and leg swelling.   Gastrointestinal:  Negative for abdominal pain and vomiting.   Genitourinary:  Negative for flank pain.   Musculoskeletal:  Negative for myalgias.   Skin: Negative.    Neurological:  Negative for focal weakness.   Endo/Heme/Allergies:  Does not bruise/bleed easily.   Psychiatric/Behavioral:  The patient is not nervous/anxious.      Past Medical History   has a past medical history of Anemia, Anesthesia, Emphysema, Hypothyroid, Osteoporosis, Pneumonia (2015), and Urinary retention.    Surgical History   has a past surgical history that includes other orthopedic surgery and vicki by laparoscopy (Bilateral, 9/11/2018).     Family History  family history includes Arthritis in her son; Asthma in her son; Cancer in her maternal  grandmother, maternal uncle, and paternal uncle; Diabetes in her brother, paternal grandmother, and sister; No Known Problems in her maternal grandfather, mother, and paternal grandfather; Other in her sister; Stroke (age of onset: 54) in her father.      Social History   reports that she quit smoking about 16 years ago. Her smoking use included cigarettes. She has a 40.00 pack-year smoking history. She has never used smokeless tobacco. She reports current alcohol use. She reports that she does not use drugs.    Allergies  No Known Allergies    Medications  Prior to Admission Medications   Prescriptions Last Dose Informant Patient Reported? Taking?   Cholecalciferol (VITAMIN D PO) 4/9/2023 at 1000 Patient Yes No   Sig: Take 1 Capsule by mouth every day.   Cyanocobalamin (VITAMIN B-12 PO) 4/9/2023 at 1000 Patient Yes No   Sig: Take 1 Tablet by mouth every day.   levothyroxine (SYNTHROID) 88 MCG Tab 4/10/2023 at 0630 Rx Bottle (For Med Information) No No   Sig: Take 1 Tablet by mouth every morning on an empty stomach.      Facility-Administered Medications: None     Physical Exam  Temp:  [39 °C (102.2 °F)-39.6 °C (103.3 °F)] 39 °C (102.2 °F)  Pulse:  [122-133] 122  Resp:  [16-25] 19  BP: (105-131)/(52-70) 113/68  SpO2:  [88 %-92 %] 92 %  Blood Pressure : 131/70   Temperature: (!) 39 °C (102.2 °F)   Pulse: (!) 129   Respiration: 18   Pulse Oximetry: 89 %     Physical Exam  Constitutional:       General: She is not in acute distress.  HENT:      Head: Normocephalic and atraumatic.      Right Ear: External ear normal.      Left Ear: External ear normal.      Nose: No congestion or rhinorrhea.      Mouth/Throat:      Mouth: Mucous membranes are moist.      Pharynx: No oropharyngeal exudate or posterior oropharyngeal erythema.   Eyes:      General: No scleral icterus.        Right eye: No discharge.         Left eye: No discharge.      Conjunctiva/sclera: Conjunctivae normal.      Pupils: Pupils are equal, round, and  reactive to light.   Cardiovascular:      Rate and Rhythm: Tachycardia present.      Heart sounds:     No friction rub. No gallop.   Pulmonary:      Effort: Pulmonary effort is normal.      Comments: Tachypnea.  Requiring 2 L of oxygen to achieve adequate saturation.  Not using accessory muscles of respiration.   Abdominal:      General: Abdomen is flat. There is no distension.      Tenderness: There is no guarding.   Musculoskeletal:         General: No swelling.      Cervical back: Neck supple. No rigidity. No muscular tenderness.      Right lower leg: No edema.      Left lower leg: No edema.   Skin:     Capillary Refill: Capillary refill takes 2 to 3 seconds.      Coloration: Skin is not jaundiced or pale.      Findings: No bruising or erythema.   Neurological:      Mental Status: She is alert and oriented to person, place, and time.   Psychiatric:         Mood and Affect: Mood normal.         Judgment: Judgment normal.     Laboratory:  Recent Labs     04/10/23  1434   WBC 23.2*   RBC 4.56   HEMOGLOBIN 13.7   HEMATOCRIT 41.6   MCV 91.2   MCH 30.0   MCHC 32.9*   RDW 44.3   PLATELETCT 331   MPV 9.8     Recent Labs     04/10/23  1434   SODIUM 134*   POTASSIUM 4.1   CHLORIDE 97   CO2 24   GLUCOSE 122*   BUN 13   CREATININE 0.77   CALCIUM 9.2     Recent Labs     04/10/23  1434   ALTSGPT 24   ASTSGOT 29   ALKPHOSPHAT 107*   TBILIRUBIN 1.5   GLUCOSE 122*         No results for input(s): NTPROBNP in the last 72 hours.      No results for input(s): TROPONINT in the last 72 hours.    Imaging:  DX-CHEST-PORTABLE (1 VIEW)   Final Result         Airspace opacity in the left lower lobe, similar to prior, atelectasis or pneumonia.        I personally reviewed patient CXR, shows LLL Lobar Pneumonia     Assessment/Plan:  Justification for Admission Status  I anticipate this patient will require at least two midnights for appropriate medical management, necessitating inpatient admission because patient has sepsis secondary to  pneumonia and will require intravenous antibiotics.    * Sepsis (HCC)- (present on admission)  Assessment & Plan  This is Sepsis Present on admission  SIRS criteria identified on my evaluation include: Fever, with temperature greater than 101 deg F, Tachycardia, with heart rate greater than 90 BPM and Leukocytosis, with WBC greater than 12,000  Source is pneumonia   Sepsis protocol initiated  Fluid resuscitation ordered per protocol  Crystalloid Fluid Administration: Fluid resuscitation ordered per standard protocol - 30 mL/kg per current or ideal body weight  IV antibiotics as appropriate for source of sepsis  Reassessment: I have reassessed the patient's hemodynamic status     Acute respiratory failure with hypoxia (HCC)- (present on admission)  Assessment & Plan  Secondary to pneumonia    Treating with antibiotics   Oxygen as needed, Respiratory protocol, Bronchodilators, Incentive spirometry     Pneumonia- (present on admission)  Assessment & Plan       I personally reviewed patient CXR, shows LLL Lobar Pneumonia  I will start Unasyn and azithromycin      Dyslipidemia- (present on admission)  Assessment & Plan  Cardiac diet     Hypothyroidism due to acquired atrophy of thyroid- (present on admission)  Assessment & Plan  Resume home levothyroxine     Mixed simple and mucopurulent chronic bronchitis (HCC)- (present on admission)  Assessment & Plan  Now with pneumonia   Oxygen as needed, Respiratory protocol, Bronchodilators, Incentive spirometry    VTE prophylaxis: SCDs/TEDs and enoxaparin ppx

## 2023-04-10 NOTE — ED PROVIDER NOTES
ED Provider Note    CHIEF COMPLAINT  Chief Complaint   Patient presents with    Fever    Shortness of Breath    N/V     Has been ill the last 3 -4 days    fevers, N/V  SOB     Cough     Cough      Weakness       EXTERNAL RECORDS REVIEWED  Outpatient Notes the patient was seen as an outpatient on 2023 to establish care, at that time the patient was not hypoxic on room air    HPI/ROS  LIMITATION TO HISTORY   Select: : None  OUTSIDE HISTORIAN(S):  Family patient's family members here with the patient helping with the history    Vaishali Soares is a 73 y.o. female who presents with past medical history significant for emphysema, osteoporosis, pneumonia, urinary retention, she presents with fever and cough with vomiting for the last 3 to 4 days.  She denies diarrhea.  She describes her symptoms as severe.    PAST MEDICAL HISTORY   has a past medical history of Anemia, Anesthesia, Emphysema, Hypothyroid, Osteoporosis, Pneumonia (), and Urinary retention.    SURGICAL HISTORY   has a past surgical history that includes other orthopedic surgery and vicki by laparoscopy (Bilateral, 2018).    FAMILY HISTORY  Family History   Problem Relation Age of Onset    Diabetes Sister         DMI    Other Sister         Kidney Failure    Diabetes Brother         DM2. no meds    Stroke Father 54    Cancer Maternal Grandmother         Uterine    Diabetes Paternal Grandmother     No Known Problems Mother     No Known Problems Maternal Grandfather     No Known Problems Paternal Grandfather     Cancer Maternal Uncle     Cancer Paternal Uncle     Arthritis Son         Arthritis, gout    Asthma Son     Alcohol/Drug Neg Hx        SOCIAL HISTORY  Social History     Tobacco Use    Smoking status: Former     Packs/day: 1.00     Years: 40.00     Pack years: 40.00     Types: Cigarettes     Quit date: 2007     Years since quittin.2    Smokeless tobacco: Never   Vaping Use    Vaping Use: Never used   Substance and Sexual  "Activity    Alcohol use: Yes     Comment: rare     Drug use: No    Sexual activity: Never       CURRENT MEDICATIONS  Home Medications       Reviewed by Jacoby Philip (Pharmacy Tech) on 04/10/23 at 1454  Med List Status: Complete     Medication Last Dose Status   Cholecalciferol (VITAMIN D PO) 4/9/2023 Active   Cyanocobalamin (VITAMIN B-12 PO) 4/9/2023 Active   levothyroxine (SYNTHROID) 88 MCG Tab 4/10/2023 Active                    ALLERGIES  No Known Allergies    PHYSICAL EXAM  VITAL SIGNS: /68   Pulse (!) 122   Temp (!) 39 °C (102.2 °F) (Temporal)   Resp 19   Ht 1.626 m (5' 4\")   Wt 83.1 kg (183 lb 3.2 oz)   SpO2 92%   BMI 31.45 kg/m²    Constitutional: Alert.  HENT: No signs of trauma, Bilateral external ears normal, Nose normal.   Eyes: Pupils are equal and reactive, Conjunctiva normal, Non-icteric.   Neck: Normal range of motion, No tenderness, Supple, No stridor.   Lymphatic: No lymphadenopathy noted.   Cardiovascular: Regular rate and rhythm, no murmurs.   Thorax & Lungs: Crackles both bases.  Abdomen: Bowel sounds normal, Soft, No tenderness, No peritoneal signs, No masses, No pulsatile masses.   Skin: Warm, Dry, No erythema, No rash.   Back: No bony tenderness, No CVA tenderness.   Extremities: Intact distal pulses, No edema, No tenderness, No cyanosis.  Musculoskeletal: Good range of motion in all major joints. No tenderness to palpation or major deformities noted.   Neurologic: Alert , Normal motor function, Normal sensory function, No focal deficits noted.   Psychiatric: Affect normal, Judgment normal, Mood normal.       DIAGNOSTIC STUDIES / PROCEDURES  EKG  I have independently interpreted this EKG  This is a twelve-lead EKG interpretation by myself.  It is sinus tachycardia at a rate of 119.  The axis is normal.  The intervals, ME and QRS are normal.  The QTc is prolonged 507.  There are nonspecific ST-T wave changes.  My impression of this EKG, sinus tachycardia, does not meet " "STEMI criteria at this time.    LABS  Labs Reviewed   CBC WITH DIFFERENTIAL - Abnormal; Notable for the following components:       Result Value    WBC 23.2 (*)     MCHC 32.9 (*)     Neutrophils-Polys 86.10 (*)     Lymphocytes 8.70 (*)     Neutrophils (Absolute) 19.98 (*)     Monos (Absolute) 1.05 (*)     All other components within normal limits   COMP METABOLIC PANEL - Abnormal; Notable for the following components:    Sodium 134 (*)     Glucose 122 (*)     Alkaline Phosphatase 107 (*)     Globulin 3.8 (*)     All other components within normal limits   URINALYSIS - Abnormal; Notable for the following components:    Character Cloudy (*)     Ketones >=80 (*)     Bilirubin Small (*)     Nitrite Positive (*)     Leukocyte Esterase Moderate (*)     Occult Blood Trace (*)     All other components within normal limits    Narrative:     Indication for culture:->Evaluation for sepsis without a  clear source of infection   URINE MICROSCOPIC (W/UA) - Abnormal; Notable for the following components:    WBC 20-50 (*)     Bacteria Moderate (*)     All other components within normal limits    Narrative:     Indication for culture:->Evaluation for sepsis without a  clear source of infection   LACTIC ACID    Narrative:     Repeat if initial lactic acid result is greater than 2   CORRECTED CALCIUM   ESTIMATED GFR   LACTIC ACID   LACTIC ACID   URINE CULTURE(NEW)    Narrative:     Indication for culture:->Evaluation for sepsis without a  clear source of infection   BLOOD CULTURE    Narrative:     Per Hospital Policy: Only change Specimen Src: to \"Line\" if  specified by physician order.   BLOOD CULTURE   LACTIC ACID         RADIOLOGY  I have independently interpreted the diagnostic imaging associated with this visit and am waiting the final reading from the radiologist.   My preliminary interpretation is as follows: Left lower lobe pneumonia.  Radiologist interpretation:     DX-CHEST-PORTABLE (1 VIEW)   Final Result         Airspace " opacity in the left lower lobe, similar to prior, atelectasis or pneumonia.            COURSE & MEDICAL DECISION MAKING    ED Observation Status? Yes; I am placing the patient in to an observation status due to a diagnostic uncertainty as well as therapeutic intensity. Patient placed in observation status at 2:27 PM, 4/10/2023.     Observation plan is as follows: The patient presents with fever, possible sepsis.  Labs ordered, chest x-ray was ordered.  Sepsis protocol was initiated.    Upon Reevaluation, the patient's condition has: not improved; and will be escalated to hospitalization.    Patient discharged from ED Observation status at 3:20 PM (Time) April 10, 2023 (Date).     INITIAL ASSESSMENT, COURSE AND PLAN  Care Narrative: The patient presents with fever, cough, she is a poor historian it is not clear the source of her infection.  Sepsis protocol was initiated.  She was given a 30 cc/kg IV LR bolus.        ADDITIONAL PROBLEM LIST  Emphysema  DISPOSITION AND DISCUSSIONS    2:56 PM the patient's white blood count is elevated 20,000.    3:20 PM the patient's chest x-ray demonstrates left lower lobe pneumonia.  I have ordered IV Zithromax and Unasyn.  The patient is hypoxic.  I spoke with  who will assess the patient for hospitalization.          I have discussed management of the patient with the following physicians and ALCIRA's:  Sanya hospitalist will assess the patient for hospitalization.    Discussion of management with other [facility] or appropriate source(s): None         Barriers to care at this time, including but not limited to:  none .     Decision tools and prescription drugs considered including, but not limited to: Antibiotics IV Unasyn and Zithromax .    FINAL DIAGNOSIS  1. Pneumonia of left lower lobe due to infectious organism    2. Other elevated white blood cell (WBC) count    3. Hypoxia    4. Sepsis, due to unspecified organism, unspecified whether acute organ dysfunction present  ([facility])    5. Acute UTI           Electronically signed by: Brock Child M.D., 4/10/2023 2:27 PM

## 2023-04-10 NOTE — ASSESSMENT & PLAN NOTE
"Continue with Unasyn and azithromycin    Sepsis parameters  Respiratory therapy  Supplemental oxygen as needed-patient does have oxygen at home, but only uses it when she \"feels like it.\"  "

## 2023-04-10 NOTE — ED NOTES
ERP at bedside. Pt agrees with plan of care discussed by ERP. AIDET acknowledged with patient. Karyn in low position, side rail up for pt safety. Call light within reach. Will continue to monitor.

## 2023-04-10 NOTE — ASSESSMENT & PLAN NOTE
Secondary to pneumonia    Treating with antibiotics   Oxygen as needed, Respiratory protocol, Bronchodilators, Incentive spirometry

## 2023-04-10 NOTE — ASSESSMENT & PLAN NOTE
Now with pneumonia   Oxygen as needed, Respiratory protocol, Bronchodilators, Incentive spirometry  Respiratory therapy

## 2023-04-10 NOTE — ED NOTES
"Continues to de sat to 87% RA, pt states \"this is my norm\"  RN applied 2 L NC and SPO2 improved to 92%.  ERP updated.    "

## 2023-04-10 NOTE — ASSESSMENT & PLAN NOTE
This is Sepsis Present on admission  SIRS criteria identified on my evaluation include: Fever, with temperature greater than 101 deg F, Tachycardia, with heart rate greater than 90 BPM and Leukocytosis, with WBC greater than 12,000  Source is pneumonia   Sepsis protocol initiated  Fluid resuscitation ordered per protocol  Crystalloid Fluid Administration: Fluid resuscitation ordered per standard protocol - 30 mL/kg per current or ideal body weight  IV antibiotics as appropriate for source of sepsis  Reassessment: I have reassessed the patient's hemodynamic status   Resolved

## 2023-04-10 NOTE — ED NOTES
Med rec updated and complete, per pt   Allergies reviewed, per pt  Pt had an RX bottle of of her LEVOTHYROXINE 88MCG, per pt reports that is the only medication that she takes. Returned RX bottle back to pt at bedside

## 2023-04-10 NOTE — ED TRIAGE NOTES
Pt comes in w/ son c/o getting ill started on Saturday   fever cough congestion fevers  Hx of Pnx   feeling terrible    Hx of COPD   sepsis noted on score 4   charge RN aware

## 2023-04-11 LAB
ALBUMIN SERPL BCP-MCNC: 3.4 G/DL (ref 3.2–4.9)
ALBUMIN/GLOB SERPL: 1.1 G/DL
ALP SERPL-CCNC: 83 U/L (ref 30–99)
ALT SERPL-CCNC: 19 U/L (ref 2–50)
ANION GAP SERPL CALC-SCNC: 9 MMOL/L (ref 7–16)
AST SERPL-CCNC: 23 U/L (ref 12–45)
BILIRUB SERPL-MCNC: 1.2 MG/DL (ref 0.1–1.5)
BUN SERPL-MCNC: 10 MG/DL (ref 8–22)
CALCIUM ALBUM COR SERPL-MCNC: 9.2 MG/DL (ref 8.5–10.5)
CALCIUM SERPL-MCNC: 8.7 MG/DL (ref 8.4–10.2)
CHLORIDE SERPL-SCNC: 105 MMOL/L (ref 96–112)
CO2 SERPL-SCNC: 25 MMOL/L (ref 20–33)
CREAT SERPL-MCNC: 0.56 MG/DL (ref 0.5–1.4)
ERYTHROCYTE [DISTWIDTH] IN BLOOD BY AUTOMATED COUNT: 44.2 FL (ref 35.9–50)
GFR SERPLBLD CREATININE-BSD FMLA CKD-EPI: 95 ML/MIN/1.73 M 2
GLOBULIN SER CALC-MCNC: 3.2 G/DL (ref 1.9–3.5)
GLUCOSE SERPL-MCNC: 102 MG/DL (ref 65–99)
HCT VFR BLD AUTO: 35.8 % (ref 37–47)
HGB BLD-MCNC: 11.5 G/DL (ref 12–16)
MAGNESIUM SERPL-MCNC: 1.9 MG/DL (ref 1.5–2.5)
MCH RBC QN AUTO: 29.6 PG (ref 27–33)
MCHC RBC AUTO-ENTMCNC: 32.1 G/DL (ref 33.6–35)
MCV RBC AUTO: 92 FL (ref 81.4–97.8)
PLATELET # BLD AUTO: 263 K/UL (ref 164–446)
PMV BLD AUTO: 9.9 FL (ref 9–12.9)
POTASSIUM SERPL-SCNC: 3.5 MMOL/L (ref 3.6–5.5)
PROT SERPL-MCNC: 6.6 G/DL (ref 6–8.2)
RBC # BLD AUTO: 3.89 M/UL (ref 4.2–5.4)
SODIUM SERPL-SCNC: 139 MMOL/L (ref 135–145)
WBC # BLD AUTO: 14.1 K/UL (ref 4.8–10.8)

## 2023-04-11 PROCEDURE — 94669 MECHANICAL CHEST WALL OSCILL: CPT

## 2023-04-11 PROCEDURE — 94760 N-INVAS EAR/PLS OXIMETRY 1: CPT

## 2023-04-11 PROCEDURE — A9270 NON-COVERED ITEM OR SERVICE: HCPCS | Performed by: HOSPITALIST

## 2023-04-11 PROCEDURE — 94640 AIRWAY INHALATION TREATMENT: CPT

## 2023-04-11 PROCEDURE — 700102 HCHG RX REV CODE 250 W/ 637 OVERRIDE(OP): Performed by: HOSPITALIST

## 2023-04-11 PROCEDURE — 700102 HCHG RX REV CODE 250 W/ 637 OVERRIDE(OP): Performed by: INTERNAL MEDICINE

## 2023-04-11 PROCEDURE — 83735 ASSAY OF MAGNESIUM: CPT

## 2023-04-11 PROCEDURE — 700111 HCHG RX REV CODE 636 W/ 250 OVERRIDE (IP): Performed by: HOSPITALIST

## 2023-04-11 PROCEDURE — 99232 SBSQ HOSP IP/OBS MODERATE 35: CPT | Performed by: INTERNAL MEDICINE

## 2023-04-11 PROCEDURE — 700105 HCHG RX REV CODE 258: Performed by: HOSPITALIST

## 2023-04-11 PROCEDURE — 85027 COMPLETE CBC AUTOMATED: CPT

## 2023-04-11 PROCEDURE — 80053 COMPREHEN METABOLIC PANEL: CPT

## 2023-04-11 PROCEDURE — A9270 NON-COVERED ITEM OR SERVICE: HCPCS | Performed by: INTERNAL MEDICINE

## 2023-04-11 PROCEDURE — 770020 HCHG ROOM/CARE - TELE (206)

## 2023-04-11 PROCEDURE — 700101 HCHG RX REV CODE 250: Performed by: INTERNAL MEDICINE

## 2023-04-11 RX ORDER — POTASSIUM CHLORIDE 20 MEQ/1
40 TABLET, EXTENDED RELEASE ORAL ONCE
Status: COMPLETED | OUTPATIENT
Start: 2023-04-11 | End: 2023-04-11

## 2023-04-11 RX ORDER — IPRATROPIUM BROMIDE AND ALBUTEROL SULFATE 2.5; .5 MG/3ML; MG/3ML
3 SOLUTION RESPIRATORY (INHALATION)
Status: DISCONTINUED | OUTPATIENT
Start: 2023-04-11 | End: 2023-04-12 | Stop reason: HOSPADM

## 2023-04-11 RX ADMIN — AZITHROMYCIN MONOHYDRATE 500 MG: 250 TABLET ORAL at 06:40

## 2023-04-11 RX ADMIN — IPRATROPIUM BROMIDE AND ALBUTEROL SULFATE 3 ML: .5; 2.5 SOLUTION RESPIRATORY (INHALATION) at 10:02

## 2023-04-11 RX ADMIN — AMPICILLIN AND SULBACTAM 3 G: 1; 2 INJECTION, POWDER, FOR SOLUTION INTRAMUSCULAR; INTRAVENOUS at 06:43

## 2023-04-11 RX ADMIN — AMPICILLIN AND SULBACTAM 3 G: 1; 2 INJECTION, POWDER, FOR SOLUTION INTRAMUSCULAR; INTRAVENOUS at 11:51

## 2023-04-11 RX ADMIN — POTASSIUM CHLORIDE 40 MEQ: 1500 TABLET, EXTENDED RELEASE ORAL at 08:04

## 2023-04-11 RX ADMIN — SODIUM CHLORIDE, POTASSIUM CHLORIDE, SODIUM LACTATE AND CALCIUM CHLORIDE: 600; 310; 30; 20 INJECTION, SOLUTION INTRAVENOUS at 08:13

## 2023-04-11 RX ADMIN — AMPICILLIN AND SULBACTAM 3 G: 1; 2 INJECTION, POWDER, FOR SOLUTION INTRAMUSCULAR; INTRAVENOUS at 16:47

## 2023-04-11 RX ADMIN — LEVOTHYROXINE SODIUM 75 MCG: 0.07 TABLET ORAL at 06:41

## 2023-04-11 ASSESSMENT — PAIN DESCRIPTION - PAIN TYPE
TYPE: ACUTE PAIN

## 2023-04-11 ASSESSMENT — ENCOUNTER SYMPTOMS
SHORTNESS OF BREATH: 1
WHEEZING: 1
SPUTUM PRODUCTION: 1
COUGH: 1

## 2023-04-11 ASSESSMENT — FIBROSIS 4 INDEX: FIB4 SCORE: 1.5

## 2023-04-11 NOTE — PROGRESS NOTES
"Pt arrived to  from ED 3 via cart accompanied by ED kang Wyman at 1950 hours. Pt noncompliant with instruction provided by ED tech and RN x 2 to remain safely on ED cart until lines and tubes were untangled and redirected. Pt educated that she was becoming tangled in oxygen tubing and IV line but pt continued with noncompliance with instruction. Pt waved this RN and RN Luther away stating, \"Go. Back up.\" when assistance offered to her to change in to pocket gown to facilitate placement of telemetry monitoring wires. Pt educated on telemetry monitoring order placed by physician and need for application of telemetry leads. Pt stated, \"I don't need those. There's nothing wrong with my heart.\" Pt did allow application of telemetry patches after cardiac monitoring policy explained further by this RN. Two-RN skin check policy explained to pt on arrival and promptly refused by pt. Pt educated on importance of skin assessment and continues to refuse. Visible skin assessed as able; see note.  "

## 2023-04-11 NOTE — PROGRESS NOTES
"Hospital Medicine Daily Progress Note    Date of Service  4/11/2023    Chief Complaint  Vaishali Soares is a 73 y.o. female admitted 4/10/2023 with worsening cough    Hospital Course  Per notes,   \"73 y.o. female with a past medical history of chronic obstructive pulmonary disease not on oxygen at baseline, hypothyroidismwho presented 4/10/2023 with generalized weakness and shortness of breath for the past 3 to 4 days.  Shortness of breath is mostly with exertion.  She denies having, extremity pain redness or swelling.  She reports having fevers and chills. \"    Interval Problem Update  Patient admitted for COPD exacerbation and community-acquired pneumonia.  She has been started on IV antibiotics, will continue with these.  EKG showing prolonged QTc of 501, patient is on azithromycin for community-acquired pneumonia so we will monitor closely.  DC IV fluids  Still having significant wheezing on exam continue with respiratory therapy    I have discussed this patient's plan of care and discharge plan at IDT rounds today with Case Management, Nursing, Nursing leadership, and other members of the IDT team.    Consultants/Specialty  None    Code Status  Full Code    Disposition  Patient is not medically cleared for discharge.   Anticipate discharge to to home with close outpatient follow-up.  I have placed the appropriate orders for post-discharge needs.    Review of Systems  Review of Systems   Constitutional:  Positive for malaise/fatigue.   Respiratory:  Positive for cough, sputum production, shortness of breath and wheezing.    All other systems reviewed and are negative.     Physical Exam  Temp:  [36.5 °C (97.7 °F)-38.1 °C (100.6 °F)] 36.5 °C (97.7 °F)  Pulse:  [] 90  Resp:  [17-36] 25  BP: (100-131)/(56-69) 100/56  SpO2:  [86 %-98 %] 93 %    Physical Exam  Vitals and nursing note reviewed.   Constitutional:       Appearance: Normal appearance.   Cardiovascular:      Rate and Rhythm: Normal rate and " regular rhythm.      Pulses: Normal pulses.      Heart sounds: Normal heart sounds.   Pulmonary:      Effort: Respiratory distress present.      Breath sounds: Wheezing and rhonchi present. No rales.   Abdominal:      General: Abdomen is flat. Bowel sounds are normal.      Palpations: Abdomen is soft.   Musculoskeletal:      Right lower leg: No edema.      Left lower leg: No edema.   Skin:     General: Skin is warm and dry.   Neurological:      General: No focal deficit present.      Mental Status: She is alert and oriented to person, place, and time. Mental status is at baseline.       Fluids    Intake/Output Summary (Last 24 hours) at 4/11/2023 1454  Last data filed at 4/11/2023 1300  Gross per 24 hour   Intake 1498.28 ml   Output 300 ml   Net 1198.28 ml       Laboratory  Recent Labs     04/10/23  1434 04/11/23  0259   WBC 23.2* 14.1*   RBC 4.56 3.89*   HEMOGLOBIN 13.7 11.5*   HEMATOCRIT 41.6 35.8*   MCV 91.2 92.0   MCH 30.0 29.6   MCHC 32.9* 32.1*   RDW 44.3 44.2   PLATELETCT 331 263   MPV 9.8 9.9     Recent Labs     04/10/23  1434 04/11/23  0259   SODIUM 134* 139   POTASSIUM 4.1 3.5*   CHLORIDE 97 105   CO2 24 25   GLUCOSE 122* 102*   BUN 13 10   CREATININE 0.77 0.56   CALCIUM 9.2 8.7                   Imaging  DX-CHEST-PORTABLE (1 VIEW)   Final Result         Airspace opacity in the left lower lobe, similar to prior, atelectasis or pneumonia.           Assessment/Plan  * Sepsis (HCC)- (present on admission)  Assessment & Plan  This is Sepsis Present on admission  SIRS criteria identified on my evaluation include: Fever, with temperature greater than 101 deg F, Tachycardia, with heart rate greater than 90 BPM and Leukocytosis, with WBC greater than 12,000  Source is pneumonia   Sepsis protocol initiated  Fluid resuscitation ordered per protocol  Crystalloid Fluid Administration: Fluid resuscitation ordered per standard protocol - 30 mL/kg per current or ideal body weight  IV antibiotics as appropriate for source  "of sepsis  Reassessment: I have reassessed the patient's hemodynamic status   Resolved    Acute respiratory failure with hypoxia (HCC)- (present on admission)  Assessment & Plan  Secondary to pneumonia    Treating with antibiotics   Oxygen as needed, Respiratory protocol, Bronchodilators, Incentive spirometry     Pneumonia- (present on admission)  Assessment & Plan  Continue with Unasyn and azithromycin    Sepsis parameters  Respiratory therapy  Supplemental oxygen as needed-patient does have oxygen at home, but only uses it when she \"feels like it.\"    Dyslipidemia- (present on admission)  Assessment & Plan  Cardiac diet     Hypothyroidism due to acquired atrophy of thyroid- (present on admission)  Assessment & Plan  Continue home levothyroxine     Mixed simple and mucopurulent chronic bronchitis (HCC)- (present on admission)  Assessment & Plan  Now with pneumonia   Oxygen as needed, Respiratory protocol, Bronchodilators, Incentive spirometry  Respiratory therapy         VTE prophylaxis: SCDs/TEDs    I have performed a physical exam and reviewed and updated ROS and Plan today (4/11/2023). In review of yesterday's note (4/10/2023), there are no changes except as documented above.        "

## 2023-04-11 NOTE — ED NOTES
Report received from Georgina LUCERO.     Rounded on pt. Pt in NAD.  Pt reports no further needs at this time.   Call bell within reach.

## 2023-04-11 NOTE — CARE PLAN
The patient is Stable - Low risk of patient condition declining or worsening    Shift Goals  Clinical Goals: Monitor Oxygen status, SPO2 > 90%  Patient Goals: Rest  Family Goals: ANNABELLE    Progress made toward(s) clinical / shift goals:    Problem: Knowledge Deficit - Standard  Goal: Patient and family/care givers will demonstrate understanding of plan of care, disease process/condition, diagnostic tests and medications  Description: Target End Date:  1-3 days or as soon as patient condition allows    Document in Patient Education    1.  Patient and family/caregiver oriented to unit, equipment, visitation policy and means for communicating concern  2.  Complete/review Learning Assessment  3.  Assess knowledge level of disease process/condition, treatment plan, diagnostic tests and medications  4.  Explain disease process/condition, treatment plan, diagnostic tests and medications  Outcome: Progressing     Problem: Hemodynamics  Goal: Patient's hemodynamics, fluid balance and neurologic status will be stable or improve  Description: Target End Date:  Prior to discharge or change in level of care    Document on Assessment and I/O flowsheet templates    1.  Monitor vital signs, pulse oximetry and cardiac monitor per provider order and/or policy  2.  Maintain blood pressure per provider order  3.  Hemodynamic monitoring per provider order  4.  Manage IV fluids and IV infusions  5.  Monitor intake and output  6.  Daily weights per unit policy or provider order  7.  Assess peripheral pulses and capillary refill  8.  Assess color and body temperature  9.  Position patient for maximum circulation/cardiac output  10. Monitor for signs/symptoms of excessive bleeding  11. Assess mental status, restlessness and changes in level of consciousness  12. Monitor temperature and report fever or hypothermia to provider immediately. Consideration of targeted temperature management.  Outcome: Progressing     Problem: Fluid Volume  Goal:  Fluid volume balance will be maintained  Description: Target End Date:  Prior to discharge or change in level of care    Document on I/O flowsheet    1.  Monitor intake and output as ordered  2.  Promote oral intake as appropriate  3.  Report inadequate intake or output to physician  4.  Administer IV therapy as ordered  5.  Weights per provider order  6.  Assess for signs and symptoms of bleeding  7.  Monitor for signs of fluid overload (respiratory changes, edema, weight gain, increased abdominal girth)  8.  Monitor of signs for inadequate fluid volume (poor skin turgor, dry mucous membranes)  9.  Instruct patient on adherence to fluid restrictions  Outcome: Progressing     Problem: Respiratory  Goal: Patient will achieve/maintain optimum respiratory ventilation and gas exchange  Description: Target End Date:  Prior to discharge or change in level of care    Document on Assessment flowsheet    1.  Assess and monitor rate, rhythm, depth and effort of respiration  2.  Breath sounds assessed qshift and/or as needed  3.  Assess O2 saturation, administer/titrate oxygen as ordered  4.  Position patient for maximum ventilatory efficiency  5.  Turn, cough, and deep breath with splinting to improve effectiveness  6.  Collaborate with RT to administer medication/treatments per order  7.  Encourage use of incentive spirometer and encourage patient to cough after use and utilize splinting techniques if applicable  8.  Airway suctioning  9.  Monitor sputum production for changes in color, consistency and frequency  10. Perform frequent oral hygiene  11. Alternate physical activity with rest periods  Outcome: Progressing

## 2023-04-11 NOTE — PROGRESS NOTES
Received pt from ER. Call report received from JAZMINE Villanueva. Pt was brought up by patient transport with fluids running.

## 2023-04-11 NOTE — CARE PLAN
The patient is Stable - Low risk of patient condition declining or worsening    Shift Goals  Clinical Goals: wean fi02, improved resp status, treatments, increased activity  Patient Goals: go home  Family Goals: ANNABELLE    Progress made toward(s) clinical / shift goals:    Problem: Pain - Standard  Goal: Alleviation of pain or a reduction in pain to the patient’s comfort goal  Outcome: Progressing     Problem: Knowledge Deficit - Standard  Goal: Patient and family/care givers will demonstrate understanding of plan of care, disease process/condition, diagnostic tests and medications  Outcome: Progressing     Problem: Hemodynamics  Goal: Patient's hemodynamics, fluid balance and neurologic status will be stable or improve  Outcome: Progressing     Problem: Fluid Volume  Goal: Fluid volume balance will be maintained  Outcome: Progressing     Problem: Urinary - Renal Perfusion  Goal: Ability to achieve and maintain adequate renal perfusion and functioning will improve  Outcome: Progressing     Problem: Respiratory  Goal: Patient will achieve/maintain optimum respiratory ventilation and gas exchange  Outcome: Progressing     Problem: Physical Regulation  Goal: Diagnostic test results will improve  Outcome: Progressing  Goal: Signs and symptoms of infection will decrease  Outcome: Progressing       Patient is not progressing towards the following goals:

## 2023-04-11 NOTE — PROGRESS NOTES
12-hour chart check complete.    Monitor Summary  Rhythm: SR/ST  Rate:   Ectopy: Rare PVC  Measurements: .18/.08/.32

## 2023-04-11 NOTE — HOSPITAL COURSE
"Per notes,   \"73 y.o. female with a past medical history of chronic obstructive pulmonary disease not on oxygen at baseline, hypothyroidismwho presented 4/10/2023 with generalized weakness and shortness of breath for the past 3 to 4 days.  Shortness of breath is mostly with exertion.  She denies having, extremity pain redness or swelling.  She reports having fevers and chills. \"  "

## 2023-04-11 NOTE — RESPIRATORY CARE
COPD EDUCATION by COPD CLINICAL EDUCATOR  4/11/2023 at 11:58 AM by Pavithra Hackett RRT     Patient reviewed by COPD education team. Patient does not have a formal history or diagnosis of COPD and is a former smoker.  Patient was not interested in full COPD program. Provided a short intervention completed with this patient covering: What is COPD (how the lungs work), daily medications rescue and maintenance, breathing techniques, infection prevention and oxygen safety were covered in detail.  A comprehensive packet including information about COPD, treatments, and oxygen safety was given.            COPD Screen  COPD Risk Screening  Do you have a history of COPD?: Yes  Do you have a Pulmonologist?: No  COPD Population Screener  During the past 4 weeks, how much did you feel short of breath?: Some of the time  Do you ever cough up any mucus or phlegm?: Yes, every day  Have you smoked at least 100 cigarettes in your entire life?: Yes  How old are you?: 60+  COPD Coordinator Recommended: Yes    COPD Assessment  COPD Clinical Specialists ONLY  COPD Education Initiated: Yes--Short Intervention  DME Company: None  Physician Follow Up Appointment: 05/02/23  Appt Time: 0845  Physician Name: Physician Assistant Chantelle Beltran P.A.-C.  Pulmonologist Name: None - is fine with PCP  Referrals Initiated: Yes  Pulmonary Rehab: Declined  Smoking Cessation: N/A  Hospice: N/A  Home Health Care: Yes  [facility] Outreach: N/A  [facility]: Yes  [facility]: N/A  Private In-Home Care Agency: N/A  Is this a COPD exacerbation patient?: No  (OP) Pulmonary Function Testing: Yes    PFT Results    No results found for: PFT    Meds to Beds  Would the patient like to opt in for Bedside Medication Delivery at Discharge?: Yes, interested     MY COPD ACTION PLAN     It is recommended that patients and physicians /healthcare providers complete this action plan together. This plan should be discussed at each physician  "visit and updated as needed.    The green, yellow and red zones show groups of symptoms of COPD. This list of symptoms is not comprehensive, and you may experience other symptoms. In the \"Actions\" column, your healthcare provider has recommended actions for you to take based on your symptoms.    Patient Name: Vaishali Soares   YOB: 1950   Last Updated on: 4/11/2023 11:58 AM   Green Zone:  I am doing well today Actions     Usual activitiy and exercise level   Take daily medications     Usual amounts of cough and phlegm/mucus   Use oxygen as prescribed     Sleep well at night   Continue regular exercise/diet plan     Appetite is good   At all times avoid cigarette smoke, inhaled irritants     Daily Medications (these medications are taken every day):                Yellow Zone:  I am having a bad day or a COPD flare Actions     More breathless than usual   Continue daily medications     I have less energy for my daily activities   Use quick relief inhaler as ordered     Increased or thicker phlegm/mucus   Use oxygen as prescribed     Using quick relief inhaler/nebulizer more often   Get plenty of rest     Swelling of ankles more than usual   Use pursed lip breathing     More coughing than usual   At all times avoid cigarette smoke, inhaled irritants     I feel like I have a \"chest cold\"     Poor sleep and my symptoms woke me up     My appetite is not good     My medicine is not helping      Call provider immediately if symptoms don’t improve     Continue daily medications, add rescue medications:               Medications to be used during a flare up, (as Discussed with Provider):              Red Zone:  I need urgent medical care Actions     Severe shortness of breath even at rest   Call 911 or seek medical care immediately     Not able to do any activity because of breathing      Fever or shaking chills      Feeling confused or very drowsy       Chest pains      Coughing up blood                  "

## 2023-04-11 NOTE — DISCHARGE PLANNING
"HTH/SCP TCN chart review completed. Collaborated with ELEAZAR Bennett,  prior to meeting with the pt. The most current review of medical record, knowledge of pt's PLOF and social support, LACE+ score of 73, 6 clicks scores of 22 ADL and 21 mobility were considered.      TCN met with patient at bedside. Introduced TCN program. Provided education regarding post acute levels of care. Discussed HTH/SCP plan benefits (Meds to Beds, medical uber and [facility] transitional care). Patient verbalized understanding.    Patient endorsed she resides with her son and son's girlfriend, stating previous level of mobility as independent of assistive device. Patient endorsed her previous level of function as independent in ADLs, IADLs and continues to drive.  Patient noted an overall reduction in her activity level following the passing of her mother. Patient stated she does have an oxygen concentrator at home; but stated \"I don't use it, I only needed it last year for about a week when I had COVID.\"     Discussed possible need for oxygen choice should patient necessitated oxygen at time of discharge from acute hospital. Patient declined. TCN collaborated with attending RN and appreciate visit with attending nurse, patient and TCN at bedside regarding potential oxygen needs.     Choice proactively obtained for DME for possible oxygen needs, faxed to [facility] and given to CM. [place] [facility] [place], stating preference for outpatient follow-up with primary care physician confirmed to Chantelle Beltran P.A.-C. TCN placed phone call to [phone] to assist in scheduling follow-up visit at patient request.     TCN will continue to follow and collaborate with discharge planning team as additional post acute needs arise. Thank you.     Completed today  Choice obtained: DME (for possible oxygen needs with patient choice for [facility]erred homecare)  [facility] [place] patient declined. TCN contacted [phone] to assist with " hospital follow-up scheduled for Monday, 4/24/2023 with Chantelle Beltran with a check-in time of 9:45 for 10AM appointment

## 2023-04-11 NOTE — PROGRESS NOTES
4 Eyes Skin Assessment Completed by JAZMINE Arnold and JAZMINE Sloan.    Head WDL  Ears WDL  Nose WDL  Mouth WDL  Neck WDL  Breast/Chest WDL skin that was able to visualized during application of telemetry patches was assessed. Unable to assess under breasts d/t pt refusal.  Shoulder Blades WDL  Spine WDL  (R) Arm/Elbow/Hand WDL  (L) Arm/Elbow/Hand WDL  Abdomen unable to assess d/t pt refusal  Groin unable to assess d/t pt refusal  Scrotum/Coccyx/Buttocks unable to assess d/t pt refusal  (R) Leg unable to assess d/t pt refusal  (L) Leg unable to assess d/t pt refusal  (R) Heel/Foot/Toe unable to assess d/t pt refusal  (L) Heel/Foot/Toe unable to assess d/t pt refusal          Devices In Places ECG, Blood Pressure Cuff, Pulse Ox, SCD's, and Nasal Cannula      Interventions In Place NC W/Ear Foams and Low Air Loss Mattress    Possible Skin Injury No (assessed skin only)    Pictures Uploaded Into Epic N/A  Wound Consult Placed N/A  RN Wound Prevention Protocol Ordered No

## 2023-04-12 ENCOUNTER — PHARMACY VISIT (OUTPATIENT)
Dept: PHARMACY | Facility: MEDICAL CENTER | Age: 73
End: 2023-04-12
Payer: MEDICARE

## 2023-04-12 VITALS
SYSTOLIC BLOOD PRESSURE: 135 MMHG | BODY MASS INDEX: 32.48 KG/M2 | DIASTOLIC BLOOD PRESSURE: 54 MMHG | OXYGEN SATURATION: 92 % | HEART RATE: 81 BPM | HEIGHT: 64 IN | TEMPERATURE: 97.3 F | WEIGHT: 190.26 LBS | RESPIRATION RATE: 17 BRPM

## 2023-04-12 LAB
ANION GAP SERPL CALC-SCNC: 8 MMOL/L (ref 7–16)
BACTERIA UR CULT: ABNORMAL
BACTERIA UR CULT: ABNORMAL
BUN SERPL-MCNC: 6 MG/DL (ref 8–22)
CALCIUM SERPL-MCNC: 8.7 MG/DL (ref 8.4–10.2)
CHLORIDE SERPL-SCNC: 103 MMOL/L (ref 96–112)
CO2 SERPL-SCNC: 26 MMOL/L (ref 20–33)
CREAT SERPL-MCNC: 0.53 MG/DL (ref 0.5–1.4)
ERYTHROCYTE [DISTWIDTH] IN BLOOD BY AUTOMATED COUNT: 43.8 FL (ref 35.9–50)
GFR SERPLBLD CREATININE-BSD FMLA CKD-EPI: 96 ML/MIN/1.73 M 2
GLUCOSE SERPL-MCNC: 103 MG/DL (ref 65–99)
HCT VFR BLD AUTO: 34.6 % (ref 37–47)
HGB BLD-MCNC: 11.1 G/DL (ref 12–16)
MCH RBC QN AUTO: 29.7 PG (ref 27–33)
MCHC RBC AUTO-ENTMCNC: 32.1 G/DL (ref 33.6–35)
MCV RBC AUTO: 92.5 FL (ref 81.4–97.8)
PLATELET # BLD AUTO: 269 K/UL (ref 164–446)
PMV BLD AUTO: 9.9 FL (ref 9–12.9)
POTASSIUM SERPL-SCNC: 3.7 MMOL/L (ref 3.6–5.5)
RBC # BLD AUTO: 3.74 M/UL (ref 4.2–5.4)
SIGNIFICANT IND 70042: ABNORMAL
SITE SITE: ABNORMAL
SODIUM SERPL-SCNC: 137 MMOL/L (ref 135–145)
SOURCE SOURCE: ABNORMAL
WBC # BLD AUTO: 10.9 K/UL (ref 4.8–10.8)

## 2023-04-12 PROCEDURE — 99239 HOSP IP/OBS DSCHRG MGMT >30: CPT | Performed by: INTERNAL MEDICINE

## 2023-04-12 PROCEDURE — 700105 HCHG RX REV CODE 258: Performed by: HOSPITALIST

## 2023-04-12 PROCEDURE — 85027 COMPLETE CBC AUTOMATED: CPT

## 2023-04-12 PROCEDURE — RXMED WILLOW AMBULATORY MEDICATION CHARGE: Performed by: INTERNAL MEDICINE

## 2023-04-12 PROCEDURE — 700111 HCHG RX REV CODE 636 W/ 250 OVERRIDE (IP): Performed by: HOSPITALIST

## 2023-04-12 PROCEDURE — A9270 NON-COVERED ITEM OR SERVICE: HCPCS | Performed by: HOSPITALIST

## 2023-04-12 PROCEDURE — 36415 COLL VENOUS BLD VENIPUNCTURE: CPT

## 2023-04-12 PROCEDURE — 94760 N-INVAS EAR/PLS OXIMETRY 1: CPT

## 2023-04-12 PROCEDURE — 700102 HCHG RX REV CODE 250 W/ 637 OVERRIDE(OP): Performed by: HOSPITALIST

## 2023-04-12 PROCEDURE — 80048 BASIC METABOLIC PNL TOTAL CA: CPT

## 2023-04-12 RX ORDER — AMOXICILLIN AND CLAVULANATE POTASSIUM 875; 125 MG/1; MG/1
1 TABLET, FILM COATED ORAL 2 TIMES DAILY
Qty: 8 TABLET | Refills: 0 | Status: SHIPPED | OUTPATIENT
Start: 2023-04-12 | End: 2023-04-16

## 2023-04-12 RX ADMIN — LEVOTHYROXINE SODIUM 75 MCG: 0.07 TABLET ORAL at 04:48

## 2023-04-12 RX ADMIN — AZITHROMYCIN MONOHYDRATE 500 MG: 250 TABLET ORAL at 06:05

## 2023-04-12 RX ADMIN — AMPICILLIN AND SULBACTAM 3 G: 1; 2 INJECTION, POWDER, FOR SOLUTION INTRAMUSCULAR; INTRAVENOUS at 00:04

## 2023-04-12 RX ADMIN — AMPICILLIN AND SULBACTAM 3 G: 1; 2 INJECTION, POWDER, FOR SOLUTION INTRAMUSCULAR; INTRAVENOUS at 04:50

## 2023-04-12 ASSESSMENT — FIBROSIS 4 INDEX: FIB4 SCORE: 1.47

## 2023-04-12 NOTE — PROGRESS NOTES
Pt resting in bed, no signs of acute distress. Pt on room air sats 91%. Pt understands importance of f/u appt.

## 2023-04-12 NOTE — PROGRESS NOTES
12-hour chart check complete.    Monitor Summary  Rhythm: SR with PVC  Rate: 74-95  Ectopy: Rare PVC  Measurements: .18/.08/.34

## 2023-04-12 NOTE — DISCHARGE SUMMARY
"Discharge Summary    CHIEF COMPLAINT ON ADMISSION  Chief Complaint   Patient presents with    Fever    Shortness of Breath    N/V     Has been ill the last 3 -4 days    fevers, N/V  SOB     Cough     Cough      Weakness       Reason for Admission  Fever; Cough; Fatigue; Weakness; S*     Admission Date  4/10/2023    CODE STATUS  Prior    HPI & HOSPITAL COURSE  Per notes,   \"73 y.o. female with a past medical history of chronic obstructive pulmonary disease not on oxygen at baseline, hypothyroidismwho presented 4/10/2023 with generalized weakness and shortness of breath for the past 3 to 4 days.  Shortness of breath is mostly with exertion.  She denies having, extremity pain redness or swelling.  She reports having fevers and chills. \"    Patient admitted for COPD exacerbation and community-acquired pneumonia.  She has been started on IV antibiotics and progressed well. EKG showing prolonged QTc of 501, patient is on azithromycin for community-acquired pneumonia, which was monitored closely while in the hospital. At this time, patient has been weaned off oxygen and has returned to baseline. She is safe to be discharged and managed in the outpatient setting.     Therefore, she is discharged in good and stable condition to home with close outpatient follow-up.    The patient met 2-midnight criteria for an inpatient stay at the time of discharge.    Discharge Date  4/12/2023    FOLLOW UP ITEMS POST DISCHARGE  FU with PCP     DISCHARGE DIAGNOSES  Principal Problem:    Sepsis (HCC) POA: Yes  Active Problems:    Mixed simple and mucopurulent chronic bronchitis (HCC) POA: Yes    Hypothyroidism due to acquired atrophy of thyroid POA: Yes    Dyslipidemia POA: Yes    Pneumonia POA: Yes    Acute respiratory failure with hypoxia (HCC) POA: Yes  Resolved Problems:    * No resolved hospital problems. *      FOLLOW UP  Future Appointments   Date Time Provider Department Center   4/24/2023 10:00 AM Chantelle Beltran P.A.-C. [address] "   5/2/2023  9:00 AM Chantelle Beltran P.A.-C. [address] Beltran P.A.-C.  [address] [id number] [phone]          Vesna Davis P.A.-C.  [address]  [place] [id number] [phone]            MEDICATIONS ON DISCHARGE     Medication List        START taking these medications        Instructions   amoxicillin-clavulanate 875-125 MG Tabs  Commonly known as: AUGMENTIN   Take 1 Tablet by mouth 2 times a day for 4 days.  Dose: 1 Tablet            CONTINUE taking these medications        Instructions   levothyroxine 88 MCG Tabs  Commonly known as: SYNTHROID   Take 1 Tablet by mouth every morning on an empty stomach.  Dose: 88 mcg     VITAMIN B-12 PO   Take 1 Tablet by mouth every day.  Dose: 1 Tablet     VITAMIN D PO   Take 1 Capsule by mouth every day.  Dose: 1 Capsule              Allergies  No Known Allergies    DIET  No orders of the defined types were placed in this encounter.      ACTIVITY  As tolerated.  Weight bearing as tolerated    CONSULTATIONS  None    PROCEDURES  None    LABORATORY  Lab Results   Component Value Date    SODIUM 137 04/12/2023    POTASSIUM 3.7 04/12/2023    CHLORIDE 103 04/12/2023    CO2 26 04/12/2023    GLUCOSE 103 (H) 04/12/2023    BUN 6 (L) 04/12/2023    CREATININE 0.53 04/12/2023    CREATININE 0.7 05/13/2006        Lab Results   Component Value Date    WBC 10.9 (H) 04/12/2023    HEMOGLOBIN 11.1 (L) 04/12/2023    HEMATOCRIT 34.6 (L) 04/12/2023    PLATELETCT 269 04/12/2023        Total time of the discharge process exceeds 38 minutes.

## 2023-04-12 NOTE — PROGRESS NOTES
4 Eyes Skin Assessment Completed by JAZMINE Goldsmith and JAZMINE Jennings.    Head WDL  Ears WDL  Nose WDL  Mouth WDL  Neck WDL  Breast/Chest WDL  Shoulder Blades WDL  Spine WDL  (R) Arm/Elbow/Hand WDL  (L) Arm/Elbow/Hand WDL  Abdomen WDL  Groin WDL  Scrotum/Coccyx/Buttocks WDL  (R) Leg WDL  (L) Leg WDL  (R) Heel/Foot/Toe WDL  (L) Heel/Foot/Toe WDL          Devices In Places Tele Box and Nasal Cannula      Interventions In Place NC W/Ear Foams    Possible Skin Injury No    Pictures Uploaded Into Epic N/A  Wound Consult Placed N/A  RN Wound Prevention Protocol Ordered No

## 2023-04-12 NOTE — PROGRESS NOTES
Telemetry Shift Summary     Rhythm: SR-ST  HR:   Ectopy: rPVC, rPAC  Measurements: 0.20/0.08/0.34       Normal Values  Rhythm: SR  HR:   Measurements: 0.12-0.20 / 0.04-0.10 / 0.30-0.52

## 2023-04-12 NOTE — CARE PLAN
Pt resting in bed, denies any acute distress, no N&V/diarrhea. States she still has nonproductive cough, pt noted on 2LNC, placed on RA will monitor O2 sats. Pt using IS, ambulating and tolerating diet. Call light is within reach, refusing bed alarm.   Problem: Pain - Standard  Goal: Alleviation of pain or a reduction in pain to the patient’s comfort goal  Outcome: Progressing     Problem: Knowledge Deficit - Standard  Goal: Patient and family/care givers will demonstrate understanding of plan of care, disease process/condition, diagnostic tests and medications  Outcome: Progressing     Problem: Hemodynamics  Goal: Patient's hemodynamics, fluid balance and neurologic status will be stable or improve  Outcome: Progressing     Problem: Fluid Volume  Goal: Fluid volume balance will be maintained  Outcome: Progressing     Problem: Respiratory  Goal: Patient will achieve/maintain optimum respiratory ventilation and gas exchange  Outcome: Progressing   The patient is Stable - Low risk of patient condition declining or worsening    Shift Goals  Clinical Goals: safety, O2>90%  Patient Goals: go home, comfort  Family Goals: sarah

## 2023-04-12 NOTE — CARE PLAN
The patient is Stable - Low risk of patient condition declining or worsening    Shift Goals  Clinical Goals: safety, O2>90%  Patient Goals: go home, comfort  Family Goals: sarah    Progress made toward(s) clinical / shift goals:      Problem: Pain - Standard  Goal: Alleviation of pain or a reduction in pain to the patient’s comfort goal  Description: Target End Date:  Prior to discharge or change in level of care    Document on Vitals flowsheet    1.  Document pain using the appropriate pain scale per order or unit policy  2.  Educate and implement non-pharmacologic comfort measures (i.e. relaxation, distraction, massage, cold/heat therapy, etc.)  3.  Pain management medications as ordered  4.  Reassess pain after pain med administration per policy  5.  If opiods administered assess patient's response to pain medication is appropriate per POSS sedation scale  6.  Follow pain management plan developed in collaboration with patient and interdisciplinary team (including palliative care or pain specialists if applicable)  Outcome: Progressing  Note: Pt currently denying pain, aware to inform RN if pain occurs     Problem: Respiratory  Goal: Patient will achieve/maintain optimum respiratory ventilation and gas exchange  Description: Target End Date:  Prior to discharge or change in level of care    Document on Assessment flowsheet    1.  Assess and monitor rate, rhythm, depth and effort of respiration  2.  Breath sounds assessed qshift and/or as needed  3.  Assess O2 saturation, administer/titrate oxygen as ordered  4.  Position patient for maximum ventilatory efficiency  5.  Turn, cough, and deep breath with splinting to improve effectiveness  6.  Collaborate with RT to administer medication/treatments per order  7.  Encourage use of incentive spirometer and encourage patient to cough after use and utilize splinting techniques if applicable  8.  Airway suctioning  9.  Monitor sputum production for changes in color,  consistency and frequency  10. Perform frequent oral hygiene  11. Alternate physical activity with rest periods  Outcome: Progressing  Note: Pt maintaining O2>90% on 2L nasal cannula       Patient is not progressing towards the following goals:

## 2023-04-13 ENCOUNTER — PATIENT OUTREACH (OUTPATIENT)
Dept: MEDICAL GROUP | Age: 73
End: 2023-04-13
Payer: MEDICARE

## 2023-04-13 ENCOUNTER — TELEPHONE (OUTPATIENT)
Dept: HEALTH INFORMATION MANAGEMENT | Facility: OTHER | Age: 73
End: 2023-04-13

## 2023-04-13 NOTE — PROGRESS NOTES
Transitional Care Management     Discharge Questions  Discharge Date: 4/12/23  Outreach call: Date 04/13/23  Time: 10:53 AM   Now that you are home, how are you feeling?  Good  Did you receive any new prescriptions? Yes, Were you able to get them filled?  Yes   Pharmacy   Do you have any questions about your current medications or new medications (Review Med Rec)?  No  Do you have a follow up appointment scheduled with your PCP?  Yes Date/Time - 04/24/23 @ 1000  Any issues or paperwork you wish to discuss with your PCP? No  Does this patient qualify for the CCM program?  No     Transitional Care  Number of attempts: 1  Current or previous attempts competed within two business days of discharge?  Yes  Provided education regarding treatment plan, medications, self-management, ADLs?  Yes  Has patient completed an Advanced Directive?  No  Care Manager phone number provided? Yes  Is there anything else I can help you with?  No     Discharge Summary        Chief Complaint:  Fever; Shortness of breath; N/V; Cough; Weakness        Admitting Dx:  Fever; Shortness of breath; Fatigue; Cough; Weakness        Discharge Dx:  Sepsis     Notes:  Patient states she is doing fine, no concerns at this time.    Subjective:     Vaishali Soares is a 73 y.o. female who presents for Hospital Follow-up.    HPI:   Recently hospitalized for COPD exacerbation and community-acquired pneumonia.  She notably presented to the ER 4/10 with generalized weakness and shortness of breath that onset 3 days prior.  Chest x-ray showed left lower lobe pneumonia.  She met SIRS criteria with fever, tachycardia and leukocytosis.  Admitted to ICU.  She was started on fluid resuscitation and IV antibiotics.  Responded well to IV antibiotics, transition to oral.  Discharged home on azithromycin and Augmentin.  In clinic today she feels like she is almost back to her baseline.  Does still feel little bit wheezy, but this has significantly improved.  She does  have albuterol inhaler at home, has not been using this.  She is not able to afford maintenance inhaler, as previously discussed trying another maintenance inhaler.  She declines.  Denies chest pain, cough, shortness of breath, fever, chills, sweats.    She would also like to discuss decreasing her levothyroxine down to 75 mcg.  Previous PCP increased to 88 mcg of levothyroxine as she was feeling little bit more fatigued.  She states that she does not like the way she has been feeling on the 88 mcg's of levothyroxine.  She just feels off, hs requesting to be decreased back down to 75 mcg of levothyroxine.  Last TSH level drawn 3/21: 0.92.       Latest Reference Range & Units 04/11/23 02:59 04/12/23 02:54   WBC 4.8 - 10.8 K/uL 14.1 (H) 10.9 (H)   RBC 4.20 - 5.40 M/uL 3.89 (L) 3.74 (L)   Hemoglobin 12.0 - 16.0 g/dL 11.5 (L) 11.1 (L)   Hematocrit 37.0 - 47.0 % 35.8 (L) 34.6 (L)   MCV 81.4 - 97.8 fL 92.0 92.5   MCH 27.0 - 33.0 pg 29.6 29.7   MCHC 33.6 - 35.0 g/dL 32.1 (L) 32.1 (L)   RDW 35.9 - 50.0 fL 44.2 43.8   Platelet Count 164 - 446 K/uL 263 269   MPV 9.0 - 12.9 fL 9.9 9.9   Sodium 135 - 145 mmol/L 139 137   Potassium 3.6 - 5.5 mmol/L 3.5 (L) 3.7   Chloride 96 - 112 mmol/L 105 103   Co2 20 - 33 mmol/L 25 26   Anion Gap 7.0 - 16.0  9.0 8.0   Glucose 65 - 99 mg/dL 102 (H) 103 (H)   Bun 8 - 22 mg/dL 10 6 (L)   Creatinine 0.50 - 1.40 mg/dL 0.56 0.53   GFR (CKD-EPI) >60 mL/min/1.73 m 2 95 96   Calcium 8.4 - 10.2 mg/dL 8.7 8.7   Correct Calcium 8.5 - 10.5 mg/dL 9.2    AST(SGOT) 12 - 45 U/L 23    ALT(SGPT) 2 - 50 U/L 19    Alkaline Phosphatase 30 - 99 U/L 83    Total Bilirubin 0.1 - 1.5 mg/dL 1.2    Albumin 3.2 - 4.9 g/dL 3.4    Total Protein 6.0 - 8.2 g/dL 6.6    Globulin 1.9 - 3.5 g/dL 3.2    A-G Ratio g/dL 1.1    Magnesium 1.5 - 2.5 mg/dL 1.9    (H): Data is abnormally high  (L): Data is abnormally low    Current medicines (including reconciliation performed today)  Current Outpatient Medications   Medication Sig  "Dispense Refill    albuterol 108 (90 Base) MCG/ACT Aero Soln inhalation aerosol Inhale 2 Puffs every 6 hours as needed.      levothyroxine (SYNTHROID) 75 MCG Tab Take 1 Tablet by mouth every morning on an empty stomach. 90 Tablet 3    Cyanocobalamin (VITAMIN B-12 PO) Take 1 Tablet by mouth every day.      Cholecalciferol (VITAMIN D PO) Take 1 Capsule by mouth every day.       No current facility-administered medications for this visit.       Allergies:   Patient has no known allergies.    Social History     Tobacco Use    Smoking status: Former     Packs/day: 1.00     Years: 40.00     Pack years: 40.00     Types: Cigarettes     Quit date: 2007     Years since quittin.2    Smokeless tobacco: Never   Vaping Use    Vaping Use: Never used   Substance Use Topics    Alcohol use: Yes     Comment: rare     Drug use: No       ROS:  See above    Objective:     Vitals:    23 0931   BP: 124/80   BP Location: Left arm   Patient Position: Sitting   BP Cuff Size: Adult   Pulse: 98   Resp: 16   Temp: 36.6 °C (97.8 °F)   TempSrc: Temporal   SpO2: 90%   Weight: 83.5 kg (184 lb)   Height: 1.626 m (5' 4\")     Body mass index is 31.58 kg/m².    Physical Exam:    General: Alert, pleasant, NAD  HEENT: Normocephalic. Neck supple.  Fairly moderate sized nodule on the right side of the neck, just above the thyroid, similar-appearing mass on the left.  Possibly on the thyroid.   No cervical or supraclavicular lymphadenopathy. No carotid bruits   Heart: Regular rate and rhythm.  S1 and S2 normal.  No murmurs appreciated.  Respiratory: Normal respiratory effort.  Trace expiratory wheezing in the lower lobes bilaterally, otherwise clear to auscultation bilaterally.  Skin: Warm, dry, no rashes.  Extremities: No leg edema.  Radial pulses 2+ symmetric  Psych:  Affect/mood is normal, judgement is good, memory is intact, grooming is appropriate.    Assessment and Plan:     1. Hospital discharge follow-up  Discharge medications " reviewed and reconciled.    2. Sepsis due to Streptococcus pneumoniae without acute organ dysfunction (HCC)  -Resolved.    3. Pneumonia of left lower lobe due to infectious organism  -Resolved.  She has successfully completed antibiotics.  She feels she is almost back to her baseline.  She still slightly wheezy on exam.  Advised albuterol inhaler as needed.    4. Leukocytosis, unspecified type  -Significantly improved prior to discharge from the hospital.  Almost back to baseline.  Will monitor once more.  - CBC WITH DIFFERENTIAL; Future    5. Hypothyroidism due to acquired atrophy of thyroid  -TSH level still within appropriate range at 88 mcg of levothyroxine, however she is becoming symptomatic and TSH is on the lower end of normal.  Will decrease levothyroxine back down to 75 mcg.  Will monitor repeat TSH level again in 6 weeks.  - levothyroxine (SYNTHROID) 75 MCG Tab; Take 1 Tablet by mouth every morning on an empty stomach.  Dispense: 90 Tablet; Refill: 3  - TSH WITH REFLEX TO FT4; Future    6. Mixed simple and mucopurulent chronic bronchitis (HCC)  -Discussed starting maintenance inhaler.  She declines.  Continue albuterol inhaler as needed.      - Chart and discharge summary were reviewed.   - Hospitalization and results reviewed with patient.   - Medications reviewed including instructions regarding high risk medications, dosing and side effects.  - Recommended Services: No services needed at this time  - Advance directive/POLST on file?  No     Follow-up:Return in about 6 weeks (around 6/5/2023) for Lab Review.    Face-to-face transitional care management services with MODERATE (today's visit is within 14 days post discharge & LACE+ score of 28-58) medical decision complexity were provided.

## 2023-04-13 NOTE — PROGRESS NOTES
Transitional Care Management    Discharge Questions  Discharge Date: 4/12/23  Outreach call: Date 04/13/23  Time: 10:53 AM   Now that you are home, how are you feeling?  Good  Did you receive any new prescriptions? Yes, Were you able to get them filled?  Yes   Pharmacy   Do you have any questions about your current medications or new medications (Review Med Rec)?  No  Do you have a follow up appointment scheduled with your PCP?  Yes Date/Time - 04/24/23 @ 1000  Any issues or paperwork you wish to discuss with your PCP? No  Does this patient qualify for the CCM program?  No    Transitional Care  Number of attempts: 1  Current or previous attempts competed within two business days of discharge?  Yes  Provided education regarding treatment plan, medications, self-management, ADLs?  Yes  Has patient completed an Advanced Directive?  No  Care Manager phone number provided? Yes  Is there anything else I can help you with?  No    Discharge Summary   Chief Complaint:  Fever; Shortness of breath; N/V; Cough; Weakness   Admitting Dx:  Fever; Shortness of breath; Fatigue; Cough; Weakness   Discharge Dx:  Sepsis    Notes:  Patient states she is doing fine, no concerns at this time.

## 2023-04-15 LAB
BACTERIA BLD CULT: NORMAL
BACTERIA BLD CULT: NORMAL
SIGNIFICANT IND 70042: NORMAL
SIGNIFICANT IND 70042: NORMAL
SITE SITE: NORMAL
SITE SITE: NORMAL
SOURCE SOURCE: NORMAL
SOURCE SOURCE: NORMAL

## 2023-04-24 ENCOUNTER — HOSPITAL ENCOUNTER (OUTPATIENT)
Dept: LAB | Facility: MEDICAL CENTER | Age: 73
End: 2023-04-24
Attending: PHYSICIAN ASSISTANT
Payer: MEDICARE

## 2023-04-24 ENCOUNTER — OFFICE VISIT (OUTPATIENT)
Dept: MEDICAL GROUP | Age: 73
End: 2023-04-24
Payer: MEDICARE

## 2023-04-24 VITALS
OXYGEN SATURATION: 90 % | WEIGHT: 184 LBS | BODY MASS INDEX: 31.41 KG/M2 | HEIGHT: 64 IN | DIASTOLIC BLOOD PRESSURE: 80 MMHG | TEMPERATURE: 97.8 F | SYSTOLIC BLOOD PRESSURE: 124 MMHG | RESPIRATION RATE: 16 BRPM | HEART RATE: 98 BPM

## 2023-04-24 DIAGNOSIS — Z09 HOSPITAL DISCHARGE FOLLOW-UP: ICD-10-CM

## 2023-04-24 DIAGNOSIS — D72.829 LEUKOCYTOSIS, UNSPECIFIED TYPE: ICD-10-CM

## 2023-04-24 DIAGNOSIS — A40.3 SEPSIS DUE TO STREPTOCOCCUS PNEUMONIAE WITHOUT ACUTE ORGAN DYSFUNCTION (HCC): ICD-10-CM

## 2023-04-24 DIAGNOSIS — J41.8 MIXED SIMPLE AND MUCOPURULENT CHRONIC BRONCHITIS (HCC): ICD-10-CM

## 2023-04-24 DIAGNOSIS — J18.9 PNEUMONIA OF LEFT LOWER LOBE DUE TO INFECTIOUS ORGANISM: ICD-10-CM

## 2023-04-24 DIAGNOSIS — E03.4 HYPOTHYROIDISM DUE TO ACQUIRED ATROPHY OF THYROID: ICD-10-CM

## 2023-04-24 LAB
BASOPHILS # BLD AUTO: 0.7 % (ref 0–1.8)
BASOPHILS # BLD: 0.06 K/UL (ref 0–0.12)
EOSINOPHIL # BLD AUTO: 0.17 K/UL (ref 0–0.51)
EOSINOPHIL NFR BLD: 1.9 % (ref 0–6.9)
ERYTHROCYTE [DISTWIDTH] IN BLOOD BY AUTOMATED COUNT: 45.5 FL (ref 35.9–50)
HCT VFR BLD AUTO: 42.4 % (ref 37–47)
HGB BLD-MCNC: 13.3 G/DL (ref 12–16)
IMM GRANULOCYTES # BLD AUTO: 0.02 K/UL (ref 0–0.11)
IMM GRANULOCYTES NFR BLD AUTO: 0.2 % (ref 0–0.9)
LYMPHOCYTES # BLD AUTO: 2.92 K/UL (ref 1–4.8)
LYMPHOCYTES NFR BLD: 33.2 % (ref 22–41)
MCH RBC QN AUTO: 29.3 PG (ref 27–33)
MCHC RBC AUTO-ENTMCNC: 31.4 G/DL (ref 33.6–35)
MCV RBC AUTO: 93.4 FL (ref 81.4–97.8)
MONOCYTES # BLD AUTO: 0.54 K/UL (ref 0–0.85)
MONOCYTES NFR BLD AUTO: 6.1 % (ref 0–13.4)
NEUTROPHILS # BLD AUTO: 5.09 K/UL (ref 2–7.15)
NEUTROPHILS NFR BLD: 57.9 % (ref 44–72)
NRBC # BLD AUTO: 0 K/UL
NRBC BLD-RTO: 0 /100 WBC
PLATELET # BLD AUTO: 391 K/UL (ref 164–446)
PMV BLD AUTO: 10.2 FL (ref 9–12.9)
RBC # BLD AUTO: 4.54 M/UL (ref 4.2–5.4)
WBC # BLD AUTO: 8.8 K/UL (ref 4.8–10.8)

## 2023-04-24 PROCEDURE — 85025 COMPLETE CBC W/AUTO DIFF WBC: CPT

## 2023-04-24 PROCEDURE — 99495 TRANSJ CARE MGMT MOD F2F 14D: CPT | Performed by: PHYSICIAN ASSISTANT

## 2023-04-24 PROCEDURE — 36415 COLL VENOUS BLD VENIPUNCTURE: CPT

## 2023-04-24 RX ORDER — ALBUTEROL SULFATE 90 UG/1
2 AEROSOL, METERED RESPIRATORY (INHALATION) EVERY 6 HOURS PRN
COMMUNITY
End: 2023-11-14

## 2023-04-24 RX ORDER — LEVOTHYROXINE SODIUM 0.07 MG/1
75 TABLET ORAL
Qty: 90 TABLET | Refills: 3 | Status: SHIPPED | OUTPATIENT
Start: 2023-04-24 | End: 2023-06-06 | Stop reason: SDUPTHER

## 2023-04-24 ASSESSMENT — FIBROSIS 4 INDEX: FIB4 SCORE: 1.47

## 2023-06-05 ENCOUNTER — OFFICE VISIT (OUTPATIENT)
Dept: MEDICAL GROUP | Age: 73
End: 2023-06-05
Payer: MEDICARE

## 2023-06-05 ENCOUNTER — HOSPITAL ENCOUNTER (OUTPATIENT)
Dept: LAB | Facility: MEDICAL CENTER | Age: 73
End: 2023-06-05
Attending: PHYSICIAN ASSISTANT
Payer: MEDICARE

## 2023-06-05 VITALS
BODY MASS INDEX: 31.24 KG/M2 | TEMPERATURE: 98.1 F | DIASTOLIC BLOOD PRESSURE: 68 MMHG | HEART RATE: 92 BPM | HEIGHT: 64 IN | WEIGHT: 183 LBS | SYSTOLIC BLOOD PRESSURE: 132 MMHG | OXYGEN SATURATION: 90 %

## 2023-06-05 DIAGNOSIS — Z78.0 POSTMENOPAUSAL STATUS (AGE-RELATED) (NATURAL): ICD-10-CM

## 2023-06-05 DIAGNOSIS — N95.1 MENOPAUSAL STATE: ICD-10-CM

## 2023-06-05 DIAGNOSIS — J41.8 MIXED SIMPLE AND MUCOPURULENT CHRONIC BRONCHITIS (HCC): ICD-10-CM

## 2023-06-05 DIAGNOSIS — E03.4 HYPOTHYROIDISM DUE TO ACQUIRED ATROPHY OF THYROID: ICD-10-CM

## 2023-06-05 PROBLEM — J18.9 PNEUMONIA: Status: RESOLVED | Noted: 2023-04-10 | Resolved: 2023-06-05

## 2023-06-05 PROBLEM — J96.01 ACUTE RESPIRATORY FAILURE WITH HYPOXIA (HCC): Status: RESOLVED | Noted: 2023-04-10 | Resolved: 2023-06-05

## 2023-06-05 PROBLEM — A41.9 SEPSIS (HCC): Status: RESOLVED | Noted: 2023-04-10 | Resolved: 2023-06-05

## 2023-06-05 LAB
T4 FREE SERPL-MCNC: 1.35 NG/DL (ref 0.93–1.7)
TSH SERPL DL<=0.005 MIU/L-ACNC: 0.27 UIU/ML (ref 0.38–5.33)

## 2023-06-05 PROCEDURE — 3075F SYST BP GE 130 - 139MM HG: CPT | Performed by: PHYSICIAN ASSISTANT

## 2023-06-05 PROCEDURE — 36415 COLL VENOUS BLD VENIPUNCTURE: CPT

## 2023-06-05 PROCEDURE — 3078F DIAST BP <80 MM HG: CPT | Performed by: PHYSICIAN ASSISTANT

## 2023-06-05 PROCEDURE — 84439 ASSAY OF FREE THYROXINE: CPT

## 2023-06-05 PROCEDURE — 84443 ASSAY THYROID STIM HORMONE: CPT

## 2023-06-05 PROCEDURE — 99214 OFFICE O/P EST MOD 30 MIN: CPT | Performed by: PHYSICIAN ASSISTANT

## 2023-06-05 RX ORDER — FLUTICASONE PROPIONATE AND SALMETEROL 250; 50 UG/1; UG/1
1 POWDER RESPIRATORY (INHALATION) EVERY 12 HOURS
Qty: 60 EACH | Refills: 3 | Status: SHIPPED
Start: 2023-06-05 | End: 2023-11-14

## 2023-06-05 ASSESSMENT — FIBROSIS 4 INDEX: FIB4 SCORE: 1.01

## 2023-06-05 NOTE — PROGRESS NOTES
"cc: Lab review    Subjective:     HPI  Vaishali Soares is a 73 y.o. female presenting for lab review, although she did not have her TSH level checked prior to visit today.  Last visit she was complaining that the dose of 88 mcg of levothyroxine was giving her some anxiety, jitteriness, was feeling fairly symptomatic at this level.  TSH level was normal, but on the lower end.  Thus due to being symptomatic levothyroxine was decreased to 75 mcg.  She is feeling much better at this dose.    She does have COPD.  Currently not on any maintenance inhalers.  Has been having issues affording inhalers.  She does have albuterol inhaler to use as needed.  Denies any change in sputum color.        Review of systems:  See above.       Current Outpatient Medications:     fluticasone-salmeterol (ADVAIR) 250-50 MCG/ACT AEROSOL POWDER, BREATH ACTIVATED, Inhale 1 Puff every 12 hours., Disp: 60 Each, Rfl: 3    albuterol 108 (90 Base) MCG/ACT Aero Soln inhalation aerosol, Inhale 2 Puffs every 6 hours as needed., Disp: , Rfl:     levothyroxine (SYNTHROID) 75 MCG Tab, Take 1 Tablet by mouth every morning on an empty stomach., Disp: 90 Tablet, Rfl: 3    Cyanocobalamin (VITAMIN B-12 PO), Take 1 Tablet by mouth every day., Disp: , Rfl:     Cholecalciferol (VITAMIN D PO), Take 1 Capsule by mouth every day., Disp: , Rfl:     Allergies, past medical history, past surgical history, family history, social history reviewed and updated    Objective:     Vitals: /68 (BP Location: Left arm, Patient Position: Sitting, BP Cuff Size: Adult)   Pulse 92   Temp 36.7 °C (98.1 °F) (Temporal)   Ht 1.626 m (5' 4\")   Wt 83 kg (183 lb)   SpO2 90%   BMI 31.41 kg/m²   General: Alert, pleasant, NAD  HEENT: Normocephalic. Neck supple.  No thyromegaly or masses palpated. No cervical or supraclavicular lymphadenopathy. No carotid bruits   Heart: Regular rate and rhythm.  S1 and S2 normal.  No murmurs appreciated.  Respiratory: Normal respiratory " effort.  Expiratory wheezing throughout.  No rhonchi or rales.  Skin: Warm, dry, no rashes.  Extremities: No leg edema.  Radial pulses 2+ symmetric  Psych:  Affect/mood is normal, judgement is good, memory is intact, grooming is appropriate.    Assessment/Plan:     Vaishali was seen today for follow-up.    Diagnoses and all orders for this visit:    Mixed simple and mucopurulent chronic bronchitis (HCC)  -Uncontrolled.  Does look like Advair if sent through mail order pharmacy might be better covered.  We will start on trial of Advair.  If she is able to afford this medication follow-up in 4 to 6 weeks for reevaluation.  Continue albuterol inhaler as needed.  -     fluticasone-salmeterol (ADVAIR) 250-50 MCG/ACT AEROSOL POWDER, BREATH ACTIVATED; Inhale 1 Puff every 12 hours.    Hypothyroidism due to acquired atrophy of thyroid  -Symptomatically feeling better at 75 mcg's of levothyroxine.  She will have TSH level checked.  Will adjust medication if needed pending results for now continue on the 75 mcg of levothyroxine.    Postmenopausal status (age-related) (natural)  -Due for DEXA.  Order placed.  -     DS-BONE DENSITY STUDY (DEXA); Future    Menopausal state  -     DS-BONE DENSITY STUDY (DEXA); Future        Return in about 6 months (around 12/5/2023) for Medication Check.

## 2023-06-06 DIAGNOSIS — E03.4 HYPOTHYROIDISM DUE TO ACQUIRED ATROPHY OF THYROID: ICD-10-CM

## 2023-06-06 RX ORDER — LEVOTHYROXINE SODIUM 0.05 MG/1
50 TABLET ORAL
Qty: 90 TABLET | Refills: 3 | Status: SHIPPED | OUTPATIENT
Start: 2023-06-06 | End: 2023-11-14

## 2023-06-30 DIAGNOSIS — E03.4 HYPOTHYROIDISM DUE TO ACQUIRED ATROPHY OF THYROID: ICD-10-CM

## 2023-06-30 RX ORDER — LEVOTHYROXINE SODIUM 0.05 MG/1
75 TABLET ORAL
Qty: 90 TABLET | Refills: 3 | OUTPATIENT
Start: 2023-06-30

## 2023-06-30 RX ORDER — LEVOTHYROXINE SODIUM 0.05 MG/1
50 TABLET ORAL
Qty: 90 TABLET | Refills: 3 | OUTPATIENT
Start: 2023-06-30

## 2023-08-04 ENCOUNTER — HOSPITAL ENCOUNTER (OUTPATIENT)
Dept: RADIOLOGY | Facility: MEDICAL CENTER | Age: 73
End: 2023-08-04
Attending: PHYSICIAN ASSISTANT
Payer: MEDICARE

## 2023-08-04 DIAGNOSIS — Z78.0 POSTMENOPAUSAL STATUS (AGE-RELATED) (NATURAL): ICD-10-CM

## 2023-08-04 DIAGNOSIS — N95.1 MENOPAUSAL STATE: ICD-10-CM

## 2023-08-04 PROCEDURE — 77080 DXA BONE DENSITY AXIAL: CPT

## 2023-08-08 ENCOUNTER — TELEPHONE (OUTPATIENT)
Dept: MEDICAL GROUP | Age: 73
End: 2023-08-08
Payer: MEDICARE

## 2023-08-08 NOTE — TELEPHONE ENCOUNTER
Phone Number Called: [phone]     Call outcome: Spoke to patient regarding message below.    Message: called and spoke with patient and informed her of Dexa Scan. She stated that she wants to wait till December to see Chantelle. She also stated that Chantelle does not listen to her so she is in no wood to be seen.

## 2023-08-08 NOTE — TELEPHONE ENCOUNTER
----- Message from Chantelle Beltran P.A.-C. sent at 8/4/2023  2:59 PM PDT -----  Please let patient know that she does have osteoporosis.  If you could please schedule her for an office visit to discuss management that would be great.

## 2023-11-14 ENCOUNTER — APPOINTMENT (OUTPATIENT)
Dept: RADIOLOGY | Facility: MEDICAL CENTER | Age: 73
DRG: 177 | End: 2023-11-14
Attending: EMERGENCY MEDICINE
Payer: MEDICARE

## 2023-11-14 ENCOUNTER — HOSPITAL ENCOUNTER (INPATIENT)
Facility: MEDICAL CENTER | Age: 73
LOS: 2 days | DRG: 177 | End: 2023-11-16
Attending: EMERGENCY MEDICINE | Admitting: HOSPITALIST
Payer: MEDICARE

## 2023-11-14 ENCOUNTER — APPOINTMENT (OUTPATIENT)
Dept: RADIOLOGY | Facility: MEDICAL CENTER | Age: 73
DRG: 177 | End: 2023-11-14
Payer: MEDICARE

## 2023-11-14 DIAGNOSIS — M85.89 OSTEOPENIA OF MULTIPLE SITES: ICD-10-CM

## 2023-11-14 DIAGNOSIS — U07.1 COVID-19: ICD-10-CM

## 2023-11-14 DIAGNOSIS — J44.1 ACUTE EXACERBATION OF CHRONIC OBSTRUCTIVE PULMONARY DISEASE (COPD) (HCC): ICD-10-CM

## 2023-11-14 DIAGNOSIS — J18.9 COMMUNITY ACQUIRED PNEUMONIA, UNSPECIFIED LATERALITY: ICD-10-CM

## 2023-11-14 DIAGNOSIS — R09.02 HYPOXIA: ICD-10-CM

## 2023-11-14 PROBLEM — Z78.9 FULL CODE STATUS: Status: ACTIVE | Noted: 2023-11-14

## 2023-11-14 LAB
ALBUMIN SERPL BCP-MCNC: 3.9 G/DL (ref 3.2–4.9)
ALBUMIN/GLOB SERPL: 1 G/DL
ALP SERPL-CCNC: 71 U/L (ref 30–99)
ALT SERPL-CCNC: 14 U/L (ref 2–50)
ANION GAP SERPL CALC-SCNC: 16 MMOL/L (ref 7–16)
AST SERPL-CCNC: 26 U/L (ref 12–45)
BASOPHILS # BLD AUTO: 0.8 % (ref 0–1.8)
BASOPHILS # BLD: 0.08 K/UL (ref 0–0.12)
BILIRUB SERPL-MCNC: 1 MG/DL (ref 0.1–1.5)
BUN SERPL-MCNC: 12 MG/DL (ref 8–22)
CALCIUM ALBUM COR SERPL-MCNC: 8.6 MG/DL (ref 8.5–10.5)
CALCIUM SERPL-MCNC: 8.5 MG/DL (ref 8.4–10.2)
CHLORIDE SERPL-SCNC: 95 MMOL/L (ref 96–112)
CO2 SERPL-SCNC: 20 MMOL/L (ref 20–33)
CREAT SERPL-MCNC: 0.67 MG/DL (ref 0.5–1.4)
EKG IMPRESSION: NORMAL
EOSINOPHIL # BLD AUTO: 0.02 K/UL (ref 0–0.51)
EOSINOPHIL NFR BLD: 0.2 % (ref 0–6.9)
ERYTHROCYTE [DISTWIDTH] IN BLOOD BY AUTOMATED COUNT: 44.9 FL (ref 35.9–50)
FLUAV RNA SPEC QL NAA+PROBE: NEGATIVE
FLUBV RNA SPEC QL NAA+PROBE: NEGATIVE
GFR SERPLBLD CREATININE-BSD FMLA CKD-EPI: 91 ML/MIN/1.73 M 2
GLOBULIN SER CALC-MCNC: 3.8 G/DL (ref 1.9–3.5)
GLUCOSE SERPL-MCNC: 96 MG/DL (ref 65–99)
HCT VFR BLD AUTO: 36.9 % (ref 37–47)
HGB BLD-MCNC: 11.8 G/DL (ref 12–16)
IMM GRANULOCYTES # BLD AUTO: 0.03 K/UL (ref 0–0.11)
IMM GRANULOCYTES NFR BLD AUTO: 0.3 % (ref 0–0.9)
LACTATE SERPL-SCNC: 1 MMOL/L (ref 0.5–2)
LYMPHOCYTES # BLD AUTO: 2.29 K/UL (ref 1–4.8)
LYMPHOCYTES NFR BLD: 22.9 % (ref 22–41)
MCH RBC QN AUTO: 28 PG (ref 27–33)
MCHC RBC AUTO-ENTMCNC: 32 G/DL (ref 32.2–35.5)
MCV RBC AUTO: 87.6 FL (ref 81.4–97.8)
MONOCYTES # BLD AUTO: 1.14 K/UL (ref 0–0.85)
MONOCYTES NFR BLD AUTO: 11.4 % (ref 0–13.4)
NEUTROPHILS # BLD AUTO: 6.42 K/UL (ref 1.82–7.42)
NEUTROPHILS NFR BLD: 64.4 % (ref 44–72)
NRBC # BLD AUTO: 0 K/UL
NRBC BLD-RTO: 0 /100 WBC (ref 0–0.2)
PLATELET # BLD AUTO: 325 K/UL (ref 164–446)
PMV BLD AUTO: 9.9 FL (ref 9–12.9)
POTASSIUM SERPL-SCNC: 4 MMOL/L (ref 3.6–5.5)
PROT SERPL-MCNC: 7.7 G/DL (ref 6–8.2)
RBC # BLD AUTO: 4.21 M/UL (ref 4.2–5.4)
RSV RNA SPEC QL NAA+PROBE: NEGATIVE
SARS-COV-2 RNA RESP QL NAA+PROBE: DETECTED
SODIUM SERPL-SCNC: 131 MMOL/L (ref 135–145)
SODIUM SERPL-SCNC: 133 MMOL/L (ref 135–145)
SPECIMEN SOURCE: ABNORMAL
T4 FREE SERPL-MCNC: 1.23 NG/DL (ref 0.93–1.7)
TSH SERPL DL<=0.005 MIU/L-ACNC: 0.28 UIU/ML (ref 0.38–5.33)
WBC # BLD AUTO: 10 K/UL (ref 4.8–10.8)

## 2023-11-14 PROCEDURE — 93005 ELECTROCARDIOGRAM TRACING: CPT | Performed by: EMERGENCY MEDICINE

## 2023-11-14 PROCEDURE — 84295 ASSAY OF SERUM SODIUM: CPT

## 2023-11-14 PROCEDURE — C9803 HOPD COVID-19 SPEC COLLECT: HCPCS | Performed by: EMERGENCY MEDICINE

## 2023-11-14 PROCEDURE — 700111 HCHG RX REV CODE 636 W/ 250 OVERRIDE (IP): Mod: JZ | Performed by: EMERGENCY MEDICINE

## 2023-11-14 PROCEDURE — A9270 NON-COVERED ITEM OR SERVICE: HCPCS | Performed by: HOSPITALIST

## 2023-11-14 PROCEDURE — 71045 X-RAY EXAM CHEST 1 VIEW: CPT

## 2023-11-14 PROCEDURE — 99223 1ST HOSP IP/OBS HIGH 75: CPT | Mod: AI | Performed by: HOSPITALIST

## 2023-11-14 PROCEDURE — 700102 HCHG RX REV CODE 250 W/ 637 OVERRIDE(OP): Performed by: HOSPITALIST

## 2023-11-14 PROCEDURE — A9270 NON-COVERED ITEM OR SERVICE: HCPCS | Performed by: EMERGENCY MEDICINE

## 2023-11-14 PROCEDURE — 700102 HCHG RX REV CODE 250 W/ 637 OVERRIDE(OP): Performed by: EMERGENCY MEDICINE

## 2023-11-14 PROCEDURE — 96365 THER/PROPH/DIAG IV INF INIT: CPT

## 2023-11-14 PROCEDURE — 94640 AIRWAY INHALATION TREATMENT: CPT

## 2023-11-14 PROCEDURE — 0241U HCHG SARS-COV-2 COVID-19 NFCT DS RESP RNA 4 TRGT MIC: CPT

## 2023-11-14 PROCEDURE — 84439 ASSAY OF FREE THYROXINE: CPT

## 2023-11-14 PROCEDURE — 700101 HCHG RX REV CODE 250: Performed by: EMERGENCY MEDICINE

## 2023-11-14 PROCEDURE — 36415 COLL VENOUS BLD VENIPUNCTURE: CPT

## 2023-11-14 PROCEDURE — 94760 N-INVAS EAR/PLS OXIMETRY 1: CPT

## 2023-11-14 PROCEDURE — 700105 HCHG RX REV CODE 258: Performed by: EMERGENCY MEDICINE

## 2023-11-14 PROCEDURE — 85025 COMPLETE CBC W/AUTO DIFF WBC: CPT

## 2023-11-14 PROCEDURE — 99285 EMERGENCY DEPT VISIT HI MDM: CPT

## 2023-11-14 PROCEDURE — 700111 HCHG RX REV CODE 636 W/ 250 OVERRIDE (IP): Mod: JZ | Performed by: HOSPITALIST

## 2023-11-14 PROCEDURE — 700105 HCHG RX REV CODE 258: Performed by: HOSPITALIST

## 2023-11-14 PROCEDURE — 87040 BLOOD CULTURE FOR BACTERIA: CPT | Mod: 91

## 2023-11-14 PROCEDURE — 80053 COMPREHEN METABOLIC PANEL: CPT

## 2023-11-14 PROCEDURE — 770020 HCHG ROOM/CARE - TELE (206)

## 2023-11-14 PROCEDURE — 84443 ASSAY THYROID STIM HORMONE: CPT

## 2023-11-14 PROCEDURE — 83605 ASSAY OF LACTIC ACID: CPT

## 2023-11-14 PROCEDURE — 94669 MECHANICAL CHEST WALL OSCILL: CPT

## 2023-11-14 RX ORDER — ECHINACEA PURPUREA EXTRACT 125 MG
2 TABLET ORAL
Status: DISCONTINUED | OUTPATIENT
Start: 2023-11-14 | End: 2023-11-16 | Stop reason: HOSPADM

## 2023-11-14 RX ORDER — AZITHROMYCIN 250 MG/1
500 TABLET, FILM COATED ORAL ONCE
Status: DISCONTINUED | OUTPATIENT
Start: 2023-11-14 | End: 2023-11-14

## 2023-11-14 RX ORDER — ALBUTEROL SULFATE 90 UG/1
2 AEROSOL, METERED RESPIRATORY (INHALATION)
Status: DISCONTINUED | OUTPATIENT
Start: 2023-11-14 | End: 2023-11-16 | Stop reason: HOSPADM

## 2023-11-14 RX ORDER — SODIUM CHLORIDE 9 MG/ML
1000 INJECTION, SOLUTION INTRAVENOUS ONCE
Status: COMPLETED | OUTPATIENT
Start: 2023-11-14 | End: 2023-11-14

## 2023-11-14 RX ORDER — ASPIRIN 325 MG
325 TABLET ORAL DAILY
Status: DISCONTINUED | OUTPATIENT
Start: 2023-11-14 | End: 2023-11-14

## 2023-11-14 RX ORDER — AZITHROMYCIN 250 MG/1
500 TABLET, FILM COATED ORAL
Status: DISCONTINUED | OUTPATIENT
Start: 2023-11-14 | End: 2023-11-15

## 2023-11-14 RX ORDER — LEVOTHYROXINE SODIUM 0.07 MG/1
75 TABLET ORAL
Status: DISCONTINUED | OUTPATIENT
Start: 2023-11-15 | End: 2023-11-16 | Stop reason: HOSPADM

## 2023-11-14 RX ORDER — IPRATROPIUM BROMIDE AND ALBUTEROL SULFATE 2.5; .5 MG/3ML; MG/3ML
3 SOLUTION RESPIRATORY (INHALATION)
Status: DISCONTINUED | OUTPATIENT
Start: 2023-11-14 | End: 2023-11-16 | Stop reason: HOSPADM

## 2023-11-14 RX ORDER — ASPIRIN 325 MG
325 TABLET ORAL EVERY 6 HOURS PRN
COMMUNITY
End: 2023-11-29

## 2023-11-14 RX ORDER — ENOXAPARIN SODIUM 100 MG/ML
40 INJECTION SUBCUTANEOUS DAILY
Status: DISCONTINUED | OUTPATIENT
Start: 2023-11-14 | End: 2023-11-16 | Stop reason: HOSPADM

## 2023-11-14 RX ORDER — ASCORBIC ACID 500 MG
1000 TABLET ORAL 2 TIMES DAILY
Status: DISCONTINUED | OUTPATIENT
Start: 2023-11-14 | End: 2023-11-16 | Stop reason: HOSPADM

## 2023-11-14 RX ORDER — METHYLPREDNISOLONE SODIUM SUCCINATE 40 MG/ML
40 INJECTION, POWDER, LYOPHILIZED, FOR SOLUTION INTRAMUSCULAR; INTRAVENOUS EVERY 6 HOURS
Status: DISCONTINUED | OUTPATIENT
Start: 2023-11-14 | End: 2023-11-14

## 2023-11-14 RX ORDER — SODIUM CHLORIDE 9 MG/ML
INJECTION, SOLUTION INTRAVENOUS CONTINUOUS
Status: DISCONTINUED | OUTPATIENT
Start: 2023-11-14 | End: 2023-11-15

## 2023-11-14 RX ORDER — LEVOTHYROXINE SODIUM 0.07 MG/1
75 TABLET ORAL
COMMUNITY
End: 2024-03-06 | Stop reason: SDUPTHER

## 2023-11-14 RX ORDER — AMOXICILLIN 250 MG
2 CAPSULE ORAL 2 TIMES DAILY
Status: DISCONTINUED | OUTPATIENT
Start: 2023-11-14 | End: 2023-11-16 | Stop reason: HOSPADM

## 2023-11-14 RX ORDER — ONDANSETRON 4 MG/1
4 TABLET, ORALLY DISINTEGRATING ORAL EVERY 4 HOURS PRN
Status: DISCONTINUED | OUTPATIENT
Start: 2023-11-14 | End: 2023-11-16 | Stop reason: HOSPADM

## 2023-11-14 RX ORDER — BISACODYL 10 MG
10 SUPPOSITORY, RECTAL RECTAL
Status: DISCONTINUED | OUTPATIENT
Start: 2023-11-14 | End: 2023-11-16 | Stop reason: HOSPADM

## 2023-11-14 RX ORDER — IPRATROPIUM BROMIDE AND ALBUTEROL SULFATE 2.5; .5 MG/3ML; MG/3ML
3 SOLUTION RESPIRATORY (INHALATION)
Status: DISCONTINUED | OUTPATIENT
Start: 2023-11-14 | End: 2023-11-14

## 2023-11-14 RX ORDER — IPRATROPIUM BROMIDE AND ALBUTEROL SULFATE 2.5; .5 MG/3ML; MG/3ML
3 SOLUTION RESPIRATORY (INHALATION) ONCE
Status: COMPLETED | OUTPATIENT
Start: 2023-11-14 | End: 2023-11-14

## 2023-11-14 RX ORDER — DEXAMETHASONE SODIUM PHOSPHATE 4 MG/ML
6 INJECTION, SOLUTION INTRA-ARTICULAR; INTRALESIONAL; INTRAMUSCULAR; INTRAVENOUS; SOFT TISSUE DAILY
Status: DISCONTINUED | OUTPATIENT
Start: 2023-11-14 | End: 2023-11-16 | Stop reason: HOSPADM

## 2023-11-14 RX ORDER — POLYETHYLENE GLYCOL 3350 17 G/17G
1 POWDER, FOR SOLUTION ORAL
Status: DISCONTINUED | OUTPATIENT
Start: 2023-11-14 | End: 2023-11-16 | Stop reason: HOSPADM

## 2023-11-14 RX ORDER — ONDANSETRON 2 MG/ML
4 INJECTION INTRAMUSCULAR; INTRAVENOUS EVERY 4 HOURS PRN
Status: DISCONTINUED | OUTPATIENT
Start: 2023-11-14 | End: 2023-11-16 | Stop reason: HOSPADM

## 2023-11-14 RX ORDER — ZINC SULFATE 50(220)MG
220 CAPSULE ORAL DAILY
Status: DISCONTINUED | OUTPATIENT
Start: 2023-11-14 | End: 2023-11-16 | Stop reason: HOSPADM

## 2023-11-14 RX ORDER — LABETALOL HYDROCHLORIDE 5 MG/ML
10 INJECTION, SOLUTION INTRAVENOUS EVERY 4 HOURS PRN
Status: DISCONTINUED | OUTPATIENT
Start: 2023-11-14 | End: 2023-11-16 | Stop reason: HOSPADM

## 2023-11-14 RX ORDER — ACETAMINOPHEN 325 MG/1
650 TABLET ORAL EVERY 6 HOURS PRN
Status: DISCONTINUED | OUTPATIENT
Start: 2023-11-14 | End: 2023-11-16 | Stop reason: HOSPADM

## 2023-11-14 RX ORDER — IBUPROFEN 200 MG
400 TABLET ORAL EVERY 6 HOURS PRN
COMMUNITY

## 2023-11-14 RX ORDER — ALBUTEROL SULFATE 90 UG/1
2 AEROSOL, METERED RESPIRATORY (INHALATION)
Status: DISCONTINUED | OUTPATIENT
Start: 2023-11-14 | End: 2023-11-14

## 2023-11-14 RX ORDER — VITAMIN B COMPLEX
1000 TABLET ORAL DAILY
Status: DISCONTINUED | OUTPATIENT
Start: 2023-11-15 | End: 2023-11-16 | Stop reason: HOSPADM

## 2023-11-14 RX ADMIN — AZITHROMYCIN DIHYDRATE 500 MG: 250 TABLET ORAL at 14:35

## 2023-11-14 RX ADMIN — IPRATROPIUM BROMIDE AND ALBUTEROL SULFATE 3 ML: .5; 3 SOLUTION RESPIRATORY (INHALATION) at 13:11

## 2023-11-14 RX ADMIN — AMPICILLIN AND SULBACTAM 3 G: 1; 2 INJECTION, POWDER, FOR SOLUTION INTRAMUSCULAR; INTRAVENOUS at 14:43

## 2023-11-14 RX ADMIN — SODIUM CHLORIDE: 9 INJECTION, SOLUTION INTRAVENOUS at 18:22

## 2023-11-14 RX ADMIN — OXYCODONE HYDROCHLORIDE AND ACETAMINOPHEN 1000 MG: 500 TABLET ORAL at 18:22

## 2023-11-14 RX ADMIN — ALBUTEROL SULFATE 2 PUFF: 90 AEROSOL, METERED RESPIRATORY (INHALATION) at 20:01

## 2023-11-14 RX ADMIN — SODIUM CHLORIDE 1000 ML: 9 INJECTION, SOLUTION INTRAVENOUS at 12:54

## 2023-11-14 RX ADMIN — ENOXAPARIN SODIUM 40 MG: 100 INJECTION SUBCUTANEOUS at 21:08

## 2023-11-14 ASSESSMENT — COGNITIVE AND FUNCTIONAL STATUS - GENERAL
TOILETING: A LITTLE
SUGGESTED CMS G CODE MODIFIER MOBILITY: CJ
DAILY ACTIVITIY SCORE: 22
SUGGESTED CMS G CODE MODIFIER DAILY ACTIVITY: CJ
MOBILITY SCORE: 20
MOVING TO AND FROM BED TO CHAIR: A LITTLE
HELP NEEDED FOR BATHING: A LITTLE
MOVING FROM LYING ON BACK TO SITTING ON SIDE OF FLAT BED: A LITTLE
STANDING UP FROM CHAIR USING ARMS: A LITTLE
WALKING IN HOSPITAL ROOM: A LITTLE

## 2023-11-14 ASSESSMENT — LIFESTYLE VARIABLES
CONSUMPTION TOTAL: NEGATIVE
HOW MANY TIMES IN THE PAST YEAR HAVE YOU HAD 5 OR MORE DRINKS IN A DAY: 0
HAVE YOU EVER FELT YOU SHOULD CUT DOWN ON YOUR DRINKING: NO
ON A TYPICAL DAY WHEN YOU DRINK ALCOHOL HOW MANY DRINKS DO YOU HAVE: 0
EVER FELT BAD OR GUILTY ABOUT YOUR DRINKING: NO
TOTAL SCORE: 0
ALCOHOL_USE: YES
TOTAL SCORE: 0
AVERAGE NUMBER OF DAYS PER WEEK YOU HAVE A DRINK CONTAINING ALCOHOL: 0
HAVE PEOPLE ANNOYED YOU BY CRITICIZING YOUR DRINKING: NO
TOTAL SCORE: 0
EVER HAD A DRINK FIRST THING IN THE MORNING TO STEADY YOUR NERVES TO GET RID OF A HANGOVER: NO
SUBSTANCE_ABUSE: 0

## 2023-11-14 ASSESSMENT — ENCOUNTER SYMPTOMS
DIZZINESS: 0
CHILLS: 0
MYALGIAS: 1
NERVOUS/ANXIOUS: 0
DIARRHEA: 0
HEADACHES: 1
SEIZURES: 0
FOCAL WEAKNESS: 0
HEARTBURN: 0
DOUBLE VISION: 0
ABDOMINAL PAIN: 0
VOMITING: 0
COUGH: 1
LOSS OF CONSCIOUSNESS: 0
FEVER: 1
PALPITATIONS: 0
GASTROINTESTINAL NEGATIVE: 1
DIAPHORESIS: 0
BLOOD IN STOOL: 0
EYES NEGATIVE: 1
MEMORY LOSS: 0
HEMOPTYSIS: 0
NAUSEA: 0
CARDIOVASCULAR NEGATIVE: 1
SPUTUM PRODUCTION: 1
CONSTIPATION: 0
SHORTNESS OF BREATH: 1
WHEEZING: 0
BRUISES/BLEEDS EASILY: 0
INSOMNIA: 1
DEPRESSION: 0

## 2023-11-14 ASSESSMENT — PATIENT HEALTH QUESTIONNAIRE - PHQ9
2. FEELING DOWN, DEPRESSED, IRRITABLE, OR HOPELESS: NOT AT ALL
SUM OF ALL RESPONSES TO PHQ9 QUESTIONS 1 AND 2: 0
1. LITTLE INTEREST OR PLEASURE IN DOING THINGS: NOT AT ALL

## 2023-11-14 ASSESSMENT — COPD QUESTIONNAIRES
DO YOU EVER COUGH UP ANY MUCUS OR PHLEGM?: YES, A FEW DAYS A WEEK OR MONTH
COPD SCREENING SCORE: 6
DURING THE PAST 4 WEEKS HOW MUCH DID YOU FEEL SHORT OF BREATH: SOME OF THE TIME
HAVE YOU SMOKED AT LEAST 100 CIGARETTES IN YOUR ENTIRE LIFE: YES

## 2023-11-14 ASSESSMENT — PAIN DESCRIPTION - PAIN TYPE: TYPE: ACUTE PAIN

## 2023-11-14 ASSESSMENT — FIBROSIS 4 INDEX
FIB4 SCORE: 1.6
FIB4 SCORE: 1.01
FIB4 SCORE: 1.6

## 2023-11-14 ASSESSMENT — VISUAL ACUITY: OU: 1

## 2023-11-14 NOTE — ASSESSMENT & PLAN NOTE
35+ years smoking  Continue with dexamethasone  Continue RT protocol  Currently requiring 2 L to maintain adequate saturation

## 2023-11-14 NOTE — ASSESSMENT & PLAN NOTE
-supportive measures with synthroid supplementation at 75 mcg per day, no change  -latest TSH is 0.275 and this is with in establish normal guidlines

## 2023-11-14 NOTE — ED TRIAGE NOTES
"Chief Complaint   Patient presents with    Flu Like Symptoms    Cough    N/V    Diarrhea     74 yo female BIB daughter with reports of cough, shortness of breath, n/v/d for the past week.  Patient reports some SOB.  + cough and congestion.  Reports generalized fatigue     /75   Pulse (!) 108   Temp (!) 38.3 °C (100.9 °F) (Temporal)   Resp (!) 22   Ht 1.626 m (5' 4\")   Wt 79.4 kg (175 lb)   SpO2 88%   BMI 30.04 kg/m²   Placed on 2 liters NC oxygen at 95%     "

## 2023-11-14 NOTE — ED NOTES
Medication history reviewed with pt. Med rec is complete.  Allergies reviewed, per pt    Pt had RX bottle at bedside, went over RX bottle and returned RX bottle back to pt at bedside.  Pt reports that she has not used any inhalers in years.    Patient has not had any outpatient antibiotics in the last 30 days.    Pt is not on any anticoagulants

## 2023-11-14 NOTE — ASSESSMENT & PLAN NOTE
Respiratory failure is a combination of COVID-19 infection as well as COPD exacerbation  Keep oxygen saturations above 90% by providing oxygen support.  RT protocol  Nebulizer treatments  2 L nasal cannula

## 2023-11-14 NOTE — ASSESSMENT & PLAN NOTE
Mild hyponatremia most likely secondary to COVID-19 infection  Provide fluid resuscitation monitor sodium levels

## 2023-11-14 NOTE — H&P
Hospital Medicine History & Physical Note    Date of Service  11/14/2023    Primary Care Physician  Chantelle Beltran P.A.-C.    Consultants  None    Specialist Names: None    Code Status  Full Code    Chief Complaint  Chief Complaint   Patient presents with    Flu Like Symptoms    Cough    N/V    Diarrhea     72 yo female BIB daughter with reports of cough, shortness of breath, n/v/d for the past week.  Patient reports some SOB.  + cough and congestion.  Reports generalized fatigue        History of Presenting Illness  Vaishali Soares is a 73 y.o. female who presented 11/14/2023 with shortness of breath.  Patient reports that shortness of breath began about 2 days ago.  She just came to the hospital today because she had a event where she could not catch her breath and she was coughing so violently.  The patient on room air was down to only 75%.  On 4 L by nasal cannula while I was talking to her she was 84%.  The patient has been diagnosed with COPD in the past and at this point initial thought that she had a COPD exacerbation only but then the COVID-19 test came back positive today as well.  She apparently has had this for probably about 2 days.  Patient thus will be admitted for COVID-19 management as well as COPD exacerbation.  I am initiating her on Decadron due to the hypoxia.  We will start the patient also on zinc vitamin C and vitamin D.  In discussion the patient wants to be full CODE STATUS..  I discussed her situation with the daughter-in-law who is at bedside with her.  The son was also on the phone and he was able to also understand the current situation and slowly been updated on the current treatment plan.    I discussed the plan of care with patient, family, bedside RN, , pharmacy, and emergency room physician Dr. Shantel Martínez .    Review of Systems  Review of Systems   Constitutional:  Positive for fever and malaise/fatigue. Negative for chills and diaphoresis.   HENT: Negative.      Eyes: Negative.  Negative for double vision.   Respiratory:  Positive for cough, sputum production and shortness of breath. Negative for hemoptysis and wheezing.    Cardiovascular: Negative.  Negative for chest pain, palpitations and leg swelling.   Gastrointestinal: Negative.  Negative for abdominal pain, blood in stool, constipation, diarrhea, heartburn, nausea and vomiting.   Genitourinary: Negative.  Negative for frequency, hematuria and urgency.   Musculoskeletal:  Positive for myalgias. Negative for joint pain.   Skin: Negative.  Negative for itching and rash.   Neurological:  Positive for headaches. Negative for dizziness, focal weakness, seizures and loss of consciousness.   Endo/Heme/Allergies: Negative.  Does not bruise/bleed easily.   Psychiatric/Behavioral:  Negative for depression, memory loss, substance abuse and suicidal ideas. The patient has insomnia. The patient is not nervous/anxious.    All other systems reviewed and are negative.      Past Medical History   has a past medical history of Acute respiratory failure with hypoxia ([facility]) (4/10/2023), Anemia, Anesthesia, Emphysema, Hypothyroid, Osteoporosis, Pneumonia (2015), and Urinary retention.    Surgical History   has a past surgical history that includes other orthopedic surgery and vicki by laparoscopy (Bilateral, 9/11/2018).     Family History  family history includes Arthritis in her son; Asthma in her son; Cancer in her maternal grandmother, maternal uncle, and paternal uncle; Diabetes in her brother, paternal grandmother, and sister; No Known Problems in her maternal grandfather, mother, and paternal grandfather; Other in her sister; Stroke (age of onset: 54) in her father.   Family history reviewed with patient. There is no family history that is pertinent to the chief complaint.     Social History   reports that she quit smoking about 16 years ago. Her smoking use included cigarettes. She started smoking about 56 years ago. She has a  40.0 pack-year smoking history. She has never used smokeless tobacco. She reports current alcohol use. She reports that she does not use drugs.    Allergies  No Known Allergies    Medications  Prior to Admission Medications   Prescriptions Last Dose Informant Patient Reported? Taking?   Cholecalciferol (D3 PO) 11/13/2023 at 1000 Patient Yes Yes   Sig: Take 1 Capsule by mouth every day.   Cyanocobalamin (VITAMIN B-12 PO) 11/13/2023 at 1000 Patient Yes No   Sig: Take 1 Tablet by mouth every day.   ELDERBERRY PO 11/13/2023 at 1000 Patient Yes Yes   Sig: Take 1 Tablet by mouth every day.   aspirin (ASA) 325 MG Tab 11/13/2023 at 2100 Patient Yes Yes   Sig: Take 325 mg by mouth every 6 hours as needed.   diphenhydrAMINE HCl (ALLERGY MEDICATION PO) 11/14/2023 at 1000 Patient Yes Yes   Sig: Take 1 Tablet by mouth every day. (OTC)   ibuprofen (MOTRIN) 200 MG Tab 11/13/2023 at 1700 Patient Yes Yes   Sig: Take 400 mg by mouth every 6 hours as needed for Headache.   levothyroxine (SYNTHROID) 75 MCG Tab 11/14/2023 at 0600 Rx Bottle (For Med Information) Yes Yes   Sig: Take 75 mcg by mouth every morning on an empty stomach.      Facility-Administered Medications: None       Physical Exam  Temp:  [36.6 °C (97.8 °F)-38.3 °C (100.9 °F)] 36.6 °C (97.8 °F)  Pulse:  [101-120] 120  Resp:  [20-38] 33  BP: (107-134)/(50-75) 134/50  SpO2:  [88 %-98 %] 95 %  Blood Pressure : 134/50   Temperature: 36.6 °C (97.8 °F)   Pulse: (!) 120   Respiration: (!) 33   Pulse Oximetry: 95 %       Physical Exam  Vitals and nursing note reviewed. Exam conducted with a chaperone present.   Constitutional:       General: She is awake.      Appearance: Normal appearance. She is well-developed, well-groomed and normal weight. She is ill-appearing.   HENT:      Head: Normocephalic and atraumatic.      Jaw: There is normal jaw occlusion. No trismus.      Salivary Glands: Right salivary gland is not tender. Left salivary gland is not tender.      Right Ear:  External ear normal.      Left Ear: External ear normal.      Nose: Nose normal.      Mouth/Throat:      Mouth: Mucous membranes are moist.      Pharynx: Oropharynx is clear.   Eyes:      General: Lids are normal. Vision grossly intact.         Right eye: No discharge.         Left eye: No discharge.      Extraocular Movements: Extraocular movements intact.      Conjunctiva/sclera: Conjunctivae normal.      Right eye: Right conjunctiva is not injected. No exudate.     Left eye: Left conjunctiva is not injected. No exudate.     Pupils: Pupils are equal, round, and reactive to light.   Neck:      Thyroid: No thyroid mass.      Vascular: No hepatojugular reflux or JVD.      Trachea: No abnormal tracheal secretions or tracheal deviation.   Cardiovascular:      Rate and Rhythm: Normal rate and regular rhythm. Occasional Extrasystoles are present.     Pulses: Normal pulses.      Heart sounds: Normal heart sounds. No murmur heard.     No friction rub.   Pulmonary:      Effort: Tachypnea and accessory muscle usage present.      Breath sounds: Decreased air movement present. Examination of the right-upper field reveals decreased breath sounds and rhonchi. Examination of the left-upper field reveals decreased breath sounds and rhonchi. Examination of the right-middle field reveals decreased breath sounds. Examination of the left-middle field reveals decreased breath sounds and rhonchi. Examination of the right-lower field reveals decreased breath sounds and rhonchi. Examination of the left-lower field reveals decreased breath sounds and rhonchi. Decreased breath sounds and rhonchi present. No wheezing.   Abdominal:      General: Abdomen is flat. Bowel sounds are normal.      Palpations: Abdomen is soft.      Tenderness: There is no abdominal tenderness. There is no right CVA tenderness or left CVA tenderness.      Hernia: No hernia is present.   Musculoskeletal:      Cervical back: Full passive range of motion without pain,  normal range of motion and neck supple. No rigidity. No muscular tenderness.      Right lower leg: No edema.      Left lower leg: No edema.   Lymphadenopathy:      Head:      Right side of head: No submental adenopathy.      Left side of head: No submental adenopathy.      Cervical:      Right cervical: No superficial cervical adenopathy.     Left cervical: No superficial cervical adenopathy.      Upper Body:      Right upper body: No supraclavicular adenopathy.      Left upper body: No supraclavicular adenopathy.   Skin:     General: Skin is warm and dry.      Capillary Refill: Capillary refill takes less than 2 seconds.      Coloration: Skin is not cyanotic or pale.      Findings: No abrasion or bruising.   Neurological:      General: No focal deficit present.      Mental Status: She is alert and oriented to person, place, and time. Mental status is at baseline.      GCS: GCS eye subscore is 4. GCS verbal subscore is 5. GCS motor subscore is 6.      Cranial Nerves: No cranial nerve deficit.      Sensory: No sensory deficit.      Motor: Motor function is intact.      Deep Tendon Reflexes:      Reflex Scores:       Tricep reflexes are 2+ on the right side and 2+ on the left side.       Bicep reflexes are 2+ on the right side and 2+ on the left side.       Brachioradialis reflexes are 2+ on the right side and 2+ on the left side.       Patellar reflexes are 2+ on the right side and 2+ on the left side.       Achilles reflexes are 2+ on the right side and 2+ on the left side.  Psychiatric:         Attention and Perception: Attention and perception normal.         Mood and Affect: Mood normal.         Speech: Speech normal.         Behavior: Behavior normal. Behavior is cooperative.         Thought Content: Thought content normal.         Cognition and Memory: Cognition and memory normal.         Judgment: Judgment normal.         Laboratory:  Recent Labs     11/14/23  1200   WBC 10.0   RBC 4.21   HEMOGLOBIN 11.8*  "  HEMATOCRIT 36.9*   MCV 87.6   MCH 28.0   MCHC 32.0*   RDW 44.9   PLATELETCT 325   MPV 9.9     Recent Labs     11/14/23  1200   SODIUM 131*   POTASSIUM 4.0   CHLORIDE 95*   CO2 20   GLUCOSE 96   BUN 12   CREATININE 0.67   CALCIUM 8.5     Recent Labs     11/14/23  1200   ALTSGPT 14   ASTSGOT 26   ALKPHOSPHAT 71   TBILIRUBIN 1.0   GLUCOSE 96         No results for input(s): \"NTPROBNP\" in the last 72 hours.      No results for input(s): \"TROPONINT\" in the last 72 hours.    Imaging:  DX-CHEST-PORTABLE (1 VIEW)   Final Result         Hazy bibasilar opacities, atelectasis or infection.          X-Ray:  I have personally reviewed the images and compared with prior images.  EKG:  I have personally reviewed the images and compared with prior images.    Assessment/Plan:  Justification for Admission Status  I anticipate this patient will require at least two midnights for appropriate medical management, necessitating inpatient admission because patient is acute COPD exacerbation this will require at least 2 midnights of inpatient management.    Patient will need a Telemetry bed on MEDICAL service .  The need is secondary to acute hypoxic respiratory failure.    * Acute exacerbation of chronic obstructive pulmonary disease (COPD) (HCC)- (present on admission)  Assessment & Plan  Patient has been a smoker for at least 35 years.  She says she started when she was 3 years old.  She quit when she was 53.  She says she did not smoke the whole time though.  Patient now has a COPD exacerbation  Initiate RT protocol  Nebulizer treatments  Patient will need eventually PFTs  For now she will be on Decadron since she also has COVID-19 simultaneously.    COVID-19- (present on admission)  Assessment & Plan  Patient is positive for COVID-19 and second time in the last 3 years  Initiate Decadron since she is hypoxic  Start zinc and vitamin C and vitamin D.  Oxygen support to keep oxygen saturations above 90%  Respiratory and droplet " isolation    Acute respiratory failure with hypoxia (HCC)- (present on admission)  Assessment & Plan  Respiratory failure is a combination of COVID-19 infection as well as COPD exacerbation  Keep oxygen saturations above 90% by providing oxygen support.  RT protocol  Nebulizer treatments    Hyponatremia- (present on admission)  Assessment & Plan  Mild hyponatremia most likely secondary to COVID-19 infection  Provide fluid resuscitation monitor sodium levels    Full code status  Assessment & Plan  After discussion of advanced directives patient wishes to be full code.    Dyslipidemia- (present on admission)  Assessment & Plan  Low-fat low-cholesterol diet  Statin  Fasting lipid panel    Obesity (BMI 30-39.9)- (present on admission)  Assessment & Plan  Mild hyponatremia most likely secondary to COVID-19 infection  Provide fluid resuscitation monitor sodium levels    Decreased hearing of both ears--hearing aids- (present on admission)  Assessment & Plan  Patient uses a hearing aid device in both ears  Follow-up with otology as an outpatient    Normochromic normocytic anemia- (present on admission)  Assessment & Plan  Monitor H&H if drops below 7 or 21 transfuse  Most recent hemoglobin 11.8 and hematocrit is 36.9.    Hypothyroidism due to acquired atrophy of thyroid- (present on admission)  Assessment & Plan  -supportive measures with synthroid supplementation at 75 mcg per day, no change  -latest TSH is 0.275 and this is with in establish normal guidlines         VTE prophylaxis: SCDs/TEDs

## 2023-11-14 NOTE — ED PROVIDER NOTES
ED Provider Note    CHIEF COMPLAINT  Chief Complaint   Patient presents with    Flu Like Symptoms    Cough    N/V    Diarrhea     72 yo female BIB daughter with reports of cough, shortness of breath, n/v/d for the past week.  Patient reports some SOB.  + cough and congestion.  Reports generalized fatigue        EXTERNAL RECORDS REVIEWED  Inpatient Notes discharge summary/12/23 after admission for fever, shortness of breath, generalized weakness.  History COPD not on oxygen, hypothyroidism.  Patient admitted for COPD exacerbation and community-acquired pneumonia.  Started IV antibiotics and progressed well.  EKG with prolonged QTc, on azithromycin community-acquired pneumonia which was monitored closely in the hospital.  Weaned off oxygen and returned to baseline.  Safe for discharge.  Discharged on Augmentin.    HPI/ROS  LIMITATION TO HISTORY   Select: : None  OUTSIDE HISTORIAN(S):  Family daughter    Vaishali Soares is a 73 y.o. female who presents to the emergency department through triage with daughter for malaise and fatigue, cough, shortness of breath for 1 week.  Tactile fever and chills.  Productive sputum cough.  Denies chest pain, palpitations or syncope.  Denies nausea or vomiting but has had decreased appetite.  Patient states that she gets pneumonia 2-4 times every year, required hospitalization previously.  Not normally oxygen dependent.  Denies lung disease, asthma or COPD but states she does use breathing treatments at home.  History of tobacco dependence but quit 20 years ago.    Additionally, patient and her daughter states that she has had some prolonged malaise and fatigue, also depression.  She does have history of losing an adult son.  Also history of hypothyroidism, compliant with levothyroxine but has had some dose adjustment.    PAST MEDICAL HISTORY   has a past medical history of Acute respiratory failure with hypoxia (HCC) (4/10/2023), Anemia, Anesthesia, Emphysema, Hypothyroid,  "Osteoporosis, Pneumonia (2015), and Urinary retention.    SURGICAL HISTORY   has a past surgical history that includes other orthopedic surgery and vicki by laparoscopy (Bilateral, 2018).    FAMILY HISTORY  Family History   Problem Relation Age of Onset    Diabetes Sister         DMI    Other Sister         Kidney Failure    Diabetes Brother         DM2. no meds    Stroke Father 54    Cancer Maternal Grandmother         Uterine    Diabetes Paternal Grandmother     No Known Problems Mother     No Known Problems Maternal Grandfather     No Known Problems Paternal Grandfather     Cancer Maternal Uncle     Cancer Paternal Uncle     Arthritis Son         Arthritis, gout    Asthma Son     Alcohol/Drug Neg Hx        SOCIAL HISTORY  Social History     Tobacco Use    Smoking status: Former     Current packs/day: 0.00     Average packs/day: 1 pack/day for 40.0 years (40.0 ttl pk-yrs)     Types: Cigarettes     Start date: 1967     Quit date: 2007     Years since quittin.8    Smokeless tobacco: Never   Vaping Use    Vaping Use: Never used   Substance and Sexual Activity    Alcohol use: Yes     Comment: rare     Drug use: No    Sexual activity: Never       CURRENT MEDICATIONS  Home Medications       Reviewed by Jacoby Philip (Pharmacy Tech) on 23 at 1249  Med List Status: Complete     Medication Last Dose Status   aspirin (ASA) 325 MG Tab 2023 Active   Cholecalciferol (D3 PO) 2023 Active   Cyanocobalamin (VITAMIN B-12 PO) 2023 Active   diphenhydrAMINE HCl (ALLERGY MEDICATION PO) 2023 Active   ELDERBERRY PO 2023 Active   ibuprofen (MOTRIN) 200 MG Tab 2023 Active   levothyroxine (SYNTHROID) 75 MCG Tab 2023 Active                    ALLERGIES  No Known Allergies    PHYSICAL EXAM  VITAL SIGNS: /51   Pulse (!) 106   Temp (!) 38.3 °C (100.9 °F) (Temporal)   Resp (!) 38   Ht 1.626 m (5' 4\")   Wt 79.4 kg (175 lb)   SpO2 98%   BMI 30.04 kg/m²  "   Pulse ox interpretation: I interpret this pulse ox as low  Constitutional: Alert in no apparent distress.  HENT: Normocephalic, atraumatic. Bilateral external ears normal, Nose normal.  Dry mucous membranes.    Eyes: Pupils are equal and reactive, Conjunctiva normal.   Neck: Normal range of motion, Supple  Lymphatic: No lymphadenopathy noted.   Cardiovascular: Regular rate and rhythm, no murmurs. Distal pulses intact.  No peripheral edema.  Thorax & Lungs: Coarse, diminished bilaterally, faint end expiratory wheeze.  No increased work of breathing, clipped speech or retractions.  Abdomen: Soft, non-distended, non-tender to palpation.   Skin: Warm, Dry, No erythema, No rash.   Musculoskeletal: Good range of motion in all major joints.    Neurologic: Alert and orient x4.  Moves 4 extremity spontaneously.  Psychiatric: Affect normal, Judgment normal, Mood normal.       DIAGNOSTIC STUDIES / PROCEDURES    LABS  Results for orders placed or performed during the hospital encounter of 11/14/23   Lactic acid (lactate)   Result Value Ref Range    Lactic Acid 1.0 0.5 - 2.0 mmol/L   CBC With Differential   Result Value Ref Range    WBC 10.0 4.8 - 10.8 K/uL    RBC 4.21 4.20 - 5.40 M/uL    Hemoglobin 11.8 (L) 12.0 - 16.0 g/dL    Hematocrit 36.9 (L) 37.0 - 47.0 %    MCV 87.6 81.4 - 97.8 fL    MCH 28.0 27.0 - 33.0 pg    MCHC 32.0 (L) 32.2 - 35.5 g/dL    RDW 44.9 35.9 - 50.0 fL    Platelet Count 325 164 - 446 K/uL    MPV 9.9 9.0 - 12.9 fL    Neutrophils-Polys 64.40 44.00 - 72.00 %    Lymphocytes 22.90 22.00 - 41.00 %    Monocytes 11.40 0.00 - 13.40 %    Eosinophils 0.20 0.00 - 6.90 %    Basophils 0.80 0.00 - 1.80 %    Immature Granulocytes 0.30 0.00 - 0.90 %    Nucleated RBC 0.00 0.00 - 0.20 /100 WBC    Neutrophils (Absolute) 6.42 1.82 - 7.42 K/uL    Lymphs (Absolute) 2.29 1.00 - 4.80 K/uL    Monos (Absolute) 1.14 (H) 0.00 - 0.85 K/uL    Eos (Absolute) 0.02 0.00 - 0.51 K/uL    Baso (Absolute) 0.08 0.00 - 0.12 K/uL    Immature  Granulocytes (abs) 0.03 0.00 - 0.11 K/uL    NRBC (Absolute) 0.00 K/uL   Comp Metabolic Panel   Result Value Ref Range    Sodium 131 (L) 135 - 145 mmol/L    Potassium 4.0 3.6 - 5.5 mmol/L    Chloride 95 (L) 96 - 112 mmol/L    Co2 20 20 - 33 mmol/L    Anion Gap 16.0 7.0 - 16.0    Glucose 96 65 - 99 mg/dL    Bun 12 8 - 22 mg/dL    Creatinine 0.67 0.50 - 1.40 mg/dL    Calcium 8.5 8.4 - 10.2 mg/dL    Correct Calcium 8.6 8.5 - 10.5 mg/dL    AST(SGOT) 26 12 - 45 U/L    ALT(SGPT) 14 2 - 50 U/L    Alkaline Phosphatase 71 30 - 99 U/L    Total Bilirubin 1.0 0.1 - 1.5 mg/dL    Albumin 3.9 3.2 - 4.9 g/dL    Total Protein 7.7 6.0 - 8.2 g/dL    Globulin 3.8 (H) 1.9 - 3.5 g/dL    A-G Ratio 1.0 g/dL   TSH   Result Value Ref Range    TSH 0.275 (L) 0.380 - 5.330 uIU/mL   FREE THYROXINE   Result Value Ref Range    Free T-4 1.23 0.93 - 1.70 ng/dL   ESTIMATED GFR   Result Value Ref Range    GFR (CKD-EPI) 91 >60 mL/min/1.73 m 2   EKG   Result Value Ref Range    Report       [facility] Emergency Dept.    Test Date:  2023  Pt Name:    AMADOR CHEUNG                Department: [facility]  MRN:        [id number]                      [facility]  Gender:     Female                       Technician: [id number]  :        [id number]                   Requested By:COURTNEY BARAHONA  Order #:    [id number]                    Reading MD: COURTNEY BARAHONA DO    Measurements  Intervals                                Axis  Rate:       103                          P:          56  MD:         165                          QRS:        -2  QRSD:       99                           T:          -1  QT:         328  QTc:        430    Interpretive Statements  Sinus tachycardia  Compared to ECG 04/10/2023 15:50:10  T-wave abnormality no longer present  Prolonged QT interval no longer present  Electronically Signed On 2023 13:24:45 PST by COURTNEY BARAHONA, DO       RADIOLOGY  I have independently interpreted the diagnostic  imaging associated with this visit and am waiting the final reading from the radiologist.   My preliminary interpretation is as follows:   Chest x-ray: Hazy bases bilaterally, cannot exclude infiltrate.    Radiologist interpretation:   DX-CHEST-PORTABLE (1 VIEW)   Final Result         Hazy bibasilar opacities, atelectasis or infection.          COURSE & MEDICAL DECISION MAKING    ED Observation Status? Yes; I am placing the patient in to an observation status due to a diagnostic uncertainty as well as therapeutic intensity. Patient placed in observation status at 12:24 PM, 11/14/2023.     Observation plan is as follows: Labs, imaging, DuoNeb before final disposition can be made.    Upon Reevaluation, the patient's condition has: not improved; and will be escalated to hospitalization.    Patient discharged from ED Observation status at 1:27 PM  (Time) 11/14/23 (Date).     INITIAL ASSESSMENT, COURSE AND PLAN  Care Narrative:   Seen evaluated at bedside.  Malaise and fatigue, fever, chills, cough and shortness of breath this week.  History of pneumonia.  History of tobacco dependence, COPD, coarse lung sounds with end expiratory wheeze and hypoxia.  More comfortable on 2 L by nasal cannula.  Add Solu-Medrol and DuoNeb as well.  Labs for septic protocol for tachycardia, hypoxia, low-grade fever.    No leukocytosis, left shift or bandemia.  Lactate normal.  No electrolyte derangement.  Chest x-ray does suggest mild basilar opacities.  Patient with history of the same.  Unasyn and azithromycin per protocol, EKG confirmed QTc within normal limits (given history of prolongation).    ADDITIONAL PROBLEM LIST  Hypothyroidism  Depression  COPD    DISPOSITION AND DISCUSSIONS  I have discussed management of the patient with the following physicians and ALCIRA's:    1:27 PM Dr. Hays is aware of the patient agreeable to consultation.      FINAL DIAGNOSIS  1. Hypoxia    2. Acute exacerbation of chronic obstructive pulmonary disease  (COPD) (HCC)    3. Community acquired pneumonia, unspecified laterality           Electronically signed by: Shantel Martínez D.O., 11/14/2023 12:19 PM

## 2023-11-14 NOTE — ASSESSMENT & PLAN NOTE
Patient is positive for COVID-19 and second time in the last 3 years  Initiate Decadron since she is hypoxic and concurrent COPD exacerbation  Emesis this morning after taking multiple pills  Oxygen support to keep oxygen saturations above 90%  Respiratory and droplet isolation

## 2023-11-14 NOTE — FLOWSHEET NOTE
11/14/23 1312   Vital Signs   Pulse (!) 104   Respiration 20   Pulse Oximetry 98 %   $ Pulse Oximetry (Spot Check) Yes   Respiratory Assessment   Respiratory Pattern Within Normal Limits   Level of Consciousness Alert   Chest Exam   Work Of Breathing / Effort Within Normal Limits   Breath Sounds   RUL Breath Sounds Clear   RML Breath Sounds Diminished   RLL Breath Sounds Diminished   BJ Breath Sounds Clear   LLL Breath Sounds Diminished   Oxygen   O2 (LPM) 2   O2 Delivery Device Silicone Nasal Cannula

## 2023-11-15 LAB
ANION GAP SERPL CALC-SCNC: 12 MMOL/L (ref 7–16)
APPEARANCE UR: CLEAR
BILIRUB UR QL STRIP.AUTO: NEGATIVE
BUN SERPL-MCNC: 12 MG/DL (ref 8–22)
CALCIUM SERPL-MCNC: 8.1 MG/DL (ref 8.4–10.2)
CHLORIDE SERPL-SCNC: 101 MMOL/L (ref 96–112)
CO2 SERPL-SCNC: 20 MMOL/L (ref 20–33)
COLOR UR: YELLOW
CREAT SERPL-MCNC: 0.62 MG/DL (ref 0.5–1.4)
ERYTHROCYTE [DISTWIDTH] IN BLOOD BY AUTOMATED COUNT: 45.8 FL (ref 35.9–50)
GFR SERPLBLD CREATININE-BSD FMLA CKD-EPI: 92 ML/MIN/1.73 M 2
GLUCOSE SERPL-MCNC: 103 MG/DL (ref 65–99)
GLUCOSE UR STRIP.AUTO-MCNC: NEGATIVE MG/DL
HCT VFR BLD AUTO: 31.4 % (ref 37–47)
HGB BLD-MCNC: 9.9 G/DL (ref 12–16)
KETONES UR STRIP.AUTO-MCNC: 15 MG/DL
LEUKOCYTE ESTERASE UR QL STRIP.AUTO: NEGATIVE
MCH RBC QN AUTO: 28.1 PG (ref 27–33)
MCHC RBC AUTO-ENTMCNC: 31.5 G/DL (ref 32.2–35.5)
MCV RBC AUTO: 89.2 FL (ref 81.4–97.8)
MICRO URNS: ABNORMAL
NITRITE UR QL STRIP.AUTO: NEGATIVE
PH UR STRIP.AUTO: 6 [PH] (ref 5–8)
PLATELET # BLD AUTO: 244 K/UL (ref 164–446)
PMV BLD AUTO: 9.7 FL (ref 9–12.9)
POTASSIUM SERPL-SCNC: 3.5 MMOL/L (ref 3.6–5.5)
PROT UR QL STRIP: NEGATIVE MG/DL
RBC # BLD AUTO: 3.52 M/UL (ref 4.2–5.4)
RBC UR QL AUTO: NEGATIVE
SODIUM SERPL-SCNC: 133 MMOL/L (ref 135–145)
SODIUM SERPL-SCNC: 136 MMOL/L (ref 135–145)
SP GR UR STRIP.AUTO: 1.01
WBC # BLD AUTO: 7.2 K/UL (ref 4.8–10.8)

## 2023-11-15 PROCEDURE — 94640 AIRWAY INHALATION TREATMENT: CPT

## 2023-11-15 PROCEDURE — 94669 MECHANICAL CHEST WALL OSCILL: CPT

## 2023-11-15 PROCEDURE — 99232 SBSQ HOSP IP/OBS MODERATE 35: CPT | Performed by: INTERNAL MEDICINE

## 2023-11-15 PROCEDURE — 94760 N-INVAS EAR/PLS OXIMETRY 1: CPT

## 2023-11-15 PROCEDURE — A9270 NON-COVERED ITEM OR SERVICE: HCPCS | Performed by: HOSPITALIST

## 2023-11-15 PROCEDURE — 700111 HCHG RX REV CODE 636 W/ 250 OVERRIDE (IP): Performed by: HOSPITALIST

## 2023-11-15 PROCEDURE — 700102 HCHG RX REV CODE 250 W/ 637 OVERRIDE(OP): Performed by: HOSPITALIST

## 2023-11-15 PROCEDURE — 87086 URINE CULTURE/COLONY COUNT: CPT

## 2023-11-15 PROCEDURE — 36415 COLL VENOUS BLD VENIPUNCTURE: CPT

## 2023-11-15 PROCEDURE — 770020 HCHG ROOM/CARE - TELE (206)

## 2023-11-15 PROCEDURE — 85027 COMPLETE CBC AUTOMATED: CPT

## 2023-11-15 PROCEDURE — 700105 HCHG RX REV CODE 258: Performed by: HOSPITALIST

## 2023-11-15 PROCEDURE — 84295 ASSAY OF SERUM SODIUM: CPT

## 2023-11-15 PROCEDURE — 81003 URINALYSIS AUTO W/O SCOPE: CPT

## 2023-11-15 PROCEDURE — 80048 BASIC METABOLIC PNL TOTAL CA: CPT

## 2023-11-15 RX ADMIN — SODIUM CHLORIDE: 9 INJECTION, SOLUTION INTRAVENOUS at 05:56

## 2023-11-15 RX ADMIN — Medication 1000 UNITS: at 05:48

## 2023-11-15 RX ADMIN — ENOXAPARIN SODIUM 40 MG: 100 INJECTION SUBCUTANEOUS at 20:42

## 2023-11-15 RX ADMIN — ALBUTEROL SULFATE 2 PUFF: 90 AEROSOL, METERED RESPIRATORY (INHALATION) at 11:52

## 2023-11-15 RX ADMIN — LEVOTHYROXINE SODIUM 75 MCG: 0.07 TABLET ORAL at 06:01

## 2023-11-15 RX ADMIN — SALINE NASAL SPRAY 2 SPRAY: 1.5 SOLUTION NASAL at 00:09

## 2023-11-15 RX ADMIN — ALBUTEROL SULFATE 2 PUFF: 90 AEROSOL, METERED RESPIRATORY (INHALATION) at 15:00

## 2023-11-15 RX ADMIN — DEXAMETHASONE SODIUM PHOSPHATE 6 MG: 4 INJECTION INTRA-ARTICULAR; INTRALESIONAL; INTRAMUSCULAR; INTRAVENOUS; SOFT TISSUE at 05:49

## 2023-11-15 RX ADMIN — ALBUTEROL SULFATE 2 PUFF: 90 AEROSOL, METERED RESPIRATORY (INHALATION) at 00:08

## 2023-11-15 RX ADMIN — ALBUTEROL SULFATE 2 PUFF: 90 AEROSOL, METERED RESPIRATORY (INHALATION) at 20:00

## 2023-11-15 RX ADMIN — OXYCODONE HYDROCHLORIDE AND ACETAMINOPHEN 1000 MG: 500 TABLET ORAL at 05:47

## 2023-11-15 RX ADMIN — ALBUTEROL SULFATE 2 PUFF: 90 AEROSOL, METERED RESPIRATORY (INHALATION) at 23:55

## 2023-11-15 RX ADMIN — OXYCODONE HYDROCHLORIDE AND ACETAMINOPHEN 1000 MG: 500 TABLET ORAL at 20:43

## 2023-11-15 RX ADMIN — ZINC SULFATE 220 MG (50 MG) CAPSULE 220 MG: CAPSULE at 05:48

## 2023-11-15 RX ADMIN — ALBUTEROL SULFATE 2 PUFF: 90 AEROSOL, METERED RESPIRATORY (INHALATION) at 05:51

## 2023-11-15 ASSESSMENT — PATIENT HEALTH QUESTIONNAIRE - PHQ9
1. LITTLE INTEREST OR PLEASURE IN DOING THINGS: NOT AT ALL
SUM OF ALL RESPONSES TO PHQ9 QUESTIONS 1 AND 2: 0
2. FEELING DOWN, DEPRESSED, IRRITABLE, OR HOPELESS: NOT AT ALL

## 2023-11-15 ASSESSMENT — ENCOUNTER SYMPTOMS
DIARRHEA: 1
DIZZINESS: 0
CONSTIPATION: 0
NERVOUS/ANXIOUS: 0
PHOTOPHOBIA: 0
NAUSEA: 1
BLURRED VISION: 0
DEPRESSION: 0
SPEECH CHANGE: 0
HEARTBURN: 0
MYALGIAS: 0
HEADACHES: 0
INSOMNIA: 0
SHORTNESS OF BREATH: 1
CLAUDICATION: 0
ABDOMINAL PAIN: 0
SENSORY CHANGE: 0
FEVER: 0
COUGH: 0
CHILLS: 0
WEAKNESS: 0
VOMITING: 0

## 2023-11-15 ASSESSMENT — PAIN DESCRIPTION - PAIN TYPE
TYPE: ACUTE PAIN

## 2023-11-15 NOTE — PROGRESS NOTES
Hospital Medicine Daily Progress Note    Date of Service  11/15/2023    Chief Complaint  Vaishali Soares is a 73 y.o. female admitted 11/14/2023 with flulike symptoms.    Hospital Course  Patient is a 73-year-old female who presented with flulike symptoms with cough nausea vomiting diarrhea and initially thought that she was feeling poorly because of her thyroid.  She had similar symptoms when her thyroid replacement was cut to 50 mcg but she is still on the 75 mcg at this time.  TSH was checked and her TSH is actually low indicating that she is likely taking too much thyroid.  She is hypoxic and long history of smoking with history of COPD and also found to be COVID-positive.  She was started on dexamethasone 6 mg daily and oxygen supplementation.  She continues to have intermittent nausea vomiting and noticed that she got sick to her stomach after she took the multiple pills this morning.  She is no longer having issues with uncontrolled diarrhea and will be taken off of the IV fluid supplementation.    Interval Problem Update  11/15 patient states that she is feeling slightly better but still had an episode of emesis this morning and was only able to eat her peaches for breakfast.  These did stay down.  She continues to have intermittent shortness of breath.  She is tolerating the steroids at this time.  She did have normal saline 83 cc started secondary to dehydration however she is keeping her fluids and therefore I will discontinue this in the setting of COVID.    I have discussed this patient's plan of care and discharge plan at IDT rounds today with Case Management, Nursing, Nursing leadership, and other members of the IDT team.    Consultants/Specialty  pulmonary    Code Status  Full Code    Disposition  The patient is not medically cleared for discharge to home or a post-acute facility.  Anticipate discharge to: home with close outpatient follow-up    I have placed the appropriate orders for  post-discharge needs.    Review of Systems  Review of Systems   Constitutional:  Positive for malaise/fatigue. Negative for chills and fever.   HENT:  Negative for congestion.    Eyes:  Negative for blurred vision and photophobia.   Respiratory:  Positive for shortness of breath. Negative for cough.    Cardiovascular:  Negative for chest pain, claudication and leg swelling.   Gastrointestinal:  Positive for diarrhea and nausea. Negative for abdominal pain, constipation, heartburn and vomiting.   Genitourinary:  Negative for dysuria and hematuria.   Musculoskeletal:  Negative for joint pain and myalgias.   Skin:  Negative for itching and rash.   Neurological:  Negative for dizziness, sensory change, speech change, weakness and headaches.   Psychiatric/Behavioral:  Negative for depression. The patient is not nervous/anxious and does not have insomnia.         Physical Exam  Temp:  [36.8 °C (98.3 °F)-37.6 °C (99.7 °F)] 36.9 °C (98.4 °F)  Pulse:  [] 113  Resp:  [18-33] 18  BP: (106-137)/(52-67) 116/58  SpO2:  [89 %-98 %] 91 %    Physical Exam  Vitals and nursing note reviewed.   Constitutional:       General: She is not in acute distress.     Appearance: Normal appearance. She is not ill-appearing.   HENT:      Head: Normocephalic and atraumatic.      Nose: Nose normal.   Eyes:      General: No scleral icterus.  Cardiovascular:      Rate and Rhythm: Normal rate and regular rhythm.      Heart sounds: Normal heart sounds. No murmur heard.  Pulmonary:      Effort: Pulmonary effort is normal.      Breath sounds: Normal breath sounds.   Abdominal:      General: Bowel sounds are normal. There is no distension.      Palpations: Abdomen is soft.   Musculoskeletal:         General: No swelling or tenderness.      Cervical back: Neck supple.   Skin:     General: Skin is warm and dry.   Neurological:      General: No focal deficit present.      Mental Status: She is alert and oriented to person, place, and time.    Psychiatric:         Mood and Affect: Mood normal.         Fluids  No intake or output data in the 24 hours ending 11/15/23 1426    Laboratory  Recent Labs     11/14/23  1200 11/15/23  0115   WBC 10.0 7.2   RBC 4.21 3.52*   HEMOGLOBIN 11.8* 9.9*   HEMATOCRIT 36.9* 31.4*   MCV 87.6 89.2   MCH 28.0 28.1   MCHC 32.0* 31.5*   RDW 44.9 45.8   PLATELETCT 325 244   MPV 9.9 9.7     Recent Labs     11/14/23  1200 11/14/23  1734 11/15/23  0115 11/15/23  0912   SODIUM 131* 133* 133* 136   POTASSIUM 4.0  --  3.5*  --    CHLORIDE 95*  --  101  --    CO2 20  --  20  --    GLUCOSE 96  --  103*  --    BUN 12  --  12  --    CREATININE 0.67  --  0.62  --    CALCIUM 8.5  --  8.1*  --                    Imaging  DX-CHEST-PORTABLE (1 VIEW)   Final Result         Hazy bibasilar opacities, atelectasis or infection.           Assessment/Plan  * Acute exacerbation of chronic obstructive pulmonary disease (COPD) (HCC)- (present on admission)  Assessment & Plan  35+ years smoking  Continue with dexamethasone  Continue RT protocol  Currently requiring 2 L to maintain adequate saturation    Full code status  Assessment & Plan  After discussion of advanced directives patient wishes to be full code.    COVID-19- (present on admission)  Assessment & Plan  Patient is positive for COVID-19 and second time in the last 3 years  Initiate Decadron since she is hypoxic and concurrent COPD exacerbation  Emesis this morning after taking multiple pills  Oxygen support to keep oxygen saturations above 90%  Respiratory and droplet isolation    Acute respiratory failure with hypoxia (HCC)- (present on admission)  Assessment & Plan  Respiratory failure is a combination of COVID-19 infection as well as COPD exacerbation  Keep oxygen saturations above 90% by providing oxygen support.  RT protocol  Nebulizer treatments  2 L nasal cannula    Dyslipidemia- (present on admission)  Assessment & Plan  Low-fat low-cholesterol diet  Statin  Fasting lipid  panel    Obesity (BMI 30-39.9)- (present on admission)  Assessment & Plan  Mild hyponatremia most likely secondary to COVID-19 infection  Provide fluid resuscitation monitor sodium levels    Decreased hearing of both ears--hearing aids- (present on admission)  Assessment & Plan  Patient uses a hearing aid device in both ears  Follow-up with otology as an outpatient    Normochromic normocytic anemia- (present on admission)  Assessment & Plan  Mild, monitor      Hyponatremia- (present on admission)  Assessment & Plan  Mild hyponatremia most likely secondary to COVID-19 infection  Provide fluid resuscitation monitor sodium levels    Hypothyroidism due to acquired atrophy of thyroid- (present on admission)  Assessment & Plan  -supportive measures with synthroid supplementation at 75 mcg per day, no change  -latest TSH is 0.275 and this is with in establish normal guidlines          VTE prophylaxis:   SCDs/TEDs      I have performed a physical exam and reviewed and updated ROS and Plan today (11/15/2023). In review of yesterday's note (11/14/2023), there are no changes except as documented above.

## 2023-11-15 NOTE — DISCHARGE PLANNING
"HTH/SCP TCN chart review completed. Collaborated with CICI Laura prior to meeting with the pt. The most current review of medical record, knowledge of pt's PLOF and social support, LACE+ score of 73, 6 clicks scores of 22 ADL and 20 mobility were considered.      Introduced TCN program. Provided education regarding post acute levels of care. Discussed HTH/SCP plan benefits (Meds to Beds, medical uber and [facility] transitional care). Pt verbalized understanding.     Patient endorsed she resides with her son and daughter-in-law in a single story home. Patient endorsed that her son and daughter-in-law \"take care of me.\" Patient endorsed independence in ADLs, IADLs and continues to drive. Patient stated no mobility concerns noting she did not use any assistive device with ambulation prior to acute hospitalization. Patient endorsed no use of oxygen prior to acute hospitalization, but noted she does have an oxygen concentrator \"that was my mother's\" in the home.     In collaboration with CM and based on patient potential discharge planning needs, Choice proactively obtained for DME ([facility] noting patient choice of [facility]), faxed to [facility] and given to CM. Patient advised she may be amendable to home healthcare services but \"only if they are recommended\" and declined to provide choice for HH during TCN visit. In discussion regarding SCP plan benefits and after hospital visit follow up, patient requested TCN assistance in scheduling with a new primary care provider.     TCN will continue to follow and collaborate with discharge planning team as additional post acute needs arise. Thank you.     Completed today  Choice obtained: DME ([facility])  SCP with [facility] PCP. At patient request, TCN placed telephone call to assist in scheduling after hospital visit follow up.   "

## 2023-11-15 NOTE — PROGRESS NOTES
Telemetry Shift Summary     Rhythm SR/ST  HR Range   Ectopy rPVC/PAC  Measurements .20/.08/.32           Normal Values  Rhythm SR  HR Range    Measurements 0.12-0.20 / 0.06-0.10  / 0.30-0.52

## 2023-11-15 NOTE — PROGRESS NOTES
4 Eyes Skin Assessment Completed by Aranza RN and Tommie RN.    Head WDL  Ears WDL  Nose WDL  Mouth WDL  Neck WDL  Breast/Chest WDL  Shoulder Blades WDL  Spine WDL  (R) Arm/Elbow/Hand WDL  (L) Arm/Elbow/Hand WDL  Abdomen WDL  Groin WDL  Scrotum/Coccyx/Buttocks Redness and Blanching  (R) Leg WDL  (L) Leg WDL  (R) Heel/Foot/Toe Blanching  (L) Heel/Foot/Toe Blanching          Devices In Places Tele Box and Pulse Ox      Interventions In Place Gray Ear Foams, NC W/Ear Foams, Pillows, and Pressure Redistribution Mattress    Possible Skin Injury No    Pictures Uploaded Into Epic N/A  Wound Consult Placed N/A  RN Wound Prevention Protocol Ordered No

## 2023-11-15 NOTE — HOSPITAL COURSE
Patient is a 73-year-old female who presented with flulike symptoms with cough nausea vomiting diarrhea and initially thought that she was feeling poorly because of her thyroid.  She had similar symptoms when her thyroid replacement was cut to 50 mcg but she is still on the 75 mcg at this time.  TSH was checked and her TSH is actually low indicating that she is likely taking too much thyroid.  She is hypoxic and long history of smoking with history of COPD and also found to be COVID-positive.  She was started on dexamethasone 6 mg daily and oxygen supplementation.  She continues to have intermittent nausea vomiting and noticed that she got sick to her stomach after she took the multiple pills this morning.  She is no longer having issues with uncontrolled diarrhea and will be taken off of the IV fluid supplementation.

## 2023-11-15 NOTE — CARE PLAN
The patient is Watcher - Medium risk of patient condition declining or worsening    Shift Goals  Clinical Goals: IVF, abx,  monitor O2/labs,isolation  Patient Goals: rest, sleep    Progress made toward(s) clinical / shift goals:  Pt A&Ox4, [place]. Pt slightly SOB with acitivity. Pt with gen. pain but does not need pain meds. Assisted pt up to the bathroom . Gait steady. Increased O2 to 3L/nasal cannula. Pt has strong dry non productive cough. Abx started. Isolation precaution observed. Urine sample sent to lab. Call  light within reach.     Patient is not progressing towards the following goals:      Problem: Ineffective Airway Clearance  Goal: Patient will maintain patent airway with clear/clearing breath sounds  Outcome: Not Progressing     Problem: Impaired Gas Exchange  Goal: Patient will demonstrate improved ventilation and adequate oxygenation and participate in treatment regimen within the level of ability/situation.  Outcome: Not Progressing     Problem: Hemodynamics  Goal: Patient's hemodynamics, fluid balance and neurologic status will be stable or improve  Outcome: Progressing     Problem: Infection - Standard  Goal: Patient will remain free from infection  Outcome: Progressing

## 2023-11-15 NOTE — PROGRESS NOTES
Pt arrived to floor. Quick assessment completed. Pt A&O x 4. Respirations are even and unlabored on 2L. Pt denies pain at this time. VS stable, call light and belongings within reach. IV fluids started. Pt hard of hearing and has hearing aid. Pt educated on room and call light, pt verbalized understanding. Communication board updated. Needs met.

## 2023-11-16 ENCOUNTER — PHARMACY VISIT (OUTPATIENT)
Dept: PHARMACY | Facility: MEDICAL CENTER | Age: 73
End: 2023-11-16
Payer: COMMERCIAL

## 2023-11-16 VITALS
RESPIRATION RATE: 18 BRPM | TEMPERATURE: 98.4 F | HEIGHT: 64 IN | BODY MASS INDEX: 28.49 KG/M2 | DIASTOLIC BLOOD PRESSURE: 59 MMHG | OXYGEN SATURATION: 92 % | SYSTOLIC BLOOD PRESSURE: 107 MMHG | HEART RATE: 77 BPM | WEIGHT: 166.89 LBS

## 2023-11-16 LAB
ANION GAP SERPL CALC-SCNC: 11 MMOL/L (ref 7–16)
BUN SERPL-MCNC: 14 MG/DL (ref 8–22)
CALCIUM SERPL-MCNC: 8.7 MG/DL (ref 8.4–10.2)
CHLORIDE SERPL-SCNC: 106 MMOL/L (ref 96–112)
CO2 SERPL-SCNC: 23 MMOL/L (ref 20–33)
CREAT SERPL-MCNC: 0.8 MG/DL (ref 0.5–1.4)
ERYTHROCYTE [DISTWIDTH] IN BLOOD BY AUTOMATED COUNT: 45 FL (ref 35.9–50)
GFR SERPLBLD CREATININE-BSD FMLA CKD-EPI: 76 ML/MIN/1.73 M 2
GLUCOSE SERPL-MCNC: 122 MG/DL (ref 65–99)
HCT VFR BLD AUTO: 30.9 % (ref 37–47)
HGB BLD-MCNC: 9.8 G/DL (ref 12–16)
MCH RBC QN AUTO: 28.2 PG (ref 27–33)
MCHC RBC AUTO-ENTMCNC: 31.7 G/DL (ref 32.2–35.5)
MCV RBC AUTO: 88.8 FL (ref 81.4–97.8)
PLATELET # BLD AUTO: 288 K/UL (ref 164–446)
PMV BLD AUTO: 10 FL (ref 9–12.9)
POTASSIUM SERPL-SCNC: 4 MMOL/L (ref 3.6–5.5)
RBC # BLD AUTO: 3.48 M/UL (ref 4.2–5.4)
SODIUM SERPL-SCNC: 140 MMOL/L (ref 135–145)
WBC # BLD AUTO: 5.8 K/UL (ref 4.8–10.8)

## 2023-11-16 PROCEDURE — A9270 NON-COVERED ITEM OR SERVICE: HCPCS | Performed by: HOSPITALIST

## 2023-11-16 PROCEDURE — 94760 N-INVAS EAR/PLS OXIMETRY 1: CPT

## 2023-11-16 PROCEDURE — 700102 HCHG RX REV CODE 250 W/ 637 OVERRIDE(OP): Performed by: HOSPITALIST

## 2023-11-16 PROCEDURE — 94640 AIRWAY INHALATION TREATMENT: CPT

## 2023-11-16 PROCEDURE — RXMED WILLOW AMBULATORY MEDICATION CHARGE: Performed by: INTERNAL MEDICINE

## 2023-11-16 PROCEDURE — 700111 HCHG RX REV CODE 636 W/ 250 OVERRIDE (IP): Performed by: HOSPITALIST

## 2023-11-16 PROCEDURE — 99239 HOSP IP/OBS DSCHRG MGMT >30: CPT | Performed by: INTERNAL MEDICINE

## 2023-11-16 PROCEDURE — 85027 COMPLETE CBC AUTOMATED: CPT

## 2023-11-16 PROCEDURE — 80048 BASIC METABOLIC PNL TOTAL CA: CPT

## 2023-11-16 PROCEDURE — 36415 COLL VENOUS BLD VENIPUNCTURE: CPT

## 2023-11-16 RX ORDER — DEXAMETHASONE 2 MG/1
6 TABLET ORAL DAILY
Qty: 24 TABLET | Refills: 0 | Status: SHIPPED | OUTPATIENT
Start: 2023-11-17 | End: 2023-11-25

## 2023-11-16 RX ADMIN — ALBUTEROL SULFATE 2 PUFF: 90 AEROSOL, METERED RESPIRATORY (INHALATION) at 11:00

## 2023-11-16 RX ADMIN — ZINC SULFATE 220 MG (50 MG) CAPSULE 220 MG: CAPSULE at 05:38

## 2023-11-16 RX ADMIN — DEXAMETHASONE SODIUM PHOSPHATE 6 MG: 4 INJECTION INTRA-ARTICULAR; INTRALESIONAL; INTRAMUSCULAR; INTRAVENOUS; SOFT TISSUE at 05:38

## 2023-11-16 RX ADMIN — OXYCODONE HYDROCHLORIDE AND ACETAMINOPHEN 1000 MG: 500 TABLET ORAL at 05:39

## 2023-11-16 RX ADMIN — LEVOTHYROXINE SODIUM 75 MCG: 0.07 TABLET ORAL at 05:39

## 2023-11-16 RX ADMIN — SENNOSIDES AND DOCUSATE SODIUM 2 TABLET: 50; 8.6 TABLET ORAL at 05:38

## 2023-11-16 RX ADMIN — ALBUTEROL SULFATE 2 PUFF: 90 AEROSOL, METERED RESPIRATORY (INHALATION) at 05:30

## 2023-11-16 RX ADMIN — Medication 1000 UNITS: at 05:39

## 2023-11-16 ASSESSMENT — PAIN DESCRIPTION - PAIN TYPE
TYPE: ACUTE PAIN
TYPE: ACUTE PAIN

## 2023-11-16 ASSESSMENT — FIBROSIS 4 INDEX: FIB4 SCORE: 1.81

## 2023-11-16 NOTE — DOCUMENTATION QUERY
[facility]                                                                       Query Response Note      PATIENT:               AMADOR CHEUNG  ACCT #:                  [id number]  MRN:                     [id number]  :                      1950  ADMIT DATE:       2023 11:40 AM  DISCH DATE:          RESPONDING  PROVIDER #:        [id number]           QUERY TEXT:    Pneumonia is documented in the ED Provider Note.    Can you clarify if you agree with this assessment?        The patient's clinical indicators include:  ED Provider Note:  Community acquired pneumonia   CXR: hazy bibasilar opacities, atelectasis or infection  Risk Factors: COVID +, COPD  Treatment:  Decadron, Unasyn, Zithromax    Thank you,  Brad Moore RN, BSN, CCDS  Clinical   Connect via [employer] Messenger  Options provided:   -- Pneumonia is clinically relevant and ruled in, please further specify type   -- Pneumonia is ruled out   -- Other explanation, (please specify the other explanation)   -- Unable to determine      Query created by: Brad Moore on 11/15/2023 1:50 PM    RESPONSE TEXT:    pneumonia is relevant and ruled in Covid pneumonia          Electronically signed by:  PARISH GOMEZ DO 2023 7:08 AM

## 2023-11-16 NOTE — DISCHARGE SUMMARY
Discharge Summary    CHIEF COMPLAINT ON ADMISSION  Chief Complaint   Patient presents with    Flu Like Symptoms    Cough    N/V    Diarrhea     72 yo female BIB daughter with reports of cough, shortness of breath, n/v/d for the past week.  Patient reports some SOB.  + cough and congestion.  Reports generalized fatigue        Reason for Admission  Flu Like Symptoms     Admission Date  11/14/2023    CODE STATUS  Full Code    HPI & HOSPITAL COURSE  Patient is a 73-year-old female who presented with flulike symptoms with cough nausea vomiting diarrhea and initially thought that she was feeling poorly because of her thyroid.  She had similar symptoms when her thyroid replacement was cut to 50 mcg but she is still on the 75 mcg at this time.  TSH was checked and her TSH is actually low indicating that she is likely taking too much thyroid.  She is hypoxic and long history of smoking with history of COPD and also found to be COVID-positive.  She was started on dexamethasone 6 mg daily and oxygen supplementation.  She is no longer having issues with uncontrolled diarrhea nausea and IV fluids were discontinued.  She continued to make significant improvements and is doing well and has weaned to room air and is saturating well greater than 90.  She will complete the 10-day course of 6 mg dexamethasone for treatment both of the COVID-pneumonia as well as a COPD exacerbation.  She states she already has breathing treatments at home and feels that she will succeed after discharge.    Therefore, she is discharged in good and stable condition to home with close outpatient follow-up.    The patient met 2-midnight criteria for an inpatient stay at the time of discharge.    Discharge Date  11/16/2023    FOLLOW UP ITEMS POST DISCHARGE  PCP-2 weeks    DISCHARGE DIAGNOSES  Principal Problem:    Acute exacerbation of chronic obstructive pulmonary disease (COPD) ([facility]) (POA: Yes)  Active Problems:    Hypothyroidism due to acquired atrophy  of thyroid (POA: Yes)    Hyponatremia (POA: Yes)    Normochromic normocytic anemia (POA: Yes)    Decreased hearing of both ears--hearing aids (POA: Yes)    Obesity (BMI 30-39.9) (POA: Yes)    Dyslipidemia (POA: Yes)    Acute respiratory failure with hypoxia (HCC) (POA: Yes)    COVID-19 (POA: Yes)    Full code status (POA: Unknown)  Resolved Problems:    * No resolved hospital problems. *      FOLLOW UP  Future Appointments   Date Time Provider Department Center   12/5/2023  8:40 AM Chantelle Beltran P[facility] [address] [facility]  [address] [id number] [phone]    Follow up in 2 week(s)      Chantelle Beltran P.A.-C.  [address] [place] [id number] [phone]            MEDICATIONS ON DISCHARGE     Medication List        START taking these medications        Instructions   dexamethasone 2 MG tablet  Start taking on: November 17, 2023  Commonly known as: Decadron   Take 3 Tablets by mouth every day for 8 days.  Dose: 6 mg            CONTINUE taking these medications        Instructions   ALLERGY MEDICATION PO   Take 1 Tablet by mouth every day. (OTC)  Dose: 1 Tablet     aspirin 325 MG Tabs  Commonly known as: Asa   Take 325 mg by mouth every 6 hours as needed.  Dose: 325 mg     D3 PO   Take 1 Capsule by mouth every day.  Dose: 1 Capsule     ELDERBERRY PO   Take 1 Tablet by mouth every day.  Dose: 1 Tablet     ibuprofen 200 MG Tabs  Commonly known as: Motrin   Take 400 mg by mouth every 6 hours as needed for Headache.  Dose: 400 mg     levothyroxine 75 MCG Tabs  Commonly known as: Synthroid   Take 75 mcg by mouth every morning on an empty stomach.  Dose: 75 mcg     VITAMIN B-12 PO   Take 1 Tablet by mouth every day.  Dose: 1 Tablet              Allergies  No Known Allergies    DIET  Orders Placed This Encounter   Procedures    Diet Order Diet: Regular     Standing Status:   Standing     Number of Occurrences:   1     Order Specific Question:   Diet:     Answer:   Regular [1]        ACTIVITY  As tolerated.  Weight bearing as tolerated    CONSULTATIONS  none    PROCEDURES  none    LABORATORY  Lab Results   Component Value Date    SODIUM 140 11/16/2023    POTASSIUM 4.0 11/16/2023    CHLORIDE 106 11/16/2023    CO2 23 11/16/2023    GLUCOSE 122 (H) 11/16/2023    BUN 14 11/16/2023    CREATININE 0.80 11/16/2023    CREATININE 0.7 05/13/2006        Lab Results   Component Value Date    WBC 5.8 11/16/2023    HEMOGLOBIN 9.8 (L) 11/16/2023    HEMATOCRIT 30.9 (L) 11/16/2023    PLATELETCT 288 11/16/2023        Total time of the discharge process exceeds 36 minutes.

## 2023-11-16 NOTE — CARE PLAN
The patient is Watcher - Medium risk of patient condition declining or worsening    Shift Goals  Clinical Goals: Monitor O2, Nausea control  Patient Goals: sleep and go home  Family Goals: ANNABELLE    Progress made toward(s) clinical / shift goals:        Problem: Pain - Standard  Goal: Alleviation of pain or a reduction in pain to the patient’s comfort goal  Description: Target End Date:  Prior to discharge or change in level of care    Document on Vitals flowsheet    1.  Document pain using the appropriate pain scale per order or unit policy  2.  Educate and implement non-pharmacologic comfort measures (i.e. relaxation, distraction, massage, cold/heat therapy, etc.)  3.  Pain management medications as ordered  4.  Reassess pain after pain med administration per policy  5.  If opiods administered assess patient's response to pain medication is appropriate per POSS sedation scale  6.  Follow pain management plan developed in collaboration with patient and interdisciplinary team (including palliative care or pain specialists if applicable)  Outcome: Progressing     Problem: Knowledge Deficit - Standard  Goal: Patient and family/care givers will demonstrate understanding of plan of care, disease process/condition, diagnostic tests and medications  Description: Target End Date:  1-3 days or as soon as patient condition allows    Document in Patient Education    1.  Patient and family/caregiver oriented to unit, equipment, visitation policy and means for communicating concern  2.  Complete/review Learning Assessment  3.  Assess knowledge level of disease process/condition, treatment plan, diagnostic tests and medications  4.  Explain disease process/condition, treatment plan, diagnostic tests and medications  Outcome: Progressing     Problem: Self Care  Goal: Patient will have the ability to perform ADLs independently or with assistance (bathe, groom, dress, toilet and feed)  Description: Target End Date:  Prior to discharge  or change in level of care    Document on ADL flowsheet    1.  Assess the capability and level of deficiency to perform ADLs  2.  Encourage family/care giver involvement  3.  Provide assistive devices  4.  Consider PT/OT evaluations  5.  Maintain support, give positive feedback, encourage self-care allowing extra time and verbal cuing as needed  6.  Avoid doing something for patients they can do themselves, but provide assistance as needed  7.  Assist in anticipating/planning individual needs  8.  Collaborate with Case Management and  to meet discharge needs  Outcome: Progressing

## 2023-11-16 NOTE — PROGRESS NOTES
Monitor summary:     Rhythm : SR  Rate : 92-97  Ectopy : R PVC/PAC    UT=.20  QRS=.08  QT=.36        Per telemetry strip summary from monitor room.

## 2023-11-16 NOTE — RESPIRATORY CARE
"   COPD EDUCATION by COPD CLINICAL EDUCATOR  11/15/2023 at 4:51 PM by Pavithra Hackett RRT     Patient reviewed by COPD education team. Patient denies having a history or diagnosis of COPD and is a former smoker.  Therefore, patient is not interested in the COPD program.    COPD Screen  COPD Risk Screening  Do you have a history of COPD?: No (Pt denies COPD)  Do you have a Pulmonologist?: No  COPD Population Screener  During the past 4 weeks, how much did you feel short of breath?: Some of the time  Do you ever cough up any mucus or phlegm?: Yes, a few days a week or month  In the past 12 months, you do less than you used to because of your breathing problems: Disagree/unsure  Have you smoked at least 100 cigarettes in your entire life?: Yes  How old are you?: 60+  COPD Screening Score: 6  COPD Coordinator Recommended: Yes    COPD Assessment  COPD Clinical Specialists ONLY  COPD Education Initiated: Yes--Short Intervention (Pt denies COPD, said has mostly allergies and now PNA pt even denies having COVID, pt does not use any maintenance inhalers, does not want to go to a Pulmonary will see her new PCP, quit smoking in 2007)  DME Company: [employer]  DME Equipment Type: Oxygen  Physician Name: TENZIN VILLAFUERTE P.A.-C.  Pulmonologist Name: NONE  Referrals Initiated: Yes  Pulmonary Rehab: Declined  Smoking Cessation: N/A  Hospice: N/A  Home Health Care: N/A  Mobile Urgent Care Services: N/A  Geriatric Specialty Group: Yes  Private In-Home Care Agency: N/A  (OP) Pulmonary Function Testing: Yes  Interdisciplinary Rounds: Attendance at Rounds (30 Min)    PFT Results    No results found for: \"PFT\"    Meds to Beds        MY COPD ACTION PLAN     It is recommended that patients and physicians /healthcare providers complete this action plan together. This plan should be discussed at each physician visit and updated as needed.    The green, yellow and red zones show groups of symptoms of COPD. This list of symptoms is not " "comprehensive, and you may experience other symptoms. In the \"Actions\" column, your healthcare provider has recommended actions for you to take based on your symptoms.    Patient Name: Vaishali Soares   YOB: 1950   Last Updated on: 11/15/2023  4:51 PM   Green Zone:  I am doing well today Actions     Usual activitiy and exercise level   Take daily medications     Usual amounts of cough and phlegm/mucus   Use oxygen as prescribed     Sleep well at night   Continue regular exercise/diet plan     Appetite is good   At all times avoid cigarette smoke, inhaled irritants     Daily Medications (these medications are taken every day):                Yellow Zone:  I am having a bad day or a COPD flare Actions     More breathless than usual   Continue daily medications     I have less energy for my daily activities   Use quick relief inhaler as ordered     Increased or thicker phlegm/mucus   Use oxygen as prescribed     Using quick relief inhaler/nebulizer more often   Get plenty of rest     Swelling of ankles more than usual   Use pursed lip breathing     More coughing than usual   At all times avoid cigarette smoke, inhaled irritants     I feel like I have a \"chest cold\"     Poor sleep and my symptoms woke me up     My appetite is not good     My medicine is not helping      Call provider immediately if symptoms don’t improve     Continue daily medications, add rescue medications:               Medications to be used during a flare up, (as Discussed with Provider):              Red Zone:  I need urgent medical care Actions     Severe shortness of breath even at rest   Call 911 or seek medical care immediately     Not able to do any activity because of breathing      Fever or shaking chills      Feeling confused or very drowsy       Chest pains      Coughing up blood                  "

## 2023-11-16 NOTE — PROGRESS NOTES
Telemetry Shift Summary     Rhythm SR  HR Range 76-94  Ectopy rPVC/PAC  Measurements .18/.08/.36           Normal Values  Rhythm SR  HR Range    Measurements 0.12-0.20 / 0.06-0.10  / 0.30-0.52

## 2023-11-16 NOTE — CARE PLAN
The patient is Stable - Low risk of patient condition declining or worsening    Shift Goals  Clinical Goals: monitor O2/labs, abx  Patient Goals: sleep, go home  Family Goals: ANNABELLE    Progress made toward(s) clinical / shift goals:  Pt A&Ox4, [place]. Denies pain. Tolerating 2L oxygen /nasal cannula. Pt able to get up the bathroom with steady gait. Call light within reach. Will continue to monitor labs.   Problem: Pain - Standard  Goal: Alleviation of pain or a reduction in pain to the patient’s comfort goal  Outcome: Progressing     Problem: Risk for Infection - COPD  Goal: Patient will remain free from signs and symptoms of infection  Outcome: Progressing     Problem: Impaired Gas Exchange  Goal: Patient will demonstrate improved ventilation and adequate oxygenation and participate in treatment regimen within the level of ability/situation.  Outcome: Progressing     Problem: Risk for Aspiration  Goal: Patient's risk for aspiration will be absent or decrease  Outcome: Progressing       Patient is not progressing towards the following goals:

## 2023-11-17 ENCOUNTER — PATIENT OUTREACH (OUTPATIENT)
Dept: MEDICAL GROUP | Age: 73
End: 2023-11-17
Payer: MEDICARE

## 2023-11-17 LAB
BACTERIA UR CULT: NORMAL
SIGNIFICANT IND 70042: NORMAL
SITE SITE: NORMAL
SOURCE SOURCE: NORMAL

## 2023-11-27 ENCOUNTER — TELEPHONE (OUTPATIENT)
Dept: HEALTH INFORMATION MANAGEMENT | Facility: OTHER | Age: 73
End: 2023-11-27
Payer: MEDICARE

## 2023-11-29 ENCOUNTER — OFFICE VISIT (OUTPATIENT)
Dept: MEDICAL GROUP | Age: 73
End: 2023-11-29
Payer: MEDICARE

## 2023-11-29 VITALS
OXYGEN SATURATION: 90 % | HEART RATE: 112 BPM | HEIGHT: 64 IN | DIASTOLIC BLOOD PRESSURE: 78 MMHG | TEMPERATURE: 96.8 F | SYSTOLIC BLOOD PRESSURE: 120 MMHG | WEIGHT: 173 LBS | BODY MASS INDEX: 29.53 KG/M2

## 2023-11-29 DIAGNOSIS — E03.4 HYPOTHYROIDISM DUE TO ACQUIRED ATROPHY OF THYROID: ICD-10-CM

## 2023-11-29 DIAGNOSIS — Z09 HOSPITAL DISCHARGE FOLLOW-UP: ICD-10-CM

## 2023-11-29 DIAGNOSIS — M81.6 LOCALIZED OSTEOPOROSIS WITHOUT CURRENT PATHOLOGICAL FRACTURE: ICD-10-CM

## 2023-11-29 DIAGNOSIS — R73.03 PREDIABETES: ICD-10-CM

## 2023-11-29 DIAGNOSIS — J43.9 PULMONARY EMPHYSEMA, UNSPECIFIED EMPHYSEMA TYPE (HCC): ICD-10-CM

## 2023-11-29 DIAGNOSIS — E78.5 DYSLIPIDEMIA: ICD-10-CM

## 2023-11-29 PROCEDURE — 3078F DIAST BP <80 MM HG: CPT | Performed by: STUDENT IN AN ORGANIZED HEALTH CARE EDUCATION/TRAINING PROGRAM

## 2023-11-29 PROCEDURE — 3074F SYST BP LT 130 MM HG: CPT | Performed by: STUDENT IN AN ORGANIZED HEALTH CARE EDUCATION/TRAINING PROGRAM

## 2023-11-29 PROCEDURE — 99495 TRANSJ CARE MGMT MOD F2F 14D: CPT | Performed by: STUDENT IN AN ORGANIZED HEALTH CARE EDUCATION/TRAINING PROGRAM

## 2023-11-29 RX ORDER — CALCIUM CARBONATE/VITAMIN D3 500MG-5MCG
1 TABLET ORAL 2 TIMES DAILY
Qty: 180 TABLET | Refills: 3 | Status: SHIPPED | OUTPATIENT
Start: 2023-11-29

## 2023-11-29 ASSESSMENT — ENCOUNTER SYMPTOMS
MYALGIAS: 0
BACK PAIN: 0
DIZZINESS: 0
SHORTNESS OF BREATH: 0
BLOOD IN STOOL: 0
ABDOMINAL PAIN: 0
HEARTBURN: 0
CHILLS: 0
FEVER: 0
DOUBLE VISION: 0
PALPITATIONS: 0
PHOTOPHOBIA: 0
BLURRED VISION: 0
VOMITING: 0
WEAKNESS: 0
NAUSEA: 0
WHEEZING: 0
NECK PAIN: 0
HEADACHES: 0
COUGH: 1

## 2023-11-29 ASSESSMENT — FIBROSIS 4 INDEX: FIB4 SCORE: 1.81

## 2023-11-29 NOTE — PROGRESS NOTES
Subjective:     CC: Hospital follow up    HPI:   Vaishali presents today for Hospital follow up.  She was hospitalized on 11/14 for flu like symptoms and was admitted for acute on chronic respiratory failure sec to COVID-19 infection requiring oxygen.  During hospitalization she was started on Dexametasome to complete 10 day treatment. Labs during hospital stay relevant for low TSH slightly better than previously number when checked by her PCP this could be sec to acute illness?  Patient improved after two night in the hospital and was discharged home.  She uses Oxygen at home at times, appears that she is not very compliant with her care. She was also diagnosed with Osteoporosis in August and was told to scheduled an appointment to discuss treatment but she did not follow up.  Today patient states feeling well she has been compliant with Thyroid medicine she takes it in the morning on empty stomach at around 6 am.  Denies any complaints no SOB, chest pain or palpitations, has not been using her Oxygen at home, apparently no need for it.  [facility] patient on 11/17/23.  We also discussed appropriate screening and vaccines for her age but she declined.  I recommended vaccinations and screening but she is not interested and understands the risks.    No problems updated.    Health Maintenance: Completed    ROS:  Review of Systems   Constitutional:  Negative for chills, fever and malaise/fatigue.   HENT:  Positive for hearing loss.         Chronic, needs new hearing aids I encouraged her to make get new ones    Eyes:  Negative for blurred vision, double vision and photophobia.   Respiratory:  Positive for cough. Negative for shortness of breath and wheezing.         Chronic sporadic cough sec to COPD   Cardiovascular:  Negative for chest pain and palpitations.   Gastrointestinal:  Negative for abdominal pain, blood in stool, heartburn, melena, nausea and vomiting.   Genitourinary:  Negative for dysuria and  "urgency.   Musculoskeletal:  Negative for back pain, myalgias and neck pain.   Neurological:  Negative for dizziness, weakness and headaches.   Psychiatric/Behavioral:  Negative for suicidal ideas.        Objective:     Exam:  /78 (BP Location: Right arm, Patient Position: Sitting, BP Cuff Size: Adult)   Pulse (!) 112   Temp 36 °C (96.8 °F) (Temporal)   Ht 1.626 m (5' 4\")   Wt 78.5 kg (173 lb)   SpO2 90%   BMI 29.70 kg/m²  Body mass index is 29.7 kg/m².    Physical Exam  Vitals reviewed.   Constitutional:       General: She is not in acute distress.  HENT:      Head: Normocephalic and atraumatic.      Right Ear: Tympanic membrane normal.      Left Ear: Tympanic membrane normal.      Mouth/Throat:      Mouth: Mucous membranes are moist.      Pharynx: Oropharynx is clear. No oropharyngeal exudate.   Eyes:      General: No scleral icterus.     Extraocular Movements: Extraocular movements intact.      Pupils: Pupils are equal, round, and reactive to light.   Cardiovascular:      Rate and Rhythm: Normal rate and regular rhythm.      Pulses: Normal pulses.      Heart sounds: Normal heart sounds. No murmur heard.  Pulmonary:      Effort: No respiratory distress.      Breath sounds: No wheezing or rhonchi.   Abdominal:      General: There is no distension.      Palpations: Abdomen is soft.      Tenderness: There is no abdominal tenderness. There is no guarding or rebound.   Musculoskeletal:         General: No swelling. Normal range of motion.      Cervical back: Normal range of motion and neck supple.      Right lower leg: No edema.      Left lower leg: No edema.   Skin:     General: Skin is warm.      Capillary Refill: Capillary refill takes less than 2 seconds.      Coloration: Skin is not jaundiced.   Neurological:      General: No focal deficit present.      Mental Status: She is alert.      Cranial Nerves: No cranial nerve deficit.      Sensory: No sensory deficit.   Psychiatric:         Mood and Affect: " Mood normal.         A chaperone was offered to the patient during today's exam.: Patient declined.    Labs: Ordered new labs    Assessment & Plan:     73 y.o. female with the following -       1. Hospital discharge follow-up  - Hospitalization on 11/14 for acute resp failure sec to covid 19 infection  - Treated with Oxygen and dexamethasone   - Medication review and reconciliation updated  - Patient states that she is back at her baseline she completed 10 days of steroids     2. Localized osteoporosis without current pathological fracture  - Diagnosis oin August   - Left femur Osteoporosis T-score -2.5  - Lumbar spine osteopenia  - Patient was contacted to come to the office to discuss treatment but did not make appointment  - I discuss with her options for treatment  - She would like to think about treatment and discuss it next visit in 4 weeks  - Recommended Ca and Vit D 1200 mg/800 IU daily  - Weight bearing exercise    3. Pulmonary emphysema, unspecified emphysema type (HCC)  - chronic, stable  - On and off Oxygen at home  - Not taking any medications currently  - I suggested PFT's    - PULMONARY FUNCTION TESTS -Test requested: Complete Pulmonary Function Test; Future    4. Hypothyroidism due to acquired atrophy of thyroid  - TSH 0.275 in the hospital  - Dose was decrease from 88 to 75 mcg  - In the pass not tolerated lower doses (50 mcg) feeling very tired  - I will recheck Thyroid hormones in 4 weeks  - Follow up after labs done    - TSH; Future  - FREE THYROXINE; Future  - TRIIDOTHYRONINE; Future    5. Dyslipidemia  - Chrinic hx, not on therapy  - Unclear why, likely declined  - Will repeat labs    - Lipid Profile; Future    6. Prediabetes  - Hx of prediabetes  - Labs     - HEMOGLOBIN A1C; Future       Referral for genetic research was offered. Patient declined.      Return in about 4 weeks (around 12/27/2023) for Labs, Hypothyr.    Please note that this dictation was created using voice recognition  software. I have made every reasonable attempt to correct obvious errors, but I expect that there are errors of grammar and possibly content that I did not discover before finalizing the note.

## 2023-11-29 NOTE — PATIENT INSTRUCTIONS
- Continue levothyroxin 75 mcg daily  - Start Ca/vit D tab daily BID  - Labs fasting in 4 weeks  - Follow up after labs done  - Think about treatment for Osteoporosis

## 2023-12-21 ENCOUNTER — HOSPITAL ENCOUNTER (OUTPATIENT)
Dept: LAB | Facility: MEDICAL CENTER | Age: 73
End: 2023-12-21
Attending: STUDENT IN AN ORGANIZED HEALTH CARE EDUCATION/TRAINING PROGRAM
Payer: MEDICARE

## 2023-12-21 DIAGNOSIS — E78.5 DYSLIPIDEMIA: ICD-10-CM

## 2023-12-21 DIAGNOSIS — R73.03 PREDIABETES: ICD-10-CM

## 2023-12-21 DIAGNOSIS — E03.4 HYPOTHYROIDISM DUE TO ACQUIRED ATROPHY OF THYROID: ICD-10-CM

## 2023-12-21 LAB
CHOLEST SERPL-MCNC: 212 MG/DL (ref 100–199)
EST. AVERAGE GLUCOSE BLD GHB EST-MCNC: 123 MG/DL
FASTING STATUS PATIENT QL REPORTED: NORMAL
HBA1C MFR BLD: 5.9 % (ref 4–5.6)
HDLC SERPL-MCNC: 43 MG/DL
LDLC SERPL CALC-MCNC: 135 MG/DL
T3 SERPL-MCNC: 135 NG/DL (ref 60–181)
T4 FREE SERPL-MCNC: 1.24 NG/DL (ref 0.93–1.7)
TRIGL SERPL-MCNC: 168 MG/DL (ref 0–149)
TSH SERPL DL<=0.005 MIU/L-ACNC: 0.97 UIU/ML (ref 0.38–5.33)

## 2023-12-21 PROCEDURE — 84443 ASSAY THYROID STIM HORMONE: CPT

## 2023-12-21 PROCEDURE — 80061 LIPID PANEL: CPT

## 2023-12-21 PROCEDURE — 84480 ASSAY TRIIODOTHYRONINE (T3): CPT

## 2023-12-21 PROCEDURE — 36415 COLL VENOUS BLD VENIPUNCTURE: CPT

## 2023-12-21 PROCEDURE — 84439 ASSAY OF FREE THYROXINE: CPT

## 2023-12-21 PROCEDURE — 83036 HEMOGLOBIN GLYCOSYLATED A1C: CPT

## 2023-12-27 ENCOUNTER — OFFICE VISIT (OUTPATIENT)
Dept: MEDICAL GROUP | Age: 73
End: 2023-12-27
Payer: MEDICARE

## 2023-12-27 VITALS
SYSTOLIC BLOOD PRESSURE: 112 MMHG | DIASTOLIC BLOOD PRESSURE: 64 MMHG | OXYGEN SATURATION: 90 % | HEIGHT: 64 IN | HEART RATE: 102 BPM | BODY MASS INDEX: 30.05 KG/M2 | WEIGHT: 176 LBS | TEMPERATURE: 97.6 F

## 2023-12-27 DIAGNOSIS — R73.03 PREDIABETES: ICD-10-CM

## 2023-12-27 DIAGNOSIS — D64.9 NORMOCHROMIC NORMOCYTIC ANEMIA: ICD-10-CM

## 2023-12-27 DIAGNOSIS — M81.6 LOCALIZED OSTEOPOROSIS WITHOUT CURRENT PATHOLOGICAL FRACTURE: ICD-10-CM

## 2023-12-27 DIAGNOSIS — E03.4 HYPOTHYROIDISM DUE TO ACQUIRED ATROPHY OF THYROID: ICD-10-CM

## 2023-12-27 DIAGNOSIS — E78.2 MIXED DYSLIPIDEMIA: ICD-10-CM

## 2023-12-27 PROCEDURE — 3078F DIAST BP <80 MM HG: CPT | Performed by: STUDENT IN AN ORGANIZED HEALTH CARE EDUCATION/TRAINING PROGRAM

## 2023-12-27 PROCEDURE — 3074F SYST BP LT 130 MM HG: CPT | Performed by: STUDENT IN AN ORGANIZED HEALTH CARE EDUCATION/TRAINING PROGRAM

## 2023-12-27 PROCEDURE — 99214 OFFICE O/P EST MOD 30 MIN: CPT | Performed by: STUDENT IN AN ORGANIZED HEALTH CARE EDUCATION/TRAINING PROGRAM

## 2023-12-27 RX ORDER — EPINEPHRINE 1 MG/ML(1)
0.5 AMPUL (ML) INJECTION PRN
OUTPATIENT
Start: 2024-01-02

## 2023-12-27 RX ORDER — DIPHENHYDRAMINE HYDROCHLORIDE 50 MG/ML
50 INJECTION INTRAMUSCULAR; INTRAVENOUS PRN
OUTPATIENT
Start: 2024-01-02

## 2023-12-27 RX ORDER — METHYLPREDNISOLONE SODIUM SUCCINATE 125 MG/2ML
125 INJECTION, POWDER, LYOPHILIZED, FOR SOLUTION INTRAMUSCULAR; INTRAVENOUS PRN
OUTPATIENT
Start: 2024-01-02

## 2023-12-27 RX ORDER — ATORVASTATIN CALCIUM 20 MG/1
20 TABLET, FILM COATED ORAL NIGHTLY
Qty: 90 TABLET | Refills: 3 | Status: SHIPPED | OUTPATIENT
Start: 2023-12-27

## 2023-12-27 ASSESSMENT — ENCOUNTER SYMPTOMS
HEADACHES: 0
COUGH: 0
BLURRED VISION: 0
HEMOPTYSIS: 0
WEAKNESS: 0
CHILLS: 0
BLOOD IN STOOL: 0
DEPRESSION: 0
MYALGIAS: 0
FEVER: 0
SHORTNESS OF BREATH: 0
PALPITATIONS: 0
DIZZINESS: 0
ABDOMINAL PAIN: 0
DOUBLE VISION: 0
PHOTOPHOBIA: 0
NECK PAIN: 0
HEARTBURN: 0

## 2023-12-27 ASSESSMENT — FIBROSIS 4 INDEX: FIB4 SCORE: 1.81

## 2023-12-27 NOTE — PATIENT INSTRUCTIONS
- Continue home meds  - Start lipitor 20 mg daily  - Labs in 3 months  - You will get call to schedule appointment for Osteoporosis treatment   - Follow up in 3 months

## 2023-12-27 NOTE — PROGRESS NOTES
"Subjective:     CC: Follow up labs    HPI:   Vaishali presents today for follow up with lab results.  Today we discussed again untreated Osteoporosis and he decided to get treatment with Prolia.  Lab work showed normal Thyroid function, A1c: 5.9%, Lipid profile Tot Chol:212, Triglycerides:168 and LDL:135. Calculated ASCVD score 12.6% recommended statin therapy. We will recheck her H&H again in few months since it was low while in the hospital could have been sec to stress and frequent lab drowns since she had normal H&H prior this hospital stay. No SOB with activity.    Health Maintenance: Completed    ROS:  Review of Systems   Constitutional:  Negative for chills, fever and malaise/fatigue.   HENT:  Positive for hearing loss.         Baseline   Eyes:  Negative for blurred vision, double vision and photophobia.   Respiratory:  Negative for cough, hemoptysis and shortness of breath.    Cardiovascular:  Negative for chest pain, palpitations and leg swelling.   Gastrointestinal:  Negative for abdominal pain, blood in stool, heartburn and melena.   Genitourinary:  Negative for dysuria and urgency.   Musculoskeletal:  Negative for myalgias and neck pain.   Neurological:  Negative for dizziness, weakness and headaches.   Psychiatric/Behavioral:  Negative for depression and suicidal ideas.        Objective:     Exam:  /64 (BP Location: Right arm, Patient Position: Sitting, BP Cuff Size: Adult)   Pulse (!) 102   Temp 36.4 °C (97.6 °F) (Temporal)   Ht 1.626 m (5' 4\")   Wt 79.8 kg (176 lb)   SpO2 90%   BMI 30.21 kg/m²  Body mass index is 30.21 kg/m².    Physical Exam  Vitals reviewed.   Constitutional:       General: She is not in acute distress.  HENT:      Head: Normocephalic and atraumatic.      Mouth/Throat:      Mouth: Mucous membranes are moist.   Eyes:      General: No scleral icterus.     Extraocular Movements: Extraocular movements intact.      Pupils: Pupils are equal, round, and reactive to light. "   Cardiovascular:      Rate and Rhythm: Normal rate and regular rhythm.      Pulses: Normal pulses.      Heart sounds: Normal heart sounds. No murmur heard.  Pulmonary:      Effort: No respiratory distress.      Breath sounds: No wheezing.   Abdominal:      General: There is no distension.      Palpations: Abdomen is soft.      Tenderness: There is no abdominal tenderness. There is no guarding or rebound.   Musculoskeletal:         General: No swelling. Normal range of motion.      Cervical back: Normal range of motion and neck supple.      Right lower leg: No edema.      Left lower leg: No edema.   Skin:     General: Skin is warm.      Capillary Refill: Capillary refill takes less than 2 seconds.      Coloration: Skin is not jaundiced.   Neurological:      General: No focal deficit present.      Mental Status: She is alert.   Psychiatric:         Mood and Affect: Mood normal.         A chaperone was offered to the patient during today's exam.: Patient declined.    Labs: Reviewed     Assessment & Plan:     73 y.o. female with the following -     1. Localized osteoporosis without current pathological fracture  - Dexa done in August this year  - Osteoporosis of proximal left femur and osteopenia of lumbar spine  - Last visit said that would make decision for today about therapy  - Opted for Prolia   - Therapy order placed  - Se will get notified to make appointment for therapy every 6 months  - Repeat DEXA in 2 years   - Continue Vit D and Calcium    2. Hypothyroidism due to acquired atrophy of thyroid  - Chronic, stable  - Asymptomatic  - Labs showed Euthyroid     3. Mixed dyslipidemia  - Labs this month showed elevated LDL, Tot Chol, Triglycerides   - ASCVD score 12.6%  - Statin therapy recommended    - atorvastatin (LIPITOR) 20 MG Tab; Take 1 Tablet by mouth every evening.  Dispense: 90 Tablet; Refill: 3  - Lipid Profile; Future    4. Prediabetes  - A1c: 5.9%  - States eating a lot of fast food recently  - We will  monitor in 6 months    5. Normochromic normocytic anemia  - Unclear etiology could be multifactorial while in the hospital stress, frequent lab drown  - No evidence of bleeding, we will recheck in 3 months   - If not at baseline next visit will treat with Oral Iron  - Denies SOB with exertion     - CBC WITH DIFFERENTIAL; Future         Return in about 3 months (around 3/27/2024) for Hyperchol, Labs.    Please note that this dictation was created using voice recognition software. I have made every reasonable attempt to correct obvious errors, but I expect that there are errors of grammar and possibly content that I did not discover before finalizing the note.

## 2023-12-28 ENCOUNTER — TELEPHONE (OUTPATIENT)
Dept: ONCOLOGY | Facility: MEDICAL CENTER | Age: 73
End: 2023-12-28
Payer: MEDICARE

## 2024-01-04 ENCOUNTER — PATIENT OUTREACH (OUTPATIENT)
Dept: HEALTH INFORMATION MANAGEMENT | Facility: OTHER | Age: 74
End: 2024-01-04
Payer: MEDICARE

## 2024-01-04 DIAGNOSIS — J44.1 ACUTE EXACERBATION OF CHRONIC OBSTRUCTIVE PULMONARY DISEASE (COPD) (HCC): ICD-10-CM

## 2024-01-04 NOTE — PROGRESS NOTES
1st attempt to contact pt to introduce the PCM program. Unable to reach; LVM with contact information. Will follow up for 2nd attempt later this month.

## 2024-01-23 ENCOUNTER — TELEPHONE (OUTPATIENT)
Dept: ONCOLOGY | Facility: MEDICAL CENTER | Age: 74
End: 2024-01-23
Payer: MEDICARE

## 2024-01-29 ENCOUNTER — HOSPITAL ENCOUNTER (OUTPATIENT)
Dept: PULMONOLOGY | Facility: MEDICAL CENTER | Age: 74
End: 2024-01-29
Attending: STUDENT IN AN ORGANIZED HEALTH CARE EDUCATION/TRAINING PROGRAM
Payer: MEDICARE

## 2024-01-29 DIAGNOSIS — J43.9 PULMONARY EMPHYSEMA, UNSPECIFIED EMPHYSEMA TYPE (HCC): ICD-10-CM

## 2024-01-29 PROCEDURE — 94729 DIFFUSING CAPACITY: CPT

## 2024-01-29 PROCEDURE — 94726 PLETHYSMOGRAPHY LUNG VOLUMES: CPT | Mod: 26 | Performed by: STUDENT IN AN ORGANIZED HEALTH CARE EDUCATION/TRAINING PROGRAM

## 2024-01-29 PROCEDURE — 94726 PLETHYSMOGRAPHY LUNG VOLUMES: CPT

## 2024-01-29 PROCEDURE — 94060 EVALUATION OF WHEEZING: CPT | Mod: 26 | Performed by: STUDENT IN AN ORGANIZED HEALTH CARE EDUCATION/TRAINING PROGRAM

## 2024-01-29 PROCEDURE — 94060 EVALUATION OF WHEEZING: CPT

## 2024-01-29 PROCEDURE — 94729 DIFFUSING CAPACITY: CPT | Mod: 26 | Performed by: STUDENT IN AN ORGANIZED HEALTH CARE EDUCATION/TRAINING PROGRAM

## 2024-01-29 RX ADMIN — Medication 2.5 MG: at 10:07

## 2024-01-29 ASSESSMENT — PULMONARY FUNCTION TESTS
FEV1/FVC: 68.46
FEV1_PERCENT_CHANGE: 3
FEV1/FVC: 68
FVC_PREDICTED: 2.69
FEV1_PERCENT_PREDICTED: 46
FVC_LLN: 2.25
FEV1/FVC_PERCENT_PREDICTED: 88
FVC: 1.49
FEV1/FVC_PERCENT_PREDICTED: 77
FEV1_PERCENT_CHANGE: 2
FEV1/FVC_PERCENT_LLN: 65
FEV1_PERCENT_PREDICTED: 49
FEV1/FVC_PERCENT_LLN: 65
FEV1: 0.97
FEV1/FVC_PERCENT_CHANGE: -1
FEV1_LLN: 1.73
FEV1/FVC_PREDICTED: 78
FEV1_PREDICTED: 2.07
FEV1/FVC_PERCENT_PREDICTED: 88
FEV1: 1.02
FEV1/FVC_PERCENT_PREDICTED: 89
FVC_LLN: 2.25
FEV1/FVC: 69
FVC_PERCENT_PREDICTED: 55
FEV1/FVC_PERCENT_PREDICTED: 88
FEV1/FVC: 69
FVC: 1.41
FEV1/FVC_PERCENT_CHANGE: 67
FEV1_LLN: 1.73
FVC_PERCENT_PREDICTED: 52

## 2024-02-01 NOTE — PROCEDURES
DATE OF SERVICE:  01/29/2024     PULMONARY FUNCTION TEST INTERPRETATION     Using ATS criteria, there is a severe reduction in the FEV1 and FVC   representing severe obstruction.  There is a negative bronchodilator response.     Notably, using NHANES criteria, this does not necessarily represent   obstruction.     Spirometry demonstrates scooping of the expiratory loop supporting   obstruction.     Full lung volumes demonstrate severe air trapping as well as a reduction in   ERV secondary to BMI of 30.     The DLCO is reduced.     SUMMARY: Findings are consistent with severe COPD with air trapping as well as   likely emphysema as described.  The low DLCO should be correlated with recent   hemoglobin level.  If the patient has edema on exam, consider ordering an   echocardiogram.  Additionally, the patient should be considered for  long-acting inhalers   due to the finding of severe air trapping.        ______________________________  DO YVETTE SMALL/JOSE/JAN    DD:  02/01/2024 10:49  DT:  02/01/2024 11:35    Job#:  [id number]

## 2024-03-06 DIAGNOSIS — E03.4 HYPOTHYROIDISM DUE TO ACQUIRED ATROPHY OF THYROID: ICD-10-CM

## 2024-03-06 RX ORDER — LEVOTHYROXINE SODIUM 0.07 MG/1
75 TABLET ORAL
Qty: 90 TABLET | Refills: 2 | Status: SHIPPED | OUTPATIENT
Start: 2024-03-06

## 2024-03-11 ENCOUNTER — PATIENT OUTREACH (OUTPATIENT)
Dept: HEALTH INFORMATION MANAGEMENT | Facility: OTHER | Age: 74
End: 2024-03-11
Payer: MEDICARE

## 2024-03-11 DIAGNOSIS — E03.4 HYPOTHYROIDISM DUE TO ACQUIRED ATROPHY OF THYROID: ICD-10-CM

## 2024-03-11 DIAGNOSIS — M85.89 OSTEOPENIA OF MULTIPLE SITES: ICD-10-CM

## 2024-03-11 DIAGNOSIS — E78.5 DYSLIPIDEMIA: ICD-10-CM

## 2024-03-11 DIAGNOSIS — E66.9 OBESITY (BMI 30-39.9): ICD-10-CM

## 2024-03-11 DIAGNOSIS — D64.9 NORMOCHROMIC NORMOCYTIC ANEMIA: ICD-10-CM

## 2024-03-11 DIAGNOSIS — J96.01 ACUTE RESPIRATORY FAILURE WITH HYPOXIA (HCC): ICD-10-CM

## 2024-03-11 DIAGNOSIS — K57.90 DIVERTICULOSIS: ICD-10-CM

## 2024-03-11 DIAGNOSIS — M81.6 LOCALIZED OSTEOPOROSIS WITHOUT CURRENT PATHOLOGICAL FRACTURE: ICD-10-CM

## 2024-03-11 DIAGNOSIS — J44.1 ACUTE EXACERBATION OF CHRONIC OBSTRUCTIVE PULMONARY DISEASE (COPD) (HCC): ICD-10-CM

## 2024-03-11 DIAGNOSIS — J41.8 MIXED SIMPLE AND MUCOPURULENT CHRONIC BRONCHITIS (HCC): ICD-10-CM

## 2024-03-11 NOTE — PROGRESS NOTES
Contacted pt to introduce the PCM program. Pt is interested in the program. Pt told this RN that she would like to talk about the program more in person, but that she would like to wait. She requested I mail her our pamphlet w/ contact information and hours.    Reports one morning last week her heart was pounding, she was SOB, and she had a hard time walking across her house. She had attributed it to her simvastatin, so she stopped taking it. She mentioned she may go to  for follow up. Informed pt that it would actually be advised she go to the ER for the symptoms she described. Did not report current symptoms. Reiterated she should go to the ER if she experiences those symptoms; expressed understanding. Encounter routed to PCP as an FYI.

## 2024-03-28 ENCOUNTER — HOSPITAL ENCOUNTER (OUTPATIENT)
Dept: LAB | Facility: MEDICAL CENTER | Age: 74
End: 2024-03-28
Attending: STUDENT IN AN ORGANIZED HEALTH CARE EDUCATION/TRAINING PROGRAM
Payer: MEDICARE

## 2024-03-28 DIAGNOSIS — D64.9 NORMOCHROMIC NORMOCYTIC ANEMIA: ICD-10-CM

## 2024-03-28 DIAGNOSIS — E78.2 MIXED DYSLIPIDEMIA: ICD-10-CM

## 2024-03-28 LAB
ANISOCYTOSIS BLD QL SMEAR: ABNORMAL
BASOPHILS # BLD AUTO: 0.8 % (ref 0–1.8)
BASOPHILS # BLD: 0.06 K/UL (ref 0–0.12)
CHOLEST SERPL-MCNC: 177 MG/DL (ref 100–199)
COMMENT 1642: NORMAL
EOSINOPHIL # BLD AUTO: 0.17 K/UL (ref 0–0.51)
EOSINOPHIL NFR BLD: 2.4 % (ref 0–6.9)
ERYTHROCYTE [DISTWIDTH] IN BLOOD BY AUTOMATED COUNT: 46.2 FL (ref 35.9–50)
FASTING STATUS PATIENT QL REPORTED: NORMAL
HCT VFR BLD AUTO: 32 % (ref 37–47)
HDLC SERPL-MCNC: 43 MG/DL
HGB BLD-MCNC: 9.5 G/DL (ref 12–16)
IMM GRANULOCYTES # BLD AUTO: 0.01 K/UL (ref 0–0.11)
IMM GRANULOCYTES NFR BLD AUTO: 0.1 % (ref 0–0.9)
LDLC SERPL CALC-MCNC: 100 MG/DL
LYMPHOCYTES # BLD AUTO: 2.45 K/UL (ref 1–4.8)
LYMPHOCYTES NFR BLD: 34.4 % (ref 22–41)
MCH RBC QN AUTO: 24.5 PG (ref 27–33)
MCHC RBC AUTO-ENTMCNC: 29.7 G/DL (ref 32.2–35.5)
MCV RBC AUTO: 82.5 FL (ref 81.4–97.8)
MICROCYTES BLD QL SMEAR: ABNORMAL
MONOCYTES # BLD AUTO: 0.58 K/UL (ref 0–0.85)
MONOCYTES NFR BLD AUTO: 8.1 % (ref 0–13.4)
MORPHOLOGY BLD-IMP: NORMAL
NEUTROPHILS # BLD AUTO: 3.86 K/UL (ref 1.82–7.42)
NEUTROPHILS NFR BLD: 54.2 % (ref 44–72)
NRBC # BLD AUTO: 0 K/UL
NRBC BLD-RTO: 0 /100 WBC (ref 0–0.2)
OVALOCYTES BLD QL SMEAR: NORMAL
PLATELET # BLD AUTO: 413 K/UL (ref 164–446)
PLATELET BLD QL SMEAR: NORMAL
PMV BLD AUTO: 9.8 FL (ref 9–12.9)
POIKILOCYTOSIS BLD QL SMEAR: NORMAL
RBC # BLD AUTO: 3.88 M/UL (ref 4.2–5.4)
RBC BLD AUTO: PRESENT
TRIGL SERPL-MCNC: 168 MG/DL (ref 0–149)
WBC # BLD AUTO: 7.1 K/UL (ref 4.8–10.8)

## 2024-03-28 PROCEDURE — 80061 LIPID PANEL: CPT

## 2024-03-28 PROCEDURE — 36415 COLL VENOUS BLD VENIPUNCTURE: CPT

## 2024-03-28 PROCEDURE — 85025 COMPLETE CBC W/AUTO DIFF WBC: CPT

## 2024-04-01 ENCOUNTER — APPOINTMENT (OUTPATIENT)
Dept: MEDICAL GROUP | Age: 74
End: 2024-04-01
Payer: MEDICARE

## 2024-04-01 VITALS
BODY MASS INDEX: 30.22 KG/M2 | SYSTOLIC BLOOD PRESSURE: 118 MMHG | HEART RATE: 102 BPM | OXYGEN SATURATION: 89 % | TEMPERATURE: 98 F | DIASTOLIC BLOOD PRESSURE: 62 MMHG | WEIGHT: 177 LBS | HEIGHT: 64 IN

## 2024-04-01 DIAGNOSIS — M81.6 LOCALIZED OSTEOPOROSIS WITHOUT CURRENT PATHOLOGICAL FRACTURE: ICD-10-CM

## 2024-04-01 DIAGNOSIS — J44.9 CHRONIC OBSTRUCTIVE PULMONARY DISEASE, UNSPECIFIED COPD TYPE (HCC): ICD-10-CM

## 2024-04-01 DIAGNOSIS — E78.2 MIXED DYSLIPIDEMIA: ICD-10-CM

## 2024-04-01 PROCEDURE — 3074F SYST BP LT 130 MM HG: CPT | Performed by: STUDENT IN AN ORGANIZED HEALTH CARE EDUCATION/TRAINING PROGRAM

## 2024-04-01 PROCEDURE — 99214 OFFICE O/P EST MOD 30 MIN: CPT | Performed by: STUDENT IN AN ORGANIZED HEALTH CARE EDUCATION/TRAINING PROGRAM

## 2024-04-01 PROCEDURE — 3078F DIAST BP <80 MM HG: CPT | Performed by: STUDENT IN AN ORGANIZED HEALTH CARE EDUCATION/TRAINING PROGRAM

## 2024-04-01 RX ORDER — BUDESONIDE AND FORMOTEROL FUMARATE DIHYDRATE 80; 4.5 UG/1; UG/1
2 AEROSOL RESPIRATORY (INHALATION) 2 TIMES DAILY
Qty: 10.2 G | Refills: 3 | Status: SHIPPED | OUTPATIENT
Start: 2024-04-01

## 2024-04-01 RX ORDER — ALENDRONATE SODIUM 70 MG/1
70 TABLET ORAL
Qty: 12 TABLET | Refills: 2 | Status: SHIPPED | OUTPATIENT
Start: 2024-04-01

## 2024-04-01 ASSESSMENT — ENCOUNTER SYMPTOMS
WEAKNESS: 0
FEVER: 0
ABDOMINAL PAIN: 0
BLOOD IN STOOL: 0
BLURRED VISION: 0
DIZZINESS: 0
DEPRESSION: 0
COUGH: 1
SPUTUM PRODUCTION: 1
HEARTBURN: 0
PALPITATIONS: 0
BRUISES/BLEEDS EASILY: 0
WHEEZING: 0
CHILLS: 0
SHORTNESS OF BREATH: 0
HEADACHES: 0

## 2024-04-01 ASSESSMENT — FIBROSIS 4 INDEX: FIB4 SCORE: 1.28

## 2024-04-01 ASSESSMENT — PATIENT HEALTH QUESTIONNAIRE - PHQ9: CLINICAL INTERPRETATION OF PHQ2 SCORE: 0

## 2024-04-01 NOTE — PROGRESS NOTES
Subjective:     CC: Lab review follow-up    HPI:   Vaishali presents today for follow-up lab review and discuss medical therapy for COPD and osteoporosis.  I saw Mrs. Soares for the first time in in late November last year for hospital discharge follow-up.  She was admitted for respiratory failure secondary to COVID infection.  She was also diagnosed few months prior to this with osteoporosis but treatment was never discussed with her.  I started her on calcium and vitamin D and discussed therapy for osteoporosis but she wanted to wait and do her research to see what kind of medication will be more appropriate for her since she is afraid of side effects.  Patient was also started on atorvastatin in most recent visit given elevated ASCVD score.  Most recent lab work showed improved total cholesterol from 2 12-1 77, unchanged triglycerides 168, unchanged HDL 43, and improved LDL from 135 to 100.  She also underwent PFT in late January showing severe COPD with air trapping, patient is not using any inhalers.  She does experience shortness of breath with walking, so likely she needs to be on a long-acting medication.      ROS:  Review of Systems   Constitutional:  Negative for chills, fever and malaise/fatigue.   HENT:  Negative for congestion and tinnitus.    Eyes:  Negative for blurred vision.   Respiratory:  Positive for cough and sputum production. Negative for shortness of breath and wheezing.    Cardiovascular:  Negative for chest pain and palpitations.   Gastrointestinal:  Negative for abdominal pain, blood in stool, heartburn and melena.   Genitourinary:  Negative for dysuria and urgency.   Neurological:  Negative for dizziness, weakness and headaches.   Endo/Heme/Allergies:  Does not bruise/bleed easily.   Psychiatric/Behavioral:  Negative for depression and suicidal ideas.        Objective:     Exam:  /62 (BP Location: Right arm, Patient Position: Sitting, BP Cuff Size: Adult)   Pulse (!) 102   Temp 36.7  "°C (98 °F) (Temporal)   Ht 1.626 m (5' 4\")   Wt 80.3 kg (177 lb)   SpO2 89%   BMI 30.38 kg/m²  Body mass index is 30.38 kg/m².    Physical Exam  Vitals reviewed.   Constitutional:       General: She is not in acute distress.  HENT:      Head: Normocephalic and atraumatic.      Mouth/Throat:      Mouth: Mucous membranes are moist.   Eyes:      General: No scleral icterus.     Extraocular Movements: Extraocular movements intact.      Pupils: Pupils are equal, round, and reactive to light.   Cardiovascular:      Rate and Rhythm: Normal rate and regular rhythm.      Heart sounds: Normal heart sounds. No murmur heard.  Pulmonary:      Effort: No respiratory distress.      Breath sounds: Normal breath sounds. No wheezing.   Abdominal:      General: There is no distension.      Palpations: Abdomen is soft.      Tenderness: There is no abdominal tenderness. There is no guarding or rebound.   Musculoskeletal:      Cervical back: Normal range of motion and neck supple.      Right lower leg: No edema.      Left lower leg: No edema.   Skin:     General: Skin is warm.      Capillary Refill: Capillary refill takes less than 2 seconds.      Coloration: Skin is not jaundiced.   Neurological:      General: No focal deficit present.      Mental Status: She is alert.   Psychiatric:         Mood and Affect: Mood normal.         Behavior: Behavior normal.           Labs: Reviewed    Assessment & Plan:     74 y.o. female with the following -     1. Localized osteoporosis without current pathological fracture  -Chronic, currently taking vitamin D and calcium  -Diagnosis was made last August but started therapy just recently with vitamin D and calcium, she was hesitant about therapy for osteoporosis due to potential side effects  -Patient wanted to wait any longer to decide what kind of medication she will take  -Today she opted for Fosamax, she did not want to try Prolia because this is a long-acting drug.  -Patient will start Fosamax " 1 tablet weekly  -She will notify me if side effects  - alendronate (FOSAMAX) 70 MG Tab; Take 1 Tablet by mouth every 7 days.  Dispense: 12 Tablet; Refill: 2    2. Chronic obstructive pulmonary disease, unspecified COPD type (HCC)  -Chronic, not properly treated  -Patient in the past was taking some sort inhalers  -Patient was admitted to the hospitalist last November for acute respiratory failure secondary to COVID  -Recent PFT showed severe COPD  -Recommended therapy, patient opted for Symbicort  -I will see her again in 3 to 4 months for her annual exam and assess response    - budesonide-formoterol (SYMBICORT) 80-4.5 MCG/ACT Aerosol; Inhale 2 Puffs 2 times a day.  Dispense: 10.2 g; Refill: 3    3. Mixed dyslipidemia  -Chronic, improving  -Therapy with Lipitor was started in late December  -Lipid profile showed improvement in total cholesterol and LDL  -Continue same therapy    HCC Gap Form    Last edited 04/01/24 13:50 PDT by Alberto Cisneros M.D.         Return in about 4 months (around 8/1/2024) for Annual.    Please note that this dictation was created using voice recognition software. I have made every reasonable attempt to correct obvious errors, but I expect that there are errors of grammar and possibly content that I did not discover before finalizing the note.

## 2024-04-01 NOTE — PATIENT INSTRUCTIONS
- Continue home medications  -Start Fosamax 1 tablet weekly  -Start Symbicort twice daily  -Schedule annual exam in 3 to 4 months

## 2024-04-21 DIAGNOSIS — E78.2 MIXED DYSLIPIDEMIA: ICD-10-CM

## 2024-04-25 RX ORDER — ATORVASTATIN CALCIUM 20 MG/1
20 TABLET, FILM COATED ORAL EVERY EVENING
Qty: 100 TABLET | Refills: 3 | Status: SHIPPED | OUTPATIENT
Start: 2024-04-25

## 2024-04-26 ENCOUNTER — TELEPHONE (OUTPATIENT)
Dept: HEALTH INFORMATION MANAGEMENT | Facility: OTHER | Age: 74
End: 2024-04-26
Payer: MEDICARE

## 2024-06-14 ENCOUNTER — PATIENT OUTREACH (OUTPATIENT)
Dept: HEALTH INFORMATION MANAGEMENT | Facility: OTHER | Age: 74
End: 2024-06-14
Payer: MEDICARE

## 2024-06-14 DIAGNOSIS — J41.8 MIXED SIMPLE AND MUCOPURULENT CHRONIC BRONCHITIS (HCC): ICD-10-CM

## 2024-06-14 DIAGNOSIS — J96.01 ACUTE RESPIRATORY FAILURE WITH HYPOXIA (HCC): ICD-10-CM

## 2024-06-14 NOTE — PROGRESS NOTES
Contacted pt to follow up on PCM. Unable to meet her after her last PCP appt. She is still interested in services. Completed some of the intake. She said her main concern has been finding someone to do cataract surgery. She said she used to see an ophthalmologist, and she was supposed to have cataract surgery, but she didn't want to go back to that office. She said her vision has slowly gotten worse. She has difficulty with fine print. She said her son and his girlfriend help her. She would like a list of ophthalmologists. She also mentioned she typically gets pneumonia at least four times a year, and a while back she experienced side effects from her medications. Denies symptom concerns right now. The pt said communication is easier for her when it is face to face. She would like to come to the [facility] 6/17 to finish the intake and to get the list. Provided her w/ office hours. She said she will get our contact information when we meet on Monday.

## 2024-06-17 ENCOUNTER — TELEPHONE (OUTPATIENT)
Dept: HEALTH INFORMATION MANAGEMENT | Facility: OTHER | Age: 74
End: 2024-06-17
Payer: MEDICARE

## 2024-06-17 NOTE — TELEPHONE ENCOUNTER
Met w/ pt to complete PCM intake. Provided list of eye doctors. She mentioned she has been feeling more SOB lately. Cough noted. Pt appeared calm and was able to speak in complete sentences. Pt denies fever/chills. RN checked O2 sats and HR. Spo2 was 94%-95% on RA. HR was 115bpm. RN did see her  HR has been elevated at her last three office visits. Pt said occasionally she feels like her heart is pounding. Denies feeling that right now. Denies chest pain. She said she also has a hard time showering because she feels dizzy when standing still for long periods of time. Denies dizziness right now. Pt declined PCP appt for follow up. She declined UC. Pt said she will go to UC if her breathing gets worse. Reviewed red flag symptoms/ER precautions including palpitations, worsening SOB, dizziness, chest pain, etc.       Pt also mentioned she has side and back pain if she turns to the sides or bends down. Does not have pain at rest or when ambulating. Described it as a cramping feeling that radiates upward. She said it has been going on for 6 years and first noticed it after she had her gallbladder surgery.     Pt asked RN for a list of eye doctors because she has been diagnosed w/ cataracts and feels her vision is getting worse in her right eye. RN also expressed concerns w/ pt driving. The pt said she drives, but has difficulty seeing. She said she can see signs and bumper lights. Educated on dangers of driving w/ impaired vision. Educated on alternative options. Pt said she has family, and they share a car, but she prefers to drive herself. Reiterated she is a danger to herself and others if she cannot see well enough to drive but continues to do so.Pt will not schedule sooner PCP appt. She plans to see an eye doctor. No appt schedule yet. RN will notify PCP.       1340    Provider notified in person of symptoms and driving concern.

## 2024-06-17 NOTE — TELEPHONE ENCOUNTER
Spoke w/ supervisor Dejah about pt driving concern and next steps. APS report submitted. Reference number [id number].    6/18/24     Case closed by APS on 6/18.

## 2024-06-20 NOTE — PROGRESS NOTES
Met w/ pt in clinic 6/17 to complete PCM intake and provide list of eye doctors. Unable to complete the rest of the PCM intake. Pt had other concerns and after addressing her concerns she said needed to leave. See 6/17 telephone encounter for more information. Agreeable to follow up call.

## 2024-07-12 ENCOUNTER — PATIENT OUTREACH (OUTPATIENT)
Dept: HEALTH INFORMATION MANAGEMENT | Facility: OTHER | Age: 74
End: 2024-07-12
Payer: MEDICARE

## 2024-07-12 DIAGNOSIS — M85.89 OSTEOPENIA OF MULTIPLE SITES: ICD-10-CM

## 2024-07-12 DIAGNOSIS — J44.1 ACUTE EXACERBATION OF CHRONIC OBSTRUCTIVE PULMONARY DISEASE (COPD) (HCC): ICD-10-CM

## 2024-07-12 DIAGNOSIS — J41.8 MIXED SIMPLE AND MUCOPURULENT CHRONIC BRONCHITIS (HCC): ICD-10-CM

## 2024-07-12 DIAGNOSIS — E78.5 DYSLIPIDEMIA: ICD-10-CM

## 2024-07-12 DIAGNOSIS — D64.9 NORMOCHROMIC NORMOCYTIC ANEMIA: ICD-10-CM

## 2024-07-12 DIAGNOSIS — E66.9 OBESITY (BMI 30-39.9): ICD-10-CM

## 2024-07-12 DIAGNOSIS — K57.90 DIVERTICULOSIS: ICD-10-CM

## 2024-07-12 DIAGNOSIS — J96.01 ACUTE RESPIRATORY FAILURE WITH HYPOXIA (HCC): ICD-10-CM

## 2024-07-12 DIAGNOSIS — M81.6 LOCALIZED OSTEOPOROSIS WITHOUT CURRENT PATHOLOGICAL FRACTURE: ICD-10-CM

## 2024-07-12 DIAGNOSIS — E03.4 HYPOTHYROIDISM DUE TO ACQUIRED ATROPHY OF THYROID: ICD-10-CM

## 2024-08-01 ENCOUNTER — PATIENT OUTREACH (OUTPATIENT)
Dept: HEALTH INFORMATION MANAGEMENT | Facility: OTHER | Age: 74
End: 2024-08-01

## 2024-08-01 ENCOUNTER — APPOINTMENT (OUTPATIENT)
Dept: MEDICAL GROUP | Age: 74
End: 2024-08-01
Payer: MEDICARE

## 2024-08-01 ENCOUNTER — HOSPITAL ENCOUNTER (OUTPATIENT)
Dept: LAB | Facility: MEDICAL CENTER | Age: 74
End: 2024-08-01
Attending: STUDENT IN AN ORGANIZED HEALTH CARE EDUCATION/TRAINING PROGRAM
Payer: MEDICARE

## 2024-08-01 VITALS
HEART RATE: 101 BPM | WEIGHT: 185 LBS | TEMPERATURE: 98.1 F | BODY MASS INDEX: 31.76 KG/M2 | OXYGEN SATURATION: 92 % | DIASTOLIC BLOOD PRESSURE: 58 MMHG | SYSTOLIC BLOOD PRESSURE: 110 MMHG

## 2024-08-01 DIAGNOSIS — E03.4 HYPOTHYROIDISM DUE TO ACQUIRED ATROPHY OF THYROID: ICD-10-CM

## 2024-08-01 DIAGNOSIS — J44.1 ACUTE EXACERBATION OF CHRONIC OBSTRUCTIVE PULMONARY DISEASE (COPD) (HCC): ICD-10-CM

## 2024-08-01 DIAGNOSIS — J96.01 ACUTE RESPIRATORY FAILURE WITH HYPOXIA (HCC): ICD-10-CM

## 2024-08-01 DIAGNOSIS — Z00.00 WELLNESS EXAMINATION: ICD-10-CM

## 2024-08-01 DIAGNOSIS — E78.5 DYSLIPIDEMIA: ICD-10-CM

## 2024-08-01 DIAGNOSIS — E66.9 OBESITY (BMI 30-39.9): ICD-10-CM

## 2024-08-01 DIAGNOSIS — K57.90 DIVERTICULOSIS: ICD-10-CM

## 2024-08-01 DIAGNOSIS — J43.9 PULMONARY EMPHYSEMA, UNSPECIFIED EMPHYSEMA TYPE (HCC): ICD-10-CM

## 2024-08-01 DIAGNOSIS — M85.89 OSTEOPENIA OF MULTIPLE SITES: ICD-10-CM

## 2024-08-01 DIAGNOSIS — J41.8 MIXED SIMPLE AND MUCOPURULENT CHRONIC BRONCHITIS (HCC): ICD-10-CM

## 2024-08-01 DIAGNOSIS — M81.6 LOCALIZED OSTEOPOROSIS WITHOUT CURRENT PATHOLOGICAL FRACTURE: ICD-10-CM

## 2024-08-01 DIAGNOSIS — D64.9 NORMOCHROMIC NORMOCYTIC ANEMIA: ICD-10-CM

## 2024-08-01 LAB
T4 FREE SERPL-MCNC: 1.16 NG/DL (ref 0.93–1.7)
TSH SERPL-ACNC: 3.31 UIU/ML (ref 0.35–5.5)

## 2024-08-01 PROCEDURE — 3078F DIAST BP <80 MM HG: CPT | Performed by: STUDENT IN AN ORGANIZED HEALTH CARE EDUCATION/TRAINING PROGRAM

## 2024-08-01 PROCEDURE — 3074F SYST BP LT 130 MM HG: CPT | Performed by: STUDENT IN AN ORGANIZED HEALTH CARE EDUCATION/TRAINING PROGRAM

## 2024-08-01 PROCEDURE — 84443 ASSAY THYROID STIM HORMONE: CPT

## 2024-08-01 PROCEDURE — 84439 ASSAY OF FREE THYROXINE: CPT

## 2024-08-01 PROCEDURE — 36415 COLL VENOUS BLD VENIPUNCTURE: CPT

## 2024-08-01 PROCEDURE — G0439 PPPS, SUBSEQ VISIT: HCPCS | Performed by: STUDENT IN AN ORGANIZED HEALTH CARE EDUCATION/TRAINING PROGRAM

## 2024-08-01 ASSESSMENT — ACTIVITIES OF DAILY LIVING (ADL): BATHING_REQUIRES_ASSISTANCE: 0

## 2024-08-01 ASSESSMENT — FIBROSIS 4 INDEX: FIB4 SCORE: 1.28

## 2024-08-01 ASSESSMENT — PATIENT HEALTH QUESTIONNAIRE - PHQ9: CLINICAL INTERPRETATION OF PHQ2 SCORE: 0

## 2024-08-01 ASSESSMENT — ENCOUNTER SYMPTOMS: GENERAL WELL-BEING: POOR

## 2024-08-01 NOTE — PROGRESS NOTES
Subjective:     CC: Annual exam  Chief Complaint   Patient presents with    Annual Exam       HPI:   Vaishali Soares is a 74 y.o. female who presents for annual exam. She is feeling well and denies any complaints.    Health Maintenance  Advanced directive: Recommended   Osteoporosis Screen/ DEXA: Up-to-date.    PT for falls prevention: Declined   Cholesterol Screening: Screened   Diabetes Screening: Screened   Aspirin Use: N/A     Anticipatory Guidance  Diet: Regular diet, no restrictions.    Exercise: No   Substance Abuse: No   Safe in relationship.   Seat belts, bike helmet, gun safety discussed.  Sun protection used.  Dental Home.    Cancer screening  Colorectal Cancer Screening: Up-to-date   Lung Cancer Screening: Screened   Cervical Cancer Screening: N/A   Breast Cancer Screening: N/A     Infectious disease screening/Immunizations  --STI Screening: N/A   --Practices safe sex.  --HIV Screening: N/A   --Hepatitis C Screening: Screened   --Immunizations:    Influenza: Annually    HPV: N/A    Tetanus: Up-to-date    Shingles: n/a    Pneumococcal : Declined       Hepatitis B: Declined    COVID-19: Up-to-date  Other immunizations: N/A    She  has a past medical history of Acute respiratory failure with hypoxia (HCC) (4/10/2023), Anemia, Anesthesia, Emphysema, Hypothyroid, Osteoporosis, Pneumonia (2015), and Urinary retention.  She  has a past surgical history that includes other orthopedic surgery and vicki by laparoscopy (Bilateral, 9/11/2018).    Family History   Problem Relation Age of Onset    Diabetes Sister         DMI    Other Sister         Kidney Failure    Diabetes Brother         DM2. no meds    Stroke Father 54    Cancer Maternal Grandmother         Uterine    Diabetes Paternal Grandmother     No Known Problems Mother     No Known Problems Maternal Grandfather     No Known Problems Paternal Grandfather     Cancer Maternal Uncle     Cancer Paternal Uncle     Arthritis Son         Arthritis, gout     Asthma Son     Alcohol/Drug Neg Hx        Social History     Socioeconomic History    Marital status: Single     Spouse name: Not on file    Number of children: Not on file    Years of education: Not on file    Highest education level: Not on file   Occupational History    Not on file   Tobacco Use    Smoking status: Former     Current packs/day: 0.00     Average packs/day: 1 pack/day for 40.0 years (40.0 ttl pk-yrs)     Types: Cigarettes     Start date: 1967     Quit date: 2007     Years since quittin.5    Smokeless tobacco: Never   Vaping Use    Vaping status: Never Used   Substance and Sexual Activity    Alcohol use: Yes     Comment: rare     Drug use: No    Sexual activity: Never   Other Topics Concern    Not on file   Social History Narrative    Lives by herself.     .     Has 4 children; one son has arthritis, gout and asthma.     Work: worker     Social Determinants of Health     Financial Resource Strain: Not on file   Food Insecurity: Not on file   Transportation Needs: Not on file   Physical Activity: Not on file   Stress: Not on file   Social Connections: Not on file   Intimate Partner Violence: Not on file   Housing Stability: Not on file       Patient Active Problem List    Diagnosis Date Noted    Localized osteoporosis without current pathological fracture 2023    Acute exacerbation of chronic obstructive pulmonary disease (COPD) ([facility]) 2023    COVID-19 2023    Full code status 2023    Acute respiratory failure with hypoxia ([facility]) 04/10/2023    Osteopenia of multiple sites 2019    Dyslipidemia 2017    Obesity (BMI 30-39.9) 2016    Diverticulosis 2015    Cystocele with rectocele 04/10/2015    Decreased hearing of both ears--hearing aids 2015    Hypothyroidism due to acquired atrophy of thyroid 2013    Normochromic normocytic anemia 2013         Current Outpatient Medications   Medication Sig Dispense Refill     atorvastatin (LIPITOR) 20 MG Tab TAKE 1 TABLET BY MOUTH EVERY DAY IN THE EVENING 100 Tablet 3    alendronate (FOSAMAX) 70 MG Tab Take 1 Tablet by mouth every 7 days. 12 Tablet 2    budesonide-formoterol (SYMBICORT) 80-4.5 MCG/ACT Aerosol Inhale 2 Puffs 2 times a day. 10.2 g 3    levothyroxine (SYNTHROID) 75 MCG Tab Take 1 Tablet by mouth every morning on an empty stomach. 90 Tablet 2    calcium/vitamin D 500-5 MG-MCG Tab Take 1 Tablet by mouth 2 times a day. 180 Tablet 3    Cholecalciferol (D3 PO) Take 1 Capsule by mouth every day.      ELDERBERRY PO Take 1 Tablet by mouth every day.      diphenhydrAMINE HCl (ALLERGY MEDICATION PO) Take 1 Tablet by mouth every day. (OTC)      ibuprofen (MOTRIN) 200 MG Tab Take 400 mg by mouth every 6 hours as needed for Headache.      Cyanocobalamin (VITAMIN B-12 PO) Take 1 Tablet by mouth every day.       No current facility-administered medications for this visit.     No Known Allergies    Review of Systems   Constitutional: Negative for fever, chills and malaise/fatigue.   HENT: Negative for congestion.    Eyes: Negative for pain.    Respiratory: Negative for cough and shortness of breath.  Cardiovascular: Negative for leg swelling.   Gastrointestinal: Negative for nausea, vomiting, abdominal pain and diarrhea.   Genitourinary: Negative for dysuria and hematuria.   Skin: Negative for rash.   Neurological: Negative for dizziness, focal weakness and headaches.   Endo/Heme/Allergies: Does not bleed easily.   Psychiatric/Behavioral: Negative for depression.  The patient is not nervous/anxious.      Objective:     /58 (BP Location: Left arm, Patient Position: Sitting, BP Cuff Size: Adult)   Pulse (!) 101   Temp 36.7 °C (98.1 °F) (Temporal)   Wt 83.9 kg (185 lb)   SpO2 92%   BMI 31.76 kg/m²   Body mass index is 31.76 kg/m².  Wt Readings from Last 4 Encounters:   08/01/24 83.9 kg (185 lb)   04/01/24 80.3 kg (177 lb)   12/27/23 79.8 kg (176 lb)   11/29/23 78.5 kg (173 lb)        Physical Exam:  Constitutional: Well-developed and well-nourished. Not diaphoretic. No distress.   Skin: Skin is warm and dry. No rash noted.  Head: Atraumatic without lesions.  Eyes: Conjunctivae and extraocular motions are normal. Pupils are equal, round, and reactive to light. No scleral icterus.   Ears:  External ears unremarkable. Tympanic membranes clear and intact.  Nose: Nares patent. Septum midline. Turbinates without erythema nor edema. No discharge.   Mouth/Throat: Dentition is poor. Tongue normal. Oropharynx is clear and moist. Posterior pharynx without erythema or exudates.  Neck: Supple, trachea midline. Normal range of motion. No thyromegaly present. No lymphadenopathy--cervical or supraclavicular.  Cardiovascular: Regular rate and rhythm, S1 and S2 without murmur, rubs, or gallops.  Lungs: Normal inspiratory effort, CTA bilaterally, no wheezes/rhonchi/rales  Breast: Deferred  Abdomen: Soft, non tender, and without distention. Active bowel sounds in all four quadrants. No rebound, guarding, masses or HSM.  : Deferred  Extremities: No cyanosis, clubbing, erythema, nor edema. Distal pulses intact and symmetric.   Musculoskeletal: All major joints AROM full in all directions without pain.  Neurological: Alert and oriented x 3. DTRs 2+/3 and symmetric. No cranial nerve deficit. 5/5 myotomes. Sensation intact.   Psychiatric:  Behavior, mood, and affect are appropriate.    A chaperone was offered to the patient during today's exam.: Patient declined.    Assessment and Plan:     1. Wellness examination  -Patient presented today for scheduled annual examination  -Chronic medical conditions listed below  -Today she did not have any new complaints    2. Hypothyroidism due to acquired atrophy of thyroid  -Chronic, stable, however she needs new labs  -Most recent labs in December 2023 showed normal TSH, normal free T4  -Recently has some issues with medication refill and apparently she did not take her  medications for a few weeks.  -Patient states feeling well, she did not exhibit any signs or symptoms of hypo or hyperthyroidism  -Recommend to continue same therapy  -Lab work today to assess thyroid function    - TSH; Future  - FREE THYROXINE; Future    3. Localized osteoporosis without current pathological fracture  -Most recent exam in August 20, 2020 showed osteoporosis of the proximal left femur and osteopenia of the lumbar spine  -Patient was placed on therapy with Fosamax, however she recently discontinued the medication.  Patient states that she cannot tolerate the pills.  However she has been continuing therapy with vitamin D and calcium.  -Patient is not interested on other options to treat her osteoporosis.  She will continue therapy only with vitamin D and calcium  -I recommended her bearing weight exercises if possible    4. Dyslipidemia  -Chronic, stable  -Most recent lipid profile from March 2024 showed mild elevated triglycerides and borderline normal LDL  -Current therapy with Lipitor 20 mg daily  -No side effects, continue same therapy    5. Pulmonary emphysema, unspecified emphysema type (HCC)  -Chronic, severe  -Patient has been given medical therapy with Symbicort, however she is reluctant to use inhalers  -Patient stated that she uses oxygen at home has needed  -I recommended to the patient today and in previous visits to use the medication for COPD, however she is not interested on such therapy.    6. Obesity (BMI 30-39.9)  -Chronic, stable  -No significant weight changes over the past year  -Regular exercise, DASH diet or plant-based diet  - Patient identified as having weight management issue.  Appropriate orders and counseling given.     HCM: N/A  Labs per orders  Immunizations per orders  Patient counseled about skin care, diet, supplements, prenatal vitamins, safe sex and exercise.      Follow-up: Return in about 6 months (around 2/1/2025) for chronic conditions .

## 2024-08-01 NOTE — PROGRESS NOTES
Pt left the [facility] w/o meeting this RN for PCM intake.       Contacted pt for follow up. She said she got confused because she was sent to get labs after her appt. Pt is hard of hearing and cannot complete an intake over the phone. She is very focused on getting in to see her eye doctor, so she does not want to meet in-person for PCM intake until after she get in to see them. Pt will reach out to this RN. Agreeable to follow up call in a few weeks if I do not hear from her.

## 2024-09-12 ENCOUNTER — PATIENT OUTREACH (OUTPATIENT)
Dept: HEALTH INFORMATION MANAGEMENT | Facility: OTHER | Age: 74
End: 2024-09-12
Payer: MEDICARE

## 2024-09-12 DIAGNOSIS — J44.1 ACUTE EXACERBATION OF CHRONIC OBSTRUCTIVE PULMONARY DISEASE (COPD) (HCC): ICD-10-CM

## 2024-09-12 DIAGNOSIS — E78.5 DYSLIPIDEMIA: ICD-10-CM

## 2024-09-12 DIAGNOSIS — M81.6 LOCALIZED OSTEOPOROSIS WITHOUT CURRENT PATHOLOGICAL FRACTURE: ICD-10-CM

## 2024-09-12 DIAGNOSIS — D64.9 NORMOCHROMIC NORMOCYTIC ANEMIA: ICD-10-CM

## 2024-09-12 DIAGNOSIS — J96.01 ACUTE RESPIRATORY FAILURE WITH HYPOXIA (HCC): ICD-10-CM

## 2024-09-12 DIAGNOSIS — E03.4 HYPOTHYROIDISM DUE TO ACQUIRED ATROPHY OF THYROID: ICD-10-CM

## 2024-09-12 DIAGNOSIS — J41.8 MIXED SIMPLE AND MUCOPURULENT CHRONIC BRONCHITIS (HCC): ICD-10-CM

## 2024-09-12 DIAGNOSIS — M85.89 OSTEOPENIA OF MULTIPLE SITES: ICD-10-CM

## 2024-09-12 DIAGNOSIS — E66.9 OBESITY (BMI 30-39.9): ICD-10-CM

## 2024-09-12 DIAGNOSIS — K57.90 DIVERTICULOSIS: ICD-10-CM

## 2024-09-12 NOTE — PROGRESS NOTES
Contacted pt to follow up on PCM. She said she will be having her final eye surgery next week, and would like to meet after that for the intake. She requested I call her at the end of next week to schedule the intake. Pt reported that she was given different looking capsules for her levothyroxine last month. She said the pharmacist confirmed it was levothyroxine. She said she has been taking one capsule every day. Pt then said she experiences SOB, palpitations, and chest pain still when standing too long. She thinks she may go to the ER. She said she has also continued to experience pain in three spots in her abdomen and two spots in her back when she bends over. So she thinks she will go to the ER to have them look at that as well. She said she probably wont go until after her eye surgery though because she does not want to miss it because her vision has gotten so much better. RN advised the pt not wait to go to the ER for chest pain, palpitations, dizziness, or worsening SOB. Explained seriousness of symptoms and that because there can be life-threatening causes for those s/s, she should be evaluated ASAP. Pt expressed understanding, but indicated she was likely still going to wait.

## 2024-09-19 ENCOUNTER — TELEPHONE (OUTPATIENT)
Dept: HEALTH INFORMATION MANAGEMENT | Facility: OTHER | Age: 74
End: 2024-09-19
Payer: MEDICARE

## 2024-09-19 NOTE — TELEPHONE ENCOUNTER
Contacted pt to schedule in person intake. She is driving and unavailable this afternoon. Pt agreeable to call back 9/23 when this RN is back in office.

## 2024-09-23 NOTE — TELEPHONE ENCOUNTER
Contacted pt to attempt to schedule in person intake. She said she has one more doctors appt next Wed. She would like a call at the end of next week to schedule a time to meet.

## 2024-10-04 ENCOUNTER — TELEPHONE (OUTPATIENT)
Dept: HEALTH INFORMATION MANAGEMENT | Facility: OTHER | Age: 74
End: 2024-10-04
Payer: MEDICARE

## 2024-11-04 ENCOUNTER — PATIENT OUTREACH (OUTPATIENT)
Dept: HEALTH INFORMATION MANAGEMENT | Facility: OTHER | Age: 74
End: 2024-11-04
Payer: MEDICARE

## 2024-11-04 DIAGNOSIS — J41.8 MIXED SIMPLE AND MUCOPURULENT CHRONIC BRONCHITIS (HCC): ICD-10-CM

## 2024-11-04 DIAGNOSIS — J96.01 ACUTE RESPIRATORY FAILURE WITH HYPOXIA (HCC): ICD-10-CM

## 2024-11-19 DIAGNOSIS — E03.4 HYPOTHYROIDISM DUE TO ACQUIRED ATROPHY OF THYROID: ICD-10-CM

## 2024-11-19 RX ORDER — LEVOTHYROXINE SODIUM 75 UG/1
75 TABLET ORAL
Qty: 90 TABLET | Refills: 0 | Status: SHIPPED | OUTPATIENT
Start: 2024-11-19

## 2024-11-19 NOTE — TELEPHONE ENCOUNTER
Received request via: Pharmacy    Was the patient seen in the last year in this department? Yes    Does the patient have an active prescription (recently filled or refills available) for medication(s) requested? No    Pharmacy Name: [facility]    Does the patient have longterm Plus and need 100-day supply? (This applies to ALL medications) Yes, quantity updated to 100 days

## 2024-12-17 ENCOUNTER — HOSPITAL ENCOUNTER (INPATIENT)
Facility: MEDICAL CENTER | Age: 74
LOS: 3 days | DRG: 871 | End: 2024-12-20
Attending: STUDENT IN AN ORGANIZED HEALTH CARE EDUCATION/TRAINING PROGRAM | Admitting: HOSPITALIST
Payer: MEDICARE

## 2024-12-17 ENCOUNTER — APPOINTMENT (OUTPATIENT)
Dept: RADIOLOGY | Facility: MEDICAL CENTER | Age: 74
DRG: 871 | End: 2024-12-17
Attending: STUDENT IN AN ORGANIZED HEALTH CARE EDUCATION/TRAINING PROGRAM
Payer: MEDICARE

## 2024-12-17 DIAGNOSIS — E03.4 HYPOTHYROIDISM DUE TO ACQUIRED ATROPHY OF THYROID: ICD-10-CM

## 2024-12-17 DIAGNOSIS — J18.9 PNEUMONIA OF LEFT LOWER LOBE DUE TO INFECTIOUS ORGANISM: ICD-10-CM

## 2024-12-17 DIAGNOSIS — D64.9 ANEMIA, UNSPECIFIED TYPE: ICD-10-CM

## 2024-12-17 DIAGNOSIS — J96.01 ACUTE HYPOXIC RESPIRATORY FAILURE (HCC): ICD-10-CM

## 2024-12-17 DIAGNOSIS — J44.1 ACUTE EXACERBATION OF CHRONIC OBSTRUCTIVE PULMONARY DISEASE (COPD) (HCC): ICD-10-CM

## 2024-12-17 DIAGNOSIS — J18.9 COMMUNITY ACQUIRED PNEUMONIA OF LEFT LOWER LOBE OF LUNG: ICD-10-CM

## 2024-12-17 DIAGNOSIS — M85.89 OSTEOPENIA OF MULTIPLE SITES: ICD-10-CM

## 2024-12-17 PROBLEM — G93.40 ACUTE ENCEPHALOPATHY: Status: ACTIVE | Noted: 2024-12-17

## 2024-12-17 PROBLEM — A41.9 SEPSIS (HCC): Status: ACTIVE | Noted: 2024-12-17

## 2024-12-17 PROBLEM — Z71.89 ACP (ADVANCE CARE PLANNING): Status: ACTIVE | Noted: 2024-12-17

## 2024-12-17 PROBLEM — R94.31 ABNORMAL EKG: Status: ACTIVE | Noted: 2024-12-17

## 2024-12-17 LAB
ABO + RH BLD: NORMAL
ABO GROUP BLD: NORMAL
ALBUMIN SERPL BCP-MCNC: 3.1 G/DL (ref 3.2–4.9)
ALBUMIN/GLOB SERPL: 0.8 G/DL
ALP SERPL-CCNC: 128 U/L (ref 30–99)
ALT SERPL-CCNC: 19 U/L (ref 2–50)
ANION GAP SERPL CALC-SCNC: 12 MMOL/L (ref 7–16)
ANISOCYTOSIS BLD QL SMEAR: ABNORMAL
APPEARANCE UR: CLEAR
AST SERPL-CCNC: 26 U/L (ref 12–45)
BACTERIA #/AREA URNS HPF: ABNORMAL /HPF
BARCODED ABORH UBTYP: 5100
BARCODED PRD CODE UBPRD: NORMAL
BARCODED UNIT NUM UBUNT: NORMAL
BASOPHILS # BLD AUTO: 0.3 % (ref 0–1.8)
BASOPHILS # BLD: 0.07 K/UL (ref 0–0.12)
BILIRUB SERPL-MCNC: 1.2 MG/DL (ref 0.1–1.5)
BILIRUB UR QL STRIP.AUTO: NEGATIVE
BLD GP AB SCN SERPL QL: NORMAL
BUN SERPL-MCNC: 11 MG/DL (ref 8–22)
CALCIUM ALBUM COR SERPL-MCNC: 8.9 MG/DL (ref 8.5–10.5)
CALCIUM SERPL-MCNC: 8.2 MG/DL (ref 8.4–10.2)
CASTS URNS QL MICRO: ABNORMAL /LPF (ref 0–2)
CHLORIDE SERPL-SCNC: 98 MMOL/L (ref 96–112)
CO2 SERPL-SCNC: 23 MMOL/L (ref 20–33)
COLOR UR: YELLOW
COMPONENT R 8504R: NORMAL
CREAT SERPL-MCNC: 0.58 MG/DL (ref 0.5–1.4)
EKG IMPRESSION: NORMAL
EOSINOPHIL # BLD AUTO: 0 K/UL (ref 0–0.51)
EOSINOPHIL NFR BLD: 0 % (ref 0–6.9)
EPITHELIAL CELLS 1715: ABNORMAL /HPF (ref 0–5)
ERYTHROCYTE [DISTWIDTH] IN BLOOD BY AUTOMATED COUNT: 48.5 FL (ref 35.9–50)
FLUAV RNA SPEC QL NAA+PROBE: NEGATIVE
FLUBV RNA SPEC QL NAA+PROBE: NEGATIVE
GFR SERPLBLD CREATININE-BSD FMLA CKD-EPI: 93 ML/MIN/1.73 M 2
GLOBULIN SER CALC-MCNC: 4 G/DL (ref 1.9–3.5)
GLUCOSE SERPL-MCNC: 127 MG/DL (ref 65–99)
GLUCOSE UR STRIP.AUTO-MCNC: NEGATIVE MG/DL
HCT VFR BLD AUTO: 28.6 % (ref 37–47)
HGB BLD-MCNC: 7.7 G/DL (ref 12–16)
HYPOCHROMIA BLD QL SMEAR: ABNORMAL
IMM GRANULOCYTES # BLD AUTO: 0.13 K/UL (ref 0–0.11)
IMM GRANULOCYTES NFR BLD AUTO: 0.5 % (ref 0–0.9)
KETONES UR STRIP.AUTO-MCNC: NEGATIVE MG/DL
LACTATE SERPL-SCNC: 3.1 MMOL/L (ref 0.5–2)
LACTATE SERPL-SCNC: 4.1 MMOL/L (ref 0.5–2)
LEUKOCYTE ESTERASE UR QL STRIP.AUTO: ABNORMAL
LYMPHOCYTES # BLD AUTO: 2.25 K/UL (ref 1–4.8)
LYMPHOCYTES NFR BLD: 8.4 % (ref 22–41)
MCH RBC QN AUTO: 17.9 PG (ref 27–33)
MCHC RBC AUTO-ENTMCNC: 26.9 G/DL (ref 32.2–35.5)
MCV RBC AUTO: 66.7 FL (ref 81.4–97.8)
MICRO URNS: ABNORMAL
MICROCYTES BLD QL SMEAR: ABNORMAL
MONOCYTES # BLD AUTO: 1.52 K/UL (ref 0–0.85)
MONOCYTES NFR BLD AUTO: 5.7 % (ref 0–13.4)
NEUTROPHILS # BLD AUTO: 22.78 K/UL (ref 1.82–7.42)
NEUTROPHILS NFR BLD: 85.1 % (ref 44–72)
NITRITE UR QL STRIP.AUTO: NEGATIVE
NRBC # BLD AUTO: 0 K/UL
NRBC BLD-RTO: 0 /100 WBC (ref 0–0.2)
NT-PROBNP SERPL IA-MCNC: 107 PG/ML (ref 0–125)
OVALOCYTES BLD QL SMEAR: NORMAL
PH UR STRIP.AUTO: 6 [PH] (ref 5–8)
PLATELET # BLD AUTO: 527 K/UL (ref 164–446)
PLATELET BLD QL SMEAR: NORMAL
PMV BLD AUTO: 9.2 FL (ref 9–12.9)
POIKILOCYTOSIS BLD QL SMEAR: NORMAL
POTASSIUM SERPL-SCNC: 3.6 MMOL/L (ref 3.6–5.5)
PROCALCITONIN SERPL-MCNC: 0.14 NG/ML
PRODUCT TYPE UPROD: NORMAL
PROT SERPL-MCNC: 7.1 G/DL (ref 6–8.2)
PROT UR QL STRIP: NEGATIVE MG/DL
RBC # BLD AUTO: 4.29 M/UL (ref 4.2–5.4)
RBC # URNS HPF: ABNORMAL /HPF
RBC BLD AUTO: PRESENT
RBC UR QL AUTO: NEGATIVE
RH BLD: NORMAL
RSV RNA SPEC QL NAA+PROBE: NEGATIVE
SARS-COV-2 RNA RESP QL NAA+PROBE: NOTDETECTED
SODIUM SERPL-SCNC: 133 MMOL/L (ref 135–145)
SP GR UR STRIP.AUTO: <=1.005
SPECIMEN SOURCE: NORMAL
TROPONIN T SERPL-MCNC: 11 NG/L (ref 6–19)
TROPONIN T SERPL-MCNC: 13 NG/L (ref 6–19)
UNIT STATUS USTAT: NORMAL
WBC # BLD AUTO: 26.8 K/UL (ref 4.8–10.8)
WBC #/AREA URNS HPF: ABNORMAL /HPF

## 2024-12-17 PROCEDURE — 94760 N-INVAS EAR/PLS OXIMETRY 1: CPT

## 2024-12-17 PROCEDURE — 96365 THER/PROPH/DIAG IV INF INIT: CPT

## 2024-12-17 PROCEDURE — 0241U HCHG SARS-COV-2 COVID-19 NFCT DS RESP RNA 4 TRGT MIC: CPT

## 2024-12-17 PROCEDURE — 36415 COLL VENOUS BLD VENIPUNCTURE: CPT

## 2024-12-17 PROCEDURE — 80053 COMPREHEN METABOLIC PANEL: CPT

## 2024-12-17 PROCEDURE — 94640 AIRWAY INHALATION TREATMENT: CPT

## 2024-12-17 PROCEDURE — 700101 HCHG RX REV CODE 250: Performed by: STUDENT IN AN ORGANIZED HEALTH CARE EDUCATION/TRAINING PROGRAM

## 2024-12-17 PROCEDURE — 700102 HCHG RX REV CODE 250 W/ 637 OVERRIDE(OP): Performed by: HOSPITALIST

## 2024-12-17 PROCEDURE — 96375 TX/PRO/DX INJ NEW DRUG ADDON: CPT

## 2024-12-17 PROCEDURE — 84145 PROCALCITONIN (PCT): CPT

## 2024-12-17 PROCEDURE — 86850 RBC ANTIBODY SCREEN: CPT

## 2024-12-17 PROCEDURE — 700111 HCHG RX REV CODE 636 W/ 250 OVERRIDE (IP): Mod: JZ | Performed by: STUDENT IN AN ORGANIZED HEALTH CARE EDUCATION/TRAINING PROGRAM

## 2024-12-17 PROCEDURE — 87086 URINE CULTURE/COLONY COUNT: CPT

## 2024-12-17 PROCEDURE — 71045 X-RAY EXAM CHEST 1 VIEW: CPT

## 2024-12-17 PROCEDURE — 700105 HCHG RX REV CODE 258: Performed by: HOSPITALIST

## 2024-12-17 PROCEDURE — 96376 TX/PRO/DX INJ SAME DRUG ADON: CPT

## 2024-12-17 PROCEDURE — 85025 COMPLETE CBC W/AUTO DIFF WBC: CPT

## 2024-12-17 PROCEDURE — A9270 NON-COVERED ITEM OR SERVICE: HCPCS | Performed by: HOSPITALIST

## 2024-12-17 PROCEDURE — 81001 URINALYSIS AUTO W/SCOPE: CPT

## 2024-12-17 PROCEDURE — 700105 HCHG RX REV CODE 258: Performed by: INTERNAL MEDICINE

## 2024-12-17 PROCEDURE — 83880 ASSAY OF NATRIURETIC PEPTIDE: CPT

## 2024-12-17 PROCEDURE — A9270 NON-COVERED ITEM OR SERVICE: HCPCS | Performed by: STUDENT IN AN ORGANIZED HEALTH CARE EDUCATION/TRAINING PROGRAM

## 2024-12-17 PROCEDURE — 93005 ELECTROCARDIOGRAM TRACING: CPT | Mod: TC | Performed by: STUDENT IN AN ORGANIZED HEALTH CARE EDUCATION/TRAINING PROGRAM

## 2024-12-17 PROCEDURE — 99291 CRITICAL CARE FIRST HOUR: CPT

## 2024-12-17 PROCEDURE — 700105 HCHG RX REV CODE 258: Performed by: STUDENT IN AN ORGANIZED HEALTH CARE EDUCATION/TRAINING PROGRAM

## 2024-12-17 PROCEDURE — 99223 1ST HOSP IP/OBS HIGH 75: CPT | Mod: 25 | Performed by: HOSPITALIST

## 2024-12-17 PROCEDURE — 84484 ASSAY OF TROPONIN QUANT: CPT | Mod: 91

## 2024-12-17 PROCEDURE — 86901 BLOOD TYPING SEROLOGIC RH(D): CPT | Mod: 91

## 2024-12-17 PROCEDURE — 87040 BLOOD CULTURE FOR BACTERIA: CPT | Mod: 91

## 2024-12-17 PROCEDURE — 700111 HCHG RX REV CODE 636 W/ 250 OVERRIDE (IP): Performed by: HOSPITALIST

## 2024-12-17 PROCEDURE — 83605 ASSAY OF LACTIC ACID: CPT | Mod: 91

## 2024-12-17 PROCEDURE — 700101 HCHG RX REV CODE 250: Performed by: HOSPITALIST

## 2024-12-17 PROCEDURE — 86900 BLOOD TYPING SEROLOGIC ABO: CPT | Mod: 91

## 2024-12-17 PROCEDURE — 99497 ADVNCD CARE PLAN 30 MIN: CPT | Mod: 25 | Performed by: HOSPITALIST

## 2024-12-17 PROCEDURE — 770020 HCHG ROOM/CARE - TELE (206)

## 2024-12-17 PROCEDURE — 700102 HCHG RX REV CODE 250 W/ 637 OVERRIDE(OP): Performed by: STUDENT IN AN ORGANIZED HEALTH CARE EDUCATION/TRAINING PROGRAM

## 2024-12-17 RX ORDER — AZITHROMYCIN 250 MG/1
500 TABLET, FILM COATED ORAL ONCE
Status: COMPLETED | OUTPATIENT
Start: 2024-12-17 | End: 2024-12-17

## 2024-12-17 RX ORDER — IPRATROPIUM BROMIDE AND ALBUTEROL SULFATE 2.5; .5 MG/3ML; MG/3ML
3 SOLUTION RESPIRATORY (INHALATION)
Status: DISCONTINUED | OUTPATIENT
Start: 2024-12-17 | End: 2024-12-18

## 2024-12-17 RX ORDER — AZITHROMYCIN 250 MG/1
500 TABLET, FILM COATED ORAL DAILY
Status: DISCONTINUED | OUTPATIENT
Start: 2024-12-18 | End: 2024-12-18

## 2024-12-17 RX ORDER — POLYETHYLENE GLYCOL 3350 17 G/17G
1 POWDER, FOR SOLUTION ORAL
Status: DISCONTINUED | OUTPATIENT
Start: 2024-12-17 | End: 2024-12-20 | Stop reason: HOSPADM

## 2024-12-17 RX ORDER — ACETAMINOPHEN 325 MG/1
650 TABLET ORAL EVERY 6 HOURS PRN
Status: DISCONTINUED | OUTPATIENT
Start: 2024-12-17 | End: 2024-12-20 | Stop reason: HOSPADM

## 2024-12-17 RX ORDER — METHYLPREDNISOLONE SODIUM SUCCINATE 125 MG/2ML
125 INJECTION, POWDER, LYOPHILIZED, FOR SOLUTION INTRAMUSCULAR; INTRAVENOUS ONCE
Status: COMPLETED | OUTPATIENT
Start: 2024-12-17 | End: 2024-12-17

## 2024-12-17 RX ORDER — HYDROMORPHONE HYDROCHLORIDE 1 MG/ML
0.25 INJECTION, SOLUTION INTRAMUSCULAR; INTRAVENOUS; SUBCUTANEOUS
Status: DISCONTINUED | OUTPATIENT
Start: 2024-12-17 | End: 2024-12-20 | Stop reason: HOSPADM

## 2024-12-17 RX ORDER — SODIUM CHLORIDE, SODIUM LACTATE, POTASSIUM CHLORIDE, CALCIUM CHLORIDE 600; 310; 30; 20 MG/100ML; MG/100ML; MG/100ML; MG/100ML
INJECTION, SOLUTION INTRAVENOUS CONTINUOUS
Status: ACTIVE | OUTPATIENT
Start: 2024-12-17 | End: 2024-12-18

## 2024-12-17 RX ORDER — SODIUM CHLORIDE 9 MG/ML
30 INJECTION, SOLUTION INTRAVENOUS ONCE
Status: DISCONTINUED | OUTPATIENT
Start: 2024-12-17 | End: 2024-12-17

## 2024-12-17 RX ORDER — PANTOPRAZOLE SODIUM 40 MG/10ML
40 INJECTION, POWDER, LYOPHILIZED, FOR SOLUTION INTRAVENOUS 2 TIMES DAILY
Status: DISCONTINUED | OUTPATIENT
Start: 2024-12-17 | End: 2024-12-19

## 2024-12-17 RX ORDER — IPRATROPIUM BROMIDE AND ALBUTEROL SULFATE 2.5; .5 MG/3ML; MG/3ML
3 SOLUTION RESPIRATORY (INHALATION) ONCE
Status: COMPLETED | OUTPATIENT
Start: 2024-12-17 | End: 2024-12-17

## 2024-12-17 RX ORDER — SODIUM CHLORIDE, SODIUM LACTATE, POTASSIUM CHLORIDE, AND CALCIUM CHLORIDE .6; .31; .03; .02 G/100ML; G/100ML; G/100ML; G/100ML
30 INJECTION, SOLUTION INTRAVENOUS ONCE
Status: COMPLETED | OUTPATIENT
Start: 2024-12-17 | End: 2024-12-17

## 2024-12-17 RX ORDER — OXYCODONE HYDROCHLORIDE 5 MG/1
5 TABLET ORAL
Status: DISCONTINUED | OUTPATIENT
Start: 2024-12-17 | End: 2024-12-20 | Stop reason: HOSPADM

## 2024-12-17 RX ORDER — OXYCODONE HYDROCHLORIDE 5 MG/1
2.5 TABLET ORAL
Status: DISCONTINUED | OUTPATIENT
Start: 2024-12-17 | End: 2024-12-20 | Stop reason: HOSPADM

## 2024-12-17 RX ORDER — ALBUTEROL SULFATE 5 MG/ML
2.5 SOLUTION RESPIRATORY (INHALATION)
Status: DISCONTINUED | OUTPATIENT
Start: 2024-12-17 | End: 2024-12-20 | Stop reason: HOSPADM

## 2024-12-17 RX ORDER — SODIUM CHLORIDE 9 MG/ML
500 INJECTION, SOLUTION INTRAVENOUS ONCE
Status: COMPLETED | OUTPATIENT
Start: 2024-12-17 | End: 2024-12-17

## 2024-12-17 RX ORDER — METHYLPREDNISOLONE SODIUM SUCCINATE 125 MG/2ML
62.5 INJECTION, POWDER, LYOPHILIZED, FOR SOLUTION INTRAMUSCULAR; INTRAVENOUS EVERY 8 HOURS
Status: DISCONTINUED | OUTPATIENT
Start: 2024-12-17 | End: 2024-12-18

## 2024-12-17 RX ORDER — AMOXICILLIN 250 MG
2 CAPSULE ORAL EVERY EVENING
Status: DISCONTINUED | OUTPATIENT
Start: 2024-12-17 | End: 2024-12-20 | Stop reason: HOSPADM

## 2024-12-17 RX ORDER — LEVOTHYROXINE SODIUM 75 UG/1
75 TABLET ORAL
Status: DISCONTINUED | OUTPATIENT
Start: 2024-12-18 | End: 2024-12-20 | Stop reason: HOSPADM

## 2024-12-17 RX ADMIN — PANTOPRAZOLE SODIUM 40 MG: 40 INJECTION, POWDER, FOR SOLUTION INTRAVENOUS at 21:27

## 2024-12-17 RX ADMIN — SODIUM CHLORIDE, POTASSIUM CHLORIDE, SODIUM LACTATE AND CALCIUM CHLORIDE 1641 ML: 600; 310; 30; 20 INJECTION, SOLUTION INTRAVENOUS at 17:45

## 2024-12-17 RX ADMIN — MOMETASONE FUROATE AND FORMOTEROL FUMARATE DIHYDRATE 2 PUFF: 100; 5 AEROSOL RESPIRATORY (INHALATION) at 18:47

## 2024-12-17 RX ADMIN — SODIUM CHLORIDE, SODIUM LACTATE, POTASSIUM CHLORIDE, AND CALCIUM CHLORIDE: .6; .31; .03; .02 INJECTION, SOLUTION INTRAVENOUS at 21:26

## 2024-12-17 RX ADMIN — AZITHROMYCIN DIHYDRATE 500 MG: 250 TABLET ORAL at 17:46

## 2024-12-17 RX ADMIN — AMPICILLIN AND SULBACTAM 3 G: 1; 2 INJECTION, POWDER, FOR SOLUTION INTRAMUSCULAR; INTRAVENOUS at 17:44

## 2024-12-17 RX ADMIN — IPRATROPIUM BROMIDE AND ALBUTEROL SULFATE 3 ML: .5; 3 SOLUTION RESPIRATORY (INHALATION) at 21:12

## 2024-12-17 RX ADMIN — SODIUM CHLORIDE 500 ML: 9 INJECTION, SOLUTION INTRAVENOUS at 23:00

## 2024-12-17 RX ADMIN — IPRATROPIUM BROMIDE AND ALBUTEROL SULFATE 3 ML: .5; 3 SOLUTION RESPIRATORY (INHALATION) at 17:37

## 2024-12-17 RX ADMIN — METHYLPREDNISOLONE SODIUM SUCCINATE 62.5 MG: 125 INJECTION, POWDER, FOR SOLUTION INTRAMUSCULAR; INTRAVENOUS at 21:38

## 2024-12-17 RX ADMIN — METHYLPREDNISOLONE SODIUM SUCCINATE 125 MG: 125 INJECTION, POWDER, FOR SOLUTION INTRAMUSCULAR; INTRAVENOUS at 17:41

## 2024-12-17 ASSESSMENT — ENCOUNTER SYMPTOMS
EYE DISCHARGE: 0
STRIDOR: 0
FEVER: 1
SPUTUM PRODUCTION: 1
MYALGIAS: 0
VOMITING: 0
EYE REDNESS: 0
FOCAL WEAKNESS: 0
ABDOMINAL PAIN: 0
NERVOUS/ANXIOUS: 0
WHEEZING: 1
COUGH: 1
BRUISES/BLEEDS EASILY: 0
CHILLS: 1
FLANK PAIN: 0
SHORTNESS OF BREATH: 1

## 2024-12-17 ASSESSMENT — COPD QUESTIONNAIRES
DO YOU EVER COUGH UP ANY MUCUS OR PHLEGM?: YES, A FEW DAYS A WEEK OR MONTH
COPD SCREENING SCORE: 6
HAVE YOU SMOKED AT LEAST 100 CIGARETTES IN YOUR ENTIRE LIFE: YES
DURING THE PAST 4 WEEKS HOW MUCH DID YOU FEEL SHORT OF BREATH: SOME OF THE TIME

## 2024-12-17 ASSESSMENT — FIBROSIS 4 INDEX
FIB4 SCORE: 0.86
FIB4 SCORE: 1.28

## 2024-12-17 ASSESSMENT — PAIN DESCRIPTION - PAIN TYPE: TYPE: ACUTE PAIN

## 2024-12-18 ENCOUNTER — APPOINTMENT (OUTPATIENT)
Dept: RADIOLOGY | Facility: MEDICAL CENTER | Age: 74
DRG: 871 | End: 2024-12-18
Attending: INTERNAL MEDICINE
Payer: MEDICARE

## 2024-12-18 ENCOUNTER — APPOINTMENT (OUTPATIENT)
Dept: CARDIOLOGY | Facility: MEDICAL CENTER | Age: 74
DRG: 871 | End: 2024-12-18
Attending: HOSPITALIST
Payer: MEDICARE

## 2024-12-18 PROBLEM — D62 ACUTE BLOOD LOSS ANEMIA: Status: ACTIVE | Noted: 2024-12-18

## 2024-12-18 LAB
ALBUMIN SERPL BCP-MCNC: 2.7 G/DL (ref 3.2–4.9)
ALBUMIN/GLOB SERPL: 0.8 G/DL
ALP SERPL-CCNC: 107 U/L (ref 30–99)
ALT SERPL-CCNC: 17 U/L (ref 2–50)
ANION GAP SERPL CALC-SCNC: 11 MMOL/L (ref 7–16)
AST SERPL-CCNC: 24 U/L (ref 12–45)
BILIRUB SERPL-MCNC: 0.7 MG/DL (ref 0.1–1.5)
BUN SERPL-MCNC: 9 MG/DL (ref 8–22)
CALCIUM ALBUM COR SERPL-MCNC: 9.1 MG/DL (ref 8.5–10.5)
CALCIUM SERPL-MCNC: 8.1 MG/DL (ref 8.4–10.2)
CHLORIDE SERPL-SCNC: 106 MMOL/L (ref 96–112)
CK SERPL-CCNC: 104 U/L (ref 0–154)
CO2 SERPL-SCNC: 23 MMOL/L (ref 20–33)
CREAT SERPL-MCNC: 0.49 MG/DL (ref 0.5–1.4)
D DIMER PPP IA.FEU-MCNC: 0.53 UG/ML (FEU) (ref 0–0.5)
ERYTHROCYTE [DISTWIDTH] IN BLOOD BY AUTOMATED COUNT: 49.4 FL (ref 35.9–50)
FERRITIN SERPL-MCNC: 14.1 NG/ML (ref 10–291)
GFR SERPLBLD CREATININE-BSD FMLA CKD-EPI: 97 ML/MIN/1.73 M 2
GLOBULIN SER CALC-MCNC: 3.5 G/DL (ref 1.9–3.5)
GLUCOSE SERPL-MCNC: 179 MG/DL (ref 65–99)
HCT VFR BLD AUTO: 24.6 % (ref 37–47)
HGB BLD-MCNC: 6.6 G/DL (ref 12–16)
HGB BLD-MCNC: 8.1 G/DL (ref 12–16)
IRON SATN MFR SERPL: 3 % (ref 15–55)
IRON SERPL-MCNC: 8 UG/DL (ref 40–170)
LACTATE SERPL-SCNC: 3.3 MMOL/L (ref 0.5–2)
LACTATE SERPL-SCNC: 4.6 MMOL/L (ref 0.5–2)
LACTATE SERPL-SCNC: 5.2 MMOL/L (ref 0.5–2)
LV EJECT FRACT  99904: 78
LV EJECT FRACT MOD 2C 99903: 84.82
LV EJECT FRACT MOD 4C 99902: 69.33
LV EJECT FRACT MOD BP 99901: 78.1
MAGNESIUM SERPL-MCNC: 1.7 MG/DL (ref 1.5–2.5)
MCH RBC QN AUTO: 17.9 PG (ref 27–33)
MCHC RBC AUTO-ENTMCNC: 26.8 G/DL (ref 32.2–35.5)
MCV RBC AUTO: 66.7 FL (ref 81.4–97.8)
NT-PROBNP SERPL IA-MCNC: 153 PG/ML (ref 0–125)
PHOSPHATE SERPL-MCNC: 2.5 MG/DL (ref 2.5–4.5)
PLATELET # BLD AUTO: 439 K/UL (ref 164–446)
PMV BLD AUTO: 9.3 FL (ref 9–12.9)
POTASSIUM SERPL-SCNC: 3.3 MMOL/L (ref 3.6–5.5)
PROT SERPL-MCNC: 6.2 G/DL (ref 6–8.2)
RBC # BLD AUTO: 3.69 M/UL (ref 4.2–5.4)
SODIUM SERPL-SCNC: 140 MMOL/L (ref 135–145)
T4 FREE SERPL-MCNC: 1.2 NG/DL (ref 0.93–1.7)
TIBC SERPL-MCNC: 272 UG/DL (ref 250–450)
TROPONIN T SERPL-MCNC: 9 NG/L (ref 6–19)
TSH SERPL DL<=0.005 MIU/L-ACNC: 0.14 UIU/ML (ref 0.38–5.33)
UIBC SERPL-MCNC: 264 UG/DL (ref 110–370)
WBC # BLD AUTO: 16.6 K/UL (ref 4.8–10.8)

## 2024-12-18 PROCEDURE — 700102 HCHG RX REV CODE 250 W/ 637 OVERRIDE(OP): Performed by: HOSPITALIST

## 2024-12-18 PROCEDURE — 83880 ASSAY OF NATRIURETIC PEPTIDE: CPT

## 2024-12-18 PROCEDURE — 94669 MECHANICAL CHEST WALL OSCILL: CPT

## 2024-12-18 PROCEDURE — P9016 RBC LEUKOCYTES REDUCED: HCPCS

## 2024-12-18 PROCEDURE — A9270 NON-COVERED ITEM OR SERVICE: HCPCS | Performed by: HOSPITALIST

## 2024-12-18 PROCEDURE — 36415 COLL VENOUS BLD VENIPUNCTURE: CPT

## 2024-12-18 PROCEDURE — 770020 HCHG ROOM/CARE - TELE (206)

## 2024-12-18 PROCEDURE — 84484 ASSAY OF TROPONIN QUANT: CPT

## 2024-12-18 PROCEDURE — 94640 AIRWAY INHALATION TREATMENT: CPT

## 2024-12-18 PROCEDURE — 83735 ASSAY OF MAGNESIUM: CPT

## 2024-12-18 PROCEDURE — 700101 HCHG RX REV CODE 250: Performed by: HOSPITALIST

## 2024-12-18 PROCEDURE — 700111 HCHG RX REV CODE 636 W/ 250 OVERRIDE (IP): Mod: JZ | Performed by: HOSPITALIST

## 2024-12-18 PROCEDURE — 84439 ASSAY OF FREE THYROXINE: CPT

## 2024-12-18 PROCEDURE — 82550 ASSAY OF CK (CPK): CPT

## 2024-12-18 PROCEDURE — 93306 TTE W/DOPPLER COMPLETE: CPT

## 2024-12-18 PROCEDURE — 93306 TTE W/DOPPLER COMPLETE: CPT | Mod: 26 | Performed by: INTERNAL MEDICINE

## 2024-12-18 PROCEDURE — 80053 COMPREHEN METABOLIC PANEL: CPT

## 2024-12-18 PROCEDURE — 71250 CT THORAX DX C-: CPT

## 2024-12-18 PROCEDURE — 94760 N-INVAS EAR/PLS OXIMETRY 1: CPT

## 2024-12-18 PROCEDURE — 84443 ASSAY THYROID STIM HORMONE: CPT

## 2024-12-18 PROCEDURE — 84100 ASSAY OF PHOSPHORUS: CPT

## 2024-12-18 PROCEDURE — 99233 SBSQ HOSP IP/OBS HIGH 50: CPT | Performed by: HOSPITALIST

## 2024-12-18 PROCEDURE — 99222 1ST HOSP IP/OBS MODERATE 55: CPT | Performed by: INTERNAL MEDICINE

## 2024-12-18 PROCEDURE — 700111 HCHG RX REV CODE 636 W/ 250 OVERRIDE (IP): Performed by: INTERNAL MEDICINE

## 2024-12-18 PROCEDURE — 82728 ASSAY OF FERRITIN: CPT

## 2024-12-18 PROCEDURE — 83605 ASSAY OF LACTIC ACID: CPT

## 2024-12-18 PROCEDURE — 700105 HCHG RX REV CODE 258: Performed by: INTERNAL MEDICINE

## 2024-12-18 PROCEDURE — 83540 ASSAY OF IRON: CPT

## 2024-12-18 PROCEDURE — 36430 TRANSFUSION BLD/BLD COMPNT: CPT

## 2024-12-18 PROCEDURE — 30233N1 TRANSFUSION OF NONAUTOLOGOUS RED BLOOD CELLS INTO PERIPHERAL VEIN, PERCUTANEOUS APPROACH: ICD-10-PCS | Performed by: HOSPITALIST

## 2024-12-18 PROCEDURE — 85018 HEMOGLOBIN: CPT

## 2024-12-18 PROCEDURE — 700101 HCHG RX REV CODE 250: Performed by: INTERNAL MEDICINE

## 2024-12-18 PROCEDURE — 85027 COMPLETE CBC AUTOMATED: CPT

## 2024-12-18 PROCEDURE — 86923 COMPATIBILITY TEST ELECTRIC: CPT

## 2024-12-18 PROCEDURE — 700105 HCHG RX REV CODE 258: Performed by: HOSPITALIST

## 2024-12-18 PROCEDURE — 83550 IRON BINDING TEST: CPT

## 2024-12-18 PROCEDURE — 85379 FIBRIN DEGRADATION QUANT: CPT

## 2024-12-18 RX ORDER — SODIUM CHLORIDE, SODIUM LACTATE, POTASSIUM CHLORIDE, AND CALCIUM CHLORIDE .6; .31; .03; .02 G/100ML; G/100ML; G/100ML; G/100ML
500 INJECTION, SOLUTION INTRAVENOUS ONCE
Status: COMPLETED | OUTPATIENT
Start: 2024-12-18 | End: 2024-12-18

## 2024-12-18 RX ORDER — IPRATROPIUM BROMIDE AND ALBUTEROL SULFATE 2.5; .5 MG/3ML; MG/3ML
3 SOLUTION RESPIRATORY (INHALATION)
Status: DISCONTINUED | OUTPATIENT
Start: 2024-12-19 | End: 2024-12-20 | Stop reason: HOSPADM

## 2024-12-18 RX ORDER — SODIUM CHLORIDE FOR INHALATION 7 %
4 VIAL, NEBULIZER (ML) INHALATION
Status: COMPLETED | OUTPATIENT
Start: 2024-12-18 | End: 2024-12-18

## 2024-12-18 RX ORDER — PREDNISONE 50 MG/1
50 TABLET ORAL DAILY
Status: DISCONTINUED | OUTPATIENT
Start: 2024-12-19 | End: 2024-12-20 | Stop reason: HOSPADM

## 2024-12-18 RX ORDER — THIAMINE HYDROCHLORIDE 100 MG/ML
100 INJECTION, SOLUTION INTRAMUSCULAR; INTRAVENOUS ONCE
Status: COMPLETED | OUTPATIENT
Start: 2024-12-18 | End: 2024-12-18

## 2024-12-18 RX ADMIN — SODIUM CHLORIDE, SODIUM LACTATE, POTASSIUM CHLORIDE, AND CALCIUM CHLORIDE 500 ML: .6; .31; .03; .02 INJECTION, SOLUTION INTRAVENOUS at 03:29

## 2024-12-18 RX ADMIN — IPRATROPIUM BROMIDE AND ALBUTEROL SULFATE 3 ML: .5; 3 SOLUTION RESPIRATORY (INHALATION) at 11:16

## 2024-12-18 RX ADMIN — LEVOTHYROXINE SODIUM 75 MCG: 0.07 TABLET ORAL at 05:45

## 2024-12-18 RX ADMIN — Medication 4 ML: at 15:43

## 2024-12-18 RX ADMIN — AMPICILLIN AND SULBACTAM 3 G: 1; 2 INJECTION, POWDER, FOR SOLUTION INTRAMUSCULAR; INTRAVENOUS at 05:51

## 2024-12-18 RX ADMIN — IPRATROPIUM BROMIDE AND ALBUTEROL SULFATE 3 ML: .5; 3 SOLUTION RESPIRATORY (INHALATION) at 18:33

## 2024-12-18 RX ADMIN — SODIUM CHLORIDE, SODIUM LACTATE, POTASSIUM CHLORIDE, AND CALCIUM CHLORIDE: .6; .31; .03; .02 INJECTION, SOLUTION INTRAVENOUS at 05:08

## 2024-12-18 RX ADMIN — AMPICILLIN AND SULBACTAM 3 G: 1; 2 INJECTION, POWDER, FOR SOLUTION INTRAMUSCULAR; INTRAVENOUS at 12:43

## 2024-12-18 RX ADMIN — MOMETASONE FUROATE AND FORMOTEROL FUMARATE DIHYDRATE 2 PUFF: 100; 5 AEROSOL RESPIRATORY (INHALATION) at 20:03

## 2024-12-18 RX ADMIN — AZITHROMYCIN DIHYDRATE 500 MG: 250 TABLET ORAL at 05:45

## 2024-12-18 RX ADMIN — PANTOPRAZOLE SODIUM 40 MG: 40 INJECTION, POWDER, FOR SOLUTION INTRAVENOUS at 18:27

## 2024-12-18 RX ADMIN — IPRATROPIUM BROMIDE AND ALBUTEROL SULFATE 3 ML: .5; 3 SOLUTION RESPIRATORY (INHALATION) at 07:52

## 2024-12-18 RX ADMIN — IPRATROPIUM BROMIDE AND ALBUTEROL SULFATE 3 ML: .5; 3 SOLUTION RESPIRATORY (INHALATION) at 02:10

## 2024-12-18 RX ADMIN — IPRATROPIUM BROMIDE AND ALBUTEROL SULFATE 3 ML: .5; 3 SOLUTION RESPIRATORY (INHALATION) at 15:43

## 2024-12-18 RX ADMIN — THIAMINE HYDROCHLORIDE 100 MG: 100 INJECTION, SOLUTION INTRAMUSCULAR; INTRAVENOUS at 03:27

## 2024-12-18 RX ADMIN — METHYLPREDNISOLONE SODIUM SUCCINATE 62.5 MG: 125 INJECTION, POWDER, FOR SOLUTION INTRAMUSCULAR; INTRAVENOUS at 05:45

## 2024-12-18 RX ADMIN — AMPICILLIN AND SULBACTAM 3 G: 1; 2 INJECTION, POWDER, FOR SOLUTION INTRAMUSCULAR; INTRAVENOUS at 00:14

## 2024-12-18 RX ADMIN — METHYLPREDNISOLONE SODIUM SUCCINATE 62.5 MG: 125 INJECTION, POWDER, FOR SOLUTION INTRAMUSCULAR; INTRAVENOUS at 13:55

## 2024-12-18 RX ADMIN — MOMETASONE FUROATE AND FORMOTEROL FUMARATE DIHYDRATE 2 PUFF: 100; 5 AEROSOL RESPIRATORY (INHALATION) at 05:48

## 2024-12-18 RX ADMIN — AMPICILLIN AND SULBACTAM 3 G: 1; 2 INJECTION, POWDER, FOR SOLUTION INTRAMUSCULAR; INTRAVENOUS at 18:30

## 2024-12-18 RX ADMIN — PANTOPRAZOLE SODIUM 40 MG: 40 INJECTION, POWDER, FOR SOLUTION INTRAVENOUS at 05:45

## 2024-12-18 ASSESSMENT — ENCOUNTER SYMPTOMS
COUGH: 1
FOCAL WEAKNESS: 0
NAUSEA: 0
FEVER: 0
SPUTUM PRODUCTION: 1
DEPRESSION: 0
COUGH: 0
HEMOPTYSIS: 0
HEADACHES: 0
NECK PAIN: 0
SHORTNESS OF BREATH: 1
PALPITATIONS: 0
DIZZINESS: 0
CHILLS: 0
VOMITING: 0
WHEEZING: 1
WEAKNESS: 1
ORTHOPNEA: 0

## 2024-12-18 ASSESSMENT — PAIN DESCRIPTION - PAIN TYPE
TYPE: ACUTE PAIN

## 2024-12-18 NOTE — ASSESSMENT & PLAN NOTE
"12/19:    Echocardiogram, results reviewed  \"No prior study is available for comparison.   Patient tachycardic during the study  Normal LV size and thickness with hyperdynamic systolic function,   estimated LVEF > 75%  Normal RV size and systolic function  No significant valvular abnormalities per Doppler assessment  Normal IVC size  No pericardial effusion  Estimated RVSP 30 mmHg\"    No further specific workup is planned  "

## 2024-12-18 NOTE — HOSPITAL COURSE
Vaishali Soares has past medical history that includes chronic obstructive pulmonary disease, she does not on home oxygen.  She presented to the emergency room on 12/17/2024 with weakness, cough, and intermittent confusion.  Symptoms progressed in the 2 weeks prior to admission.  Evaluation in the emergency room was concerning for left lower lobe pneumonia.  The patient met criteria for sepsis due to pneumonia with encephalopathy.  She was fluid resuscitated, started on antibiotics, and hospitalized.

## 2024-12-18 NOTE — ASSESSMENT & PLAN NOTE
Patient is not on home oxygen at baseline    12/19:  Patient has improved, she is now on room air  Continue treatment for pneumonia and COPD exacerbation

## 2024-12-18 NOTE — PROGRESS NOTES
Updated Dr. Turk on patient's hemaglobin from this AM as well as her elevated lactic.  Dr. Turk thinks the elevated lactic is from the duoneb she is receiving q4. He is to order a unit of blood and will redraw hemoglobin 6 hours after transfusion.

## 2024-12-18 NOTE — CONSULTS
Pulmonary Consultation    Date of consult: 12/18/2024    Referring Physician  Justin Turk M.D.    Reason for Consultation  COPD exacerbation     History of Presenting Illness  74 y.o. female who presented 12/17/2024 with increasing shortness of breath.  Patient states that every fall she gets pneumonia and is seen in urgent care and received antibiotics and steroids and feels better, but this year she felt that she could take care of herself.  She had increasing shortness of breath and noted that her oxygen was low at home which prompted her to come to the emergency room.  She does have a history of COPD, of which she seems unaware.  PFTs from January 2024 show obstruction with an FEV1 of 1.02 L / 49%, air trapping, and reduced gas exchange.  Patient is on Symbicort and says that she is compliant with it.  She has not seen pulmonary yet but has been referred in the past.    Code Status  Full Code    Review of Systems   Constitutional:  Positive for malaise/fatigue. Negative for chills and fever.   Respiratory:  Positive for cough, sputum production, shortness of breath and wheezing.    Cardiovascular:  Negative for palpitations and leg swelling.   Gastrointestinal:  Negative for nausea and vomiting.   Musculoskeletal:  Negative for neck pain.   Neurological:  Positive for weakness. Negative for dizziness, focal weakness and headaches.   Psychiatric/Behavioral:  Negative for depression.        Past Medical History   has a past medical history of Acute respiratory failure with hypoxia ([facility]) (4/10/2023), Anemia, Anesthesia, Emphysema, Hypothyroid, Osteoporosis, Pneumonia (2015), and Urinary retention.    Surgical History   has a past surgical history that includes other orthopedic surgery and vicki by laparoscopy (Bilateral, 9/11/2018).    Family History  family history includes Arthritis in her son; Asthma in her son; Cancer in her maternal grandmother, maternal uncle, and paternal uncle; Diabetes in her brother,  paternal grandmother, and sister; No Known Problems in her maternal grandfather, mother, and paternal grandfather; Other in her sister; Stroke (age of onset: 54) in her father.    Social History   reports that she quit smoking about 17 years ago. Her smoking use included cigarettes. She started smoking about 57 years ago. She has a 40 pack-year smoking history. She has never used smokeless tobacco. She reports current alcohol use. She reports that she does not use drugs.    Medications  Home Medications       Reviewed by Aly CoelloD (Pharmacist) on 12/17/24 at 1809  Med List Status: Complete     Medication Last Dose Status   alendronate (FOSAMAX) 70 MG Tab Not Taking Active   atorvastatin (LIPITOR) 20 MG Tab Not Taking Active   budesonide-formoterol (SYMBICORT) 80-4.5 MCG/ACT Aerosol 12/17/2024 Active   calcium/vitamin D 500-5 MG-MCG Tab Not Taking Active   ibuprofen (MOTRIN) 200 MG Tab Unknown Active   levothyroxine (SYNTHROID) 75 MCG Tab  Active                  Audit from Redirected Encounters    **Home medications have not yet been reviewed for this encounter**       Current Facility-Administered Medications   Medication Dose Route Frequency Provider Last Rate Last Admin    mometasone-formoterol (Dulera) 100-5 MCG/ACT inhaler 2 Puff  2 Puff Inhalation BID (RT) Justin Turk M.D.        ampicillin/sulbactam (Unasyn) 3 g in  mL IVPB  3 g Intravenous Q6HRS Cindy Segundo M.D.   Stopped at 12/18/24 0621    azithromycin (Zithromax) tablet 500 mg  500 mg Oral DAILY Cindy Segundo M.D.   500 mg at 12/18/24 0545    methylPREDNISolone sod succ (SOLU-MEDROL) 125 MG injection 62.5 mg  62.5 mg Intravenous Q8HRS Cindy Segundo M.D.   62.5 mg at 12/18/24 0545    levothyroxine (Synthroid) tablet 75 mcg  75 mcg Oral AM ES Cindy Segundo M.D.   75 mcg at 12/18/24 0545    Respiratory Therapy Consult   Nebulization Continuous RT Cindy Segundo M.D.        albuterol (Proventil) 2.5mg/0.5ml nebulizer  solution 2.5 mg  2.5 mg Nebulization Q2HRS PRN (RT) Cindy Segundo M.D.        ipratropium-albuterol (DUONEB) nebulizer solution  3 mL Nebulization Q4HRS (RT) Cindy Segundo M.D.   3 mL at 12/18/24 0752    acetaminophen (Tylenol) tablet 650 mg  650 mg Oral Q6HRS PRN Aseadonis Segundo M.D.        senna-docusate (Pericolace Or Senokot S) 8.6-50 MG per tablet 2 Tablet  2 Tablet Oral Q EVENING Aseadonis Segundo M.D.        And    polyethylene glycol/lytes (Miralax) Packet 1 Packet  1 Packet Oral QDAY PRN Cindy Segundo M.D.        Pharmacy Consult Request ...Pain Management Review 1 Each  1 Each Other PHARMACY TO DOSE Cindy Segundo M.D.        oxyCODONE immediate-release (Roxicodone) tablet 2.5 mg  2.5 mg Oral Q3HRS PRN Cindy Segundo M.D.        Or    oxyCODONE immediate-release (Roxicodone) tablet 5 mg  5 mg Oral Q3HRS PRN Cindy Segundo M.D.        Or    HYDROmorphone (Dilaudid) injection 0.25 mg  0.25 mg Intravenous Q3HRS PRN Cindy Segundo M.D.        pantoprazole (Protonix) injection 40 mg  40 mg Intravenous BID Aseadonis Segundo M.D.   40 mg at 12/18/24 0545       Allergies  No Known Allergies    Vital Signs last 24 hours  Temp:  [36.1 °C (96.9 °F)-38.7 °C (101.7 °F)] 36.2 °C (97.1 °F)  Pulse:  [101-122] 102  Resp:  [13-33] 14  BP: (104-122)/(50-73) 115/55  SpO2:  [80 %-99 %] 99 %    Physical Exam  Vitals and nursing note reviewed.   Constitutional:       General: She is not in acute distress.     Appearance: Normal appearance. She is ill-appearing.   HENT:      Head: Normocephalic and atraumatic.      Mouth/Throat:      Mouth: Mucous membranes are moist.   Eyes:      Conjunctiva/sclera: Conjunctivae normal.   Cardiovascular:      Rate and Rhythm: Normal rate and regular rhythm.   Pulmonary:      Effort: Pulmonary effort is normal.      Breath sounds: Wheezing present.   Abdominal:      Palpations: Abdomen is soft.   Musculoskeletal:      Right lower leg: No edema.      Left lower leg: No edema.    Skin:     General: Skin is warm and dry.   Neurological:      General: No focal deficit present.      Mental Status: She is alert. Mental status is at baseline.   Psychiatric:         Mood and Affect: Mood normal.         Behavior: Behavior normal.       Fluids    Intake/Output Summary (Last 24 hours) at 12/18/2024 1019  Last data filed at 12/18/2024 0900  Gross per 24 hour   Intake 4504.22 ml   Output 1120 ml   Net 3384.22 ml       Laboratory  Recent Results (from the past 48 hours)   Lactic Acid    Collection Time: 12/17/24  5:10 PM   Result Value Ref Range    Lactic Acid 3.1 (H) 0.5 - 2.0 mmol/L   CBC with Differential    Collection Time: 12/17/24  5:10 PM   Result Value Ref Range    WBC 26.8 (H) 4.8 - 10.8 K/uL    RBC 4.29 4.20 - 5.40 M/uL    Hemoglobin 7.7 (L) 12.0 - 16.0 g/dL    Hematocrit 28.6 (L) 37.0 - 47.0 %    MCV 66.7 (L) 81.4 - 97.8 fL    MCH 17.9 (L) 27.0 - 33.0 pg    MCHC 26.9 (L) 32.2 - 35.5 g/dL    RDW 48.5 35.9 - 50.0 fL    Platelet Count 527 (H) 164 - 446 K/uL    MPV 9.2 9.0 - 12.9 fL    Neutrophils-Polys 85.10 (H) 44.00 - 72.00 %    Lymphocytes 8.40 (L) 22.00 - 41.00 %    Monocytes 5.70 0.00 - 13.40 %    Eosinophils 0.00 0.00 - 6.90 %    Basophils 0.30 0.00 - 1.80 %    Immature Granulocytes 0.50 0.00 - 0.90 %    Nucleated RBC 0.00 0.00 - 0.20 /100 WBC    Neutrophils (Absolute) 22.78 (H) 1.82 - 7.42 K/uL    Lymphs (Absolute) 2.25 1.00 - 4.80 K/uL    Monos (Absolute) 1.52 (H) 0.00 - 0.85 K/uL    Eos (Absolute) 0.00 0.00 - 0.51 K/uL    Baso (Absolute) 0.07 0.00 - 0.12 K/uL    Immature Granulocytes (abs) 0.13 (H) 0.00 - 0.11 K/uL    NRBC (Absolute) 0.00 K/uL    Hypochromia 3+ (A)     Anisocytosis 1+     Microcytosis 3+ (A)    Complete Metabolic Panel    Collection Time: 12/17/24  5:10 PM   Result Value Ref Range    Sodium 133 (L) 135 - 145 mmol/L    Potassium 3.6 3.6 - 5.5 mmol/L    Chloride 98 96 - 112 mmol/L    Co2 23 20 - 33 mmol/L    Anion Gap 12.0 7.0 - 16.0    Glucose 127 (H) 65 - 99 mg/dL     Bun 11 8 - 22 mg/dL    Creatinine 0.58 0.50 - 1.40 mg/dL    Calcium 8.2 (L) 8.4 - 10.2 mg/dL    Correct Calcium 8.9 8.5 - 10.5 mg/dL    AST(SGOT) 26 12 - 45 U/L    ALT(SGPT) 19 2 - 50 U/L    Alkaline Phosphatase 128 (H) 30 - 99 U/L    Total Bilirubin 1.2 0.1 - 1.5 mg/dL    Albumin 3.1 (L) 3.2 - 4.9 g/dL    Total Protein 7.1 6.0 - 8.2 g/dL    Globulin 4.0 (H) 1.9 - 3.5 g/dL    A-G Ratio 0.8 g/dL   Blood Culture - Draw one from central line and one from peripheral site    Collection Time: 12/17/24  5:10 PM    Specimen: Peripheral; Blood   Result Value Ref Range    Significant Indicator NEG     Source BLD     Site PERIPHERAL     Culture Result       No Growth  Note: Blood cultures are incubated for 5 days and  are monitored continuously.Positive blood cultures  are called to the RN and reported as soon as  they are identified.     Blood Culture - Draw one from central line and one from peripheral site    Collection Time: 12/17/24  5:10 PM    Specimen: Line; Blood   Result Value Ref Range    Significant Indicator NEG     Source BLD     Site LINE     Culture Result       No Growth  Note: Blood cultures are incubated for 5 days and  are monitored continuously.Positive blood cultures  are called to the RN and reported as soon as  they are identified.     CoV-2, FLU A/B, and RSV by PCR (2-4 Hours CEPHEID) : Collect NP swab in VTM    Collection Time: 12/17/24  5:10 PM    Specimen: Respirate   Result Value Ref Range    Influenza virus A RNA Negative Negative    Influenza virus B, PCR Negative Negative    RSV, PCR Negative Negative    SARS-CoV-2 by PCR NotDetected     SARS-CoV-2 Source NP Swab    ESTIMATED GFR    Collection Time: 12/17/24  5:10 PM   Result Value Ref Range    GFR (CKD-EPI) 93 >60 mL/min/1.73 m 2   PLATELET ESTIMATE    Collection Time: 12/17/24  5:10 PM   Result Value Ref Range    Plt Estimation Increased    MORPHOLOGY    Collection Time: 12/17/24  5:10 PM   Result Value Ref Range    RBC Morphology Present      Poikilocytosis 1+     Ovalocytes 1+    PROCALCITONIN    Collection Time: 24  5:10 PM   Result Value Ref Range    Procalcitonin 0.14 <0.25 ng/mL   TROPONIN    Collection Time: 24  5:10 PM   Result Value Ref Range    Troponin T 13 6 - 19 ng/L   proBrain Natriuretic Peptide, NT    Collection Time: 24  5:10 PM   Result Value Ref Range    NT-proBNP 107 0 - 125 pg/mL   ABO Rh Confirm    Collection Time: 24  5:10 PM   Result Value Ref Range    ABO Rh Confirm O POS    EKG (Now)    Collection Time: 24  5:59 PM   Result Value Ref Range    Report       [facility] Emergency Dept.    Test Date:  2024  Pt Name:    AMADOR CHEUNG                Department: [facility]  MRN:        [id number]                      [facility]  Gender:     Female                       Technician: [id number]  :        [id number]                   Requested By:BRANDI LÓPEZ  Order #:    [id number]                    Reading MD:    Measurements  Intervals                                Axis  Rate:       118                          P:          45  CO:         156                          QRS:        -4  QRSD:       88                           T:          -84  QT:         330  QTc:        463    Interpretive Statements  Sinus tachycardia  Atrial premature complex  Borderline repolarization abnormality  Compared to ECG 2023 13:05:14  Atrial premature complex(es) now present     COD (Adult)    Collection Time: 24  9:08 PM   Result Value Ref Range    ABO Grouping Only O     Rh Grouping Only POS     Antibody Screen-Cod NEG    Urinalysis    Collection Time: 24  9:29 PM    Specimen: Urine   Result Value Ref Range    Color Yellow     Character Clear     Specific Gravity <=1.005 <1.035    Ph 6.0 5.0 - 8.0    Glucose Negative Negative mg/dL    Ketones Negative Negative mg/dL    Protein Negative Negative mg/dL    Bilirubin Negative Negative    Nitrite Negative Negative    Leukocyte  Esterase Trace (A) Negative    Occult Blood Negative Negative    Micro Urine Req Microscopic    URINE MICROSCOPIC (W/UA)    Collection Time: 12/17/24  9:29 PM   Result Value Ref Range    WBC 0-2 /hpf    RBC 0-2 /hpf    Bacteria Rare (A) None /hpf    Epithelial Cells 0-2 0 - 5 /hpf    Urine Casts 0-2 0 - 2 /lpf   Repeat Lactic Acid    Collection Time: 12/17/24  9:45 PM   Result Value Ref Range    Lactic Acid 4.1 (HH) 0.5 - 2.0 mmol/L   TROPONIN    Collection Time: 12/17/24  9:45 PM   Result Value Ref Range    Troponin T 11 6 - 19 ng/L   Repeat Lactic Acid    Collection Time: 12/18/24  2:07 AM   Result Value Ref Range    Lactic Acid 5.2 (HH) 0.5 - 2.0 mmol/L   IRON/TOTAL IRON BIND    Collection Time: 12/18/24  2:07 AM   Result Value Ref Range    Iron 8 (L) 40 - 170 ug/dL    Total Iron Binding 272 250 - 450 ug/dL    Unsat Iron Binding 264 110 - 370 ug/dL    % Saturation 3 (L) 15 - 55 %   proBrain Natriuretic Peptide, NT    Collection Time: 12/18/24  2:07 AM   Result Value Ref Range    NT-proBNP 153 (H) 0 - 125 pg/mL   TROPONIN    Collection Time: 12/18/24  2:07 AM   Result Value Ref Range    Troponin T 9 6 - 19 ng/L   PHOSPHORUS    Collection Time: 12/18/24  2:07 AM   Result Value Ref Range    Phosphorus 2.5 2.5 - 4.5 mg/dL   TSH WITH REFLEX TO FT4    Collection Time: 12/18/24  2:07 AM   Result Value Ref Range    TSH 0.141 (L) 0.380 - 5.330 uIU/mL   CREATINE KINASE    Collection Time: 12/18/24  2:07 AM   Result Value Ref Range    CPK Total 104 0 - 154 U/L   FREE THYROXINE    Collection Time: 12/18/24  2:07 AM   Result Value Ref Range    Free T-4 1.20 0.93 - 1.70 ng/dL   Repeat Lactic Acid    Collection Time: 12/18/24  5:50 AM   Result Value Ref Range    Lactic Acid 3.3 (H) 0.5 - 2.0 mmol/L   CBC without Differential    Collection Time: 12/18/24  7:30 AM   Result Value Ref Range    WBC 16.6 (H) 4.8 - 10.8 K/uL    RBC 3.69 (L) 4.20 - 5.40 M/uL    Hemoglobin 6.6 (L) 12.0 - 16.0 g/dL    Hematocrit 24.6 (L) 37.0 - 47.0 %     MCV 66.7 (L) 81.4 - 97.8 fL    MCH 17.9 (L) 27.0 - 33.0 pg    MCHC 26.8 (L) 32.2 - 35.5 g/dL    RDW 49.4 35.9 - 50.0 fL    Platelet Count 439 164 - 446 K/uL    MPV 9.3 9.0 - 12.9 fL   Comp Metabolic Panel (CMP)    Collection Time: 12/18/24  7:30 AM   Result Value Ref Range    Sodium 140 135 - 145 mmol/L    Potassium 3.3 (L) 3.6 - 5.5 mmol/L    Chloride 106 96 - 112 mmol/L    Co2 23 20 - 33 mmol/L    Anion Gap 11.0 7.0 - 16.0    Glucose 179 (H) 65 - 99 mg/dL    Bun 9 8 - 22 mg/dL    Creatinine 0.49 (L) 0.50 - 1.40 mg/dL    Calcium 8.1 (L) 8.4 - 10.2 mg/dL    Correct Calcium 9.1 8.5 - 10.5 mg/dL    AST(SGOT) 24 12 - 45 U/L    ALT(SGPT) 17 2 - 50 U/L    Alkaline Phosphatase 107 (H) 30 - 99 U/L    Total Bilirubin 0.7 0.1 - 1.5 mg/dL    Albumin 2.7 (L) 3.2 - 4.9 g/dL    Total Protein 6.2 6.0 - 8.2 g/dL    Globulin 3.5 1.9 - 3.5 g/dL    A-G Ratio 0.8 g/dL   Magnesium    Collection Time: 12/18/24  7:30 AM   Result Value Ref Range    Magnesium 1.7 1.5 - 2.5 mg/dL   D-DIMER    Collection Time: 12/18/24  7:30 AM   Result Value Ref Range    D-Dimer 0.53 (H) 0.00 - 0.50 ug/mL (FEU)   ESTIMATED GFR    Collection Time: 12/18/24  7:30 AM   Result Value Ref Range    GFR (CKD-EPI) 97 >60 mL/min/1.73 m 2       Imaging  DX-CHEST-PORTABLE (1 VIEW)   Final Result      1.  Mild diffuse interstitial parenchymal scarring.      2.  Left retrocardiac airspace opacity consistent with atelectasis and/or pneumonitis.      EC-ECHOCARDIOGRAM COMPLETE W/O CONT    (Results Pending)     -Reviewed by me, appears similar to previous x-ray    CT scan from 2013 shows bronchiectasis and pulmonary cysts    Assessment/Plan  # Acute hypoxemic respiratory failure, improving  # COPD, in exacerbation  # Bronchiectasis seen on CT in 2013    Titrate O2 to maintain sats 88-92%  Continue with steroids and nebulizers  Patient should probably be discharged on triple therapy, Trelegy is easiest as it is once daily  Follow-up sputum culture and sputum AFB, hypertonic  saline available for reduction if needed  Follow-up repeat CT scan to assess if her bronchiectasis has advanced    I will arrange follow-up in the clinic for patient    __________  This note was generated using voice recognition software which has a chance of producing errors of grammar and content.  I have made every reasonable attempt to find and correct any errors, but it should be expected that some may not be found prior to finalization of this note.    Violeta Terrell, DO   Pulmonary and Critical Care

## 2024-12-18 NOTE — PROGRESS NOTES
12-Hour chart check complete      Monitor summary   Rhythm: SR/ST  Rate:   Ectopy: rPAC  Measurements:  .20/.08/.36

## 2024-12-18 NOTE — ED TRIAGE NOTES
"Patient presents to the ER with the following complaints:    Chief Complaint   Patient presents with    Cough     Persistent productive cough for the past few days with white sputum. History of COPD.     Fever     Patient received 1000 mg of tylenol from [facility] in route.      /73   Pulse (!) 122   Temp (!) 38.7 °C (101.7 °F) (Oral)   Resp (!) 24   Ht 1.626 m (5' 4\")   Wt 83.9 kg (185 lb)   SpO2 (!) 80%   BMI 31.76 kg/m²       "

## 2024-12-18 NOTE — PROGRESS NOTES
4 Eyes Skin Assessment Completed by Faizan, RN and JAZMINE Carbajal.    Head WDL  Ears WDL  Nose WDL  Mouth WDL  Neck WDL  Breast/Chest WDL  Shoulder Blades WDL  Spine WDL  (R) Arm/Elbow/Hand WDL  (L) Arm/Elbow/Hand WDL  Abdomen WDL  Groin WDL  Scrotum/Coccyx/Buttocks WDL  (R) Leg Scab  (L) Leg Scab  (R) Heel/Foot/Toe Blanching and Boggy  (L) Heel/Foot/Toe Blanching and Boggy          Devices In Places Blood Pressure Cuff and Pulse Ox      Interventions In Place NC W/Ear Foams, Pillows, and Q2 Turns    Possible Skin Injury No    Pictures Uploaded Into Epic N/A  Wound Consult Placed N/A  RN Wound Prevention Protocol Ordered Yes

## 2024-12-18 NOTE — CARE PLAN
The patient is Stable - Low risk of patient condition declining or worsening    Shift Goals  Clinical Goals: vitals, comfort, labs, medication  Patient Goals: comfort, go home  Family Goals: sarah    Progress made toward(s) clinical / shift goals:  Monitor vitals, labs, medication administration, comfort, and pain control.     Patient is not progressing towards the following goals:      Problem: Knowledge Deficit - Standard  Goal: Patient and family/care givers will demonstrate understanding of plan of care, disease process/condition, diagnostic tests and medications  Description: Target End Date:  1-3 days or as soon as patient condition allows    Document in Patient Education    1.  Patient and family/caregiver oriented to unit, equipment, visitation policy and means for communicating concern  2.  Complete/review Learning Assessment  3.  Assess knowledge level of disease process/condition, treatment plan, diagnostic tests and medications  4.  Explain disease process/condition, treatment plan, diagnostic tests and medications  Outcome: Progressing     Problem: Nutrition - Advanced  Goal: Patient will display progressive weight gain toward goal have adequate food and fluid intake  Description: Target End Date:  Prior to discharge or change in level of care    1.  Daily weights  2.  Monitor dietary intake  3.  Promote systemic fluid hydration within cardiac tolerance  4.  Albumin and PreAlbumin per provider order  5.  Enteral and parenteral nutrition per provider order  6.  Calorie count  7.  Provide oral care  8.  Collaborate with Clinical Dietician  9.  Consider Speech Therapy consult for swallowing difficulties  10. Administer supplemental oxygen during meals as indicated  Outcome: Progressing     Problem: Self Care  Goal: Patient will have the ability to perform ADLs independently or with assistance (bathe, groom, dress, toilet and feed)  Description: Target End Date:  Prior to discharge or change in level of  care    Document on ADL flowsheet    1.  Assess the capability and level of deficiency to perform ADLs  2.  Encourage family/care giver involvement  3.  Provide assistive devices  4.  Consider PT/OT evaluations  5.  Maintain support, give positive feedback, encourage self-care allowing extra time and verbal cuing as needed  6.  Avoid doing something for patients they can do themselves, but provide assistance as needed  7.  Assist in anticipating/planning individual needs  8.  Collaborate with Case Management and  to meet discharge needs  Outcome: Progressing     Problem: Hemodynamics  Goal: Patient's hemodynamics, fluid balance and neurologic status will be stable or improve  Description: Target End Date:  Prior to discharge or change in level of care    Document on Assessment and I/O flowsheet templates    1.  Monitor vital signs, pulse oximetry and cardiac monitor per provider order and/or policy  2.  Maintain blood pressure per provider order  3.  Hemodynamic monitoring per provider order  4.  Manage IV fluids and IV infusions  5.  Monitor intake and output  6.  Daily weights per unit policy or provider order  7.  Assess peripheral pulses and capillary refill  8.  Assess color and body temperature  9.  Position patient for maximum circulation/cardiac output  10. Monitor for signs/symptoms of excessive bleeding  11. Assess mental status, restlessness and changes in level of consciousness  12. Monitor temperature and report fever or hypothermia to provider immediately. Consideration of targeted temperature management.  Outcome: Progressing     Problem: Respiratory  Goal: Patient will achieve/maintain optimum respiratory ventilation and gas exchange  Description: Target End Date:  Prior to discharge or change in level of care    Document on Assessment flowsheet    1.  Assess and monitor rate, rhythm, depth and effort of respiration  2.  Breath sounds assessed qshift and/or as needed  3.  Assess O2  saturation, administer/titrate oxygen as ordered  4.  Position patient for maximum ventilatory efficiency  5.  Turn, cough, and deep breath with splinting to improve effectiveness  6.  Collaborate with RT to administer medication/treatments per order  7.  Encourage use of incentive spirometer and encourage patient to cough after use and utilize splinting techniques if applicable  8.  Airway suctioning  9.  Monitor sputum production for changes in color, consistency and frequency  10. Perform frequent oral hygiene  11. Alternate physical activity with rest periods  Outcome: Progressing     Problem: Skin Integrity  Goal: Skin integrity is maintained or improved  Description: Target End Date:  Prior to discharge or change in level of care    Document interventions on Skin Risk/Jayden flowsheet groups and corresponding LDA    1.  Assess and monitor skin integrity, appearance and/or temperature  2.  Assess risk factors for impaired skin integrity and/or pressures ulcers  3.  Implement precautions to protect skin integrity in collaboration with interdisciplinary team  4.  Implement pressure ulcer prevention protocol if at risk for skin breakdown  5.  Confirm wound care consult if at risk for skin breakdown  6.  Ensure patient use of pressure relieving devices  (Low air loss bed, waffle overlay, heel protectors, ROHO cushion, etc)  Outcome: Progressing

## 2024-12-18 NOTE — ED NOTES
Medication history updated per patient. Patient states she is only taking levothyroxine on a daily basis but used her symbicort inhaler today. She states it has been a long time since she took her other medications and supplements.    Patient denies recent outpatient antibiotics and any anticoagulant use.    Zak Dia, PharmD, BCPS

## 2024-12-18 NOTE — ED PROVIDER NOTES
ED Provider Note    CHIEF COMPLAINT  Chief Complaint   Patient presents with    Cough     Persistent productive cough for the past few days with white sputum. History of COPD.     Fever     Patient received 1000 mg of tylenol from [facility] in route.        EXTERNAL RECORDS REVIEWED  Outpatient Notes patient was seen by her PCP August 1, 2024 for an annual evaluation.  She was doing well at that time.  Notably she has a history of COPD and acute hypoxic respiratory failure exacerbated by COVID.    HPI/ROS  LIMITATION TO HISTORY   Select: : None  OUTSIDE HISTORIAN(S):  Family daughter in law reports that the patient has been ill for the last 2 weeks with worsening dyspnea.  She has been trying to convince the patient to come to the ER during this time.    Vaishali Soares is a 74 y.o. female who presents to the emergency department for evaluation of a cough, shortness of breath and weakness.  She reports symptoms have been ongoing for the last 2 to 3 weeks.  She states that she has felt progressively more short of breath and generally weak.  She states that she has had an increasingly productive and harsh cough now with green sputum.  She states she always has a cough but her cough is certainly more significant recently.  She states that today she got to the point where she could not walk because of her shortness of breath.  She has been using her albuterol inhaler with no significant improvement.  She reports generalized bodyaches, general malaise and general fatigue as well.  She reports nausea and vomited last night.  She reports some centralized chest discomfort with coughing.  She has had subjective fevers and chills.  She states this feels like when she gets pneumonia which she states happens every year.    PAST MEDICAL HISTORY   has a past medical history of Acute respiratory failure with hypoxia (HCC) (4/10/2023), Anemia, Anesthesia, Emphysema, Hypothyroid, Osteoporosis, Pneumonia (2015), and Urinary  "retention.    SURGICAL HISTORY   has a past surgical history that includes other orthopedic surgery and vicki by laparoscopy (Bilateral, 2018).    FAMILY HISTORY  Family History   Problem Relation Age of Onset    Diabetes Sister         DMI    Other Sister         Kidney Failure    Diabetes Brother         DM2. no meds    Stroke Father 54    Cancer Maternal Grandmother         Uterine    Diabetes Paternal Grandmother     No Known Problems Mother     No Known Problems Maternal Grandfather     No Known Problems Paternal Grandfather     Cancer Maternal Uncle     Cancer Paternal Uncle     Arthritis Son         Arthritis, gout    Asthma Son     Alcohol/Drug Neg Hx        SOCIAL HISTORY  Social History     Tobacco Use    Smoking status: Former     Current packs/day: 0.00     Average packs/day: 1 pack/day for 40.0 years (40.0 ttl pk-yrs)     Types: Cigarettes     Start date: 1967     Quit date: 2007     Years since quittin.9    Smokeless tobacco: Never   Vaping Use    Vaping status: Never Used   Substance and Sexual Activity    Alcohol use: Yes     Comment: rare     Drug use: No    Sexual activity: Never       CURRENT MEDICATIONS  Home Medications       Reviewed by Blaise Dia PharmD (Pharmacist) on 24 at 1809  Med List Status: Complete     Medication Last Dose Status   alendronate (FOSAMAX) 70 MG Tab Not Taking Active   atorvastatin (LIPITOR) 20 MG Tab Not Taking Active   budesonide-formoterol (SYMBICORT) 80-4.5 MCG/ACT Aerosol 2024 Active   calcium/vitamin D 500-5 MG-MCG Tab Not Taking Active   ibuprofen (MOTRIN) 200 MG Tab Unknown Active   levothyroxine (SYNTHROID) 75 MCG Tab  Active                  Audit from Redirected Encounters    **Home medications have not yet been reviewed for this encounter**         ALLERGIES  No Known Allergies    PHYSICAL EXAM  VITAL SIGNS: /73   Pulse (!) 108   Temp (!) 38.7 °C (101.7 °F) (Oral)   Resp 20   Ht 1.626 m (5' 4\")   Wt 83.9 kg (185 " lb)   SpO2 96%   BMI 31.76 kg/m²    Constitutional: Sluggish, ill-appearing  HEENT: Atraumatic, normocephalic, pupils are equal round reactive to light, nose normal, mouth shows dry mucous membranes  Neck: Supple, no JVD, no tracheal deviation  Cardiovascular: Tachycardic, regular  Thorax & Lungs: Tachypneic, diminished breath sounds throughout, rhonchi and wheezing throughout  GI: Soft, non-distended, non-tender, no rebound  Skin: Warm, dry, no acute rash or lesion  Musculoskeletal: Moving all extremities, no acute deformity, trace lower extremity edema  Neurologic: A&Ox3, at baseline mentation  Psychiatric: Appropriate affect for situation at this time      EKG/LABS  Labs Reviewed   LACTIC ACID - Abnormal; Notable for the following components:       Result Value    Lactic Acid 3.1 (*)     All other components within normal limits   CBC WITH DIFFERENTIAL - Abnormal; Notable for the following components:    WBC 26.8 (*)     Hemoglobin 7.7 (*)     Hematocrit 28.6 (*)     MCV 66.7 (*)     MCH 17.9 (*)     MCHC 26.9 (*)     Platelet Count 527 (*)     Neutrophils-Polys 85.10 (*)     Lymphocytes 8.40 (*)     Neutrophils (Absolute) 22.78 (*)     Monos (Absolute) 1.52 (*)     Immature Granulocytes (abs) 0.13 (*)     Hypochromia 3+ (*)     Microcytosis 3+ (*)     All other components within normal limits   COMP METABOLIC PANEL - Abnormal; Notable for the following components:    Sodium 133 (*)     Glucose 127 (*)     Calcium 8.2 (*)     Alkaline Phosphatase 128 (*)     Albumin 3.1 (*)     Globulin 4.0 (*)     All other components within normal limits   COV-2, FLU A/B, AND RSV BY PCR (CEPHEID)   ESTIMATED GFR   PLATELET ESTIMATE   MORPHOLOGY   LACTIC ACID   LACTIC ACID   URINALYSIS   URINE CULTURE(NEW)   BLOOD CULTURE   BLOOD CULTURE   PROCALCITONIN     Results for orders placed or performed during the hospital encounter of 12/17/24   EKG (Now)   Result Value Ref Range    Report       [facility]  Emergency Dept.    Test Date:  2024  Pt Name:    AMADOR CHEUNG                Department: [facility]  MRN:        [id number]                      Room:       -ROOM 3  Gender:     Female                       Technician: [id number]  :        [id number]                   Requested By:BRANDI LÓPEZ  Order #:    [id number]                    Reading MD:    Measurements  Intervals                                Axis  Rate:       118                          P:          45  NE:         156                          QRS:        -4  QRSD:       88                           T:          -84  QT:         330  QTc:        463    Interpretive Statements  Sinus tachycardia  Atrial premature complex  Borderline repolarization abnormality  Compared to ECG 2023 13:05:14  Atrial premature complex(es) now present         I have independently interpreted this EKG: Sinus tachycardia at a rate of 119, normal axis, normal intervals, no ST elevation or depression, no acute T wave inversion.  Q wave in lead III.  No change from prior EKG.  Impression sinus tachycardia.  No acute ischemia.    RADIOLOGY/PROCEDURES   I have independently interpreted the diagnostic imaging associated with this visit and am waiting the final reading from the radiologist.   My preliminary interpretation is as follows: Appears to demonstrate left-sided focal consolidative pneumonia    Radiologist interpretation:  DX-CHEST-PORTABLE (1 VIEW)   Final Result      1.  Mild diffuse interstitial parenchymal scarring.      2.  Left retrocardiac airspace opacity consistent with atelectasis and/or pneumonitis.          COURSE & MEDICAL DECISION MAKING    ASSESSMENT, COURSE AND PLAN  Care Narrative:     Patient with a known history of COPD, 40-pack-year smoking history presents to the ER for evaluation of 2 weeks of worsening dyspnea and now profound shortness of breath, and increasingly productive cough, malaise and fatigue.  She arrives with 3 out of 4 SIRS criteria  including fever, tachycardia, tachypnea.  She is ill-appearing.  She notes that she uses 2 to 4 L of supplemental oxygen at home intermittently though was prescribed 2 L as needed.  She currently needs 3 L due to saturations in the very low 80s.  She is in respiratory distress with diminished breath sounds and clinical features suggestive of COPD exacerbation.  I am also concerned for underlying community-acquired pneumonia.  Possible viral prodrome/pneumonitis.  Resuscitation initiated with 30 cc/kg of lactated Ringer's.  Blood cultures and lactate sent.  Will initiate antimicrobial therapy with Unasyn and azithromycin for pneumonia therapy.  Will also treat COPD exacerbation with Solu-Medrol and DuoNeb.    On reassessment patient is breathing much easier and her aeration has improved.  Remains tachycardic however I suspect this is secondary to beta agonist.  Will continue to monitor with fluid resuscitation.  Her workup is remarkable for significant leukocytosis, worsening of chronic anemia, mild hyponatremia, mild lactic acidosis.  Chest x-ray by my review appears consistent with focal pneumonia.  Will plan for admission.    Case discussed with Dr. Segundo, Hospitalist who accepts patient for admission    Hydration: Based on the patient's presentation of Dehydration, Sepsis, and Tachycardia the patient was given IV fluids. IV Hydration was used because oral hydration was not adequate alone. Upon recheck following hydration, the patient was improved.  Sepsis: Infection was suspected 5:20 PM (Time). Sepsis pathway was initiated. 30cc/kg bolus given. Antibiotics were given per protocol.          ADDITIONAL PROBLEMS MANAGED  COPD exacerbation  Acute hypoxic respiratory failure    DISPOSITION AND DISCUSSIONS  I have discussed management of the patient with the following physicians and ALCIRA's: Hospitalist as above    Decision tools and prescription drugs considered including, but not limited to: Antibiotics Unasyn,  azithromycin .    FINAL DIAGNOSIS  1. Community acquired pneumonia of left lower lobe of lung    2. Acute exacerbation of chronic obstructive pulmonary disease (COPD) (HCC)    3. Acute hypoxic respiratory failure (HCC)    4. Anemia, unspecified type         Electronically signed by: Den Lazcano M.D., 12/17/2024 5:16 PM

## 2024-12-18 NOTE — H&P
Hospital Medicine History & Physical Note    Date of Service  12/17/2024    Primary Care Physician  Alberto Cisneros M.D.    Consultants  None     Code Status  Full Code    Chief Complaint  Chief Complaint   Patient presents with    Cough     Persistent productive cough for the past few days with white sputum. History of COPD.     Fever     Patient received 1000 mg of tylenol from [facility] in route.      History of Presenting Illness  Vaishali Soares is a 74 y.o. female with a past medical history of chronic obstructive pulmonary disease not on oxygen at home, does not follow with primary care physician, class I obesity, and hyperlipidemia who presented 12/17/2024 with generalized weakness, cough, intermittent confusion wheezing and shortness of breath.  Patient has not been feeling well over the past 10-15 days.  She has been having progressively worsening Shortness of breath and wheezing.    I discussed the plan of care with emergency physician, the patient and patient daughter present at bedside in the emergency room    Review of Systems  Review of Systems   Constitutional:  Positive for chills, fever and malaise/fatigue.   Eyes:  Negative for discharge and redness.   Respiratory:  Positive for cough, sputum production, shortness of breath and wheezing. Negative for stridor.    Cardiovascular:  Negative for chest pain and leg swelling.   Gastrointestinal:  Negative for abdominal pain and vomiting.   Genitourinary:  Negative for flank pain.   Musculoskeletal:  Negative for myalgias.   Skin: Negative.    Neurological:  Negative for focal weakness.        Intermittent confusion noticed by family   Endo/Heme/Allergies:  Does not bruise/bleed easily.   Psychiatric/Behavioral:  The patient is not nervous/anxious.      Past Medical History   has a past medical history of Acute respiratory failure with hypoxia ([facility]) (4/10/2023), Anemia, Anesthesia, Emphysema, Hypothyroid, Osteoporosis, Pneumonia (2015), and Urinary  retention.    Surgical History   has a past surgical history that includes other orthopedic surgery and vicki by laparoscopy (Bilateral, 9/11/2018).     Family History  family history includes Arthritis in her son; Asthma in her son; Cancer in her maternal grandmother, maternal uncle, and paternal uncle; Diabetes in her brother, paternal grandmother, and sister; No Known Problems in her maternal grandfather, mother, and paternal grandfather; Other in her sister; Stroke (age of onset: 54) in her father.      Social History   reports that she quit smoking about 17 years ago. Her smoking use included cigarettes. She started smoking about 57 years ago. She has a 40 pack-year smoking history. She has never used smokeless tobacco. She reports current alcohol use. She reports that she does not use drugs.    Allergies  No Known Allergies    Medications  Prior to Admission Medications   Prescriptions Last Dose Informant Patient Reported? Taking?   alendronate (FOSAMAX) 70 MG Tab Not Taking  No No   Sig: Take 1 Tablet by mouth every 7 days.   Patient not taking: Reported on 12/17/2024   atorvastatin (LIPITOR) 20 MG Tab Not Taking  No No   Sig: TAKE 1 TABLET BY MOUTH EVERY DAY IN THE EVENING   Patient not taking: Reported on 12/17/2024   budesonide-formoterol (SYMBICORT) 80-4.5 MCG/ACT Aerosol 12/17/2024 Morning  No Yes   Sig: Inhale 2 Puffs 2 times a day.   calcium/vitamin D 500-5 MG-MCG Tab Not Taking  No No   Sig: Take 1 Tablet by mouth 2 times a day.   Patient not taking: Reported on 12/17/2024   ibuprofen (MOTRIN) 200 MG Tab Unknown Patient Yes No   Sig: Take 400 mg by mouth every 6 hours as needed for Headache.   levothyroxine (SYNTHROID) 75 MCG Tab Morning  No No   Sig: TAKE 1 TABLET BY MOUTH IN THE MORNING ON AN EMPTY STOMACH      Facility-Administered Medications: None     Physical Exam  Temp:  [38.7 °C (101.7 °F)] 38.7 °C (101.7 °F)  Pulse:  [108-122] 110  Resp:  [20-24] 24  BP: (104-122)/(53-73) 118/55  SpO2:  [80  %-98 %] 96 %  Blood Pressure : 105/73   Temperature: (!) 38.7 °C (101.7 °F)   Pulse: (!) 108   Respiration: 20   Pulse Oximetry: 96 %     Physical Exam  Constitutional:       General: She is not in acute distress.  HENT:      Head: Normocephalic and atraumatic.      Right Ear: External ear normal.      Left Ear: External ear normal.      Nose: No congestion or rhinorrhea.      Mouth/Throat:      Mouth: Mucous membranes are moist.      Pharynx: No oropharyngeal exudate or posterior oropharyngeal erythema.   Eyes:      General: No scleral icterus.        Right eye: No discharge.         Left eye: No discharge.      Conjunctiva/sclera: Conjunctivae normal.      Pupils: Pupils are equal, round, and reactive to light.   Cardiovascular:      Rate and Rhythm: Tachycardia present.      Heart sounds:      No friction rub. No gallop.   Pulmonary:      Breath sounds: Wheezing present.      Comments: Requiring 4 L of oxygen to achieve adequate saturation.  Reduced air entry bilaterally.  Scattered wheeze  Abdominal:      General: Abdomen is flat. There is no distension.      Tenderness: There is no guarding.   Musculoskeletal:         General: Swelling present.      Cervical back: Neck supple. No rigidity. No muscular tenderness.      Right lower leg: Edema present.      Left lower leg: Edema present.   Skin:     Capillary Refill: Capillary refill takes 2 to 3 seconds.      Coloration: Skin is pale. Skin is not jaundiced.      Findings: No bruising or erythema.   Neurological:      Mental Status: She is alert. She is disoriented.      Comments: Intermittently invariably oriented x 2-3  Very hard of hearing   Psychiatric:         Mood and Affect: Mood normal.       Laboratory:  Recent Labs     12/17/24  1710   WBC 26.8*   RBC 4.29   HEMOGLOBIN 7.7*   HEMATOCRIT 28.6*   MCV 66.7*   MCH 17.9*   MCHC 26.9*   RDW 48.5   PLATELETCT 527*   MPV 9.2     Recent Labs     12/17/24  1710   SODIUM 133*   POTASSIUM 3.6   CHLORIDE 98   CO2 23    GLUCOSE 127*   BUN 11   CREATININE 0.58   CALCIUM 8.2*     Recent Labs     12/17/24  1710   ALTSGPT 19   ASTSGOT 26   ALKPHOSPHAT 128*   TBILIRUBIN 1.2   GLUCOSE 127*         Recent Labs     12/17/24  1710   NTPROBNP 107         Recent Labs     12/17/24  1710   TROPONINT 13     Imaging:  DX-CHEST-PORTABLE (1 VIEW)   Final Result      1.  Mild diffuse interstitial parenchymal scarring.      2.  Left retrocardiac airspace opacity consistent with atelectasis and/or pneumonitis.      EC-ECHOCARDIOGRAM COMPLETE W/O CONT    (Results Pending)     I patient reviewed patient EKG shows sinus tachycardia with a rate of 118, there is no ST elevation there is ST depression in lead V2-V5.  QTc is 463.    Assessment/Plan:  Justification for Admission Status  I anticipate this patient will require at least two midnights for appropriate medical management, necessitating inpatient admission because patient has multiple acute medical problems including sepsis, pneumonia, and acute hypoxemic respiratory failure.    Patient will need a Telemetry bed on MEDICAL service.     * Sepsis (HCC)- (present on admission)  Assessment & Plan  This is Sepsis Present on admission  SIRS criteria identified on my evaluation include: Fever, with temperature greater than 100.9 deg F, Tachycardia, with heart rate greater than 90 BPM, Tachypnea, with respirations greater than 20 per minute, and Leukocytosis, with WBC greater than 12,000  Clinical indicators of end organ dysfunction include Lactic Acid greater than 2, GCS < 15 [[place] Coma Score 14 points /  Wilberto Coma Scale E(4) V(4) M(6)], and Acute Respiratory Failure, requiring 4 L of oxygen to achieve adequate saturation  Source is left lower lobe pneumonia  Sepsis protocol initiated  Crystalloid Fluid Administration: Fluid resuscitation ordered per standard protocol - 30 mL/kg per current or ideal body weight  IV antibiotics as appropriate for source of sepsis  Reassessment: I have reassessed the  patient's hemodynamic status    Left lower lobe pneumonia- (present on admission)  Assessment & Plan  I will start Unasyn and azithromycin.  I will check blood cultures, follow on cultures and sensitivities  I will check a procalcitonin level    Acute toxic encephalopathy- (present on admission)  Assessment & Plan  Likely toxic likely secondary to sepsis,, and acute hypoxemic respiratory failure  Wilberto Coma Score 14 points /  Wilberto Coma Scale E(4) V(4) M(6)  Anticipate improvement with correcting/treating sepsis, and acute hypoxemic respiratory failure  Consider further diagnostic testing /imaging if encephalopathy does not improve with above measures.     Anemia- (present on admission)  Assessment & Plan  Last hemoglobin was 9.5 on March 24, her current hemoglobin is 7.7.  Patient/patient daughter deny noticing melena or blood in stool.  I will check occult blood in stool.  We will check iron studies [ferritin, iron saturation], consider parenteral iron replacement according to results.   Appears to be chronic  No evidence of gross bleeding.  Monitor hemoglobin. I will order a follow-up CBC.  Transfuse for a hemoglobin of less than or equal to 7     Acute exacerbation of chronic obstructive pulmonary disease (COPD) (HCC)- (present on admission)  Assessment & Plan  Oxygen as needed, Respiratory protocol, Bronchodilators, Incentive spirometry  I will start steroids  & azithromycin      Acute respiratory failure with hypoxia (HCC)- (present on admission)  Assessment & Plan  Patient is not on oxygen at baseline.  She is now requiring 4 L of oxygen.  Likely multifactorial secondary to COPD exacerbation, left lower lobe pneumonia and probably a component of heart failure.  We are treating COPD exacerbation with steroids and azithromycin.  I will check an echocardiography to rule out accompanying heart failure/pulmonary hypertension    Abnormal EKG- (present on admission)  Assessment & Plan  EKG shows sinus  tachycardia with a rate of 118, there is no ST elevation there is ST depression in lead V2-V5.  QTc is 463.    I will place on continuous cardiac monitoring  I will check and trend troponins  I will check a D-dimer  I will check echocardiography       Hyponatremia- (present on admission)  Assessment & Plan  Hypovolemic hyponatremia   I expect Na to improve with IVF     ACP (advance care planning)- (present on admission)  Assessment & Plan  I had a discussion with the patient and family [daughter/power of , present at bedside in the emergency room] regarding goals of care, diagnoses, prognosis, and CODE STATUS. We discussed her prognosis and comorbidities.  The patient has advanced age of 74 years.  She has a number of chronic medical problems including chronic obstructive pulmonary disease, hyperlipidemia and class I obesity.  He is presenting with multiple acute medical problems including acute hypoxemic respiratory failure, COPD exacerbation, left lower lobe pneumonia and sepsis.  At this point patient/patient daughter wants to proceed with a full code.  They are open to all forms of invasive or noninvasive diagnostic and therapeutic interventions including intubation and short-term mechanical ventilation if needed.       Dyslipidemia- (present on admission)  Assessment & Plan  Cardiac diet when able to take orally      VTE prophylaxis: SCDs/TEDs has severe anemia, rule out GI bleeding    I had a discussion with the patient and family [daughter/power of , present at bedside in the emergency room] regarding goals of care, diagnoses, prognosis, and CODE STATUS. We discussed her prognosis and comorbidities.  The patient has advanced age of 74 years.  She has a number of chronic medical problems including chronic obstructive pulmonary disease, hyperlipidemia and class I obesity.  He is presenting with multiple acute medical problems including acute hypoxemic respiratory failure, COPD exacerbation, left  lower lobe pneumonia and sepsis.  At this point patient/patient daughter wants to proceed with a full code.  They are open to all forms of invasive or noninvasive diagnostic and therapeutic interventions including intubation and short-term mechanical ventilation if needed.  I spent 17  minutes on advanced care planning.

## 2024-12-18 NOTE — CARE PLAN
Problem: Bronchoconstriction  Goal: Improve in air movement and diminished wheezing  Description: Target End Date:  2 to 3 days    1.  Implement inhaled treatments  2.  Evaluate and manage medication effects  Outcome: Progressing     Problem: Hyperinflation  Goal: Prevent or improve atelectasis  Description: Target End Date:  3 to 4 days    1. Instruct incentive spirometry usage  2.  Perform hyperinflation therapy as indicated  Outcome: Progressing     Problem: Bronchopulmonary Hygiene  Goal: Increase mobilization of retained secretions  Description: Target End Date:  2 to 3 days    1.  Perform bronchopulmonary therapy as indicated by assessment  2.  Perform airway suctioning  3.  Perform actions to maintain patient airway  Outcome: Progressing

## 2024-12-18 NOTE — CARE PLAN
The patient is Stable - Low risk of patient condition declining or worsening    Shift Goals  Clinical Goals: Spo2 >90  Patient Goals: comfort  Family Goals: ANNABELLE    Progress made toward(s) clinical / shift goals:      Problem: Knowledge Deficit - Standard  Goal: Patient and family/care givers will demonstrate understanding of plan of care, disease process/condition, diagnostic tests and medications  Description: Target End Date:  1-3 days or as soon as patient condition allows    Document in Patient Education    1.  Patient and family/caregiver oriented to unit, equipment, visitation policy and means for communicating concern  2.  Complete/review Learning Assessment  3.  Assess knowledge level of disease process/condition, treatment plan, diagnostic tests and medications  4.  Explain disease process/condition, treatment plan, diagnostic tests and medications  Outcome: Progressing     Problem: Impaired Gas Exchange  Goal: Patient will demonstrate improved ventilation and adequate oxygenation and participate in treatment regimen within the level of ability/situation.  Description: Target End Date:  Prior to discharge or change in level of care    1.   Assess/monitor rate/rhythm/depth of effort of respirations  2.   Assess oxygenation as ordered  3.   Administer/titrate oxygen as ordered  4.   Position patient for maximum ventilatory efficiency  5.   Turn, cough, and deep breathe  6.   Vital signs, pulse oximetry  7.   Assess color and body temperature  8.   Assess and monitor breath sounds  9.   Encourage deep-slow or pursed-lip breathing exercises  10. Monitor changes in the level of consciousness and mental status  11. Encourage expectoration of sputum; airway suctioning  12. Elevate the head of the bed and position patient for maximum ventilatory efficiency  13. Provide a calm, quiet environment  14. Limit patient's activity during the acute phase and have patient resume activity gradually and increase as  individually tolerated  15. Evaluate sleep patterns and limit stimulants such as caffeine  16. Collaborate with RT to administer medications/treatments as ordered  Outcome: Progressing     Problem: Fluid Volume  Goal: Fluid volume balance will be maintained  Description: Target End Date:  Prior to discharge or change in level of care    Document on I/O flowsheet    1.  Monitor intake and output as ordered  2.  Promote oral intake as appropriate  3.  Report inadequate intake or output to physician  4.  Administer IV therapy as ordered  5.  Weights per provider order  6.  Assess for signs and symptoms of bleeding  7.  Monitor for signs of fluid overload (respiratory changes, edema, weight gain, increased abdominal girth)  8.  Monitor of signs for inadequate fluid volume (poor skin turgor, dry mucous membranes)  9.  Instruct patient on adherence to fluid restrictions  Outcome: Progressing     Problem: Respiratory  Goal: Patient will achieve/maintain optimum respiratory ventilation and gas exchange  Description: Target End Date:  Prior to discharge or change in level of care    Document on Assessment flowsheet    1.  Assess and monitor rate, rhythm, depth and effort of respiration  2.  Breath sounds assessed qshift and/or as needed  3.  Assess O2 saturation, administer/titrate oxygen as ordered  4.  Position patient for maximum ventilatory efficiency  5.  Turn, cough, and deep breath with splinting to improve effectiveness  6.  Collaborate with RT to administer medication/treatments per order  7.  Encourage use of incentive spirometer and encourage patient to cough after use and utilize splinting techniques if applicable  8.  Airway suctioning  9.  Monitor sputum production for changes in color, consistency and frequency  10. Perform frequent oral hygiene  11. Alternate physical activity with rest periods  Outcome: Progressing       Patient is not progressing towards the following goals:

## 2024-12-19 LAB
ANION GAP SERPL CALC-SCNC: 9 MMOL/L (ref 7–16)
BASOPHILS # BLD AUTO: 0.1 % (ref 0–1.8)
BASOPHILS # BLD: 0.02 K/UL (ref 0–0.12)
BUN SERPL-MCNC: 12 MG/DL (ref 8–22)
CALCIUM SERPL-MCNC: 8.2 MG/DL (ref 8.4–10.2)
CHLORIDE SERPL-SCNC: 104 MMOL/L (ref 96–112)
CO2 SERPL-SCNC: 24 MMOL/L (ref 20–33)
CREAT SERPL-MCNC: 0.63 MG/DL (ref 0.5–1.4)
EOSINOPHIL # BLD AUTO: 0 K/UL (ref 0–0.51)
EOSINOPHIL NFR BLD: 0 % (ref 0–6.9)
ERYTHROCYTE [DISTWIDTH] IN BLOOD BY AUTOMATED COUNT: 56.5 FL (ref 35.9–50)
GFR SERPLBLD CREATININE-BSD FMLA CKD-EPI: 91 ML/MIN/1.73 M 2
GLUCOSE SERPL-MCNC: 189 MG/DL (ref 65–99)
HCT VFR BLD AUTO: 26 % (ref 37–47)
HGB BLD-MCNC: 7.5 G/DL (ref 12–16)
HGB BLD-MCNC: 7.6 G/DL (ref 12–16)
HGB BLD-MCNC: 7.8 G/DL (ref 12–16)
IMM GRANULOCYTES # BLD AUTO: 0.19 K/UL (ref 0–0.11)
IMM GRANULOCYTES NFR BLD AUTO: 0.9 % (ref 0–0.9)
IRON SATN MFR SERPL: 4 % (ref 15–55)
IRON SERPL-MCNC: 11 UG/DL (ref 40–170)
LYMPHOCYTES # BLD AUTO: 1.57 K/UL (ref 1–4.8)
LYMPHOCYTES NFR BLD: 7.1 % (ref 22–41)
MCH RBC QN AUTO: 19.9 PG (ref 27–33)
MCHC RBC AUTO-ENTMCNC: 28.8 G/DL (ref 32.2–35.5)
MCV RBC AUTO: 69 FL (ref 81.4–97.8)
MONOCYTES # BLD AUTO: 1.22 K/UL (ref 0–0.85)
MONOCYTES NFR BLD AUTO: 5.5 % (ref 0–13.4)
NEUTROPHILS # BLD AUTO: 19.25 K/UL (ref 1.82–7.42)
NEUTROPHILS NFR BLD: 86.4 % (ref 44–72)
NRBC # BLD AUTO: 0 K/UL
NRBC BLD-RTO: 0 /100 WBC (ref 0–0.2)
PLATELET # BLD AUTO: 475 K/UL (ref 164–446)
PMV BLD AUTO: 9.3 FL (ref 9–12.9)
POTASSIUM SERPL-SCNC: 3.5 MMOL/L (ref 3.6–5.5)
RBC # BLD AUTO: 3.77 M/UL (ref 4.2–5.4)
SODIUM SERPL-SCNC: 137 MMOL/L (ref 135–145)
TIBC SERPL-MCNC: 246 UG/DL (ref 250–450)
UIBC SERPL-MCNC: 235 UG/DL (ref 110–370)
WBC # BLD AUTO: 22.3 K/UL (ref 4.8–10.8)

## 2024-12-19 PROCEDURE — 94760 N-INVAS EAR/PLS OXIMETRY 1: CPT

## 2024-12-19 PROCEDURE — 99232 SBSQ HOSP IP/OBS MODERATE 35: CPT | Performed by: HOSPITALIST

## 2024-12-19 PROCEDURE — 700102 HCHG RX REV CODE 250 W/ 637 OVERRIDE(OP): Performed by: HOSPITALIST

## 2024-12-19 PROCEDURE — 700111 HCHG RX REV CODE 636 W/ 250 OVERRIDE (IP): Mod: JZ | Performed by: HOSPITALIST

## 2024-12-19 PROCEDURE — 700105 HCHG RX REV CODE 258: Performed by: HOSPITALIST

## 2024-12-19 PROCEDURE — 85018 HEMOGLOBIN: CPT

## 2024-12-19 PROCEDURE — 770020 HCHG ROOM/CARE - TELE (206)

## 2024-12-19 PROCEDURE — A9270 NON-COVERED ITEM OR SERVICE: HCPCS | Performed by: HOSPITALIST

## 2024-12-19 PROCEDURE — 94664 DEMO&/EVAL PT USE INHALER: CPT

## 2024-12-19 PROCEDURE — 700101 HCHG RX REV CODE 250: Performed by: HOSPITALIST

## 2024-12-19 PROCEDURE — 36415 COLL VENOUS BLD VENIPUNCTURE: CPT

## 2024-12-19 PROCEDURE — 94640 AIRWAY INHALATION TREATMENT: CPT

## 2024-12-19 PROCEDURE — 83540 ASSAY OF IRON: CPT

## 2024-12-19 PROCEDURE — 80048 BASIC METABOLIC PNL TOTAL CA: CPT

## 2024-12-19 PROCEDURE — 85025 COMPLETE CBC W/AUTO DIFF WBC: CPT

## 2024-12-19 PROCEDURE — 83550 IRON BINDING TEST: CPT

## 2024-12-19 RX ADMIN — PANTOPRAZOLE SODIUM 40 MG: 40 INJECTION, POWDER, FOR SOLUTION INTRAVENOUS at 06:17

## 2024-12-19 RX ADMIN — PREDNISONE 50 MG: 50 TABLET ORAL at 06:18

## 2024-12-19 RX ADMIN — AMOXICILLIN AND CLAVULANATE POTASSIUM 1 TABLET: 875; 125 TABLET, FILM COATED ORAL at 17:44

## 2024-12-19 RX ADMIN — IPRATROPIUM BROMIDE AND ALBUTEROL SULFATE 3 ML: .5; 3 SOLUTION RESPIRATORY (INHALATION) at 07:52

## 2024-12-19 RX ADMIN — IPRATROPIUM BROMIDE AND ALBUTEROL SULFATE 3 ML: .5; 3 SOLUTION RESPIRATORY (INHALATION) at 14:14

## 2024-12-19 RX ADMIN — AMPICILLIN AND SULBACTAM 3 G: 1; 2 INJECTION, POWDER, FOR SOLUTION INTRAMUSCULAR; INTRAVENOUS at 06:18

## 2024-12-19 RX ADMIN — MOMETASONE FUROATE AND FORMOTEROL FUMARATE DIHYDRATE 2 PUFF: 100; 5 AEROSOL RESPIRATORY (INHALATION) at 06:18

## 2024-12-19 RX ADMIN — SODIUM CHLORIDE 250 MG: 9 INJECTION, SOLUTION INTRAVENOUS at 17:46

## 2024-12-19 RX ADMIN — AMPICILLIN AND SULBACTAM 3 G: 2; 1 INJECTION, POWDER, FOR SOLUTION INTRAMUSCULAR; INTRAVENOUS at 12:38

## 2024-12-19 RX ADMIN — LEVOTHYROXINE SODIUM 75 MCG: 0.07 TABLET ORAL at 06:18

## 2024-12-19 RX ADMIN — IPRATROPIUM BROMIDE AND ALBUTEROL SULFATE 3 ML: .5; 3 SOLUTION RESPIRATORY (INHALATION) at 19:22

## 2024-12-19 RX ADMIN — AMPICILLIN AND SULBACTAM 3 G: 1; 2 INJECTION, POWDER, FOR SOLUTION INTRAMUSCULAR; INTRAVENOUS at 00:29

## 2024-12-19 ASSESSMENT — LIFESTYLE VARIABLES
HAVE YOU EVER FELT YOU SHOULD CUT DOWN ON YOUR DRINKING: NO
DOES PATIENT WANT TO STOP DRINKING: NO
CONSUMPTION TOTAL: NEGATIVE
TOTAL SCORE: 0
ALCOHOL_USE: NO
AVERAGE NUMBER OF DAYS PER WEEK YOU HAVE A DRINK CONTAINING ALCOHOL: 0
TOTAL SCORE: 0
TOTAL SCORE: 0
HOW MANY TIMES IN THE PAST YEAR HAVE YOU HAD 5 OR MORE DRINKS IN A DAY: 0
EVER HAD A DRINK FIRST THING IN THE MORNING TO STEADY YOUR NERVES TO GET RID OF A HANGOVER: NO
ON A TYPICAL DAY WHEN YOU DRINK ALCOHOL HOW MANY DRINKS DO YOU HAVE: 0
HAVE PEOPLE ANNOYED YOU BY CRITICIZING YOUR DRINKING: NO
EVER FELT BAD OR GUILTY ABOUT YOUR DRINKING: NO

## 2024-12-19 ASSESSMENT — ENCOUNTER SYMPTOMS
SHORTNESS OF BREATH: 1
COUGH: 0
VOMITING: 0
CHILLS: 0
HEMOPTYSIS: 0
PALPITATIONS: 0
DIZZINESS: 0
NAUSEA: 0
ORTHOPNEA: 0

## 2024-12-19 ASSESSMENT — COGNITIVE AND FUNCTIONAL STATUS - GENERAL
MOVING FROM LYING ON BACK TO SITTING ON SIDE OF FLAT BED: A LITTLE
MOBILITY SCORE: 22
SUGGESTED CMS G CODE MODIFIER MOBILITY: CJ
SUGGESTED CMS G CODE MODIFIER DAILY ACTIVITY: CH
DAILY ACTIVITIY SCORE: 24
CLIMB 3 TO 5 STEPS WITH RAILING: A LITTLE

## 2024-12-19 ASSESSMENT — PAIN DESCRIPTION - PAIN TYPE
TYPE: ACUTE PAIN
TYPE: ACUTE PAIN

## 2024-12-19 NOTE — PROGRESS NOTES
[facility] Medicine Daily Progress Note    Date of Service  12/18/2024    Chief Complaint  Vaishali Soares is a 74 y.o. female admitted 12/17/2024 with weakness, cough and confusion    Hospital Course  Vaishali Soares has past medical history that includes chronic obstructive pulmonary disease, she does not on home oxygen.  She presented to the emergency room on 12/17/2024 with weakness, cough, and intermittent confusion.  Symptoms progressed in the 2 weeks prior to admission.  Evaluation in the emergency room was concerning for left lower lobe pneumonia.  The patient met criteria for sepsis due to pneumonia with encephalopathy.  She was fluid resuscitated, started on antibiotics, and hospitalized.    Interval Problem Update  The patient is alert and conversant today, she does have some mild confusion but is certainly better than prior  He will drop to 6.7, packed blood cells ordered, her blood pressure has been stable  States that her breathing is improved, she is on 2 L by nasal cannula    I have discussed this patient's plan of care and discharge plan at IDT rounds today with Case Management, Nursing, Nursing leadership, and other members of the IDT team.    Consultants/Specialty  pulmonary    Code Status  Full Code    Disposition  The patient is not medically cleared for discharge to home or a post-acute facility.  Anticipate discharge to: home with organized home healthcare and close outpatient follow-up    I have placed the appropriate orders for post-discharge needs.    Review of Systems  Review of Systems   Constitutional:  Negative for chills and malaise/fatigue.   Respiratory:  Positive for sputum production and shortness of breath. Negative for cough and hemoptysis.    Cardiovascular:  Negative for chest pain, palpitations and orthopnea.   Gastrointestinal:  Negative for nausea and vomiting.   Skin:  Negative for itching and rash.   Neurological:  Negative for dizziness.   All other systems reviewed and  are negative.       Physical Exam  Temp:  [36.1 °C (96.9 °F)-38.7 °C (101.7 °F)] 36.8 °C (98.3 °F)  Pulse:  [101-124] 108  Resp:  [13-33] 20  BP: (104-122)/(50-73) 116/60  SpO2:  [80 %-99 %] 98 %    Physical Exam  Constitutional:       General: She is not in acute distress.     Appearance: Normal appearance. She is normal weight.   HENT:      Head: Normocephalic and atraumatic.      Right Ear: External ear normal.      Left Ear: External ear normal.   Eyes:      Extraocular Movements: Extraocular movements intact.   Cardiovascular:      Rate and Rhythm: Regular rhythm. Tachycardia present.      Pulses: Normal pulses.   Pulmonary:      Effort: Pulmonary effort is normal.      Breath sounds: Wheezing present.   Abdominal:      General: Abdomen is flat. Bowel sounds are normal. There is no distension.      Palpations: Abdomen is soft.   Musculoskeletal:         General: Normal range of motion.      Cervical back: Normal range of motion and neck supple.   Skin:     General: Skin is warm and dry.   Neurological:      General: No focal deficit present.      Mental Status: She is alert and oriented to person, place, and time.      Cranial Nerves: No cranial nerve deficit.   Psychiatric:         Mood and Affect: Mood normal.         Behavior: Behavior normal.         Fluids    Intake/Output Summary (Last 24 hours) at 12/18/2024 1700  Last data filed at 12/18/2024 1400  Gross per 24 hour   Intake 4744.22 ml   Output 1320 ml   Net 3424.22 ml        Laboratory  Recent Labs     12/17/24  1710 12/18/24  0730   WBC 26.8* 16.6*   RBC 4.29 3.69*   HEMOGLOBIN 7.7* 6.6*   HEMATOCRIT 28.6* 24.6*   MCV 66.7* 66.7*   MCH 17.9* 17.9*   MCHC 26.9* 26.8*   RDW 48.5 49.4   PLATELETCT 527* 439   MPV 9.2 9.3     Recent Labs     12/17/24  1710 12/18/24  0730   SODIUM 133* 140   POTASSIUM 3.6 3.3*   CHLORIDE 98 106   CO2 23 23   GLUCOSE 127* 179*   BUN 11 9   CREATININE 0.58 0.49*   CALCIUM 8.2* 8.1*                   Imaging  DX-CHEST-PORTABLE  (1 VIEW)   Final Result      1.  Mild diffuse interstitial parenchymal scarring.      2.  Left retrocardiac airspace opacity consistent with atelectasis and/or pneumonitis.      EC-ECHOCARDIOGRAM COMPLETE W/O CONT    (Results Pending)   CT-CHEST (THORAX) W/O    (Results Pending)        Assessment/Plan  * Sepsis (HCC)- (present on admission)  Assessment & Plan  12/18:  Continue antibiotics for pneumonia    Left lower lobe pneumonia- (present on admission)  Assessment & Plan  12/18:  Continue Unasyn and azithromycin    Acute blood loss anemia- (present on admission)  Assessment & Plan  12/18:  Hemoglobin has dropped to 6.7  I ordered 1 unit of packed red blood cells, monitor for transfusion reaction  I ordered repeat labs for morning    Acute toxic encephalopathy- (present on admission)  Assessment & Plan  12/18:  Due to sepsis    Acute exacerbation of chronic obstructive pulmonary disease (COPD) (HCC)- (present on admission)  Assessment & Plan  12/18:  Appreciate pulmonary consultation  Continue systemic steroids    Acute respiratory failure with hypoxia (HCC)- (present on admission)  Assessment & Plan  Patient is not on home oxygen at baseline    12/18:.  Continues to require oxygen supplementation    Hyponatremia- (present on admission)  Assessment & Plan  12/18:  Suspect hypovolemic hyponatremia  Sodium level improved with fluid resuscitation    Abnormal EKG- (present on admission)  Assessment & Plan  12/18:  Echocardiogram pending    ACP (advance care planning)- (present on admission)  Assessment & Plan  See prior documentation, the patient is full code    Anemia- (present on admission)  Assessment & Plan  12/18:  Hemoglobin low but stable no need for transfusion today,     Dyslipidemia- (present on admission)  Assessment & Plan  Cardiac diet when able to take orally         VTE prophylaxis:   SCDs/TEDs      I have performed a physical exam and reviewed and updated ROS and Plan today (12/18/2024). In review of  yesterday's note (12/17/2024), there are no changes except as documented above.

## 2024-12-19 NOTE — DISCHARGE PLANNING
"  Met with pt who said she lives with her son. Son also has some mobility difficulties. Pt said \"my son and I help each other\". Pt said he does not use any DMEs. She has O2 at home but it was from her mother who passed away. Pt said sometimes she uses it. Pt said she was independent with ADLs and IADLs pta but feels weaker now. CM asked if she wants to go to SNF but pt said she wants to go home. Possible need for HH but explained that PT/OT will need to see her [facility] ICU RN said that pt did well [place] [facility] [place].     PCP is Alberto Cisneros.   Pt wants MEDS TO BEDS.    Care Transition Team Assessment    Information Source  Orientation Level: Oriented X4  Information Given By: Patient  Who is responsible for making decisions for patient? : Patient    Readmission Evaluation  Is this a readmission?: No    Elopement Risk  Legal Hold: No  Ambulatory or Self Mobile in Wheelchair: No-Not an Elopement Risk    Interdisciplinary Discharge Planning  Does Admitting Nurse Feel This Could be a Complex Discharge?: No  Primary Care Physician: Dr. Alberto Cisneros  Lives with - Patient's Self Care Capacity: Adult Children  Patient or legal guardian wants to designate a caregiver: No  Support Systems: Children  Do You Take your Prescribed Medications Regularly: Yes  Able to Return to Previous ADL's: Future Time w/Therapy  Mobility Issues: No  Prior Services: Home-Independent  Patient Prefers to be Discharged to:: Home with HH  Assistance Needed: Yes    Discharge Preparedness  What is your plan after discharge?: Home with help, Home health care  What are your discharge supports?: Child  Prior Functional Level: Ambulatory, Drives Self, Independent with Activities of Daily Living, Independent with Medication Management  Difficulity with ADLs: Walking, Bathing  Difficulity with IADLs: Driving    Functional Assesment  Prior Functional Level: Ambulatory, Drives Self, Independent with Activities of Daily Living, Independent with " Medication Management    Finances  Financial Barriers to Discharge: No  Prescription Coverage: Yes    Vision / Hearing Impairment  Vision Impairment : Yes  Hearing Impairment : Yes  Hearing Impairment: Both Ears, Hearing Device Not Available    Values / Beliefs / Concerns  Values / Beliefs Concerns : No    Advance Directive  Advance Directive?: None    Domestic Abuse  Have you ever been the victim of abuse or violence?: No         Discharge Risks or Barriers  Discharge risks or barriers?: Post-acute placement / services    Anticipated Discharge Information  Discharge Disposition: D/T to home under A care in anticipation of covered skilled care (06)

## 2024-12-19 NOTE — DISCHARGE PLANNING
Case Management Discharge Planning    Admission Date: 12/17/2024  GMLOS: 4.9  ALOS: 2    6-Clicks ADL Score: 24  6-Clicks Mobility Score: 22      Anticipated Discharge Dispo: Discharge Disposition: Discharged to home/self care (01)    Spoke to patient at bedside. Discussed home health. She is not agreeable to home health services at this time. Reports she wants to go home.

## 2024-12-19 NOTE — PROGRESS NOTES
Telemetry Shift Summary     Rhythm: ST  HR: 110-122  Ectopy: rPVC/rPAC    Measurements: .18/.10/.34

## 2024-12-19 NOTE — PROGRESS NOTES
4 Eyes Skin Assessment Completed by Raven LUCERO and Shantel LUCERO.    Head WDL  Ears WDL  Nose WDL  Mouth WDL  Neck WDL  Breast/Chest WDL  Shoulder Blades WDL  Spine WDL  (R) Arm/Elbow/Hand Bruising  (L) Arm/Elbow/Hand Bruising  Abdomen WDL  Groin WDL  Scrotum/Coccyx/Buttocks WDL  (R) Leg WDL  (L) Leg WDL  (R) Heel/Foot/Toe WDL---dry skin  (L) Heel/Foot/Toe WDL-- dry skin      Devices In Places Tele Box      Interventions In Place Pillows    Possible Skin Injury No    Pictures Uploaded Into Epic N/A  Wound Consult Placed N/A  RN Wound Prevention Protocol Ordered No     Clothing

## 2024-12-19 NOTE — PROGRESS NOTES
Pt to the floor. Able to ambulate to her bed from the wheel chair. Oriented to the room and discussed POC    VSS on RA; denies pain  RBCs running at 75 mL/hr-- tolerating well  Tele box in place

## 2024-12-19 NOTE — CARE PLAN
The patient is Stable - Low risk of patient condition declining or worsening    Shift Goals  Clinical Goals: monitor vitals & labs  Patient Goals: rest and comfort  Family Goals: sarah    Progress made toward(s) clinical / shift goals:    Problem: Knowledge Deficit - Standard  Goal: Patient and family/care givers will demonstrate understanding of plan of care, disease process/condition, diagnostic tests and medications  Outcome: Progressing     Problem: Risk for Aspiration  Goal: Patient's risk for aspiration will be absent or decrease  Outcome: Progressing     Problem: Skin Integrity  Goal: Skin integrity is maintained or improved  Outcome: Progressing       Patient is not progressing towards the following goals:

## 2024-12-19 NOTE — PROGRESS NOTES
[facility] Medicine Daily Progress Note    Date of Service  12/19/2024    Chief Complaint  Vaishali Soares is a 74 y.o. female admitted 12/17/2024 with weakness, cough and confusion    Hospital Course  Vaishali Soares has past medical history that includes chronic obstructive pulmonary disease, she does not on home oxygen.  She presented to the emergency room on 12/17/2024 with weakness, cough, and intermittent confusion.  Symptoms progressed in the 2 weeks prior to admission.  Evaluation in the emergency room was concerning for left lower lobe pneumonia.  The patient met criteria for sepsis due to pneumonia with encephalopathy.  She was fluid resuscitated, started on antibiotics, and hospitalized.    Interval Problem Update  Patient tolerated blood transfusion yesterday without issue  Today she is very pleasant and cooperative, somewhat tangential during conversation  She does state that her breathing is much better, she is now on room air  Discussed with the patient's daughter, they are interested in having her come back home, we discussed home health    I have discussed this patient's plan of care and discharge plan at IDT rounds today with Case Management, Nursing, Nursing leadership, and other members of the IDT team.    Consultants/Specialty  pulmonary    Code Status  Full Code    Disposition  The patient is not medically cleared for discharge to home or a post-acute facility.  Anticipate discharge to: home with organized home healthcare and close outpatient follow-up    I have placed the appropriate orders for post-discharge needs.    Review of Systems  Review of Systems   Constitutional:  Negative for chills and malaise/fatigue.   Respiratory:  Positive for shortness of breath. Negative for cough and hemoptysis.    Cardiovascular:  Negative for chest pain, palpitations and orthopnea.   Gastrointestinal:  Negative for nausea and vomiting.   Skin:  Negative for itching and rash.   Neurological:  Negative for  dizziness.   All other systems reviewed and are negative.       Physical Exam  Temp:  [36.7 °C (98.1 °F)-37.3 °C (99.1 °F)] 37.2 °C (99 °F)  Pulse:  [100-118] 104  Resp:  [18-22] 18  BP: ()/(55-66) 100/66  SpO2:  [86 %-99 %] 94 %    Physical Exam  Constitutional:       General: She is not in acute distress.     Appearance: Normal appearance. She is normal weight.   HENT:      Head: Normocephalic.      Comments: Poor dentition     Right Ear: External ear normal.      Left Ear: External ear normal.   Eyes:      Extraocular Movements: Extraocular movements intact.   Cardiovascular:      Rate and Rhythm: Regular rhythm. Tachycardia present.      Pulses: Normal pulses.   Pulmonary:      Effort: Pulmonary effort is normal.      Breath sounds: Wheezing present.      Comments: Increased work of breathing with exertion  Abdominal:      General: Abdomen is flat. Bowel sounds are normal. There is no distension.      Palpations: Abdomen is soft.   Musculoskeletal:         General: Normal range of motion.      Cervical back: Normal range of motion and neck supple.   Skin:     General: Skin is warm and dry.   Neurological:      Mental Status: She is alert.      Cranial Nerves: No cranial nerve deficit.   Psychiatric:         Mood and Affect: Mood normal.         Behavior: Behavior normal.         Fluids    Intake/Output Summary (Last 24 hours) at 12/19/2024 1336  Last data filed at 12/19/2024 1000  Gross per 24 hour   Intake 1700 ml   Output 200 ml   Net 1500 ml        Laboratory  Recent Labs     12/17/24  1710 12/18/24  0730 12/18/24  1854 12/19/24  0258 12/19/24  1045   WBC 26.8* 16.6*  --  22.3*  --    RBC 4.29 3.69*  --  3.77*  --    HEMOGLOBIN 7.7* 6.6* 8.1* 7.5* 7.6*   HEMATOCRIT 28.6* 24.6*  --  26.0*  --    MCV 66.7* 66.7*  --  69.0*  --    MCH 17.9* 17.9*  --  19.9*  --    MCHC 26.9* 26.8*  --  28.8*  --    RDW 48.5 49.4  --  56.5*  --    PLATELETCT 527* 439  --  475*  --    MPV 9.2 9.3  --  9.3  --      Recent  Labs     12/17/24  1710 12/18/24  0730 12/19/24  0258   SODIUM 133* 140 137   POTASSIUM 3.6 3.3* 3.5*   CHLORIDE 98 106 104   CO2 23 23 24   GLUCOSE 127* 179* 189*   BUN 11 9 12   CREATININE 0.58 0.49* 0.63   CALCIUM 8.2* 8.1* 8.2*                   Imaging  CT-CHEST (THORAX) W/O   Final Result         1.  Left lower lobe infiltrates   2.  Small hiatal hernia   3.  Hepatomegaly and diffuse hepatic steatosis   4.  Atherosclerosis and atherosclerotic coronary artery disease   5.  Pulmonary nodules, see nodule follow-up recommendations below.      Fleischner Society pulmonary nodule recommendations:   Low Risk: No routine follow-up      High Risk: Optional CT at 12 months      Comments: Use most suspicious nodule as guide to management. Follow-up intervals may vary according to size and risk.      Low Risk - Minimal or absent history of smoking and of other known risk factors.      High Risk - History of smoking or of other known risk factors.      Note: These recommendations do not apply to lung cancer screening, patients with immunosuppression, or patients with known primary cancer.      Fleischner Society 2017 Guidelines for Management of Incidentally Detected Pulmonary Nodules in Adults         EC-ECHOCARDIOGRAM COMPLETE W/O CONT   Final Result      DX-CHEST-PORTABLE (1 VIEW)   Final Result      1.  Mild diffuse interstitial parenchymal scarring.      2.  Left retrocardiac airspace opacity consistent with atelectasis and/or pneumonitis.           Assessment/Plan  * Acute exacerbation of chronic obstructive pulmonary disease (COPD) (HCC)- (present on admission)  Assessment & Plan  12/19:  Prednisone, inhaled bronchodilators as needed    Left lower lobe pneumonia- (present on admission)  Assessment & Plan  12/19:  Continue Unasyn  Transition to p.o. Augmentin tomorrow    Acute blood loss anemia- (present on admission)  Assessment & Plan  12/19:  Required 1 unit of packed red blood cells yesterday  Continue to check  "hemoglobin  Stool occult blood negative  Likely plan for outpatient follow-up with gastroenterology  Continue PPI, changed to enteral formulation    Acute toxic encephalopathy- (present on admission)  Assessment & Plan  12/19:  Patient has returned to her baseline mental status    Acute respiratory failure with hypoxia (HCC)- (present on admission)  Assessment & Plan  Patient is not on home oxygen at baseline    12/19:  Patient has improved, she is now on room air  Continue treatment for pneumonia and COPD exacerbation    Abnormal EKG- (present on admission)  Assessment & Plan  12/19:    Echocardiogram, results reviewed  \"No prior study is available for comparison.   Patient tachycardic during the study  Normal LV size and thickness with hyperdynamic systolic function,   estimated LVEF > 75%  Normal RV size and systolic function  No significant valvular abnormalities per Doppler assessment  Normal IVC size  No pericardial effusion  Estimated RVSP 30 mmHg\"    No further specific workup is planned    ACP (advance care planning)- (present on admission)  Assessment & Plan  See prior documentation, the patient is full code    Sepsis (HCC)- (present on admission)  Assessment & Plan  Sepsis has resolved    Dyslipidemia- (present on admission)  Assessment & Plan  Cardiac diet when able to take orally    Hyponatremia- (present on admission)  Assessment & Plan  Resolved         VTE prophylaxis:   SCDs/TEDs      I have performed a physical exam and reviewed and updated ROS and Plan today (12/19/2024). In review of yesterday's note (12/18/2024), there are no changes except as documented above.        "

## 2024-12-19 NOTE — ASSESSMENT & PLAN NOTE
12/19:  Required 1 unit of packed red blood cells yesterday  Continue to check hemoglobin  Stool occult blood negative  Likely plan for outpatient follow-up with gastroenterology  Continue PPI, changed to enteral formulation

## 2024-12-19 NOTE — PROGRESS NOTES
Patient transferred via wheelchair and this RN.  Patient arrived to bed safely, bedside report given.  All personal belongings with patient, including cell phone, cell phone , and wallet.

## 2024-12-19 NOTE — FACE TO FACE
Face to Face Supporting Documentation - Home Health    The encounter with this patient was in whole or in part the primary reason for home health admission.    Date of encounter:   Patient:                    MRN:                       YOB: 2026  Vaishali Soares  [id number]  1950     Home health to see patient for:  Skilled Nursing care for assessment, interventions & education, Physical Therapy evaluation and treatment, and Occupational therapy evaluation and treatment    Skilled need for:  New Onset Medical Diagnosis pneumonia    Skilled nursing interventions to include:  Comment: Medication management    Homebound status evidenced by:  Needs the assistance of another person in order to leave the home. Leaving home requires a considerable and taxing effort. There is a normal inability to leave the home.    Community Physician to provide follow up care: Alberto Cisneros M.D.     Optional Interventions? No      I certify the face to face encounter for this home health care referral meets the CMS requirements and the encounter/clinical assessment with the patient was, in whole, or in part, for the medical condition(s) listed above, which is the primary reason for home health care. Based on my clinical findings: the service(s) are medically necessary, support the need for home health care, and the homebound criteria are met.  I certify that this patient has had a face to face encounter by myself.  Justin Turk M.D. - NPI: [id number]

## 2024-12-20 ENCOUNTER — PHARMACY VISIT (OUTPATIENT)
Dept: PHARMACY | Facility: MEDICAL CENTER | Age: 74
End: 2024-12-20
Payer: COMMERCIAL

## 2024-12-20 VITALS
DIASTOLIC BLOOD PRESSURE: 77 MMHG | BODY MASS INDEX: 29.66 KG/M2 | WEIGHT: 173.72 LBS | RESPIRATION RATE: 19 BRPM | TEMPERATURE: 98.1 F | SYSTOLIC BLOOD PRESSURE: 132 MMHG | OXYGEN SATURATION: 94 % | HEIGHT: 64 IN | HEART RATE: 79 BPM

## 2024-12-20 PROBLEM — J44.1 ACUTE EXACERBATION OF CHRONIC OBSTRUCTIVE PULMONARY DISEASE (COPD) (HCC): Status: RESOLVED | Noted: 2023-11-14 | Resolved: 2024-12-20

## 2024-12-20 PROBLEM — D62 ACUTE BLOOD LOSS ANEMIA: Status: RESOLVED | Noted: 2024-12-18 | Resolved: 2024-12-20

## 2024-12-20 PROBLEM — J96.01 ACUTE RESPIRATORY FAILURE WITH HYPOXIA (HCC): Status: RESOLVED | Noted: 2023-04-10 | Resolved: 2024-12-20

## 2024-12-20 PROBLEM — A41.9 SEPSIS (HCC): Status: RESOLVED | Noted: 2024-12-17 | Resolved: 2024-12-20

## 2024-12-20 PROBLEM — G93.40 ACUTE ENCEPHALOPATHY: Status: RESOLVED | Noted: 2024-12-17 | Resolved: 2024-12-20

## 2024-12-20 LAB
ALBUMIN SERPL BCP-MCNC: 2.8 G/DL (ref 3.2–4.9)
ALBUMIN/GLOB SERPL: 0.8 G/DL
ALP SERPL-CCNC: 97 U/L (ref 30–99)
ALT SERPL-CCNC: 18 U/L (ref 2–50)
ANION GAP SERPL CALC-SCNC: 7 MMOL/L (ref 7–16)
AST SERPL-CCNC: 24 U/L (ref 12–45)
BACTERIA UR CULT: NORMAL
BASOPHILS # BLD AUTO: 0.1 % (ref 0–1.8)
BASOPHILS # BLD: 0.02 K/UL (ref 0–0.12)
BILIRUB SERPL-MCNC: 0.4 MG/DL (ref 0.1–1.5)
BUN SERPL-MCNC: 12 MG/DL (ref 8–22)
CALCIUM ALBUM COR SERPL-MCNC: 9.2 MG/DL (ref 8.5–10.5)
CALCIUM SERPL-MCNC: 8.2 MG/DL (ref 8.4–10.2)
CHLORIDE SERPL-SCNC: 105 MMOL/L (ref 96–112)
CO2 SERPL-SCNC: 25 MMOL/L (ref 20–33)
CREAT SERPL-MCNC: 0.67 MG/DL (ref 0.5–1.4)
EOSINOPHIL # BLD AUTO: 0 K/UL (ref 0–0.51)
EOSINOPHIL NFR BLD: 0 % (ref 0–6.9)
ERYTHROCYTE [DISTWIDTH] IN BLOOD BY AUTOMATED COUNT: 58.4 FL (ref 35.9–50)
GFR SERPLBLD CREATININE-BSD FMLA CKD-EPI: 90 ML/MIN/1.73 M 2
GLOBULIN SER CALC-MCNC: 3.3 G/DL (ref 1.9–3.5)
GLUCOSE SERPL-MCNC: 149 MG/DL (ref 65–99)
HCT VFR BLD AUTO: 26.1 % (ref 37–47)
HGB BLD-MCNC: 7.4 G/DL (ref 12–16)
IMM GRANULOCYTES # BLD AUTO: 0.27 K/UL (ref 0–0.11)
IMM GRANULOCYTES NFR BLD AUTO: 1.5 % (ref 0–0.9)
LYMPHOCYTES # BLD AUTO: 1.78 K/UL (ref 1–4.8)
LYMPHOCYTES NFR BLD: 10.2 % (ref 22–41)
MCH RBC QN AUTO: 19.6 PG (ref 27–33)
MCHC RBC AUTO-ENTMCNC: 28.4 G/DL (ref 32.2–35.5)
MCV RBC AUTO: 69.2 FL (ref 81.4–97.8)
MONOCYTES # BLD AUTO: 1.23 K/UL (ref 0–0.85)
MONOCYTES NFR BLD AUTO: 7 % (ref 0–13.4)
NEUTROPHILS # BLD AUTO: 14.21 K/UL (ref 1.82–7.42)
NEUTROPHILS NFR BLD: 81.2 % (ref 44–72)
NRBC # BLD AUTO: 0 K/UL
NRBC BLD-RTO: 0 /100 WBC (ref 0–0.2)
PLATELET # BLD AUTO: 481 K/UL (ref 164–446)
PMV BLD AUTO: 9 FL (ref 9–12.9)
POTASSIUM SERPL-SCNC: 3.5 MMOL/L (ref 3.6–5.5)
PROT SERPL-MCNC: 6.1 G/DL (ref 6–8.2)
RBC # BLD AUTO: 3.77 M/UL (ref 4.2–5.4)
SIGNIFICANT IND 70042: NORMAL
SITE SITE: NORMAL
SODIUM SERPL-SCNC: 137 MMOL/L (ref 135–145)
SOURCE SOURCE: NORMAL
WBC # BLD AUTO: 17.5 K/UL (ref 4.8–10.8)

## 2024-12-20 PROCEDURE — 36415 COLL VENOUS BLD VENIPUNCTURE: CPT

## 2024-12-20 PROCEDURE — 85025 COMPLETE CBC W/AUTO DIFF WBC: CPT

## 2024-12-20 PROCEDURE — 700101 HCHG RX REV CODE 250: Performed by: HOSPITALIST

## 2024-12-20 PROCEDURE — A9270 NON-COVERED ITEM OR SERVICE: HCPCS | Performed by: HOSPITALIST

## 2024-12-20 PROCEDURE — RXMED WILLOW AMBULATORY MEDICATION CHARGE: Performed by: HOSPITALIST

## 2024-12-20 PROCEDURE — 700111 HCHG RX REV CODE 636 W/ 250 OVERRIDE (IP): Performed by: HOSPITALIST

## 2024-12-20 PROCEDURE — 700102 HCHG RX REV CODE 250 W/ 637 OVERRIDE(OP): Performed by: HOSPITALIST

## 2024-12-20 PROCEDURE — 94760 N-INVAS EAR/PLS OXIMETRY 1: CPT

## 2024-12-20 PROCEDURE — 700105 HCHG RX REV CODE 258: Performed by: HOSPITALIST

## 2024-12-20 PROCEDURE — 99239 HOSP IP/OBS DSCHRG MGMT >30: CPT | Performed by: HOSPITALIST

## 2024-12-20 PROCEDURE — 94640 AIRWAY INHALATION TREATMENT: CPT

## 2024-12-20 PROCEDURE — 80053 COMPREHEN METABOLIC PANEL: CPT

## 2024-12-20 RX ORDER — LEVOTHYROXINE SODIUM 75 UG/1
75 TABLET ORAL
Qty: 90 TABLET | Refills: 0 | Status: SHIPPED | OUTPATIENT
Start: 2024-12-20

## 2024-12-20 RX ORDER — BENZONATATE 100 MG/1
100 CAPSULE ORAL 3 TIMES DAILY PRN
Qty: 60 CAPSULE | Refills: 0 | Status: SHIPPED | OUTPATIENT
Start: 2024-12-20

## 2024-12-20 RX ORDER — PREDNISONE 50 MG/1
50 TABLET ORAL DAILY
Qty: 2 TABLET | Refills: 0 | Status: SHIPPED | OUTPATIENT
Start: 2024-12-20 | End: 2024-12-22

## 2024-12-20 RX ORDER — IPRATROPIUM BROMIDE AND ALBUTEROL SULFATE 2.5; .5 MG/3ML; MG/3ML
3 SOLUTION RESPIRATORY (INHALATION) 4 TIMES DAILY
Qty: 270 ML | Refills: 1 | Status: SHIPPED | OUTPATIENT
Start: 2024-12-20

## 2024-12-20 RX ADMIN — IPRATROPIUM BROMIDE AND ALBUTEROL SULFATE 3 ML: .5; 3 SOLUTION RESPIRATORY (INHALATION) at 11:34

## 2024-12-20 RX ADMIN — OMEPRAZOLE 20 MG: 20 CAPSULE, DELAYED RELEASE ORAL at 06:33

## 2024-12-20 RX ADMIN — AMOXICILLIN AND CLAVULANATE POTASSIUM 1 TABLET: 875; 125 TABLET, FILM COATED ORAL at 06:33

## 2024-12-20 RX ADMIN — LEVOTHYROXINE SODIUM 75 MCG: 0.07 TABLET ORAL at 06:33

## 2024-12-20 RX ADMIN — SODIUM CHLORIDE 250 MG: 9 INJECTION, SOLUTION INTRAVENOUS at 07:54

## 2024-12-20 RX ADMIN — PREDNISONE 50 MG: 50 TABLET ORAL at 06:33

## 2024-12-20 SDOH — ECONOMIC STABILITY: TRANSPORTATION INSECURITY
IN THE PAST 12 MONTHS, HAS THE LACK OF TRANSPORTATION KEPT YOU FROM MEDICAL APPOINTMENTS OR FROM GETTING MEDICATIONS?: NO

## 2024-12-20 SDOH — ECONOMIC STABILITY: TRANSPORTATION INSECURITY
IN THE PAST 12 MONTHS, HAS LACK OF RELIABLE TRANSPORTATION KEPT YOU FROM MEDICAL APPOINTMENTS, MEETINGS, WORK OR FROM GETTING THINGS NEEDED FOR DAILY LIVING?: NO

## 2024-12-20 ASSESSMENT — SOCIAL DETERMINANTS OF HEALTH (SDOH)
IN THE PAST 12 MONTHS, HAS THE ELECTRIC, GAS, OIL, OR WATER COMPANY THREATENED TO SHUT OFF SERVICE IN YOUR HOME?: NO
WITHIN THE LAST YEAR, HAVE TO BEEN RAPED OR FORCED TO HAVE ANY KIND OF SEXUAL ACTIVITY BY YOUR PARTNER OR EX-PARTNER?: NO
WITHIN THE PAST 12 MONTHS, THE FOOD YOU BOUGHT JUST DIDN'T LAST AND YOU DIDN'T HAVE MONEY TO GET MORE: NEVER TRUE
WITHIN THE LAST YEAR, HAVE YOU BEEN HUMILIATED OR EMOTIONALLY ABUSED IN OTHER WAYS BY YOUR PARTNER OR EX-PARTNER?: NO
WITHIN THE PAST 12 MONTHS, YOU WORRIED THAT YOUR FOOD WOULD RUN OUT BEFORE YOU GOT THE MONEY TO BUY MORE: NEVER TRUE
WITHIN THE LAST YEAR, HAVE YOU BEEN AFRAID OF YOUR PARTNER OR EX-PARTNER?: NO
WITHIN THE LAST YEAR, HAVE YOU BEEN KICKED, HIT, SLAPPED, OR OTHERWISE PHYSICALLY HURT BY YOUR PARTNER OR EX-PARTNER?: NO

## 2024-12-20 ASSESSMENT — FIBROSIS 4 INDEX: FIB4 SCORE: 0.89

## 2024-12-20 NOTE — PROGRESS NOTES
Telemetry Shift Summary      Rhythm: ST  HR: 110-122  Ectopy: rPVC/rPAC     Measurements: .18/..08/.32

## 2024-12-20 NOTE — PROGRESS NOTES
Patient discharged via wheelchair with family.  Portable oxygen brought by family.  Meds to beds done.  Discharge documents and instructions given.

## 2024-12-20 NOTE — RESPIRATORY CARE
"   COPD EDUCATION by COPD CLINICAL EDUCATOR  12/19/2024 at 4:33 PM by Pavithra Hackett RRT     Patient reviewed by COPD education team. Patient does have a history or diagnosis of COPD and is a former smoker.  Patient was  not interested in COPD program.Provided a short intervention was completed with this patient covering: What is COPD (how the lungs work), daily medications rescue and maintenance, breathing techniques, infection prevention and oxygen safety were covered in detail.  A comprehensive packet including information about COPD, treatments, and oxygen safety was given.       Provided spacer with instruction for use, care, and cleaning.  A personalized \"COPD Action Plan\" was reviewed with and provided to the patient.    COPD Screen  COPD Risk Screening  Do you have a history of COPD?: Yes  Do you have a Pulmonologist?: Yes  COPD Population Screener  During the past 4 weeks, how much did you feel short of breath?: Some of the time  Do you ever cough up any mucus or phlegm?: Yes, a few days a week or month  In the past 12 months, you do less than you used to because of your breathing problems: Disagree/unsure  Have you smoked at least 100 cigarettes in your entire life?: Yes  How old are you?: 60+  COPD Screening Score: 6  COPD Coordinator Recommended: Yes    COPD Assessment  COPD Clinical Specialists ONLY  COPD Education Initiated: Yes--Short Intervention (Per PFT 1/29/24 FEV1 49% Severe COPD of which pt seems completely unaware, keeps states has PNA, when asked about her lung medications she states has all the stuff but doesnt use them, pt here for exacerbation/PNA, did action plan informed her of COPD)  Is this a COPD exacerbation patient?: Yes  DME Company: [employer]  DME Equipment Type: oxygen?  Physician Name: Alberto Cisneros M.D.  Pulmonary Follow Up Appointment: 01/20/25  Appt Time: 7720  Pulmonologist Name: Dr Brown  Referrals Initiated: Yes  Pulmonary Rehab: Declined  Smoking Cessation: " "N/A  Hospice: N/A  Home Health Care: N/A  Mobile Urgent Care Services: N/A  [facility]iatric Special[facility] Group: Yes (info given, not interested in home help)  Private In-Home Care Agency: N/A  $ Demo/Eval of SVN's, MDI's and Aerosols: Yes  Interdisciplinary Rounds: Attendance at Rounds (30 Min)    PFT Results    No results found for: \"PFT\" 1/29/24  PFT SUMMARY: Findings are consistent with severe COPD with air trapping as well as likely emphysema as described.  The low DLCO should be correlated with recent  hemoglobin level.  If the patient has edema on exam, consider ordering an echocardiogram.  Additionally, the patient should be considered for  long-acting inhalers due to the finding of severe air trapping    Meds to Beds        MY COPD ACTION PLAN     It is recommended that patients and physicians /healthcare providers complete this action plan together. This plan should be discussed at each physician visit and updated as needed.    The green, yellow and red zones show groups of symptoms of COPD. This list of symptoms is not comprehensive, and you may experience other symptoms. In the \"Actions\" column, your healthcare provider has recommended actions for you to take based on your symptoms.    Patient Name: Vaishali Soares   YOB: 1950   Last Updated on: 12/19/2024  4:33 PM   Green Zone:  I am doing well today Actions     Usual activitiy and exercise level   Take daily medications     Usual amounts of cough and phlegm/mucus   Use oxygen as prescribed     Sleep well at night   Continue regular exercise/diet plan     Appetite is good   At all times avoid cigarette smoke, inhaled irritants     Daily Medications (these medications are taken every day):   Fluticasone and Umeclidinium and Vilanterol (Trelogy)  Albuterol (Accuneb, Proair, Proventil, Ventolin) 1 Puff  2 Puffs Once daily  Every 4 hours PRN     Additional Information:  Informed pt to stop taking Symbicort and start taking Trelegy!  Rinse mouth after " "use    Yellow Zone:  I am having a bad day or a COPD flare Actions     More breathless than usual   Continue daily medications     I have less energy for my daily activities   Use quick relief inhaler as ordered     Increased or thicker phlegm/mucus   Use oxygen as prescribed     Using quick relief inhaler/nebulizer more often   Get plenty of rest     Swelling of ankles more than usual   Use pursed lip breathing     More coughing than usual   At all times avoid cigarette smoke, inhaled irritants     I feel like I have a \"chest cold\"     Poor sleep and my symptoms woke me up     My appetite is not good     My medicine is not helping      Call provider immediately if symptoms don’t improve     Continue daily medications, add rescue medications:   Albuterol/Ipratropium (Combivent, Duoneb) 3mL via nebulizer Every 4 hours       Medications to be used during a flare up, (as Discussed with Provider):           Additional Information:  Ordered new RX medication for nebulizer.  Use nebulizer, call provider if symptoms do not improve!    Patient instructed on importance of cleaning home nebulizer after every treatment to prevent infection.      Red Zone:  I need urgent medical care Actions     Severe shortness of breath even at rest   Call 911 or seek medical care immediately     Not able to do any activity because of breathing      Fever or shaking chills      Feeling confused or very drowsy       Chest pains      Coughing up blood                  "

## 2024-12-20 NOTE — CARE PLAN
Problem: Knowledge Deficit - Standard  Goal: Patient and family/care givers will demonstrate understanding of plan of care, disease process/condition, diagnostic tests and medications  Outcome: Progressing     Problem: Knowledge Deficit - COPD  Goal: Patient/significant other demonstrates understanding of disease process, utilization of the Action Plan, medications and discharge instruction  Outcome: Progressing     Problem: Risk for Infection - COPD  Goal: Patient will remain free from signs and symptoms of infection  Outcome: Progressing     Problem: Nutrition - Advanced  Goal: Patient will display progressive weight gain toward goal have adequate food and fluid intake  Outcome: Progressing     Problem: Ineffective Airway Clearance  Goal: Patient will maintain patent airway with clear/clearing breath sounds  Outcome: Progressing     Problem: Impaired Gas Exchange  Goal: Patient will demonstrate improved ventilation and adequate oxygenation and participate in treatment regimen within the level of ability/situation.  Outcome: Progressing     Problem: Risk for Aspiration  Goal: Patient's risk for aspiration will be absent or decrease  Outcome: Progressing     Problem: Self Care  Goal: Patient will have the ability to perform ADLs independently or with assistance (bathe, groom, dress, toilet and feed)  Outcome: Progressing     Problem: Hemodynamics  Goal: Patient's hemodynamics, fluid balance and neurologic status will be stable or improve  Outcome: Progressing     Problem: Fluid Volume  Goal: Fluid volume balance will be maintained  Outcome: Progressing     Problem: Urinary - Renal Perfusion  Goal: Ability to achieve and maintain adequate renal perfusion and functioning will improve  Outcome: Progressing     Problem: Respiratory  Goal: Patient will achieve/maintain optimum respiratory ventilation and gas exchange  Outcome: Progressing     Problem: Mechanical Ventilation  Goal: Safe management of artificial airway  and ventilation  Outcome: Progressing  Goal: Successful weaning off mechanical ventilator, spontaneously maintains adequate gas exchange  Outcome: Progressing  Goal: Patient will be able to express needs and understand communication  Outcome: Progressing     Problem: Physical Regulation  Goal: Diagnostic test results will improve  Outcome: Progressing  Goal: Signs and symptoms of infection will decrease  Outcome: Progressing     Problem: Skin Integrity  Goal: Skin integrity is maintained or improved  Outcome: Progressing   The patient is Stable - Low risk of patient condition declining or worsening    Shift Goals  Clinical Goals: Monitor Labs, Monitor Vitals, Safety  Patient Goals: Rest and Locate Phone   Family Goals: sarah    Progress made toward(s) clinical / shift goals:      Patient is not progressing towards the following goals:

## 2024-12-20 NOTE — PROGRESS NOTES
Assumed care of patient at bedside report from day shift RN. Updated on POC. Patient currently A & O x 4; on 0.5L O2 91 percent saturation; up self; without complaints of acute pain. Assessment completed. Call light within reach. Whiteboard updated. Fall precautions in place. Bed locked and in lowest position. All questions answered. No other needs indicated at this time.

## 2024-12-20 NOTE — DISCHARGE INSTRUCTIONS
Discharge Instructions per Justin Turk M.D.    Please follow-up with your primary care physician, an appoint will be scheduled  He would benefit from a colonoscopy to investigate why you are anemic and have a low blood counts.  Please discuss referral for colonoscopy from your primary care provider

## 2024-12-20 NOTE — DISCHARGE SUMMARY
Discharge Summary    CHIEF COMPLAINT ON ADMISSION  Chief Complaint   Patient presents with    Cough     Persistent productive cough for the past few days with white sputum. History of COPD.     Fever     Patient received 1000 mg of tylenol from [facility] in route.        Reason for Admission  EMS     Admission Date  12/17/2024    CODE STATUS  Full Code    HPI & HOSPITAL COURSE  This is a 74 y.o. female here with cough and fever.    Vaishali Soares has past medical history that includes chronic obstructive pulmonary disease, she does not on home oxygen.  She presented to the emergency room on 12/17/2024 with weakness, cough, and intermittent confusion.  Symptoms progressed in the 2 weeks prior to admission.  Evaluation in the emergency room was concerning for left lower lobe pneumonia.  The patient met criteria for sepsis due to pneumonia with encephalopathy.  She was fluid resuscitated, started on antibiotics, and hospitalized.    Patient is gradually improved.  Her breathing is near her baseline.  She will complete treatment for pneumonia with Augmentin and complete treatment for COPD exacerbation with prednisone.  Pulmonary was consulted.  We did make some changes to her inhaler and she will be discharged home with Trelegy.  I have refilled her DuoNebs.    Patient did require blood transfusion during his hospitalization.  She appears to be profoundly iron deficient and iron was supplemented.  Patient would benefit from a colonoscopy.  Follow-up primary care appointment has been made and she has been provided with written instructions to discuss referral for colonoscopy    Home health was offered.  The patient declined.    Therefore, she is discharged in fair and stable condition to home with close outpatient follow-up.    The patient met 2-midnight criteria for an inpatient stay at the time of discharge.    Discharge Date  12/20/2024    FOLLOW UP ITEMS POST DISCHARGE  Follow-up with primary care    DISCHARGE  DIAGNOSES  Principal Problem (Resolved):    Acute exacerbation of chronic obstructive pulmonary disease (COPD) (HCC) (POA: Yes)  Active Problems:    Left lower lobe pneumonia (POA: Yes)    Hyponatremia (POA: Yes)    Dyslipidemia (POA: Yes)    ACP (advance care planning) (POA: Yes)    Abnormal EKG (POA: Yes)  Resolved Problems:    Acute respiratory failure with hypoxia (HCC) (POA: Yes)    Acute toxic encephalopathy (POA: Yes)    Acute blood loss anemia (POA: Yes)    Sepsis (HCC) (POA: Yes)      FOLLOW UP  Future Appointments   Date Time Provider Department Center   12/30/2024  1:00 PM Alberto Cisneros M.D. [address]   1/20/2025  3:40 PM Fe Brown M.D. [facility] None     Alberto Cisneros M.D.  [address] [facility] [address] [place] [id number]  [phone]    Follow up        MEDICATIONS ON DISCHARGE     Medication List        START taking these medications        Instructions   amoxicillin-clavulanate 875-125 MG Tabs  Commonly known as: Augmentin   Take 1 Tablet by mouth every 12 hours for 2 days.  Dose: 1 Tablet     fluticasone-umeclidinium-vilanterol 100-62.5-25 mcg/act inhaler  Start taking on: December 21, 2024  Commonly known as: Trelegy Ellipta   Inhale 1 Puff every day.  Dose: 1 Puff     ipratropium-albuterol 0.5-2.5 (3) MG/3ML nebulizer solution  Commonly known as: Duoneb   Take 3 mL by nebulization 4 times a day.  Dose: 3 mL     omeprazole 20 MG delayed-release capsule  Start taking on: December 21, 2024  Commonly known as: PriLOSEC   Take 1 Capsule by mouth every day.  Dose: 20 mg     predniSONE 50 MG Tabs  Commonly known as: Deltasone   Take 1 Tablet by mouth every day for 2 days.  Dose: 50 mg            CONTINUE taking these medications        Instructions   ibuprofen 200 MG Tabs  Commonly known as: Motrin   Take 400 mg by mouth every 6 hours as needed for Headache.  Dose: 400 mg     levothyroxine 75 MCG Tabs  Commonly known as: Synthroid   Take 1 Tablet by mouth every morning on an empty stomach.  Dose: 75 mcg             STOP taking these medications      budesonide-formoterol 80-4.5 MCG/ACT Aero  Commonly known as: Symbicort            ASK your doctor about these medications        Instructions   alendronate 70 MG Tabs  Commonly known as: Fosamax   Take 1 Tablet by mouth every 7 days.  Dose: 70 mg     atorvastatin 20 MG Tabs  Commonly known as: Lipitor   TAKE 1 TABLET BY MOUTH EVERY DAY IN THE EVENING  Dose: 20 mg     calcium/vitamin D 500-5 MG-MCG Tabs   Take 1 Tablet by mouth 2 times a day.  Dose: 1 Tablet              Allergies  No Known Allergies    DIET  Orders Placed This Encounter   Procedures    Diet Order Diet: Regular     Standing Status:   Standing     Number of Occurrences:   1     Order Specific Question:   Diet:     Answer:   Regular [1]       ACTIVITY  As tolerated.  Weight bearing as tolerated    CONSULTATIONS  Pulmonary    PROCEDURES  CT scan chest 12/18/2024:  1.  Left lower lobe infiltrates  2.  Small hiatal hernia  3.  Hepatomegaly and diffuse hepatic steatosis  4.  Atherosclerosis and atherosclerotic coronary artery disease  5.  Pulmonary nodules, see nodule follow-up recommendations below.    Echocardiogram 12/18/2024:  CONCLUSIONS  No prior study is available for comparison.   Patient tachycardic during the study  Normal LV size and thickness with hyperdynamic systolic function,   estimated LVEF > 75%  Normal RV size and systolic function  No significant valvular abnormalities per Doppler assessment  Normal IVC size  No pericardial effusion  Estimated RVSP 30 mmHg    LABORATORY  Lab Results   Component Value Date    SODIUM 137 12/20/2024    POTASSIUM 3.5 (L) 12/20/2024    CHLORIDE 105 12/20/2024    CO2 25 12/20/2024    GLUCOSE 149 (H) 12/20/2024    BUN 12 12/20/2024    CREATININE 0.67 12/20/2024    CREATININE 0.7 05/13/2006        Lab Results   Component Value Date    WBC 17.5 (H) 12/20/2024    HEMOGLOBIN 7.4 (L) 12/20/2024    HEMATOCRIT 26.1 (L) 12/20/2024    PLATELETCT 481 (H) 12/20/2024         Total time of the discharge process exceeds 39 minutes.

## 2024-12-20 NOTE — CARE PLAN
The patient is Stable - Low risk of patient condition declining or worsening    Shift Goals  Clinical Goals: monitor vitals and labs  Patient Goals: rest and comfort  Family Goals: sarah    Progress made toward(s) clinical / shift goals:      Problem: Impaired Gas Exchange  Goal: Patient will demonstrate improved ventilation and adequate oxygenation and participate in treatment regimen within the level of ability/situation.  Outcome: Progressing     Problem: Self Care  Goal: Patient will have the ability to perform ADLs independently or with assistance (bathe, groom, dress, toilet and feed)  Outcome: Progressing  Note: 1. Assess the capability and level of deficiency to perform ADLs 2. Encourage family/care giver involvement 3. Provide assistive devices 4. Consider PT/OT evaluations 5. Maintain support, give positive feedback, encourage self-care allowing extra time and verbal cuing as needed 6. Avoid doing something for patients they can do themselves, but provide assistance as needed 7. Assist in anticipating/planning individual needs 8. Collaborate with Case Management and  to meet discharge needs      Problem: Hemodynamics  Goal: Patient's hemodynamics, fluid balance and neurologic status will be stable or improve  Outcome: Progressing  Note: 1. Monitor vital signs, pulse oximetry and cardiac monitor per provider order and/or policy 2. Maintain blood pressure per provider order 3. Hemodynamic monitoring per provider order 4. Manage IV fluids and IV infusions 5. Monitor intake and output 6. Daily weights per unit policy or provider order 7. Assess peripheral pulses and capillary refill 8. Assess color and body temperature 9. Position patient for maximum circulation/cardiac output 10. Monitor for signs/symptoms of excessive bleeding 11. Assess mental status, restlessness and changes in level of consciousness 12. Monitor temperature and report fever or hypothermia to provider immediately. Consideration  of targeted temperature management.        Patient is not progressing towards the following goals:  NA

## 2024-12-20 NOTE — PROGRESS NOTES
Telemetry Shift Summary     Rhythm: SR/ST  HR:   Ectopy: rPAC, rCoup, rPVC    Measurements: 0.18/0.08/0.32    Normal Values  Rhythm: SR  HR:   Measurements: 0.12-0.20/0.08-0.10/0.30-0.52

## 2024-12-20 NOTE — CARE PLAN
The patient is Stable - Low risk of patient condition declining or worsening    Shift Goals  Clinical Goals: Monitor Labs, Monitor Vitals, Safety  Patient Goals: Rest and Locate Phone   Family Goals: sarah    Progress made toward(s) clinical / shift goals:    Problem: Knowledge Deficit - Standard  Goal: Patient and family/care givers will demonstrate understanding of plan of care, disease process/condition, diagnostic tests and medications  Description: Target End Date:  1-3 days or as soon as patient condition allows    Document in Patient Education    1.  Patient and family/caregiver oriented to unit, equipment, visitation policy and means for communicating concern  2.  Complete/review Learning Assessment  3.  Assess knowledge level of disease process/condition, treatment plan, diagnostic tests and medications  4.  Explain disease process/condition, treatment plan, diagnostic tests and medications  Outcome: Progressing  Patient verbalized understanding of plan of care. All questions answered.     Problem: Self Care  Goal: Patient will have the ability to perform ADLs independently or with assistance (bathe, groom, dress, toilet and feed)  Description: Target End Date:  Prior to discharge or change in level of care    Document on ADL flowsheet    1.  Assess the capability and level of deficiency to perform ADLs  2.  Encourage family/care giver involvement  3.  Provide assistive devices  4.  Consider PT/OT evaluations  5.  Maintain support, give positive feedback, encourage self-care allowing extra time and verbal cuing as needed  6.  Avoid doing something for patients they can do themselves, but provide assistance as needed  7.  Assist in anticipating/planning individual needs  8.  Collaborate with Case Management and  to meet discharge needs  Outcome: Progressing    Patient demonstrated using the toilet independently.    Patient is not progressing towards the following goals:

## 2024-12-21 NOTE — PROGRESS NOTES
Telemetry Shift Summary     Rhythm st,sr  HR Range   Ectopy pac,pvc,trig  Measurements  0.18/0.10/0.32  Per strip printed 1600     Normal Values  Rhythm SR  HR Range    Measurements 0.12-0.20 / 0.06-0.10  / 0.30-0.52

## 2024-12-23 ENCOUNTER — DOCUMENTATION (OUTPATIENT)
Dept: HEALTH INFORMATION MANAGEMENT | Facility: OTHER | Age: 74
End: 2024-12-23
Payer: MEDICARE

## 2024-12-23 ENCOUNTER — PATIENT OUTREACH (OUTPATIENT)
Dept: MEDICAL GROUP | Age: 74
End: 2024-12-23
Payer: MEDICARE

## 2024-12-23 NOTE — PROGRESS NOTES
12/23: 1st attempt to reach pt for TCM outreach. LVM with contact information for pt to call back.     12/24: Transitional Care Management  TCM Outreach Date and Time: Filed (12/23/2024  7:24 AM)    Discharge Questions  Actual Discharge Date: 12/20/24  Did you receive any new prescriptions?: Yes  Were you able to get them filled?: Yes  Meds to Bed or Pharmacy filled?: Meds to Bed  Did you have any durable medical equipment ordered?: No (pt wears home O2 at baseline, currently on)  Do you have a follow up appointment scheduled with your PCP?: Yes  Appointment Date: 12/30/24  Appointment Time: 1300  If Home Health was ordered, have they contacted you (Patient): Not Applicable (pt declined HH)  Does this patient qualify for the CCM program?: Yes    Transitional Care  Number of attempts made to contact patient: 2  Current or previous attempts completed within two business days of discharge? : Yes  Has patient completed an Advanced Directive?: No  Has the Care Manager's phone number provided?: No    Discharge Summary  Chief Complaint: Cough, fever - Persistent productive cough for the past few days with white sputum. History of COPD. Fever, patient received 1000 mg of tylenol from [facility] in route.  Admitting Diagnosis: cough, sob  Discharge Diagnosis: acute exacerbation of COPD

## 2024-12-27 ENCOUNTER — TELEPHONE (OUTPATIENT)
Dept: HEALTH INFORMATION MANAGEMENT | Facility: OTHER | Age: 74
End: 2024-12-27
Payer: MEDICARE

## 2024-12-30 ENCOUNTER — OFFICE VISIT (OUTPATIENT)
Dept: MEDICAL GROUP | Age: 74
End: 2024-12-30
Payer: MEDICARE

## 2024-12-30 ENCOUNTER — PHARMACY VISIT (OUTPATIENT)
Dept: PHARMACY | Facility: MEDICAL CENTER | Age: 74
End: 2024-12-30
Payer: COMMERCIAL

## 2024-12-30 VITALS
WEIGHT: 167 LBS | DIASTOLIC BLOOD PRESSURE: 54 MMHG | HEART RATE: 95 BPM | TEMPERATURE: 97.3 F | SYSTOLIC BLOOD PRESSURE: 110 MMHG | HEIGHT: 64 IN | BODY MASS INDEX: 28.51 KG/M2 | OXYGEN SATURATION: 95 %

## 2024-12-30 DIAGNOSIS — J18.9 COMMUNITY ACQUIRED PNEUMONIA OF RIGHT LOWER LOBE OF LUNG: ICD-10-CM

## 2024-12-30 DIAGNOSIS — E78.5 DYSLIPIDEMIA: ICD-10-CM

## 2024-12-30 DIAGNOSIS — D50.9 IRON DEFICIENCY ANEMIA, UNSPECIFIED IRON DEFICIENCY ANEMIA TYPE: ICD-10-CM

## 2024-12-30 DIAGNOSIS — E03.4 HYPOTHYROIDISM DUE TO ACQUIRED ATROPHY OF THYROID: ICD-10-CM

## 2024-12-30 DIAGNOSIS — Z09 HOSPITAL DISCHARGE FOLLOW-UP: Primary | ICD-10-CM

## 2024-12-30 PROCEDURE — RXMED WILLOW AMBULATORY MEDICATION CHARGE: Performed by: STUDENT IN AN ORGANIZED HEALTH CARE EDUCATION/TRAINING PROGRAM

## 2024-12-30 RX ORDER — FERROUS SULFATE 325(65) MG
325 TABLET ORAL
Qty: 90 TABLET | Refills: 0 | Status: SHIPPED | OUTPATIENT
Start: 2024-12-30

## 2024-12-30 RX ORDER — FERROUS SULFATE 325(65) MG
325 TABLET ORAL
Qty: 90 TABLET | Refills: 0 | Status: SHIPPED | OUTPATIENT
Start: 2024-12-30 | End: 2024-12-30 | Stop reason: CLARIF

## 2024-12-30 ASSESSMENT — ENCOUNTER SYMPTOMS
WHEEZING: 0
FEVER: 0
SENSORY CHANGE: 0
SPUTUM PRODUCTION: 0
ORTHOPNEA: 0
ABDOMINAL PAIN: 0
HEADACHES: 0
HEMOPTYSIS: 0
COUGH: 0
CHILLS: 0
WEAKNESS: 0
DOUBLE VISION: 0
BACK PAIN: 0
DIZZINESS: 0
BRUISES/BLEEDS EASILY: 0
BLURRED VISION: 0
PALPITATIONS: 0
BLOOD IN STOOL: 0
DEPRESSION: 0
SHORTNESS OF BREATH: 0

## 2024-12-30 ASSESSMENT — FIBROSIS 4 INDEX: FIB4 SCORE: 0.89

## 2024-12-30 NOTE — PATIENT INSTRUCTIONS
Continue home meds  Start Iron supplement 3 times a week  Repeat lab work prior next visit  Follow up in 3 months

## 2024-12-30 NOTE — PROGRESS NOTES
Subjective:     Vaishali Soares is a 74 y.o. female who presents for Hospital Follow-up.    Transitional Care Management  TCM Outreach Date and Time: Filed (12/23/2024  7:24 AM)    Discharge Questions  Actual Discharge Date: 12/20/24  Did you receive any new prescriptions?: Yes  Were you able to get them filled?: Yes  Meds to Bed or Pharmacy filled?: Meds to Bed  Did you have any durable medical equipment ordered?: No (pt wears home O2 at baseline, currently on)  Do you have a follow up appointment scheduled with your PCP?: Yes  Appointment Date: 12/30/24  Appointment Time: 1300  If Home Health was ordered, have they contacted you (Patient): Not Applicable (pt declined HH)  Does this patient qualify for the CCM program?: Yes    Transitional Care  Number of attempts made to contact patient: 2  Current or previous attempts completed within two business days of discharge? : Yes  Has patient completed an Advanced Directive?: No  Has the Care Manager's phone number provided?: No    Discharge Summary  Chief Complaint: Cough, fever - Persistent productive cough for the past few days with white sputum. History of COPD. Fever, patient received 1000 mg of tylenol from [facility] in route.  Admitting Diagnosis: cough, sob  Discharge Diagnosis: acute exacerbation of COPD        HPI:   Recently hospitalized for acute respiratory failure secondary to pneumonia.  Patient was seen in the emergency department on December 17, 2024 for worsening productive cough and fever.  Initial vital signs in the emergency department relevant for fever of 101.7 and tachycardia.  Initial labs relevant for leukocytosis of 26.8, she also had significant anemia with an H&H of 7.7/28.6.  Patient required hospitalization for 3 days given significant hypoxemia requiring up to 4 L of oxygen.  She was treated in patient with IV antibiotics, DuoNebs and steroids.  She was then transition to oral antibiotics.  Pulmonary service was contacted during admission  which recommended trelegy.  Prior to hospitalization patient was not taking any medications for COPD, she declined therapy in the past.  She does believe that she does not have the diagnosis of COPD.  Today patient stated feeling much better, denies any respiratory symptoms.  She is scheduled to see pulmonary medicine in January 20.  She states medical compliance with recent therapy added after hospital discharge.      Current medicines (including reconciliation performed today)  Current Outpatient Medications   Medication Sig Dispense Refill    ferrous sulfate 325 (65 Fe) MG tablet Take 1 Tablet by mouth every Monday, Wednesday, and Friday. 90 Tablet 0    levothyroxine (SYNTHROID) 75 MCG Tab Take 1 Tablet by mouth every morning on an empty stomach. 90 Tablet 0    fluticasone-umeclidinium-vilanterol (TRELEGY ELLIPTA) 100-62.5-25 mcg/act inhaler Inhale 1 Puff every day. 60 Each 1    omeprazole (PRILOSEC) 20 MG delayed-release capsule Take 1 Capsule by mouth every day. 30 Capsule 0    ipratropium-albuterol (DUONEB) 0.5-2.5 (3) MG/3ML nebulizer solution Take 3 mL by nebulization 4 times a day. 270 mL 1    benzonatate (TESSALON) 100 MG Cap Take 1 Capsule by mouth 3 times a day as needed for Cough. 60 Capsule 0    ibuprofen (MOTRIN) 200 MG Tab Take 400 mg by mouth every 6 hours as needed for Headache.      atorvastatin (LIPITOR) 20 MG Tab TAKE 1 TABLET BY MOUTH EVERY DAY IN THE EVENING (Patient not taking: Reported on 12/30/2024) 100 Tablet 3    alendronate (FOSAMAX) 70 MG Tab Take 1 Tablet by mouth every 7 days. (Patient not taking: Reported on 12/30/2024) 12 Tablet 2    calcium/vitamin D 500-5 MG-MCG Tab Take 1 Tablet by mouth 2 times a day. (Patient not taking: Reported on 12/30/2024) 180 Tablet 3     No current facility-administered medications for this visit.       Allergies:   Patient has no known allergies.    Social History     Tobacco Use    Smoking status: Former     Current packs/day: 0.00     Average  "packs/day: 1 pack/day for 40.0 years (40.0 ttl pk-yrs)     Types: Cigarettes     Start date: 1967     Quit date: 2007     Years since quittin.9    Smokeless tobacco: Never   Vaping Use    Vaping status: Never Used   Substance Use Topics    Alcohol use: Yes     Comment: rare     Drug use: No       ROS:  Review of Systems   Constitutional:  Negative for chills, fever and malaise/fatigue.   HENT:  Negative for nosebleeds and tinnitus.    Eyes:  Negative for blurred vision and double vision.   Respiratory:  Negative for cough, hemoptysis, sputum production, shortness of breath and wheezing.    Cardiovascular:  Negative for chest pain, palpitations, orthopnea and leg swelling.   Gastrointestinal:  Negative for abdominal pain, blood in stool and melena.   Genitourinary:  Negative for dysuria and urgency.   Musculoskeletal:  Negative for back pain and joint pain.   Skin:  Negative for rash.   Neurological:  Negative for dizziness, sensory change, weakness and headaches.   Endo/Heme/Allergies:  Does not bruise/bleed easily.   Psychiatric/Behavioral:  Negative for depression and suicidal ideas.         Objective:     Vitals:    24 1244   BP: 110/54   BP Location: Right arm   Patient Position: Sitting   BP Cuff Size: Adult   Pulse: 95   Temp: 36.3 °C (97.3 °F)   TempSrc: Temporal   SpO2: 95%   Weight: 75.8 kg (167 lb)   Height: 1.626 m (5' 4\")     Body mass index is 28.67 kg/m².    Physical Exam:  Physical Exam  Vitals reviewed.   Constitutional:       General: She is not in acute distress.  HENT:      Head: Normocephalic and atraumatic.      Mouth/Throat:      Mouth: Mucous membranes are moist.   Eyes:      General: No scleral icterus.     Extraocular Movements: Extraocular movements intact.      Pupils: Pupils are equal, round, and reactive to light.   Cardiovascular:      Rate and Rhythm: Normal rate and regular rhythm.      Pulses: Normal pulses.      Heart sounds: Normal heart sounds. No murmur " heard.  Pulmonary:      Effort: Pulmonary effort is normal. No respiratory distress.      Breath sounds: Normal breath sounds. No wheezing.   Abdominal:      General: There is no distension.      Palpations: Abdomen is soft.      Tenderness: There is no abdominal tenderness. There is no guarding or rebound.   Musculoskeletal:      Cervical back: Normal range of motion and neck supple.      Right lower leg: No edema.      Left lower leg: No edema.   Skin:     General: Skin is warm.      Capillary Refill: Capillary refill takes less than 2 seconds.      Coloration: Skin is not jaundiced.   Neurological:      General: No focal deficit present.      Mental Status: She is alert.   Psychiatric:         Mood and Affect: Mood normal.         Behavior: Behavior normal.       Assessment and Plan:     1. Hospital discharge follow-up (Primary)  Seen in the emergency department on December 17 for productive cough and fever.  She was admitted to the hospital for acute respiratory failure secondary to pneumonia.    2. Community acquired pneumonia of right lower lobe of lung  Patient was admitted to the hospital on December 17 for acute respiratory failure secondary to pneumonia.  Required hospitalization for 3 days, initially requiring up to 4 L of oxygen.  She was treated inpatient with IV antibiotics, prednisone and DuoNebs.  Upon discharge she was given Trelegy  She also has a follow-up appointment with cardiology service in 3 weeks.    Chronic Obstructive Pulmonary Disease (COPD).  She has been diagnosed with COPD and is using Trelegy. She is advised to continue using her inhaler as prescribed. She reports that her breathing is currently good. She has a follow-up appointment on 01/20/2024 to check her lungs.    3. Iron deficiency anemia, unspecified iron deficiency anemia type  She is experiencing anemia with low red blood cell counts. She reports not eating well due to feeling unwell. A prescription for iron tablets has been  sent to her pharmacy, [facility]. She is instructed to take the iron tablets three times a week (Monday, Wednesday, and Friday) to avoid constipation. She is also advised to complete her lab tests one week prior to her next appointment.  Patient has known history of anemia of chronic disease, however she had a significant drop of H&H noticed during recent hospitalization.  Patient has not had a colonoscopy in a very long time, she continues refusing screening colonoscopy.  - ferrous sulfate 325 (65 Fe) MG tablet; Take 1 Tablet by mouth every Monday, Wednesday, and Friday.  Dispense: 90 Tablet; Refill: 0  - CBC WITHOUT DIFFERENTIAL; Future  - IRON/TOTAL IRON BIND; Future    4. Hypothyroidism due to acquired atrophy of thyroid  Chronic, Stable  Currently taking Synthroid 75 mcg daily  Recent lab work showed normal TSH  Repeat lab work in the future, continue same therapy  - Comp Metabolic Panel; Future  - TSH; Future  - FREE THYROXINE; Future    5. Dyslipidemia  Chronic, stable  Patient is not taking statin therapy.  She declines medical therapy.  Currently treated with diet and exercise.    - Lipid Profile; Future        - Chart and discharge summary were reviewed.   - Hospitalization and results reviewed with patient.   - Medications reviewed including instructions regarding high risk medications, dosing and side effects.  - Recommended Services: No services needed at this time  - Advance directive/POLST on file?  No     Follow-up:Return in about 3 months (around 3/30/2025) for Labs.    Face-to-face transitional care management services with MODERATE (today's visit is within 14 days post discharge & LACE+ score of 28-58) medical decision complexity were provided.

## 2025-01-20 ENCOUNTER — OFFICE VISIT (OUTPATIENT)
Dept: SLEEP MEDICINE | Facility: MEDICAL CENTER | Age: 75
End: 2025-01-20
Attending: STUDENT IN AN ORGANIZED HEALTH CARE EDUCATION/TRAINING PROGRAM
Payer: MEDICARE

## 2025-01-20 VITALS
SYSTOLIC BLOOD PRESSURE: 104 MMHG | HEIGHT: 64 IN | HEART RATE: 116 BPM | WEIGHT: 159 LBS | DIASTOLIC BLOOD PRESSURE: 62 MMHG | OXYGEN SATURATION: 91 % | BODY MASS INDEX: 27.14 KG/M2

## 2025-01-20 DIAGNOSIS — J44.9 CHRONIC OBSTRUCTIVE PULMONARY DISEASE, UNSPECIFIED COPD TYPE (HCC): ICD-10-CM

## 2025-01-20 PROCEDURE — 99214 OFFICE O/P EST MOD 30 MIN: CPT | Performed by: STUDENT IN AN ORGANIZED HEALTH CARE EDUCATION/TRAINING PROGRAM

## 2025-01-20 PROCEDURE — 99213 OFFICE O/P EST LOW 20 MIN: CPT | Performed by: STUDENT IN AN ORGANIZED HEALTH CARE EDUCATION/TRAINING PROGRAM

## 2025-01-20 RX ORDER — TIOTROPIUM BROMIDE AND OLODATEROL 3.124; 2.736 UG/1; UG/1
2 SPRAY, METERED RESPIRATORY (INHALATION) DAILY
Qty: 12 G | Refills: 3 | Status: SHIPPED | OUTPATIENT
Start: 2025-01-20 | End: 2025-04-30

## 2025-01-20 RX ORDER — TIOTROPIUM BROMIDE AND OLODATEROL 3.124; 2.736 UG/1; UG/1
2 SPRAY, METERED RESPIRATORY (INHALATION) DAILY
Qty: 44 G | Refills: 3 | Status: SHIPPED | OUTPATIENT
Start: 2025-01-20 | End: 2025-01-20

## 2025-01-20 RX ORDER — ALBUTEROL SULFATE 90 UG/1
2 INHALANT RESPIRATORY (INHALATION) EVERY 6 HOURS PRN
COMMUNITY

## 2025-01-20 ASSESSMENT — FIBROSIS 4 INDEX: FIB4 SCORE: 0.89

## 2025-01-21 NOTE — PROGRESS NOTES
Pulmonary Clinic Initial Visit    Date of Service: 1/20/2025    Reason for follow up:  Hospital Follow-up (ED 12/17/24-12/2027. Pulm consult: 12/18/24)        History of Present Illness: Vaishali Soares is a 74 y.o. female former smoker quit about 35 years ago(age [age]) with a medical history of COPD, hypothyroidism, GERD, hyperlipidemia referred to pulmonary clinic for a post discharge follow-up.  She was recently admitted to the hospital in December 2024 for a COPD exacerbation with pneumonia.  As per patient she usually has pneumonia every fall, which exacerbates her shortness of breath.  She visits urgent care and gets better with antibiotics/steroids.  Patient is a very poor historian, unsure if she understands any disease process.  She is also not aware of her COPD diagnosis.  She was given oxygen during one of her admission and weaned herself off.  She was supposed to be taking Trelegy inhaler however reports that she does not like powder inhalers so she is she stopped taking it.  She has been taking only albuterol up to 4 times a day, and experiences jitteriness due to which she stopped using even that.  She reports no shortness of breath, but she cannot walk far due to cold does have dry cough occasionally without sputum production.  She does not seem to believe anything is wrong with her, lives with her son and his girlfriend.  She used to work as a supervisor at [employer].  She stopped taking all medications which includes Lipitor, Trelegy, omeprazole, alendronate and thinks she does not need any of those medications because she is doing alright.  She had 3 exacerbations in the past 2 years requiring hospitalization.  She reports that she only needs hypothyroidism medications which she has been compliant with.  She was also noted to have low hemoglobin during her admission which was new however declines any further workup /evaluation including colonoscopy, since everything is fine for her.          Review of Systems   Constitutional:  Negative for chills and fever.   Respiratory:  Negative for cough and hemoptysis.    Cardiovascular:  Negative for chest pain and palpitations.   Gastrointestinal:  Negative for heartburn and nausea.   Genitourinary:  Negative for dysuria and urgency.   Neurological:  Negative for dizziness, sensory change, focal weakness and loss of consciousness.   All other systems reviewed and are negative.       Current Outpatient Medications:     Cyanocobalamin (VITAMIN B-12 PO), Take  by mouth., Disp: , Rfl:     albuterol 108 (90 Base) MCG/ACT Aero Soln inhalation aerosol, Inhale 2 Puffs every 6 hours as needed for Shortness of Breath., Disp: , Rfl:     Tiotropium Bromide-Olodaterol (STIOLTO RESPIMAT) 2.5-2.5 MCG/ACT Aero Soln, Inhale 2 Puffs every day for 100 days., Disp: 12 g, Rfl: 3    Tiotropium Bromide-Olodaterol 2.5-2.5 MCG/ACT Aero Soln, Inhale 2 Puffs every day., Disp: 3 Each, Rfl: 3    levothyroxine (SYNTHROID) 75 MCG Tab, Take 1 Tablet by mouth every morning on an empty stomach., Disp: 90 Tablet, Rfl: 0    VITAMIN D PO, Take  by mouth. (Patient not taking: Reported on 1/20/2025), Disp: , Rfl:     ferrous sulfate 325 (65 Fe) MG tablet, Take 1 Tablet by mouth every Monday, Wednesday, and Friday. (Patient not taking: Reported on 1/20/2025), Disp: 90 Tablet, Rfl: 0    fluticasone-umeclidinium-vilanterol (TRELEGY ELLIPTA) 100-62.5-25 mcg/act inhaler, Inhale 1 Puff every day. (Patient not taking: Reported on 1/20/2025), Disp: 60 Each, Rfl: 1    omeprazole (PRILOSEC) 20 MG delayed-release capsule, Take 1 Capsule by mouth every day. (Patient not taking: Reported on 1/20/2025), Disp: 30 Capsule, Rfl: 0    ipratropium-albuterol (DUONEB) 0.5-2.5 (3) MG/3ML nebulizer solution, Take 3 mL by nebulization 4 times a day. (Patient not taking: Reported on 1/20/2025), Disp: 270 mL, Rfl: 1    benzonatate (TESSALON) 100 MG Cap, Take 1 Capsule by mouth 3 times a day as needed for Cough. (Patient  not taking: Reported on 1/20/2025), Disp: 60 Capsule, Rfl: 0    atorvastatin (LIPITOR) 20 MG Tab, TAKE 1 TABLET BY MOUTH EVERY DAY IN THE EVENING (Patient not taking: Reported on 12/17/2024), Disp: 100 Tablet, Rfl: 3    alendronate (FOSAMAX) 70 MG Tab, Take 1 Tablet by mouth every 7 days. (Patient not taking: Reported on 12/17/2024), Disp: 12 Tablet, Rfl: 2    calcium/vitamin D 500-5 MG-MCG Tab, Take 1 Tablet by mouth 2 times a day. (Patient not taking: Reported on 12/17/2024), Disp: 180 Tablet, Rfl: 3    ibuprofen (MOTRIN) 200 MG Tab, Take 400 mg by mouth every 6 hours as needed for Headache. (Patient not taking: Reported on 1/20/2025), Disp: , Rfl:      reports that she quit smoking about 18 years ago. Her smoking use included cigarettes. She started smoking about 58 years ago. She has a 40 pack-year smoking history. She has never used smokeless tobacco. She reports that she does not currently use alcohol. She reports that she does not use drugs.     Past Medical History:   Diagnosis Date    Acute respiratory failure with hypoxia (HCC) 04/10/2023    Anemia     Anesthesia     PONV    Cough     Emphysema     COPD    Hypothyroid     Osteoporosis     Pneumonia 2015    Sputum production     Urinary retention        Past Surgical History:   Procedure Laterality Date    AFSANEH BY LAPAROSCOPY Bilateral 9/11/2018    Procedure: AFSANEH BY LAPAROSCOPY;  Surgeon: Mahesh Gordillo M.D.;  Location: SURGERY [facility];  Service: General    OTHER ORTHOPEDIC SURGERY      R shoulder surgery       Allergies: Patient has no known allergies.    family history includes Arthritis in her son; Asthma in her son; Cancer in her maternal grandmother, maternal uncle, and paternal uncle; Diabetes in her brother, paternal grandmother, and sister; No Known Problems in her maternal grandfather, mother, and paternal grandfather; Other in her sister; Stroke (age of onset: 54) in her father.    /62 (BP Location: Right arm, Patient  "Position: Sitting, BP Cuff Size: Adult)   Pulse (!) 116   Ht 1.626 m (5' 4\")   Wt 72.1 kg (159 lb)   SpO2 91%   BMI 27.29 kg/m²     Physical Examination  Vitals reviewed.   Constitutional:       Appearance: Normal appearance.   HENT:      Head: Normocephalic and atraumatic.      Mouth/Throat:      Mouth: Mucous membranes are moist.   Eyes:      Conjunctiva/sclera: Conjunctivae normal.      Pupils: Pupils are equal, round, and reactive to light.   Cardiovascular:      Rate and Rhythm: Normal rate and regular rhythm.   Pulmonary:      Effort: Pulmonary effort is normal. No respiratory distress.      Breath sounds: Normal breath sounds.   Abdominal:      General: There is no distension.      Palpations: Abdomen is soft. There is no mass.   Musculoskeletal:         General: No swelling.      Right lower leg: No edema.      Left lower leg: No edema.   Skin:     General: Skin is warm and dry.   Neurological:      General: No focal deficit present.      Mental Status: She is alert and oriented to person, place, and time.      Cranial Nerves: No cranial nerve deficit.      Motor: No weakness.   Psychiatric:         Mood and Affect: Mood normal.         Behavior: Behavior normal.         Results:    CT-CHEST (THORAX) W/O 12/18/2024    Narrative  12/18/2024 9:37 PM    HISTORY/REASON FOR EXAM:  Hx of bronchiectasis.    TECHNIQUE/EXAM DESCRIPTION:    CT scan of the chest without contrast.    Noncontrast helical scanning of the chest was obtained from the lung apices through the adrenal glands.    Low dose optimization technique was utilized for this CT exam including automated exposure control and adjustment of the mA and/or kV according to patient size.    COMPARISON: None.    FINDINGS:    Lungs: Patchy left lower lobe opacities are noted. Bilateral pulmonary nodular densities are seen measuring up to 3.7 mm in the right upper lobe on image 28.    Mediastinum/Selina: No significant adenopathy.    Pleura: No pleural " effusion.    Cardiac: Heart normal in size without pericardial effusion. There is no coronary artery calcification.    Vascular: Atherosclerotic changes are seen including atherosclerotic coronary artery calcifications.    Soft tissues: Unremarkable.    Bones: No acute or destructive process.    Upper Abdomen: Hepatomegaly is seen with diffuse low-density changes of liver. Small hiatal hernia is noted.    Impression  1.  Left lower lobe infiltrates  2.  Small hiatal hernia  3.  Hepatomegaly and diffuse hepatic steatosis  4.  Atherosclerosis and atherosclerotic coronary artery disease  5.  Pulmonary nodules, see nodule follow-up recommendations below.    Fleischner Society pulmonary nodule recommendations:  Low Risk: No routine follow-up    High Risk: Optional CT at 12 months    Comments: Use most suspicious nodule as guide to management. Follow-up intervals may vary according to size and risk.    Low Risk - Minimal or absent history of smoking and of other known risk factors.    High Risk - History of smoking or of other known risk factors.    Note: These recommendations do not apply to lung cancer screening, patients with immunosuppression, or patients with known primary cancer.    Fleischner Society 2017 Guidelines for Management of Incidentally Detected Pulmonary Nodules in Adults    EC-ECHOCARDIOGRAM COMP W/O CONTRAST W/O MYOCARDIAL STRAIN [facility] 12/18/2024    Narrative  Transthoracic  Echo Report      Echocardiography Laboratory    CONCLUSIONS  No prior study is available for comparison.  Patient tachycardic during the study  Normal LV size and thickness with hyperdynamic systolic function,  estimated LVEF > 75%  Normal RV size and systolic function  No significant valvular abnormalities per Doppler assessment  Normal IVC size  No pericardial effusion  Estimated RVSP 30 mmHg    AMADOR CHEUNG  Exam Date:         12/18/2024  16:54  Exam Location:     Inpatient  Priority:          Routine    Ordering Physician:         URI QUIROZ  Referring Physician:       [id number]  Sonographer:               Cindy Wolf [facility]    Age:    74     Gender:    F  MRN:    [id number]  :    1950  BSA:    1.89   Ht (in):    64     Wt (lb):    185  Exam Type:     Complete    Indications:     Shortness of breath, Cardiac murmur, unspecified  ICD Codes:       R06.02  R01.1    CPT Codes:       83216    BP:   105    /   73     HR:   113  Technical Quality:       Fair    MEASUREMENTS  (Male / Female) Normal Values  2D ECHO  LV Diastolic Diameter PLAX        3.9 cm                4.2 - 5.9 / 3.9 - 5.3  cm  LV Systolic Diameter PLAX         1.9 cm                2.1 - 4.0 cm  IVS Diastolic Thickness           1.1 cm  LVPW Diastolic Thickness          1 cm  LVOT Diameter                     1.9 cm  Estimated LV Ejection Fraction    78 %  LV Ejection Fraction MOD BP       78.1 %                >= 55  %  LV Ejection Fraction MOD 4C       69.3 %  LV Ejection Fraction MOD 2C       84.8 %  IVC Diameter                      1.6 cm    DOPPLER  AV Peak Velocity                  1.4 m/s  AV Peak Gradient                  8 mmHg  AV Mean Gradient                  4.1 mmHg  LVOT Peak Velocity                1.3 m/s  AV Area Cont Eq vti               3 cm2  Mitral E Point Velocity           0.79 m/s  Mitral E to A Ratio               0.67  MV Pressure Half Time             73.7 ms  MV Area PHT                       3 cm2  MV Deceleration Time              254 ms  TR Peak Velocity                  265 cm/s  PV Peak Velocity                  1.2 m/s  PV Peak Gradient                  5.3 mmHg    * Indicates values subject to auto-interpretation  LV EF:  78    %    FINDINGS  Left Ventricle  The left ventricle is normal in size and thickness.  Hyperdynamic left  ventricular systolic function. The ejection fraction is measured to be  78 % by Vila's biplane.  Indeterminate diastolic function    Right Ventricle  The right ventricle is normal in size and  systolic function.    Right Atrium  The right atrium is normal in size. Normal inferior vena cava size and  inspiratory collapse.    Left Atrium  The left atrium is normal in size. Left atrial volume index is 18 mL/sq  m.    Mitral Valve  Structurally normal mitral valve without significant stenosis or  regurgitation.    Aortic Valve  Structurally normal aortic valve without significant stenosis or  regurgitation.    Tricuspid Valve  Structurally normal tricuspid valve without significant stenosis. Trace  tricuspid regurgitation. Right ventricular systolic pressure is  estimated to be 30 mmHg. Right atrial pressure is estimated to be 3  mmHg.    Pulmonic Valve  Structurally normal pulmonic valve without significant stenosis or  regurgitation.    Pericardium  No pericardial effusion.    Aorta  Normal aortic root for body surface area. The ascending aorta diameter  is 3.3 cm.                      Bj Cain MD  (Electronically Signed)  Final Date:     18 December 2024  19:20     Lab Results   Component Value Date/Time    WBC 17.5 (H) 12/20/2024 01:48 AM    RBC 3.77 (L) 12/20/2024 01:48 AM    HEMOGLOBIN 7.4 (L) 12/20/2024 01:48 AM    PLATELETCT 481 (H) 12/20/2024 01:48 AM    EOSINOPHILS 0.00 12/20/2024 01:48 AM    NEUTS 14.21 (H) 12/20/2024 01:48 AM    LYMPHS 1.78 12/20/2024 01:48 AM    MONOS 1.23 (H) 12/20/2024 01:48 AM    EOS 0.00 12/20/2024 01:48 AM    BASO 0.02 12/20/2024 01:48 AM    IMMGRANAB 0.27 (H) 12/20/2024 01:48 AM    NRBCAB 0.00 12/20/2024 01:48 AM       Pulmonary function tests      Assessment & Plan  Chronic obstructive pulmonary disease, unspecified COPD type (HCC)  GOLD  class III ,secondary to tobacco smoking, 3 recent exacerbations in past 2 years requiring admission  Was prescribed Trelegy/albuterol however patient not using any of the inhalers  PFTs in 2024 showed FEV1 in 40s  CT chest from December 2024 showed emphysematous less than 5 mm nodules and lower lobe predominant bronchiectasis  concerning for aspiration changes with hiatal hernia     -Counseled the patient to use 2 puffs of Stiolto every day  -Continue to use albuterol as needed  -She will need repeated counseling, close follow-up to ensure compliance    Orders:    Tiotropium Bromide-Olodaterol 2.5-2.5 MCG/ACT Aero Soln; Inhale 2 Puffs every day.      Kevin Miramontes MD  [facility]

## 2025-01-21 NOTE — ASSESSMENT & PLAN NOTE
GOLD  class III ,secondary to tobacco smoking, 3 recent exacerbations in past 2 years requiring admission  Was prescribed Trelegy/albuterol however patient not using any of the inhalers  PFTs in 2024 showed FEV1 in 40s  CT chest from December 2024 showed emphysematous less than 5 mm nodules and lower lobe predominant bronchiectasis concerning for aspiration changes with hiatal hernia     -Counseled the patient to use 2 puffs of Stiolto every day  -Continue to use albuterol as needed  -She will need repeated counseling, close follow-up to ensure compliance    Orders:    Tiotropium Bromide-Olodaterol 2.5-2.5 MCG/ACT Aero Soln; Inhale 2 Puffs every day.

## 2025-03-20 ENCOUNTER — HOSPITAL ENCOUNTER (OUTPATIENT)
Dept: LAB | Facility: MEDICAL CENTER | Age: 75
End: 2025-03-20
Attending: STUDENT IN AN ORGANIZED HEALTH CARE EDUCATION/TRAINING PROGRAM
Payer: MEDICARE

## 2025-03-20 ENCOUNTER — OFFICE VISIT (OUTPATIENT)
Dept: MEDICAL GROUP | Age: 75
End: 2025-03-20
Payer: MEDICARE

## 2025-03-20 VITALS
TEMPERATURE: 97.5 F | HEIGHT: 64 IN | BODY MASS INDEX: 26.8 KG/M2 | HEART RATE: 97 BPM | OXYGEN SATURATION: 90 % | WEIGHT: 157 LBS | DIASTOLIC BLOOD PRESSURE: 70 MMHG | SYSTOLIC BLOOD PRESSURE: 109 MMHG

## 2025-03-20 DIAGNOSIS — E03.4 HYPOTHYROIDISM DUE TO ACQUIRED ATROPHY OF THYROID: ICD-10-CM

## 2025-03-20 DIAGNOSIS — E78.5 DYSLIPIDEMIA: ICD-10-CM

## 2025-03-20 DIAGNOSIS — M85.89 OSTEOPENIA OF MULTIPLE SITES: ICD-10-CM

## 2025-03-20 DIAGNOSIS — J44.9 CHRONIC OBSTRUCTIVE PULMONARY DISEASE, UNSPECIFIED COPD TYPE (HCC): ICD-10-CM

## 2025-03-20 DIAGNOSIS — D50.9 IRON DEFICIENCY ANEMIA, UNSPECIFIED IRON DEFICIENCY ANEMIA TYPE: ICD-10-CM

## 2025-03-20 LAB
ALBUMIN SERPL BCP-MCNC: 3.3 G/DL (ref 3.2–4.9)
ALBUMIN/GLOB SERPL: 0.8 G/DL
ALP SERPL-CCNC: 156 U/L (ref 30–99)
ALT SERPL-CCNC: 35 U/L (ref 2–50)
ANION GAP SERPL CALC-SCNC: 11 MMOL/L (ref 7–16)
AST SERPL-CCNC: 62 U/L (ref 12–45)
BILIRUB SERPL-MCNC: 0.6 MG/DL (ref 0.1–1.5)
BUN SERPL-MCNC: 7 MG/DL (ref 8–22)
CALCIUM ALBUM COR SERPL-MCNC: 9.4 MG/DL (ref 8.5–10.5)
CALCIUM SERPL-MCNC: 8.8 MG/DL (ref 8.5–10.5)
CHLORIDE SERPL-SCNC: 100 MMOL/L (ref 96–112)
CHOLEST SERPL-MCNC: 197 MG/DL (ref 100–199)
CO2 SERPL-SCNC: 26 MMOL/L (ref 20–33)
CREAT SERPL-MCNC: 0.7 MG/DL (ref 0.5–1.4)
ERYTHROCYTE [DISTWIDTH] IN BLOOD BY AUTOMATED COUNT: 54.9 FL (ref 35.9–50)
FASTING STATUS PATIENT QL REPORTED: NORMAL
GFR SERPLBLD CREATININE-BSD FMLA CKD-EPI: 89 ML/MIN/1.73 M 2
GLOBULIN SER CALC-MCNC: 4 G/DL (ref 1.9–3.5)
GLUCOSE SERPL-MCNC: 97 MG/DL (ref 65–99)
HCT VFR BLD AUTO: 43 % (ref 37–47)
HDLC SERPL-MCNC: 35 MG/DL
HGB BLD-MCNC: 13.1 G/DL (ref 12–16)
IRON SATN MFR SERPL: 25 % (ref 15–55)
IRON SERPL-MCNC: 53 UG/DL (ref 40–170)
LDLC SERPL CALC-MCNC: 127 MG/DL
MCH RBC QN AUTO: 28.1 PG (ref 27–33)
MCHC RBC AUTO-ENTMCNC: 30.5 G/DL (ref 32.2–35.5)
MCV RBC AUTO: 92.3 FL (ref 81.4–97.8)
PLATELET # BLD AUTO: 479 K/UL (ref 164–446)
PMV BLD AUTO: 9.7 FL (ref 9–12.9)
POTASSIUM SERPL-SCNC: 3.4 MMOL/L (ref 3.6–5.5)
PROT SERPL-MCNC: 7.3 G/DL (ref 6–8.2)
RBC # BLD AUTO: 4.66 M/UL (ref 4.2–5.4)
SODIUM SERPL-SCNC: 137 MMOL/L (ref 135–145)
T4 FREE SERPL-MCNC: 0.55 NG/DL (ref 0.93–1.7)
TIBC SERPL-MCNC: 211 UG/DL (ref 250–450)
TRIGL SERPL-MCNC: 176 MG/DL (ref 0–149)
TSH SERPL-ACNC: 21 UIU/ML (ref 0.35–5.5)
UIBC SERPL-MCNC: 158 UG/DL (ref 110–370)
WBC # BLD AUTO: 8.4 K/UL (ref 4.8–10.8)

## 2025-03-20 PROCEDURE — 83540 ASSAY OF IRON: CPT

## 2025-03-20 PROCEDURE — 80061 LIPID PANEL: CPT

## 2025-03-20 PROCEDURE — 84439 ASSAY OF FREE THYROXINE: CPT

## 2025-03-20 PROCEDURE — 83550 IRON BINDING TEST: CPT

## 2025-03-20 PROCEDURE — 3074F SYST BP LT 130 MM HG: CPT | Performed by: STUDENT IN AN ORGANIZED HEALTH CARE EDUCATION/TRAINING PROGRAM

## 2025-03-20 PROCEDURE — 84443 ASSAY THYROID STIM HORMONE: CPT

## 2025-03-20 PROCEDURE — 3078F DIAST BP <80 MM HG: CPT | Performed by: STUDENT IN AN ORGANIZED HEALTH CARE EDUCATION/TRAINING PROGRAM

## 2025-03-20 PROCEDURE — 80053 COMPREHEN METABOLIC PANEL: CPT

## 2025-03-20 PROCEDURE — 85027 COMPLETE CBC AUTOMATED: CPT

## 2025-03-20 PROCEDURE — 36415 COLL VENOUS BLD VENIPUNCTURE: CPT

## 2025-03-20 PROCEDURE — 99214 OFFICE O/P EST MOD 30 MIN: CPT | Performed by: STUDENT IN AN ORGANIZED HEALTH CARE EDUCATION/TRAINING PROGRAM

## 2025-03-20 ASSESSMENT — ENCOUNTER SYMPTOMS
FEVER: 0
WHEEZING: 0
ABDOMINAL PAIN: 0
DOUBLE VISION: 0
PALPITATIONS: 0
DEPRESSION: 0
SHORTNESS OF BREATH: 0
SPUTUM PRODUCTION: 0
HEADACHES: 0
COUGH: 1
DIZZINESS: 0
BACK PAIN: 0
WEAKNESS: 0
CHILLS: 0
BRUISES/BLEEDS EASILY: 0
ORTHOPNEA: 0
BLOOD IN STOOL: 0
BLURRED VISION: 0

## 2025-03-20 ASSESSMENT — FIBROSIS 4 INDEX: FIB4 SCORE: 0.91

## 2025-03-20 ASSESSMENT — PATIENT HEALTH QUESTIONNAIRE - PHQ9: CLINICAL INTERPRETATION OF PHQ2 SCORE: 0

## 2025-03-20 NOTE — PROGRESS NOTES
Subjective:     CC: Lab review follow-up    HPI:   History of Present Illness  The patient is a 75-year-old female presenting for a follow-up visit.    She has been experiencing adverse reactions to her current thyroid medication?, which she describes as causing severe stomach cramps and diarrhea. These symptoms have been so debilitating that she has been unable to drive. She has been on thyroid medication for the past 20 years without any issues until a recent change in her prescription from 75 mcg to 50 mcg. This change resulted in two hospital admissions within a span of 3 to 4 weeks. She has since discontinued the new medication and is seeking to return to her previous dosage of 75 mcg. She also reports difficulty tolerating other medications, stating that they make her feel unwell. She is currently fasting and has only taken Benadryl today.    She is not taking her cholesterol medication.    She is not taking her osteoporosis medication.    She failed to complete requested blood work prior to next visit.    Supplemental Information  She has a spot on the top of her head and wears a hat when she goes out. Her mother had precancerous skin conditions.    FAMILY HISTORY  Her mother had precancerous skin conditions.    ALLERGIES  The patient has had reactions to VACCINES and MEDICINE since childhood, including loss of use of her legs after the polio vaccine and adverse reactions to other vaccinations.    MEDICATIONS  Current: Benadryl       ROS:  Review of Systems   Constitutional:  Negative for chills, fever and malaise/fatigue.   HENT:  Negative for nosebleeds and tinnitus.    Eyes:  Negative for blurred vision and double vision.   Respiratory:  Positive for cough. Negative for sputum production, shortness of breath and wheezing.    Cardiovascular:  Negative for chest pain, palpitations and orthopnea.   Gastrointestinal:  Negative for abdominal pain, blood in stool and melena.   Genitourinary:  Negative for  "dysuria and urgency.   Musculoskeletal:  Negative for back pain and joint pain.   Skin:  Negative for rash.   Neurological:  Negative for dizziness, weakness and headaches.   Endo/Heme/Allergies:  Does not bruise/bleed easily.   Psychiatric/Behavioral:  Negative for depression and suicidal ideas.        Objective:     Exam:  /70 (BP Location: Right arm, Patient Position: Sitting, BP Cuff Size: Adult)   Pulse 97   Temp 36.4 °C (97.5 °F) (Temporal)   Ht 1.626 m (5' 4\")   Wt 71.2 kg (157 lb)   SpO2 90%   BMI 26.95 kg/m²  Body mass index is 26.95 kg/m².    Physical Exam  Vitals reviewed.   Constitutional:       General: She is not in acute distress.  HENT:      Head: Normocephalic and atraumatic.      Mouth/Throat:      Mouth: Mucous membranes are moist.   Eyes:      Extraocular Movements: Extraocular movements intact.      Pupils: Pupils are equal, round, and reactive to light.   Cardiovascular:      Rate and Rhythm: Normal rate and regular rhythm.      Pulses: Normal pulses.      Heart sounds: Normal heart sounds. No murmur heard.  Pulmonary:      Effort: Pulmonary effort is normal. No respiratory distress.      Breath sounds: Normal breath sounds. No wheezing.   Abdominal:      General: There is no distension.      Palpations: Abdomen is soft.      Tenderness: There is no abdominal tenderness. There is no guarding or rebound.   Musculoskeletal:      Cervical back: Normal range of motion and neck supple.      Right lower leg: No edema.      Left lower leg: No edema.   Skin:     General: Skin is warm.      Capillary Refill: Capillary refill takes less than 2 seconds.      Coloration: Skin is not jaundiced.      Findings: No bruising.   Neurological:      General: No focal deficit present.      Mental Status: She is alert.   Psychiatric:         Mood and Affect: Mood normal.         Behavior: Behavior normal.       Labs: Pending    Assessment & Plan:     75 y.o. female with the following -     1. " Hypothyroidism due to acquired atrophy of thyroid  She reports significant adverse reactions to her current thyroid medication, including severe stomach cramps and diarrhea. She has been advised to discuss her medication history with the pharmacist at [facility] to ensure she receives the correct medication. She has been instructed to complete her pending blood work today, which requires fasting. The results will help determine the appropriate course of action.    2. Chronic obstructive pulmonary disease, unspecified COPD type (HCC)  Chronic, not well-controlled.  Patient currently on new medication for COPD prescribed by specialist.  Apparently she is taking Stiolto Respimat 2 puffs daily, however not sure if the patient has been compliant with medical therapy.  Recommended to follow-up with pulmonologist.    3. Osteopenia of multiple sites  She is not taking her prescribed osteoporosis medication. She has been reminded of the importance of this medication in managing her condition. She is advised to resume her osteoporosis medication as prescribed.     4. Dyslipidemia.  She is currently not taking her prescribed cholesterol medication. She has been reminded of the importance of this medication in managing her condition. She is advised to resume her cholesterol medication as prescribed and complete pending blood work.      Return in about 4 weeks (around 4/17/2025) for Labs, Meds.    Please note that this dictation was created using voice recognition software. I have made every reasonable attempt to correct obvious errors, but I expect that there are errors of grammar and possibly content that I did not discover before finalizing the note.

## 2025-03-21 ENCOUNTER — TELEPHONE (OUTPATIENT)
Dept: MEDICAL GROUP | Age: 75
End: 2025-03-21
Payer: MEDICARE

## 2025-03-21 ENCOUNTER — HOME HEALTH ADMISSION (OUTPATIENT)
Dept: HOME HEALTH SERVICES | Facility: HOME HEALTHCARE | Age: 75
End: 2025-03-21
Payer: MEDICARE

## 2025-03-21 DIAGNOSIS — E03.4 HYPOTHYROIDISM DUE TO ACQUIRED ATROPHY OF THYROID: ICD-10-CM

## 2025-03-21 DIAGNOSIS — M85.89 OSTEOPENIA OF MULTIPLE SITES: ICD-10-CM

## 2025-03-21 DIAGNOSIS — J44.9 CHRONIC OBSTRUCTIVE PULMONARY DISEASE, UNSPECIFIED COPD TYPE (HCC): ICD-10-CM

## 2025-03-21 DIAGNOSIS — E78.2 MIXED DYSLIPIDEMIA: ICD-10-CM

## 2025-03-21 DIAGNOSIS — Z91.148 NONCOMPLIANCE WITH MEDICATION REGIMEN: ICD-10-CM

## 2025-03-21 NOTE — TELEPHONE ENCOUNTER
"Phone Number Called: [phone]    Call outcome: Left detailed message for patient. Informed to call back with any additional questions.    Message: lvm to inform that Dr. Cisneros is concerned about her hypothyroid and she needs to take her medication.     I have been taking care of Mrs. Soares since April of 2024, previously seen by another provider at our clinic at [address] [place] [address], but she wanted to switch PCP for personal reasons. I have had difficulties with her medical care. She misses appointments and does not comply with recommended therapy. It appears to me that over the past several months she might be only compliant with therapy for her COPD and is not taking any of other medications. She believes that her new medications given more recently make her sick. She was telling me in our recent visit that she tolerates better some \"pink pills\" and she would like to go to see the pharmacist to discuss this issue. She did not perform her lab work prior to the visit and I asked her to go to the lab next door yesterday afternoon. Her labs showed not well controlled DLD, but more concerning her Thyroid function was significantly abnormal. She is very Hypothyroid. I called her this morning, she was also called by our MA and left a voicemail with specific instruction how to proceed. She needs to resume thyroid medicine and repeat blood work in 6-7 weeks and come to see me after she does the blood work.  I also placed a  order so she can get help regarding medications and compliance.    Dr. Cisneros   "

## 2025-03-23 ENCOUNTER — TELEPHONE (OUTPATIENT)
Dept: HOME HEALTH SERVICES | Facility: HOME HEALTHCARE | Age: 75
End: 2025-03-23
Payer: MEDICARE

## 2025-03-23 NOTE — TELEPHONE ENCOUNTER
Called patient's mobile number and spoke with patient. Patient asked for a call in a few days as she is trying to sort out other appointments and medications. Later start of care requested for 3/25 - Per Request

## 2025-03-24 DIAGNOSIS — E03.4 HYPOTHYROIDISM DUE TO ACQUIRED ATROPHY OF THYROID: ICD-10-CM

## 2025-03-24 NOTE — TELEPHONE ENCOUNTER
Patient came into clinic and stated that she is needing a refill of her thyroid medication. She stated that she has been out and would like for it to be filled. She stated that she would like for it to be filled today. I informed her that I would send it to another provider as Dr. Cisneros is out of office this week.

## 2025-03-25 ENCOUNTER — TELEPHONE (OUTPATIENT)
Dept: HOME HEALTH SERVICES | Facility: HOME HEALTHCARE | Age: 75
End: 2025-03-25
Payer: MEDICARE

## 2025-03-25 DIAGNOSIS — E03.4 HYPOTHYROIDISM DUE TO ACQUIRED ATROPHY OF THYROID: ICD-10-CM

## 2025-03-25 NOTE — TELEPHONE ENCOUNTER
Received request via: Pharmacy    Was the patient seen in the last year in this department? Yes    Does the patient have an active prescription (recently filled or refills available) for medication(s) requested? No    Pharmacy Name: [facility]    Does the patient have [facility] Plus and need 100-day supply? (This applies to ALL medications) Yes, quantity updated to 100 days

## 2025-03-26 ENCOUNTER — TELEPHONE (OUTPATIENT)
Dept: MEDICAL GROUP | Age: 75
End: 2025-03-26
Payer: MEDICARE

## 2025-03-26 ENCOUNTER — TELEPHONE (OUTPATIENT)
Dept: HOME HEALTH SERVICES | Facility: HOME HEALTHCARE | Age: 75
End: 2025-03-26
Payer: MEDICARE

## 2025-03-26 RX ORDER — LEVOTHYROXINE SODIUM 75 UG/1
75 TABLET ORAL
Qty: 90 TABLET | Refills: 0 | Status: SHIPPED | OUTPATIENT
Start: 2025-03-26

## 2025-03-26 RX ORDER — LEVOTHYROXINE SODIUM 75 UG/1
75 TABLET ORAL
Qty: 90 TABLET | Refills: 0 | Status: SHIPPED
Start: 2025-03-26

## 2025-03-26 NOTE — TELEPHONE ENCOUNTER
Left message on patient's phone. Called daughter Sara's phone but it is restricted and cannot leave message. Called son Nhan and left message. Later start of care for 3/27 no contact

## 2025-03-26 NOTE — TELEPHONE ENCOUNTER
Phone Number Called: [phone]    Call outcome: Left detailed message for patient. Informed to call back with any additional questions.    Message: lvm for patient after she came into the office stating that Dr. Cisneros and his MA sent the wrong medication to be refilled. Dr. Carson completed a new refill since Dr. Cisneros is out of the office

## 2025-03-27 ENCOUNTER — TELEPHONE (OUTPATIENT)
Dept: HOME HEALTH SERVICES | Facility: HOME HEALTHCARE | Age: 75
End: 2025-03-27
Payer: MEDICARE

## 2025-03-27 NOTE — TELEPHONE ENCOUNTER
Left message on patient's mobile. Called daughter Sara phone but is restricted, unable to leave message. Later start of care for 3/28 - no contact

## 2025-03-28 ENCOUNTER — TELEPHONE (OUTPATIENT)
Dept: HOME HEALTH SERVICES | Facility: HOME HEALTHCARE | Age: 75
End: 2025-03-28
Payer: MEDICARE

## 2025-03-28 NOTE — TELEPHONE ENCOUNTER
Called patient's mobile number and left voicemail regarding declining referral due to no contact. Patient informed in voicemail to reach out to primary care provider for new home health referral if home health is wanted. Non-Admit.

## 2025-03-30 ENCOUNTER — HOME HEALTH ADMISSION (OUTPATIENT)
Dept: HOME HEALTH SERVICES | Facility: HOME HEALTHCARE | Age: 75
End: 2025-03-30
Payer: MEDICARE

## 2025-04-01 ENCOUNTER — TELEPHONE (OUTPATIENT)
Dept: HOME HEALTH SERVICES | Facility: HOME HEALTHCARE | Age: 75
End: 2025-04-01
Payer: MEDICARE

## 2025-04-01 NOTE — TELEPHONE ENCOUNTER
Called patient and left VM, Called patient's daughter and line states calls are restricted, unable to leave VM. Called son and left VM.  Scheduled as unconfirmed drive by per RN supervisor. Later Start of Care for 4/2 per request.

## 2025-04-02 ENCOUNTER — HOME CARE VISIT (OUTPATIENT)
Dept: HOME HEALTH SERVICES | Facility: HOME HEALTHCARE | Age: 75
End: 2025-04-02

## 2025-04-22 ENCOUNTER — OFFICE VISIT (OUTPATIENT)
Dept: MEDICAL GROUP | Age: 75
End: 2025-04-22
Payer: MEDICARE

## 2025-04-22 VITALS
SYSTOLIC BLOOD PRESSURE: 110 MMHG | DIASTOLIC BLOOD PRESSURE: 62 MMHG | OXYGEN SATURATION: 90 % | HEIGHT: 64 IN | WEIGHT: 140 LBS | TEMPERATURE: 96.9 F | HEART RATE: 111 BPM | BODY MASS INDEX: 23.9 KG/M2

## 2025-04-22 DIAGNOSIS — R11.2 NAUSEA AND VOMITING, UNSPECIFIED VOMITING TYPE: ICD-10-CM

## 2025-04-22 DIAGNOSIS — E78.5 DYSLIPIDEMIA: ICD-10-CM

## 2025-04-22 DIAGNOSIS — M85.89 OSTEOPENIA OF MULTIPLE SITES: ICD-10-CM

## 2025-04-22 DIAGNOSIS — E03.4 HYPOTHYROIDISM DUE TO ACQUIRED ATROPHY OF THYROID: ICD-10-CM

## 2025-04-22 PROCEDURE — 3078F DIAST BP <80 MM HG: CPT | Performed by: STUDENT IN AN ORGANIZED HEALTH CARE EDUCATION/TRAINING PROGRAM

## 2025-04-22 PROCEDURE — 3074F SYST BP LT 130 MM HG: CPT | Performed by: STUDENT IN AN ORGANIZED HEALTH CARE EDUCATION/TRAINING PROGRAM

## 2025-04-22 PROCEDURE — 99214 OFFICE O/P EST MOD 30 MIN: CPT | Performed by: STUDENT IN AN ORGANIZED HEALTH CARE EDUCATION/TRAINING PROGRAM

## 2025-04-22 RX ORDER — ONDANSETRON 4 MG/1
4 TABLET, FILM COATED ORAL
Qty: 90 TABLET | Refills: 1 | Status: SHIPPED | OUTPATIENT
Start: 2025-04-22 | End: 2025-07-21

## 2025-04-22 ASSESSMENT — ENCOUNTER SYMPTOMS
CHILLS: 0
ORTHOPNEA: 0
DEPRESSION: 0
BLOOD IN STOOL: 0
WHEEZING: 0
FEVER: 0
BACK PAIN: 0
SHORTNESS OF BREATH: 0
BLURRED VISION: 0
ABDOMINAL PAIN: 0
DIZZINESS: 0
PALPITATIONS: 0
DOUBLE VISION: 0
WEAKNESS: 0
HEADACHES: 0
COUGH: 0
BRUISES/BLEEDS EASILY: 0

## 2025-04-22 ASSESSMENT — LIFESTYLE VARIABLES: SUBSTANCE_ABUSE: 0

## 2025-04-22 ASSESSMENT — FIBROSIS 4 INDEX: FIB4 SCORE: 1.68

## 2025-04-22 NOTE — PATIENT INSTRUCTIONS
-Take your thyroid medicine daily  -May take Zofran for nausea  -Repeat blood work in 8 weeks  -Follow up with me in 9-10 weeks

## 2025-04-22 NOTE — PROGRESS NOTES
Subjective:     CC: Lab review follow-up    HPI:   History of Present Illness  The patient is a 75-year-old female presenting for a follow-up of hypothyroidism.    Thyroid function test is relevant for elevated TSH and low free T4 consistent with hypothyroidism.  Patient currently not taking her medication.  She believes that her current pills causes her nausea and vomiting and keeps stating that she would like to get previously given green pills.  Patient already discussed such compliant with pharmacist.  All the relevant test mild elevated glycerides and LDL cholesterol, and elevated platelet count.    She reports an inability to tolerate medications, leading to the discontinuation of her prescribed levothyroxine. Since late May 2024, multiple attempts to switch medications have resulted in sickness, prompting her to stop taking them altogether. She has not been taking any thyroid medication for quite some time. Despite this, she notes an improvement in her overall health status, with increased appetite and some weight gain. However, she also mentions a significant weight loss that occurred after her mother's death due to depression and decreased physical activity.    A history of polio in childhood is reported, which she believes was triggered by vaccinations. She expresses difficulty in finding the right medication and mentions that her body does not accept vaccinations well.       ROS:  Review of Systems   Constitutional:  Negative for chills, fever and malaise/fatigue.   HENT:  Positive for hearing loss.    Eyes:  Negative for blurred vision and double vision.   Respiratory:  Negative for cough, shortness of breath and wheezing.    Cardiovascular:  Negative for chest pain, palpitations and orthopnea.   Gastrointestinal:  Negative for abdominal pain, blood in stool and melena.   Musculoskeletal:  Negative for back pain and joint pain.   Skin:  Negative for rash.   Neurological:  Negative for dizziness, weakness  "and headaches.   Endo/Heme/Allergies:  Does not bruise/bleed easily.   Psychiatric/Behavioral:  Negative for depression, substance abuse and suicidal ideas.        Objective:     Exam:  /62 (BP Location: Right arm, Patient Position: Sitting, BP Cuff Size: Adult)   Pulse (!) 111   Temp 36.1 °C (96.9 °F) (Temporal)   Ht 1.626 m (5' 4\")   Wt 63.5 kg (140 lb)   SpO2 90%   BMI 24.03 kg/m²  Body mass index is 24.03 kg/m².    Physical Exam  Vitals reviewed.   Constitutional:       General: She is not in acute distress.  HENT:      Head: Normocephalic and atraumatic.      Mouth/Throat:      Mouth: Mucous membranes are moist.   Eyes:      General: No scleral icterus.     Extraocular Movements: Extraocular movements intact.      Pupils: Pupils are equal, round, and reactive to light.   Cardiovascular:      Rate and Rhythm: Normal rate and regular rhythm.      Pulses: Normal pulses.      Heart sounds: Normal heart sounds. No murmur heard.  Pulmonary:      Effort: Pulmonary effort is normal. No respiratory distress.      Breath sounds: Normal breath sounds. No wheezing.   Abdominal:      General: There is no distension.      Palpations: Abdomen is soft.      Tenderness: There is no abdominal tenderness. There is no guarding or rebound.   Musculoskeletal:      Cervical back: Normal range of motion and neck supple.      Right lower leg: No edema.      Left lower leg: No edema.   Skin:     General: Skin is warm.      Capillary Refill: Capillary refill takes less than 2 seconds.      Coloration: Skin is not jaundiced.      Findings: No bruising.   Neurological:      General: No focal deficit present.      Mental Status: She is alert.      Cranial Nerves: No cranial nerve deficit.   Psychiatric:         Mood and Affect: Mood normal.         Behavior: Behavior normal.       Labs: Reviewed    Assessment & Plan:     1. Hypothyroidism due to acquired atrophy of thyroid  - Laboratory results indicate hypothyroidism, " necessitating the resumption of levothyroxine.  - Reports nausea when taking levothyroxine, leading to discontinuation of medication.  - Counseling provided on the importance of thyroid medication and management of nausea.  - Prescribed antiemetic medication to be taken concurrently with levothyroxine; repeat laboratory tests ordered in 2 months to monitor thyroid function.  - FREE THYROXINE; Future  - TSH; Future    2. Dyslipidemia  -Chronic, currently not taking medications  - Patient refuses to take medical therapy  - Recent blood work showed elevated triglycerides and LDL cholesterol  - Recommended statin therapy, however patient declines    3. Osteopenia of multiple sites  -Chronic, currently untreated  - Patient was given alendronate, however she discontinued therapy  - Encouraged to take medical therapy  - Also recommended regular weightbearing exercises, vitamin D and calcium supplementation.    4. Nausea and vomiting, unspecified vomiting type  -Patient complains of nausea and vomiting when taking her medications  -Given such complaint I will provide her Zofran to mitigate condition and hopefully she will be able to take her home medications.  - ondansetron (ZOFRAN) 4 MG Tab tablet; Take 1 Tablet by mouth 1 time a day as needed for Nausea/Vomiting for up to 90 days.  Dispense: 90 Tablet; Refill: 1       Return in about 10 weeks (around 7/1/2025) for Labs, Meds.    Please note that this dictation was created using voice recognition software. I have made every reasonable attempt to correct obvious errors, but I expect that there are errors of grammar and possibly content that I did not discover before finalizing the note.

## 2025-06-06 ENCOUNTER — OFFICE VISIT (OUTPATIENT)
Dept: SLEEP MEDICINE | Facility: MEDICAL CENTER | Age: 75
End: 2025-06-06
Payer: MEDICARE

## 2025-06-06 VITALS
WEIGHT: 135 LBS | BODY MASS INDEX: 23.05 KG/M2 | HEIGHT: 64 IN | SYSTOLIC BLOOD PRESSURE: 102 MMHG | DIASTOLIC BLOOD PRESSURE: 60 MMHG | OXYGEN SATURATION: 92 % | HEART RATE: 112 BPM

## 2025-06-06 DIAGNOSIS — J47.9 BRONCHIECTASIS WITHOUT COMPLICATION (HCC): ICD-10-CM

## 2025-06-06 DIAGNOSIS — J44.9 CHRONIC OBSTRUCTIVE PULMONARY DISEASE, UNSPECIFIED COPD TYPE (HCC): Primary | ICD-10-CM

## 2025-06-06 DIAGNOSIS — E03.4 HYPOTHYROIDISM DUE TO ACQUIRED ATROPHY OF THYROID: ICD-10-CM

## 2025-06-06 DIAGNOSIS — R91.8 PULMONARY NODULES: ICD-10-CM

## 2025-06-06 PROCEDURE — 99213 OFFICE O/P EST LOW 20 MIN: CPT

## 2025-06-06 PROCEDURE — 3078F DIAST BP <80 MM HG: CPT

## 2025-06-06 PROCEDURE — 3074F SYST BP LT 130 MM HG: CPT

## 2025-06-06 ASSESSMENT — ENCOUNTER SYMPTOMS
SINUS PAIN: 0
HEADACHES: 0
SHORTNESS OF BREATH: 0
DIZZINESS: 0
WEAKNESS: 0
HEMOPTYSIS: 0
DIARRHEA: 0
WHEEZING: 0
SPUTUM PRODUCTION: 0
NAUSEA: 0
COUGH: 1
CHILLS: 0
VOMITING: 0
MYALGIAS: 0
FALLS: 0
HEARTBURN: 0
DIAPHORESIS: 0
FEVER: 0
PALPITATIONS: 0

## 2025-06-06 ASSESSMENT — FIBROSIS 4 INDEX: FIB4 SCORE: 1.68

## 2025-06-06 NOTE — ASSESSMENT & PLAN NOTE
PFTs in 2024 show severe COPD with air trapping and decreased DLCO with a 0.97 L or 46%, FEV1/FVC 98%, %, DLCO 60% predicted, without positive bronchodilator response.  Patient has previously been on Stiolto and albuterol, but patient reports not taking these.  Symptoms have remained stable.  No exacerbations.  Gold group A.  --Advised patient to restart Stiolto and albuterol as needed  --Advised patient to remain up-to-date on all vaccines  --Advised patient to remain active

## 2025-06-06 NOTE — ASSESSMENT & PLAN NOTE
Stable.  Asymptomatic.  Patient begins to start experiencing symptoms, will start patient on airway clearance.

## 2025-06-06 NOTE — PROGRESS NOTES
Pulmonary Clinic Note    Date of Visit: 6/6/2025     Chief Complaint:  Chief Complaint   Patient presents with    Follow-Up     Last seen 1/20/25 w/ Dr. Brown for COPD      HPI:   Vaishali Soares is a very pleasant 75 y.o. year old female former smoker (40 pack-years, quit in 2007), with a PMHx of COPD, hypothyroidism, GERD, hyperlipidemia   who presented to the Pulmonary Clinic for a regular follow up. Last seen in the office on 1/20/2025 with Dr. Brown.     Patient is followed by the pulmonary office for COPD.  PFTs in 2024 show severe COPD with air trapping and decreased DLCO with a 0.97 L or 46%, FEV1/FVC 98%, %, DLCO 60% predicted, without positive bronchodilator response.  CT chest in December 2024 shows stable left lower lobe opacities and bilateral pulmonary nodules measuring up to 3.7 mm.  CT also showed lower lobe predominant bronchiectasis.  Patient was hospitalized at the time of the CT chest with a COPD exacerbation.  There was concern for aspiration given the lower lobe bronchiectasis with history of a hiatal hernia.  Patient is currently on Stiolto and albuterol as needed.    Interval events:  6/6/2025-  Patient reports her breathing to be stable.  She will have a cough which she states is triggered by the wind.  She denies any significant shortness of breath, sputum production, or wheezing.  She has not been using her Stiolto or albuterol.  She has not had any exacerbations.  She is mainly concerned that she has had a change in her levothyroxine and is working with her primary care to obtain her original medication dosing and supplier.    Exacerbations this year:  0    Current medication regimen:  None     Oxygen use:  None    [facility] stGstrstastdstest:st st1st Past Medical History[1]  Past Surgical History[2]  Social History     Socioeconomic History    Marital status: Single     Spouse name: Not on file    Number of children: Not on file    Years of education: Not on file    Highest education  level: Not on file   Occupational History    Not on file   Tobacco Use    Smoking status: Former     Current packs/day: 0.00     Average packs/day: 1 pack/day for 40.0 years (40.0 ttl pk-yrs)     Types: Cigarettes     Start date: 1967     Quit date: 2007     Years since quittin.3    Smokeless tobacco: Never   Vaping Use    Vaping status: Never Used   Substance and Sexual Activity    Alcohol use: Not Currently     Comment: rare     Drug use: No    Sexual activity: Never   Other Topics Concern    Not on file   Social History Narrative    Lives by herself.     .     Has 4 children; one son has arthritis, gout and asthma.     Work: worker     Social Drivers of Health     Financial Resource Strain: Not on file   Food Insecurity: No Food Insecurity (2024)    Hunger Vital Sign     Worried About Running Out of Food in the Last Year: Never true     Ran Out of Food in the Last Year: Never true   Transportation Needs: No Transportation Needs (2024)    PRAPARE - Transportation     Lack of Transportation (Medical): No     Lack of Transportation (Non-Medical): No   Physical Activity: Not on file   Stress: Not on file   Social Connections: Not on file   Intimate Partner Violence: Not At Risk (2024)    Humiliation, Afraid, Rape, and Kick questionnaire     Fear of Current or Ex-Partner: No     Emotionally Abused: No     Physically Abused: No     Sexually Abused: No   Housing Stability: Low Risk  (2024)    Housing Stability Vital Sign     Unable to Pay for Housing in the Last Year: No     Number of Times Moved in the Last Year: 0     Homeless in the Last Year: No        Family History   Problem Relation Age of Onset    Diabetes Sister         DMI    Other Sister         Kidney Failure    Diabetes Brother         DM2. no meds    Stroke Father 54    Cancer Maternal Grandmother         Uterine    Diabetes Paternal Grandmother     No Known Problems Mother     No Known Problems Maternal  "Grandfather     No Known Problems Paternal Grandfather     Cancer Maternal Uncle     Cancer Paternal Uncle     Arthritis Son         Arthritis, gout    Asthma Son     Alcohol/Drug Neg Hx      Medications Ordered Prior to Encounter[3]  Allergies: Patient has no known allergies.    ROS:   Review of Systems   Constitutional:  Negative for chills, diaphoresis, fever and malaise/fatigue.   HENT:  Negative for congestion and sinus pain.    Respiratory:  Positive for cough. Negative for hemoptysis, sputum production, shortness of breath and wheezing.    Cardiovascular:  Negative for chest pain, palpitations and leg swelling.   Gastrointestinal:  Negative for diarrhea, heartburn, nausea and vomiting.   Musculoskeletal:  Negative for falls and myalgias.   Neurological:  Negative for dizziness, weakness and headaches.     Vitals:  /60 (BP Location: Left arm, Patient Position: Sitting, BP Cuff Size: Adult)   Pulse (!) 112   Ht 1.626 m (5' 4\")   Wt 61.2 kg (135 lb)   SpO2 92%     Physical Exam  Constitutional:       General: She is not in acute distress.     Appearance: Normal appearance. She is not ill-appearing, toxic-appearing or diaphoretic.   Cardiovascular:      Rate and Rhythm: Normal rate and regular rhythm.      Pulses: Normal pulses.      Heart sounds: Normal heart sounds. No murmur heard.     No friction rub. No gallop.   Pulmonary:      Effort: Pulmonary effort is normal. No respiratory distress.      Breath sounds: Normal breath sounds. No stridor. No wheezing, rhonchi or rales.   Musculoskeletal:         General: No swelling.      Right lower leg: No edema.      Left lower leg: No edema.   Skin:     General: Skin is warm.   Neurological:      General: No focal deficit present.      Mental Status: She is alert and oriented to person, place, and time.   Psychiatric:         Mood and Affect: Mood normal.         Behavior: Behavior normal.         Thought Content: Thought content normal.         Judgment: " Judgment normal.         Laboratory Data:  PFTs: (Date: 1/29/2024)-      Impression:  Findings are consistent with severe COPD with air trapping as well as   likely emphysema as described.  The low DLCO should be correlated with recent   hemoglobin level.  If the patient has edema on exam, consider ordering an   echocardiogram.  Additionally, the patient should be considered for  long-acting inhalers   due to the finding of severe air trapping.    CT Chest: (Date: 12/18/2024)-  Impression:  Lungs: Patchy left lower lobe opacities are noted. Bilateral pulmonary nodular densities are seen measuring up to 3.7 mm in the right upper lobe on image 28.     Mediastinum/Selina: No significant adenopathy.     Pleura: No pleural effusion.    ECHO: (Date: 12/18/2024)-  Impression:  No prior study is available for comparison.   Patient tachycardic during the study  Normal LV size and thickness with hyperdynamic systolic function,   estimated LVEF > 75%  Normal RV size and systolic function  No significant valvular abnormalities per Doppler assessment  Normal IVC size  No pericardial effusion  Estimated RVSP 30 mmHg      Assessment and Plan:    Problem List Items Addressed This Visit          Pulmonary/Sleep Medicine Problems    Chronic obstructive pulmonary disease (HCC) - Primary    PFTs in 2024 show severe COPD with air trapping and decreased DLCO with a 0.97 L or 46%, FEV1/FVC 98%, %, DLCO 60% predicted, without positive bronchodilator response.  Patient has previously been on Stiolto and albuterol, but patient reports not taking these.  Symptoms have remained stable.  No exacerbations.  Gold group A.  --Advised patient to restart Stiolto and albuterol as needed  --Advised patient to remain up-to-date on all vaccines  --Advised patient to remain active            Other    Hypothyroidism due to acquired atrophy of thyroid    This was a patient's main concern today.  She is working with her primary care and pharmacies to  obtain her previous dosing as well as previous medication supplier.         BMI 23.0-23.9, adult    Pulmonary nodules    CT chest in December 2024 shows stable left lower lobe opacities and bilateral pulmonary nodules measuring up to 3.7 mm.  CT also showed lower lobe predominant bronchiectasis.  Patient was hospitalized at the time of the CT chest with a COPD exacerbation.  There was concern for aspiration given the lower lobe bronchiectasis with history of a hiatal hernia.  -- Repeat CT chest in 1 year.         Relevant Orders    CT-CHEST (THORAX) W/O    Bronchiectasis without complication (HCC)    Stable.  Asymptomatic.  Patient begins to start experiencing symptoms, will start patient on airway clearance.          Diagnostic studies have been reviewed with the patient.    Return in about 6 months (around 12/20/2025), or if symptoms worsen or fail to improve, for COPD, with [facility].     This note was generated using voice recognition software which has a chance of producing errors of grammar and possibly content.  I have made every reasonable attempt to find and correct any obvious errors, but it should be expected that some may not be found prior to finalization of this note.    Time spent in record review prior to patient arrival, reviewing results, and in face-to-face encounter totaled 22 min.  __________  NUBIA Anton  Pulmonary Medicine  [facility]         [1]   Past Medical History:  Diagnosis Date    Acute respiratory failure with hypoxia (HCC) 04/10/2023    Anemia     Anesthesia     PONV    Cough     Emphysema     COPD    Hypothyroid     Osteoporosis     Pneumonia 2015    Sputum production     Urinary retention    [2]   Past Surgical History:  Procedure Laterality Date    AFSANEH BY LAPAROSCOPY Bilateral 9/11/2018    Procedure: AFSANEH BY LAPAROSCOPY;  Surgeon: Mahesh Gordillo M.D.;  Location: SURGERY [facility];  Service: General    OTHER ORTHOPEDIC SURGERY      R shoulder surgery   [3]    Current Outpatient Medications on File Prior to Visit   Medication Sig Dispense Refill    ondansetron (ZOFRAN) 4 MG Tab tablet Take 1 Tablet by mouth 1 time a day as needed for Nausea/Vomiting for up to 90 days. 90 Tablet 1    levothyroxine (SYNTHROID) 75 MCG Tab Take 1 Tablet by mouth every morning on an empty stomach. 90 Tablet 0    VITAMIN D PO Take  by mouth. Indications: SUPPLEMENT      Cyanocobalamin (VITAMIN B-12 PO) Take  by mouth. Indications: SUPPLEMENT      albuterol 108 (90 Base) MCG/ACT Aero Soln inhalation aerosol Inhale 2 Puffs every 6 hours as needed for Shortness of Breath. Indications: Worsening of Chronic Obstructive Lung Disease      Tiotropium Bromide-Olodaterol 2.5-2.5 MCG/ACT Aero Soln Inhale 2 Puffs every day. 3 Each 3     No current facility-administered medications on file prior to visit.

## 2025-06-06 NOTE — ASSESSMENT & PLAN NOTE
CT chest in December 2024 shows stable left lower lobe opacities and bilateral pulmonary nodules measuring up to 3.7 mm.  CT also showed lower lobe predominant bronchiectasis.  Patient was hospitalized at the time of the CT chest with a COPD exacerbation.  There was concern for aspiration given the lower lobe bronchiectasis with history of a hiatal hernia.  -- Repeat CT chest in 1 year.

## 2025-06-06 NOTE — ASSESSMENT & PLAN NOTE
This was a patient's main concern today.  She is working with her primary care and pharmacies to obtain her previous dosing as well as previous medication supplier.

## 2025-06-16 DIAGNOSIS — E03.4 HYPOTHYROIDISM DUE TO ACQUIRED ATROPHY OF THYROID: ICD-10-CM

## 2025-06-16 NOTE — TELEPHONE ENCOUNTER
Received request via: Patient    Was the patient seen in the last year in this department? Yes    Does the patient have an active prescription (recently filled or refills available) for medication(s) requested? No    Pharmacy Name: [id number] - [place], [place] - [address]  AT [facility]     Does the patient have long-term Plus and need 100-day supply? (This applies to ALL medications) Yes, quantity updated to 100 days

## 2025-06-17 RX ORDER — LEVOTHYROXINE SODIUM 75 UG/1
75 TABLET ORAL
Qty: 90 TABLET | Refills: 0 | Status: SHIPPED | OUTPATIENT
Start: 2025-06-17

## 2025-07-02 ENCOUNTER — OFFICE VISIT (OUTPATIENT)
Dept: URGENT CARE | Facility: CLINIC | Age: 75
End: 2025-07-02
Payer: MEDICARE

## 2025-07-02 ENCOUNTER — APPOINTMENT (OUTPATIENT)
Dept: RADIOLOGY | Facility: IMAGING CENTER | Age: 75
End: 2025-07-02
Attending: FAMILY MEDICINE
Payer: MEDICARE

## 2025-07-02 VITALS
SYSTOLIC BLOOD PRESSURE: 136 MMHG | DIASTOLIC BLOOD PRESSURE: 88 MMHG | WEIGHT: 128.8 LBS | HEIGHT: 64 IN | OXYGEN SATURATION: 90 % | BODY MASS INDEX: 21.99 KG/M2 | HEART RATE: 118 BPM | RESPIRATION RATE: 20 BRPM | TEMPERATURE: 98.4 F

## 2025-07-02 DIAGNOSIS — R05.1 ACUTE COUGH: ICD-10-CM

## 2025-07-02 DIAGNOSIS — J18.9 COMMUNITY ACQUIRED PNEUMONIA OF LEFT LOWER LOBE OF LUNG: Primary | ICD-10-CM

## 2025-07-02 DIAGNOSIS — J44.9 CHRONIC OBSTRUCTIVE PULMONARY DISEASE, UNSPECIFIED COPD TYPE (HCC): ICD-10-CM

## 2025-07-02 DIAGNOSIS — R00.0 TACHYCARDIA: ICD-10-CM

## 2025-07-02 PROCEDURE — 3079F DIAST BP 80-89 MM HG: CPT | Performed by: FAMILY MEDICINE

## 2025-07-02 PROCEDURE — 71046 X-RAY EXAM CHEST 2 VIEWS: CPT | Mod: TC,FY | Performed by: RADIOLOGY

## 2025-07-02 PROCEDURE — 3075F SYST BP GE 130 - 139MM HG: CPT | Performed by: FAMILY MEDICINE

## 2025-07-02 PROCEDURE — 99214 OFFICE O/P EST MOD 30 MIN: CPT | Performed by: FAMILY MEDICINE

## 2025-07-02 RX ORDER — DOXYCYCLINE 100 MG/1
100 CAPSULE ORAL 2 TIMES DAILY
Qty: 10 CAPSULE | Refills: 0 | Status: SHIPPED | OUTPATIENT
Start: 2025-07-02 | End: 2025-07-07

## 2025-07-02 ASSESSMENT — FIBROSIS 4 INDEX: FIB4 SCORE: 1.68

## 2025-07-02 NOTE — PROGRESS NOTES
"  Subjective:      75 y.o. female presents to urgent care for cold symptoms that started a couple of days ago. She is experiencing cough and wheezing.  No fever, headache, or body ache. Heart rate is elevated today in urgent care.  She denies any chest pain, palpitations, or shortness of breath.  No tobacco product use.  She does have COPD for which she uses Stiolto Respimat and albuterol. She is not vaccinated against COVID. No known sick contacts.     She denies any other questions or concerns at this time.    Current problem list, medication, and past medical/surgical history were reviewed in Epic.    ROS  See HPI     Objective:      /88   Pulse (!) 118   Temp 36.9 °C (98.4 °F) (Temporal)   Resp 20   Ht 1.626 m (5' 4\")   Wt 58.4 kg (128 lb 12.8 oz)   SpO2 90%   BMI 22.11 kg/m²     Physical Exam  Constitutional:       General: She is not in acute distress.     Appearance: She is not diaphoretic.   Cardiovascular:      Rate and Rhythm: Regular rhythm. Tachycardia present.      Heart sounds: Normal heart sounds.   Pulmonary:      Effort: Pulmonary effort is normal. No respiratory distress.      Breath sounds: Normal breath sounds.   Neurological:      Mental Status: She is alert.   Psychiatric:         Mood and Affect: Affect normal.         Judgment: Judgment normal.       Assessment/Plan:     1. Community acquired pneumonia of left lower lobe of lung (Primary)  2. Acute cough  3. Tachycardia  4. Chronic obstructive pulmonary disease, unspecified COPD type (HCC)  CXR showing Extensive LEFT lower lobe parenchymal opacities, likely bronchiectasis and potential pneumonia. Prescription for Doxycycline and Augmentin were sent.   - DX-CHEST-2 VIEWS; Future      Instructed to return to Urgent Care or nearest Emergency Department if symptoms fail to improve, for any change in condition, further concerns, or new concerning symptoms. Patient states understanding of the plan of care and discharge " instructions.    Lorna Rehman M.D.

## 2025-07-17 ENCOUNTER — APPOINTMENT (OUTPATIENT)
Dept: RADIOLOGY | Facility: MEDICAL CENTER | Age: 75
DRG: 871 | End: 2025-07-17
Attending: EMERGENCY MEDICINE
Payer: MEDICARE

## 2025-07-17 ENCOUNTER — HOSPITAL ENCOUNTER (EMERGENCY)
Facility: MEDICAL CENTER | Age: 75
DRG: 871 | End: 2025-07-17
Attending: EMERGENCY MEDICINE
Payer: MEDICARE

## 2025-07-17 VITALS
HEART RATE: 98 BPM | SYSTOLIC BLOOD PRESSURE: 102 MMHG | OXYGEN SATURATION: 95 % | BODY MASS INDEX: 20.66 KG/M2 | HEIGHT: 64 IN | WEIGHT: 121 LBS | DIASTOLIC BLOOD PRESSURE: 58 MMHG | TEMPERATURE: 97.5 F | RESPIRATION RATE: 20 BRPM

## 2025-07-17 DIAGNOSIS — J44.9 COPD, SEVERE (HCC): ICD-10-CM

## 2025-07-17 DIAGNOSIS — J18.1 LEFT LOWER LOBE CONSOLIDATION (HCC): Primary | ICD-10-CM

## 2025-07-17 DIAGNOSIS — R63.4 UNINTENTIONAL WEIGHT LOSS: ICD-10-CM

## 2025-07-17 DIAGNOSIS — G93.9 BRAIN LESION: ICD-10-CM

## 2025-07-17 DIAGNOSIS — E03.9 HYPOTHYROIDISM, UNSPECIFIED TYPE: ICD-10-CM

## 2025-07-17 LAB
ALBUMIN SERPL BCP-MCNC: 3 G/DL (ref 3.2–4.9)
ALBUMIN/GLOB SERPL: 0.6 G/DL
ALP SERPL-CCNC: 237 U/L (ref 30–99)
ALT SERPL-CCNC: 18 U/L (ref 2–50)
ANION GAP SERPL CALC-SCNC: 14 MMOL/L (ref 7–16)
AST SERPL-CCNC: 68 U/L (ref 12–45)
BASOPHILS # BLD AUTO: 0.5 % (ref 0–1.8)
BASOPHILS # BLD: 0.05 K/UL (ref 0–0.12)
BILIRUB SERPL-MCNC: 1.3 MG/DL (ref 0.1–1.5)
BUN SERPL-MCNC: 6 MG/DL (ref 8–22)
CALCIUM ALBUM COR SERPL-MCNC: 9.5 MG/DL (ref 8.5–10.5)
CALCIUM SERPL-MCNC: 8.7 MG/DL (ref 8.5–10.5)
CHLORIDE SERPL-SCNC: 99 MMOL/L (ref 96–112)
CO2 SERPL-SCNC: 23 MMOL/L (ref 20–33)
CREAT SERPL-MCNC: 0.52 MG/DL (ref 0.5–1.4)
EKG IMPRESSION: NORMAL
EOSINOPHIL # BLD AUTO: 0.02 K/UL (ref 0–0.51)
EOSINOPHIL NFR BLD: 0.2 % (ref 0–6.9)
ERYTHROCYTE [DISTWIDTH] IN BLOOD BY AUTOMATED COUNT: 53.2 FL (ref 35.9–50)
FLUAV RNA SPEC QL NAA+PROBE: NEGATIVE
FLUBV RNA SPEC QL NAA+PROBE: NEGATIVE
GFR SERPLBLD CREATININE-BSD FMLA CKD-EPI: 95 ML/MIN/1.73 M 2
GLOBULIN SER CALC-MCNC: 5.1 G/DL (ref 1.9–3.5)
GLUCOSE SERPL-MCNC: 97 MG/DL (ref 65–99)
HCT VFR BLD AUTO: 41.2 % (ref 37–47)
HGB BLD-MCNC: 13.4 G/DL (ref 12–16)
IMM GRANULOCYTES # BLD AUTO: 0.04 K/UL (ref 0–0.11)
IMM GRANULOCYTES NFR BLD AUTO: 0.4 % (ref 0–0.9)
LACTATE SERPL-SCNC: 1.4 MMOL/L (ref 0.5–2)
LACTATE SERPL-SCNC: 1.6 MMOL/L (ref 0.5–2)
LYMPHOCYTES # BLD AUTO: 2.11 K/UL (ref 1–4.8)
LYMPHOCYTES NFR BLD: 20.4 % (ref 22–41)
MCH RBC QN AUTO: 31.2 PG (ref 27–33)
MCHC RBC AUTO-ENTMCNC: 32.5 G/DL (ref 32.2–35.5)
MCV RBC AUTO: 96 FL (ref 81.4–97.8)
MONOCYTES # BLD AUTO: 0.68 K/UL (ref 0–0.85)
MONOCYTES NFR BLD AUTO: 6.6 % (ref 0–13.4)
NEUTROPHILS # BLD AUTO: 7.43 K/UL (ref 1.82–7.42)
NEUTROPHILS NFR BLD: 71.9 % (ref 44–72)
NRBC # BLD AUTO: 0 K/UL
NRBC BLD-RTO: 0 /100 WBC (ref 0–0.2)
PLATELET # BLD AUTO: 600 K/UL (ref 164–446)
PMV BLD AUTO: 9.5 FL (ref 9–12.9)
POTASSIUM SERPL-SCNC: 3.8 MMOL/L (ref 3.6–5.5)
PROT SERPL-MCNC: 8.1 G/DL (ref 6–8.2)
RBC # BLD AUTO: 4.29 M/UL (ref 4.2–5.4)
RSV RNA SPEC QL NAA+PROBE: NEGATIVE
SARS-COV-2 RNA RESP QL NAA+PROBE: NOTDETECTED
SODIUM SERPL-SCNC: 136 MMOL/L (ref 135–145)
SPECIMEN SOURCE: NORMAL
TSH SERPL DL<=0.005 MIU/L-ACNC: 1.55 UIU/ML (ref 0.38–5.33)
WBC # BLD AUTO: 10.3 K/UL (ref 4.8–10.8)

## 2025-07-17 PROCEDURE — 85025 COMPLETE CBC W/AUTO DIFF WBC: CPT

## 2025-07-17 PROCEDURE — 84443 ASSAY THYROID STIM HORMONE: CPT

## 2025-07-17 PROCEDURE — 71260 CT THORAX DX C+: CPT

## 2025-07-17 PROCEDURE — 87637 SARSCOV2&INF A&B&RSV AMP PRB: CPT

## 2025-07-17 PROCEDURE — 93005 ELECTROCARDIOGRAM TRACING: CPT | Mod: TC | Performed by: EMERGENCY MEDICINE

## 2025-07-17 PROCEDURE — 83605 ASSAY OF LACTIC ACID: CPT

## 2025-07-17 PROCEDURE — 99284 EMERGENCY DEPT VISIT MOD MDM: CPT

## 2025-07-17 PROCEDURE — 80053 COMPREHEN METABOLIC PANEL: CPT

## 2025-07-17 PROCEDURE — 700117 HCHG RX CONTRAST REV CODE 255: Performed by: EMERGENCY MEDICINE

## 2025-07-17 PROCEDURE — 93005 ELECTROCARDIOGRAM TRACING: CPT | Mod: TC

## 2025-07-17 PROCEDURE — 36415 COLL VENOUS BLD VENIPUNCTURE: CPT

## 2025-07-17 PROCEDURE — 87040 BLOOD CULTURE FOR BACTERIA: CPT

## 2025-07-17 PROCEDURE — 70450 CT HEAD/BRAIN W/O DYE: CPT

## 2025-07-17 PROCEDURE — 71045 X-RAY EXAM CHEST 1 VIEW: CPT

## 2025-07-17 RX ORDER — TIOTROPIUM BROMIDE AND OLODATEROL 3.124; 2.736 UG/1; UG/1
1 SPRAY, METERED RESPIRATORY (INHALATION) DAILY
Qty: 1 EACH | Refills: 0 | Status: ON HOLD | OUTPATIENT
Start: 2025-07-17

## 2025-07-17 RX ORDER — LEVOTHYROXINE SODIUM 75 UG/1
75 TABLET ORAL
Qty: 30 TABLET | Refills: 0 | Status: ON HOLD | OUTPATIENT
Start: 2025-07-17

## 2025-07-17 RX ORDER — DOXYCYCLINE 100 MG/1
100 TABLET ORAL 2 TIMES DAILY
Status: ON HOLD | COMMUNITY
Start: 2025-07-02

## 2025-07-17 RX ORDER — PREDNISONE 50 MG/1
50 TABLET ORAL DAILY
Qty: 5 TABLET | Refills: 0 | Status: ON HOLD | OUTPATIENT
Start: 2025-07-17 | End: 2025-07-23

## 2025-07-17 RX ADMIN — IOHEXOL 100 ML: 350 INJECTION, SOLUTION INTRAVENOUS at 13:30

## 2025-07-17 ASSESSMENT — FIBROSIS 4 INDEX: FIB4 SCORE: 1.68

## 2025-07-17 NOTE — ED NOTES
Medication history reviewed with PT at [facility] is complete per PT reporting    Allergies reviewed.     Patient was on Augmentin 875mg 7 day course dispensed 07/02/2025. Last dose was 07/09/2025. Course was completed.  PT was on Doxycycline Mono 100mg 7 day course dispensed 07/02/2025. Last dose was 07/09/2025. Course was completed.    Patient denies taking any antimicrobials (antivirals, antifungals and antiparasitics) in the last 30 days.    Patient is not taking anticoagulants.    Preferred pharmacy for this encounter - [facility] on [address] ([phone])

## 2025-07-17 NOTE — ED PROVIDER NOTES
ED Provider Note    Primary care provider: Alberto Cisneros M.D.    CHIEF COMPLAINT  Chief Complaint   Patient presents with    Loss of Appetite     X2 weeks nausea w/o vomiting, loss of appetite, weight loss, fatigue and malaise. Pt diagnosed with pna at  on 7/2, completed abx. no longer having respiratory complaints or fevers.       Additional history obtained from: The useful history is obtained from the son, he states that he is concerned that she has had a rapid decline in her health over the past 3 weeks, he notes that she has not been eating much, she has been losing weight rather rapidly, she basically sits on the couch all day.  This is very unusual for her as she is normally very active.  It took quite a bit of convincing to get her here to the ER.  Limitation to History:  Patient is extremely hard of hearing    HPI  Vaishali Soares is a 75 y.o. female who presents to the Emergency Department for generalized weakness and weight loss.  When I ask her why she is here, she gets frustrated that she had a physician that was turned into a midlevel last year and the midlevel provider decreased her Synthroid from 75 mcg to 50 mcg.  She attributes all of her recent health issues to the decreased Synthroid.  She denies any specific pain, numbness, focal weakness.      External Record Review: Patient with history of COPD, was admitted for 72 hours here last December for confusion, cough, COPD exacerbation.  Also received blood transfusion during the hospitalization due to iron deficient anemia.  Patient went to the urgent care on the second of this month for a few days of cough and wheezing.  Documented oxygen was 90% on room air, the patient was tachycardic to 118, had a chest x-ray that showed extensive left lower lobe opacities, the patient was discharged on doxycycline, Augmentin.  PAST MEDICAL HISTORY   has a past medical history of Acute respiratory failure with hypoxia (HCC) (04/10/2023), Anemia,  "Anesthesia, Cough, Emphysema, Hypothyroid, Osteoporosis, Pneumonia (2015), Sputum production, and Urinary retention.    SURGICAL HISTORY   has a past surgical history that includes other orthopedic surgery and vicki by laparoscopy (Bilateral, 9/11/2018).    SOCIAL HISTORY  Social History[1]   Social History     Substance and Sexual Activity   Drug Use No       PHYSICAL EXAM  VITAL SIGNS: /58   Pulse 98   Temp 36.4 °C (97.5 °F) (Temporal)   Resp 20   Ht 1.626 m (5' 4\")   Wt 54.9 kg (121 lb)   SpO2 95%   BMI 20.77 kg/m²   Constitutional: Awake, alert in no apparent distress.  Vigorous, talks quite a bit without stopping.  Clearly not short of breath.  HENT: Normocephalic, Bilateral external ears normal. Nose normal.   Eyes: Conjunctiva normal, non-icteric, EOMI.    Thorax & Lungs: Prolonged expiratory phase, otherwise clear.  Cardiovascular: Borderline tachycardic, No murmurs, rubs or gallops. Bilateral pulses symmetrical.   Abdomen:  Soft, nontender, nondistended, normal active bowel sounds.   :    Skin: Visualized skin is  Dry, No erythema, No rash.   Musculoskeletal:   No cyanosis, clubbing or edema. No leg asymmetry.   Neurologic: Alert, good strength all 4 extremities, though the left lower extremity has  drift and patient is unable to hold against gravity.  No pathologic reflexes.  Cranial nerves intact.  Psychiatric: Normal affect, Normal mood  Lymphatic:      EKG   12 lead Interpreted by me  Rhythm:  Normal sinus rhythm   Rate: 96  Axis: normal  Ectopy: none  Conduction: normal  ST Segments: no acute change  T Waves: no acute change  Clinical Impression: Specific ST changes, seen on prior EKGs, otherwise unremarkable  EKG without acute changes       RADIOLOGY  I have independently interpreted the diagnostic imaging associated with this visit.   My preliminary interpretation is as follows: Questionable nodule in the brain, dense consolidation in the left lower lobe    Radiologist interpretation: "   CT-CHEST,ABDOMEN,PELVIS WITH   Final Result      1.  No evidence of bowel obstruction or focal inflammatory change.      2.  Multiple ill-defined nodular densities within the left lower lobe and to a lesser degree the left upper lobe which are worsened from comparison. There is also mucous plugging within the left lower lobe bronchi and left mainstem bronchus. This most    likely represents infectious or atypical infectious process with neoplastic process being less likely.      3.  Multiple bilateral pulmonary nodules measuring up to 5 mm in size.      4.  Colonic diverticulosis.      5.  Fatty liver.      6.  Prior cholecystectomy.      7.  Atherosclerotic vascular calcification.      8.  Small hiatal hernia.      Fleischner Society pulmonary nodule recommendations:   Low Risk: No routine follow-up      High Risk: Optional CT at 12 months      Comments: Use most suspicious nodule as guide to management. Follow-up intervals may vary according to size and risk.      Low Risk - Minimal or absent history of smoking and of other known risk factors.      High Risk - History of smoking or of other known risk factors.      Note: These recommendations do not apply to lung cancer screening, patients with immunosuppression, or patients with known primary cancer.      Fleischner Society 2017 Guidelines for Management of Incidentally Detected Pulmonary Nodules in Adults         CT-HEAD W/O   Final Result      1.  No evidence of acute intracranial process.      2.  Atrophy.      3.  Chronic periventricular white matter change.      4.  Possible left frontal subependymal mass measuring 1 cm in size. This could be further evaluated with contrast-enhanced brain MRI.               DX-CHEST-PORTABLE (1 VIEW)   Final Result      Again seen left lower lobe consolidation, unchanged from comparison.      MR-BRAIN-W/O    (Results Pending)       COURSE & MEDICAL DECISION MAKING  Pertinent Labs & Imaging studies reviewed. (See chart for  details)    COURSE & MEDICAL DECISION MAKING  Pertinent Labs & Imaging studies reviewed. (See chart for details)    Differential diagnoses include but are not limited to: Given smoking history, multiple nodules seen on CT from last year, primary concern would be that of metastatic disease, patient also has left lower extremity weakness, other concern would be completed CVA.  Less likely would be sepsis, dehydration, anemia, hypothyroidism.    11:46 AM - Nursing notes reviewed, patient seen and examined at bedside.    Escalation of care considered, and ultimately not performed: acute inpatient care management, however at this time, the patient is most appropriate for outpatient management.        Decision tools and prescription drugs considered including, but not limited to: Heart score 2    Decision Making:  This is a pleasant 75 y.o. year old female with significant smoking history who presents with weight loss for the past few weeks.  She is extremely hard of hearing, son giving most of the history.  She is perseverating on her decreased dose of Synthroid and is pretty angry that she is on 50 mcg daily.  I explained that this is not going to have anything to do with her weight loss.    I am concerned about malignancy.  I CT the head because she had some left lower extremity weakness and then did a chest abdomen pelvis to look for signs of weight loss.  The CT head does show a possible nodule or mass in the brain, not in the distribution of the patient's weakness.  It could just be artifact.  No signs of a previous CVA.  The patient is fully ambulatory.  I have ordered outpatient MRI and explained to the son that they will need to schedule this.    On the CT chest there is some dense consolidation noted on the left lower lobe, this was seen previously on her prior CT.  Although radiology feels that this is more likely to be inflammatory/infectious, certainly there is a concern for neoplasm given her history.  As it  is more dense than previous, and may be related to a superimposed infection, the patient just finished a course of antibiotics, the CT may be lagging the clinical picture.  She is not having any leukocytosis, no cough, no fever I do not feel that additional antibiotics are warranted at this time.    I do not feel that this needs admission as she is hemodynamically stable, has borderline oxygenation and already on home O2.    I think follow-up is going to be the most important for her.  Recommend she follow back up with her pulmonary physician, I think an outpatient PET CT may be beneficial to look for metastatic disease.  MRI as discussed above.  Son in agreement, he can bring her back here at any time.  If this were to be metastatic lung cancer prognosis will be pretty poor given advanced age, therefore there is not necessarily any urgency to the workup.     The patient was discharged home (see d/c instructions) was told to return immediately for any signs or symptoms listed, or any worsening at all.  The patient verbally agreed to the discharge precautions and follow-up plan which is documented in EPIC.    Discharge Medications:  Discharge Medication List as of 7/17/2025  1:47 PM        START taking these medications    Details   predniSONE (DELTASONE) 50 MG Tab Take 1 Tablet by mouth every day for 5 days., Disp-5 Tablet, R-0, Normal      Tiotropium Bromide-Olodaterol (STIOLTO RESPIMAT) 2.5-2.5 MCG/ACT Aero Soln Inhale 1 Inhalation every day., Disp-1 Each, R-0, Normal             FINAL IMPRESSION  1. Left lower lobe consolidation (HCC)    2. Unintentional weight loss    3. COPD, severe (HCC)    4. Brain lesion    5. Hypothyroidism, unspecified type                    [1]   Social History  Tobacco Use    Smoking status: Former     Current packs/day: 0.00     Average packs/day: 1 pack/day for 40.0 years (40.0 ttl pk-yrs)     Types: Cigarettes     Start date: 1/22/1967     Quit date: 1/22/2007     Years since  quittin.4    Smokeless tobacco: Never   Vaping Use    Vaping status: Never Used   Substance Use Topics    Alcohol use: Yes     Comment: rare     Drug use: No

## 2025-07-17 NOTE — ED TRIAGE NOTES
"Chief Complaint   Patient presents with    Loss of Appetite     X2 weeks nausea w/o vomiting, loss of appetite, weight loss, fatigue and malaise. Pt diagnosed with pna at  on 7/2, completed abx. no longer having respiratory complaints or fevers.     /69   Pulse (!) 105   Temp 36.4 °C (97.5 °F) (Temporal)   Resp 20   Ht 1.626 m (5' 4\")   Wt 54.9 kg (121 lb)   SpO2 (!) 87%   BMI 20.77 kg/m²     Pt AO4, ambulates w steady gait.   Has O2 at home as needed, 87% RA in triage, placed on 2 L to maintain SpO2>90%.     Pt scored 3 for sepsis watch: SpO2 <90, HR >100, recent abx.   "

## 2025-07-17 NOTE — DISCHARGE PLANNING
TCN following. HTH/SCP chart review completed. Note pt currently in ED 2' to complaints of loss of appetite. Patient is noted to have a [facility] primary care provider noting patient does not have any visits currently scheduled with primary care. Per review, patient noted to be ambulatory with steady gait. As documented by nursing patient noted to have O2 needs at home noting patient current O2 needs in ED at 2L/min.     Based on current review, it is anticipated that pt will be fully capable of discharge home once medically clear (either directly from ED or after admission to [facility] if warranted).     If patient admits to [facility], TCN will continue to monitor and assist with SCP/HTH authorization needs for post acute services if indicated. Please reach out to TCN via VOALTE if post acute transitional care needs are warranted for dc planning. Thank you.

## 2025-07-17 NOTE — DISCHARGE INSTRUCTIONS
You have a consolidation in your left lower lobe, this has been seen on the CT from last year as well, appears to be slightly worsened today.  I recommend you follow back up with your pulmonary clinic, ideally you should obtain a PET/CT as an outpatient to look for any possible cancer.  Also would be reasonable to obtain an MRI of the brain.  I have ordered an MRI of the brain.  I will start you on prednisone today for COPD, this can stimulate appetite.

## 2025-07-17 NOTE — ED NOTES
Bug found by radiology staff on patient. Placed in container and reported concern to ER staff. Patient reported she does have bed bugs at home. MD notified.

## 2025-07-17 NOTE — ED NOTES
Dc instructions and medications discussed with patient at bedside. All questions answered at this time. VSS. No IV in place at this time. Pt to lobby without incident.

## 2025-07-20 ENCOUNTER — APPOINTMENT (OUTPATIENT)
Dept: RADIOLOGY | Facility: MEDICAL CENTER | Age: 75
DRG: 871 | End: 2025-07-20
Attending: STUDENT IN AN ORGANIZED HEALTH CARE EDUCATION/TRAINING PROGRAM
Payer: MEDICARE

## 2025-07-20 ENCOUNTER — HOSPITAL ENCOUNTER (INPATIENT)
Facility: MEDICAL CENTER | Age: 75
LOS: 3 days | DRG: 871 | End: 2025-07-23
Attending: STUDENT IN AN ORGANIZED HEALTH CARE EDUCATION/TRAINING PROGRAM | Admitting: HOSPITALIST
Payer: MEDICARE

## 2025-07-20 DIAGNOSIS — R65.21 SEPTIC SHOCK (HCC): Primary | ICD-10-CM

## 2025-07-20 DIAGNOSIS — A41.9 SEPTIC SHOCK (HCC): Primary | ICD-10-CM

## 2025-07-20 DIAGNOSIS — J44.9 CHRONIC OBSTRUCTIVE PULMONARY DISEASE, UNSPECIFIED COPD TYPE (HCC): ICD-10-CM

## 2025-07-20 DIAGNOSIS — J18.1 LOBAR PNEUMONIA (HCC): ICD-10-CM

## 2025-07-20 DIAGNOSIS — J90 PLEURAL EFFUSION ON LEFT: ICD-10-CM

## 2025-07-20 PROBLEM — D84.821 IMMUNOSUPPRESSION DUE TO CHRONIC STEROID USE (HCC): Status: ACTIVE | Noted: 2025-07-20

## 2025-07-20 PROBLEM — Z79.52 IMMUNOSUPPRESSION DUE TO CHRONIC STEROID USE (HCC): Status: ACTIVE | Noted: 2025-07-20

## 2025-07-20 PROBLEM — J18.9 PNEUMONIA OF LEFT LOWER LOBE DUE TO INFECTIOUS ORGANISM: Status: ACTIVE | Noted: 2025-07-20

## 2025-07-20 PROBLEM — J96.01 ACUTE HYPOXEMIC RESPIRATORY FAILURE (HCC): Status: ACTIVE | Noted: 2025-07-20

## 2025-07-20 PROBLEM — E03.9 ACQUIRED HYPOTHYROIDISM: Status: ACTIVE | Noted: 2025-07-20

## 2025-07-20 PROBLEM — T38.0X5A IMMUNOSUPPRESSION DUE TO CHRONIC STEROID USE (HCC): Status: ACTIVE | Noted: 2025-07-20
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
## 2025-07-20 LAB
ALBUMIN SERPL BCP-MCNC: 2.9 G/DL (ref 3.2–4.9)
ALBUMIN/GLOB SERPL: 0.6 G/DL
ALP SERPL-CCNC: 223 U/L (ref 30–99)
ALT SERPL-CCNC: 23 U/L (ref 2–50)
ANION GAP SERPL CALC-SCNC: 15 MMOL/L (ref 7–16)
APPEARANCE UR: CLEAR
AST SERPL-CCNC: 70 U/L (ref 12–45)
BACTERIA #/AREA URNS HPF: NORMAL /HPF
BASOPHILS # BLD AUTO: 0.2 % (ref 0–1.8)
BASOPHILS # BLD: 0.06 K/UL (ref 0–0.12)
BILIRUB SERPL-MCNC: 1.7 MG/DL (ref 0.1–1.5)
BILIRUB UR QL STRIP.AUTO: ABNORMAL
BUN SERPL-MCNC: 10 MG/DL (ref 8–22)
CALCIUM ALBUM COR SERPL-MCNC: 9.6 MG/DL (ref 8.5–10.5)
CALCIUM SERPL-MCNC: 8.7 MG/DL (ref 8.5–10.5)
CASTS URNS QL MICRO: NORMAL /LPF (ref 0–2)
CHLORIDE SERPL-SCNC: 96 MMOL/L (ref 96–112)
CO2 SERPL-SCNC: 22 MMOL/L (ref 20–33)
COLOR UR: ABNORMAL
CORTIS SERPL-MCNC: 26.2 UG/DL (ref 0–23)
CREAT SERPL-MCNC: 0.64 MG/DL (ref 0.5–1.4)
EOSINOPHIL # BLD AUTO: 0.01 K/UL (ref 0–0.51)
EOSINOPHIL NFR BLD: 0 % (ref 0–6.9)
EPITHELIAL CELLS 1715: NORMAL /HPF (ref 0–5)
ERYTHROCYTE [DISTWIDTH] IN BLOOD BY AUTOMATED COUNT: 52.4 FL (ref 35.9–50)
FLUAV RNA SPEC QL NAA+PROBE: NEGATIVE
FLUBV RNA SPEC QL NAA+PROBE: NEGATIVE
GFR SERPLBLD CREATININE-BSD FMLA CKD-EPI: 91 ML/MIN/1.73 M 2
GLOBULIN SER CALC-MCNC: 4.7 G/DL (ref 1.9–3.5)
GLUCOSE SERPL-MCNC: 122 MG/DL (ref 65–99)
GLUCOSE UR STRIP.AUTO-MCNC: NEGATIVE MG/DL
HCT VFR BLD AUTO: 39.5 % (ref 37–47)
HGB BLD-MCNC: 12.8 G/DL (ref 12–16)
IMM GRANULOCYTES # BLD AUTO: 0.28 K/UL (ref 0–0.11)
IMM GRANULOCYTES NFR BLD AUTO: 0.8 % (ref 0–0.9)
KETONES UR STRIP.AUTO-MCNC: NEGATIVE MG/DL
LACTATE SERPL-SCNC: 2.3 MMOL/L (ref 0.5–2)
LACTATE SERPL-SCNC: 3.3 MMOL/L (ref 0.5–2)
LACTATE SERPL-SCNC: 3.4 MMOL/L (ref 0.5–2)
LEUKOCYTE ESTERASE UR QL STRIP.AUTO: ABNORMAL
LYMPHOCYTES # BLD AUTO: 1.11 K/UL (ref 1–4.8)
LYMPHOCYTES NFR BLD: 3 % (ref 22–41)
MCH RBC QN AUTO: 30.8 PG (ref 27–33)
MCHC RBC AUTO-ENTMCNC: 32.4 G/DL (ref 32.2–35.5)
MCV RBC AUTO: 95 FL (ref 81.4–97.8)
MICRO URNS: ABNORMAL
MONOCYTES # BLD AUTO: 1.47 K/UL (ref 0–0.85)
MONOCYTES NFR BLD AUTO: 4 % (ref 0–13.4)
NEUTROPHILS # BLD AUTO: 33.62 K/UL (ref 1.82–7.42)
NEUTROPHILS NFR BLD: 92 % (ref 44–72)
NITRITE UR QL STRIP.AUTO: NEGATIVE
NRBC # BLD AUTO: 0 K/UL
NRBC BLD-RTO: 0 /100 WBC (ref 0–0.2)
PARASITE SPEC INSPECT: ABNORMAL
PARASITE SPEC INSPECT: ABNORMAL
PH UR STRIP.AUTO: 5.5 [PH] (ref 5–8)
PLATELET # BLD AUTO: 595 K/UL (ref 164–446)
PMV BLD AUTO: 9.8 FL (ref 9–12.9)
POTASSIUM SERPL-SCNC: 3.4 MMOL/L (ref 3.6–5.5)
PROCALCITONIN SERPL-MCNC: 0.42 NG/ML
PROT SERPL-MCNC: 7.6 G/DL (ref 6–8.2)
PROT UR QL STRIP: NEGATIVE MG/DL
RBC # BLD AUTO: 4.16 M/UL (ref 4.2–5.4)
RBC # URNS HPF: NORMAL /HPF
RBC UR QL AUTO: NEGATIVE
RSV RNA SPEC QL NAA+PROBE: NEGATIVE
SARS-COV-2 RNA RESP QL NAA+PROBE: NOTDETECTED
SIGNIFICANT IND 70042: ABNORMAL
SITE SITE: ABNORMAL
SODIUM SERPL-SCNC: 133 MMOL/L (ref 135–145)
SOURCE SOURCE: ABNORMAL
SP GR UR STRIP.AUTO: 1.01
SPECIMEN SOURCE: NORMAL
UROBILINOGEN UR STRIP.AUTO-MCNC: 1 EU/DL
WBC # BLD AUTO: 36.6 K/UL (ref 4.8–10.8)
WBC #/AREA URNS HPF: NORMAL /HPF

## 2025-07-20 PROCEDURE — 99497 ADVNCD CARE PLAN 30 MIN: CPT | Mod: 25 | Performed by: HOSPITALIST

## 2025-07-20 PROCEDURE — 94640 AIRWAY INHALATION TREATMENT: CPT

## 2025-07-20 PROCEDURE — 99223 1ST HOSP IP/OBS HIGH 75: CPT | Mod: 25,AI | Performed by: HOSPITALIST

## 2025-07-20 PROCEDURE — 87637 SARSCOV2&INF A&B&RSV AMP PRB: CPT

## 2025-07-20 PROCEDURE — 96374 THER/PROPH/DIAG INJ IV PUSH: CPT

## 2025-07-20 PROCEDURE — 80053 COMPREHEN METABOLIC PANEL: CPT

## 2025-07-20 PROCEDURE — 700111 HCHG RX REV CODE 636 W/ 250 OVERRIDE (IP): Mod: JZ | Performed by: HOSPITALIST

## 2025-07-20 PROCEDURE — 81001 URINALYSIS AUTO W/SCOPE: CPT

## 2025-07-20 PROCEDURE — 51701 INSERT BLADDER CATHETER: CPT

## 2025-07-20 PROCEDURE — 99285 EMERGENCY DEPT VISIT HI MDM: CPT

## 2025-07-20 PROCEDURE — 770020 HCHG ROOM/CARE - TELE (206)

## 2025-07-20 PROCEDURE — 700102 HCHG RX REV CODE 250 W/ 637 OVERRIDE(OP): Performed by: HOSPITALIST

## 2025-07-20 PROCEDURE — 36415 COLL VENOUS BLD VENIPUNCTURE: CPT

## 2025-07-20 PROCEDURE — 700101 HCHG RX REV CODE 250: Performed by: HOSPITALIST

## 2025-07-20 PROCEDURE — A9270 NON-COVERED ITEM OR SERVICE: HCPCS | Performed by: HOSPITALIST

## 2025-07-20 PROCEDURE — 87086 URINE CULTURE/COLONY COUNT: CPT

## 2025-07-20 PROCEDURE — 700111 HCHG RX REV CODE 636 W/ 250 OVERRIDE (IP): Mod: JZ | Performed by: STUDENT IN AN ORGANIZED HEALTH CARE EDUCATION/TRAINING PROGRAM

## 2025-07-20 PROCEDURE — 85025 COMPLETE CBC W/AUTO DIFF WBC: CPT

## 2025-07-20 PROCEDURE — 94760 N-INVAS EAR/PLS OXIMETRY 1: CPT

## 2025-07-20 PROCEDURE — 84145 PROCALCITONIN (PCT): CPT

## 2025-07-20 PROCEDURE — 87641 MR-STAPH DNA AMP PROBE: CPT

## 2025-07-20 PROCEDURE — 87169 MACROSCOPIC EXAM PARASITE: CPT

## 2025-07-20 PROCEDURE — 87040 BLOOD CULTURE FOR BACTERIA: CPT

## 2025-07-20 PROCEDURE — 83605 ASSAY OF LACTIC ACID: CPT | Mod: 91

## 2025-07-20 PROCEDURE — 71045 X-RAY EXAM CHEST 1 VIEW: CPT

## 2025-07-20 PROCEDURE — 82533 TOTAL CORTISOL: CPT

## 2025-07-20 PROCEDURE — 94664 DEMO&/EVAL PT USE INHALER: CPT

## 2025-07-20 PROCEDURE — 700105 HCHG RX REV CODE 258: Performed by: HOSPITALIST

## 2025-07-20 PROCEDURE — 0202U NFCT DS 22 TRGT SARS-COV-2: CPT

## 2025-07-20 PROCEDURE — 700105 HCHG RX REV CODE 258: Performed by: STUDENT IN AN ORGANIZED HEALTH CARE EDUCATION/TRAINING PROGRAM

## 2025-07-20 RX ORDER — LEVOTHYROXINE SODIUM 75 UG/1
75 TABLET ORAL
Status: DISCONTINUED | OUTPATIENT
Start: 2025-07-20 | End: 2025-07-23 | Stop reason: HOSPADM

## 2025-07-20 RX ORDER — SODIUM CHLORIDE AND POTASSIUM CHLORIDE 150; 900 MG/100ML; MG/100ML
INJECTION, SOLUTION INTRAVENOUS CONTINUOUS
Status: DISPENSED | OUTPATIENT
Start: 2025-07-20 | End: 2025-07-21

## 2025-07-20 RX ORDER — ONDANSETRON 4 MG/1
4 TABLET, ORALLY DISINTEGRATING ORAL EVERY 4 HOURS PRN
Status: DISCONTINUED | OUTPATIENT
Start: 2025-07-20 | End: 2025-07-23 | Stop reason: HOSPADM

## 2025-07-20 RX ORDER — SODIUM CHLORIDE AND POTASSIUM CHLORIDE 150; 900 MG/100ML; MG/100ML
INJECTION, SOLUTION INTRAVENOUS CONTINUOUS
Status: DISPENSED | OUTPATIENT
Start: 2025-07-20 | End: 2025-07-20

## 2025-07-20 RX ORDER — OXYCODONE HYDROCHLORIDE 5 MG/1
5 TABLET ORAL
Refills: 0 | Status: DISCONTINUED | OUTPATIENT
Start: 2025-07-20 | End: 2025-07-23 | Stop reason: HOSPADM

## 2025-07-20 RX ORDER — OXYCODONE HYDROCHLORIDE 5 MG/1
2.5 TABLET ORAL
Refills: 0 | Status: DISCONTINUED | OUTPATIENT
Start: 2025-07-20 | End: 2025-07-23 | Stop reason: HOSPADM

## 2025-07-20 RX ORDER — SODIUM CHLORIDE 9 MG/ML
30 INJECTION, SOLUTION INTRAVENOUS ONCE
Status: COMPLETED | OUTPATIENT
Start: 2025-07-20 | End: 2025-07-20

## 2025-07-20 RX ORDER — HYDROCORTISONE SODIUM SUCCINATE 100 MG/2ML
50 INJECTION INTRAMUSCULAR; INTRAVENOUS EVERY 6 HOURS
Status: DISCONTINUED | OUTPATIENT
Start: 2025-07-20 | End: 2025-07-21

## 2025-07-20 RX ORDER — SODIUM CHLORIDE, SODIUM LACTATE, POTASSIUM CHLORIDE, AND CALCIUM CHLORIDE .6; .31; .03; .02 G/100ML; G/100ML; G/100ML; G/100ML
30 INJECTION, SOLUTION INTRAVENOUS ONCE
Status: DISCONTINUED | OUTPATIENT
Start: 2025-07-20 | End: 2025-07-20

## 2025-07-20 RX ORDER — UMECLIDINIUM BROMIDE AND VILANTEROL TRIFENATATE 62.5; 25 UG/1; UG/1
1 POWDER RESPIRATORY (INHALATION)
Status: DISCONTINUED | OUTPATIENT
Start: 2025-07-21 | End: 2025-07-23 | Stop reason: HOSPADM

## 2025-07-20 RX ORDER — CEFTRIAXONE 2 G/1
2000 INJECTION, POWDER, FOR SOLUTION INTRAMUSCULAR; INTRAVENOUS ONCE
Status: COMPLETED | OUTPATIENT
Start: 2025-07-20 | End: 2025-07-20

## 2025-07-20 RX ORDER — HYDROMORPHONE HYDROCHLORIDE 1 MG/ML
0.25 INJECTION, SOLUTION INTRAMUSCULAR; INTRAVENOUS; SUBCUTANEOUS
Status: DISCONTINUED | OUTPATIENT
Start: 2025-07-20 | End: 2025-07-23 | Stop reason: HOSPADM

## 2025-07-20 RX ORDER — FLUDROCORTISONE ACETATE 0.1 MG/1
0.05 TABLET ORAL EVERY MORNING
Status: DISCONTINUED | OUTPATIENT
Start: 2025-07-20 | End: 2025-07-21

## 2025-07-20 RX ORDER — ONDANSETRON 2 MG/ML
4 INJECTION INTRAMUSCULAR; INTRAVENOUS EVERY 4 HOURS PRN
Status: DISCONTINUED | OUTPATIENT
Start: 2025-07-20 | End: 2025-07-23 | Stop reason: HOSPADM

## 2025-07-20 RX ORDER — AMOXICILLIN 250 MG
2 CAPSULE ORAL EVERY EVENING
Status: DISCONTINUED | OUTPATIENT
Start: 2025-07-20 | End: 2025-07-23 | Stop reason: HOSPADM

## 2025-07-20 RX ORDER — HEPARIN SODIUM 5000 [USP'U]/ML
5000 INJECTION, SOLUTION INTRAVENOUS; SUBCUTANEOUS EVERY 8 HOURS
Status: DISCONTINUED | OUTPATIENT
Start: 2025-07-21 | End: 2025-07-23 | Stop reason: HOSPADM

## 2025-07-20 RX ORDER — LINEZOLID 600 MG/1
600 TABLET, FILM COATED ORAL EVERY 12 HOURS
Status: DISCONTINUED | OUTPATIENT
Start: 2025-07-20 | End: 2025-07-21

## 2025-07-20 RX ORDER — ACETAMINOPHEN 325 MG/1
650 TABLET ORAL EVERY 6 HOURS PRN
Status: DISCONTINUED | OUTPATIENT
Start: 2025-07-20 | End: 2025-07-23 | Stop reason: HOSPADM

## 2025-07-20 RX ORDER — ALBUTEROL SULFATE 90 UG/1
2 INHALANT RESPIRATORY (INHALATION) EVERY 6 HOURS PRN
Status: DISCONTINUED | OUTPATIENT
Start: 2025-07-20 | End: 2025-07-23 | Stop reason: HOSPADM

## 2025-07-20 RX ORDER — UMECLIDINIUM BROMIDE AND VILANTEROL TRIFENATATE 62.5; 25 UG/1; UG/1
1 POWDER RESPIRATORY (INHALATION)
Status: DISCONTINUED | OUTPATIENT
Start: 2025-07-20 | End: 2025-07-20

## 2025-07-20 RX ORDER — POLYETHYLENE GLYCOL 3350 17 G/17G
1 POWDER, FOR SOLUTION ORAL
Status: DISCONTINUED | OUTPATIENT
Start: 2025-07-20 | End: 2025-07-23 | Stop reason: HOSPADM

## 2025-07-20 RX ADMIN — POTASSIUM CHLORIDE AND SODIUM CHLORIDE: 900; 150 INJECTION, SOLUTION INTRAVENOUS at 23:07

## 2025-07-20 RX ADMIN — LINEZOLID 600 MG: 600 TABLET, FILM COATED ORAL at 17:58

## 2025-07-20 RX ADMIN — POTASSIUM CHLORIDE AND SODIUM CHLORIDE: 900; 150 INJECTION, SOLUTION INTRAVENOUS at 18:00

## 2025-07-20 RX ADMIN — PIPERACILLIN AND TAZOBACTAM 3.38 G: 3; .375 INJECTION, POWDER, FOR SOLUTION INTRAVENOUS at 19:31

## 2025-07-20 RX ADMIN — PIPERACILLIN AND TAZOBACTAM 3.38 G: 3; .375 INJECTION, POWDER, FOR SOLUTION INTRAVENOUS at 16:23

## 2025-07-20 RX ADMIN — POTASSIUM CHLORIDE AND SODIUM CHLORIDE: 900; 150 INJECTION, SOLUTION INTRAVENOUS at 23:39

## 2025-07-20 RX ADMIN — PIPERONYL BUTOXIDE, PYRETHRUM EXTRACT: 4; .33 SHAMPOO TOPICAL at 17:45

## 2025-07-20 RX ADMIN — SODIUM CHLORIDE 1590 ML: 9 INJECTION, SOLUTION INTRAVENOUS at 13:19

## 2025-07-20 RX ADMIN — ALBUTEROL SULFATE 2 PUFF: 90 AEROSOL, METERED RESPIRATORY (INHALATION) at 19:01

## 2025-07-20 RX ADMIN — CEFTRIAXONE SODIUM 2000 MG: 2 INJECTION, POWDER, FOR SOLUTION INTRAMUSCULAR; INTRAVENOUS at 13:34

## 2025-07-20 RX ADMIN — UMECLIDINIUM BROMIDE AND VILANTEROL TRIFENATATE 1 PUFF: 62.5; 25 POWDER RESPIRATORY (INHALATION) at 19:02

## 2025-07-20 RX ADMIN — HYDROCORTISONE SODIUM SUCCINATE 50 MG: 100 INJECTION, POWDER, FOR SOLUTION INTRAMUSCULAR; INTRAVENOUS at 16:20

## 2025-07-20 ASSESSMENT — ENCOUNTER SYMPTOMS
SHORTNESS OF BREATH: 1
FOCAL WEAKNESS: 0
FLANK PAIN: 0
CHILLS: 0
STRIDOR: 0
COUGH: 1
ABDOMINAL PAIN: 0
NERVOUS/ANXIOUS: 0
MYALGIAS: 0
BRUISES/BLEEDS EASILY: 0
EYE REDNESS: 0
VOMITING: 1
NAUSEA: 1
SPUTUM PRODUCTION: 1
FEVER: 0
EYE DISCHARGE: 0

## 2025-07-20 ASSESSMENT — COGNITIVE AND FUNCTIONAL STATUS - GENERAL
MOBILITY SCORE: 18
MOVING TO AND FROM BED TO CHAIR: A LITTLE
MOVING FROM LYING ON BACK TO SITTING ON SIDE OF FLAT BED: A LITTLE
DRESSING REGULAR LOWER BODY CLOTHING: A LITTLE
DAILY ACTIVITIY SCORE: 20
CLIMB 3 TO 5 STEPS WITH RAILING: A LITTLE
HELP NEEDED FOR BATHING: A LITTLE
DRESSING REGULAR UPPER BODY CLOTHING: A LITTLE
SUGGESTED CMS G CODE MODIFIER MOBILITY: CK
SUGGESTED CMS G CODE MODIFIER DAILY ACTIVITY: CJ
STANDING UP FROM CHAIR USING ARMS: A LITTLE
WALKING IN HOSPITAL ROOM: A LITTLE
TOILETING: A LITTLE
TURNING FROM BACK TO SIDE WHILE IN FLAT BAD: A LITTLE

## 2025-07-20 ASSESSMENT — LIFESTYLE VARIABLES
EVER HAD A DRINK FIRST THING IN THE MORNING TO STEADY YOUR NERVES TO GET RID OF A HANGOVER: NO
HAVE YOU EVER FELT YOU SHOULD CUT DOWN ON YOUR DRINKING: NO
ON A TYPICAL DAY WHEN YOU DRINK ALCOHOL HOW MANY DRINKS DO YOU HAVE: 0
TOTAL SCORE: 0
HAVE PEOPLE ANNOYED YOU BY CRITICIZING YOUR DRINKING: NO
ALCOHOL_USE: NO
CONSUMPTION TOTAL: NEGATIVE
EVER FELT BAD OR GUILTY ABOUT YOUR DRINKING: NO
HOW MANY TIMES IN THE PAST YEAR HAVE YOU HAD 5 OR MORE DRINKS IN A DAY: 0
TOTAL SCORE: 0
TOTAL SCORE: 0
AVERAGE NUMBER OF DAYS PER WEEK YOU HAVE A DRINK CONTAINING ALCOHOL: 0

## 2025-07-20 ASSESSMENT — SOCIAL DETERMINANTS OF HEALTH (SDOH)
WITHIN THE LAST YEAR, HAVE YOU BEEN KICKED, HIT, SLAPPED, OR OTHERWISE PHYSICALLY HURT BY YOUR PARTNER OR EX-PARTNER?: NO
WITHIN THE PAST 12 MONTHS, YOU WORRIED THAT YOUR FOOD WOULD RUN OUT BEFORE YOU GOT THE MONEY TO BUY MORE: NEVER TRUE
WITHIN THE LAST YEAR, HAVE YOU BEEN AFRAID OF YOUR PARTNER OR EX-PARTNER?: NO
WITHIN THE PAST 12 MONTHS, THE FOOD YOU BOUGHT JUST DIDN'T LAST AND YOU DIDN'T HAVE MONEY TO GET MORE: NEVER TRUE
IN THE PAST 12 MONTHS, HAS THE ELECTRIC, GAS, OIL, OR WATER COMPANY THREATENED TO SHUT OFF SERVICE IN YOUR HOME?: NO
IN THE PAST 12 MONTHS, HAS THE ELECTRIC, GAS, OIL, OR WATER COMPANY THREATENED TO SHUT OFF SERVICE IN YOUR HOME?: NO
WITHIN THE LAST YEAR, HAVE YOU BEEN HUMILIATED OR EMOTIONALLY ABUSED IN OTHER WAYS BY YOUR PARTNER OR EX-PARTNER?: NO
WITHIN THE LAST YEAR, HAVE TO BEEN RAPED OR FORCED TO HAVE ANY KIND OF SEXUAL ACTIVITY BY YOUR PARTNER OR EX-PARTNER?: NO
WITHIN THE PAST 12 MONTHS, YOU WORRIED THAT YOUR FOOD WOULD RUN OUT BEFORE YOU GOT THE MONEY TO BUY MORE: NEVER TRUE
WITHIN THE PAST 12 MONTHS, THE FOOD YOU BOUGHT JUST DIDN'T LAST AND YOU DIDN'T HAVE MONEY TO GET MORE: NEVER TRUE

## 2025-07-20 ASSESSMENT — PAIN DESCRIPTION - PAIN TYPE
TYPE: ACUTE PAIN
TYPE: ACUTE PAIN

## 2025-07-20 ASSESSMENT — FIBROSIS 4 INDEX
FIB4 SCORE: 2.06
FIB4 SCORE: 1.89

## 2025-07-20 NOTE — ED TRIAGE NOTES
"BP (!) 73/47   Pulse (!) 121   Temp 36.6 °C (97.8 °F) (Temporal)   Resp 18   Ht 1.626 m (5' 4\")   Wt 53 kg (116 lb 13.5 oz)   SpO2 88%   Chief Complaint   Patient presents with    N/V     Was seen here 7/17/2025 for same  not getting better   was let go from ED   was not admitted     Weakness     Gradually getting worse    not able to tolerate food      Pt comes in w/ family   aware of VS  pt take to RM 6 now by RN   "

## 2025-07-20 NOTE — DISCHARGE PLANNING
"TCN following. HTH/SCP chart review completed. Note pt currently in ED 2' to N/V and weakness.  Per chart review, patient presented to ER on 7/17/25 for similar complaints including not able to tolerate food.      Per chart review of [facility] note on 4/2/25 at 9 AM by Merna Arthur R.N. stating patient, \"is refusing Home Health services at this time. States she is now taking all medications as prescribed (has been for past week) and is feeling much better\".      Note pt has a [facility] PCP and does not have a primary/follow up visit scheduled.  Will refer to  if indicated by ERP at discharge.  Per review, she is ambulatory at her baseline and was on 3 L/min O2.  Based on current review, it is anticipated that pt will dc to home with possible HH vs. Post-acute placement (either directly from ED or after admission to [facility] if warranted).     If patient does not warrant admission/ inpatient status to [facility] and is unable to functionally discharge home, please reach out to TCN for assist with SCP auth to discharge directly from ED to [facility]. Discussed with ED BRYCE Landry.      If patient admits to [facility], please reach out to TCN via VOALTE if post acute transitional care needs are warranted for dc planning. Thank you.      Addendum:  1503- Per chart review, patient now has an admit order with DX of sepsis.   "

## 2025-07-20 NOTE — ED NOTES
Straight cath preformed for UA.   NP swab collected.     Pt updated on plan of care with all questions answered. Call light in reach.

## 2025-07-20 NOTE — H&P
Hospital Medicine History & Physical Note    Date of Service  7/20/2025    Primary Care Physician  Alberto Cisneros M.D.    Consultants  None     Code Status  Full Code    Chief Complaint  Chief Complaint   Patient presents with    N/V     Was seen here 7/17/2025 for same  not getting better   was let go from ED   was not admitted     Weakness     Gradually getting worse    not able to tolerate food      History of Presenting Illness  Vaishali Soares is a 75 y.o. female with a past medical history of chronic obstructive pulmonary disease who presented 7/20/2025 with generalized weakness, cough, shortness of breath in addition to nausea and vomiting.  Patient reports that she has not been feeling well over the past 2 weeks.  She has been having worsening cough, generalized weakness. The patient was seen 3 days ago after presenting with flulike symptoms and was prescribed Augmentin and doxycycline for suspected pneumonia.    I discussed the plan of care with emergency department physician, and the patient.    Review of Systems  Review of Systems   Constitutional:  Positive for malaise/fatigue. Negative for chills and fever.   Eyes:  Negative for discharge and redness.   Respiratory:  Positive for cough, sputum production and shortness of breath. Negative for stridor.    Cardiovascular:  Negative for chest pain and leg swelling.   Gastrointestinal:  Positive for nausea and vomiting. Negative for abdominal pain.   Genitourinary:  Negative for flank pain.   Musculoskeletal:  Negative for myalgias.   Skin: Negative.    Neurological:  Negative for focal weakness.   Endo/Heme/Allergies:  Does not bruise/bleed easily.   Psychiatric/Behavioral:  The patient is not nervous/anxious.      Past Medical History   has a past medical history of Acute respiratory failure with hypoxia ([facility]) (04/10/2023), Anemia, Anesthesia, Cough, Emphysema, Hypothyroid, Osteoporosis, Pneumonia (2015), Sputum production, and Urinary  retention.    Surgical History   has a past surgical history that includes other orthopedic surgery and vicki by laparoscopy (Bilateral, 9/11/2018).     Family History  Family History   Problem Relation Age of Onset    Diabetes Sister         DMI    Other Sister         Kidney Failure    Diabetes Brother         DM2. no meds    Stroke Father 54    Cancer Maternal Grandmother         Uterine    Diabetes Paternal Grandmother     No Known Problems Mother     No Known Problems Maternal Grandfather     No Known Problems Paternal Grandfather     Cancer Maternal Uncle     Cancer Paternal Uncle     Arthritis Son         Arthritis, gout    Asthma Son     Alcohol/Drug Neg Hx       Social History   reports that she quit smoking about 18 years ago. Her smoking use included cigarettes. She started smoking about 58 years ago. She has a 40 pack-year smoking history. She has never used smokeless tobacco. She reports current alcohol use. She reports that she does not use drugs.    Allergies  Allergies[1]    Medications  Prior to Admission Medications   Prescriptions Last Dose Informant Patient Reported? Taking?   Cyanocobalamin (VITAMIN B-12 PO) 7/20/2025 at  8:30 AM Patient Yes Yes   Sig: Take 1 Tablet by mouth every day.   Tiotropium Bromide-Olodaterol (STIOLTO RESPIMAT) 2.5-2.5 MCG/ACT Aero Soln New Rx Patient's Home Pharmacy No No   Sig: Inhale 1 Inhalation every day.   VITAMIN D PO 7/20/2025 at  8:30 AM Patient Yes Yes   Sig: Take 1 Capsule by mouth every day.   albuterol 108 (90 Base) MCG/ACT Aero Soln inhalation aerosol 7/13/2025 Patient Yes No   Sig: Inhale 2 Puffs every 6 hours as needed for Shortness of Breath. Indications: Worsening of Chronic Obstructive Lung Disease   amoxicillin-clavulanate (AUGMENTIN) 875-125 MG Tab 7/7/2025 Patient's Home Pharmacy Yes No   Sig: Take 1 Tablet by mouth 2 times a day. Pt started on 7/2/2025 for 5 day course   PRESCRIPTION COURSE WAS COMPLETED   doxycycline monohydrate (ADOXA) 100 MG  tablet 7/7/2025 Patient's Home Pharmacy Yes No   Sig: Take 100 mg by mouth 2 times a day. Pt started on 7/2/2025 for 5 day course   PRESCRIPTION COURSE WAS COMPLETED   levothyroxine (SYNTHROID) 75 MCG Tab New Rx Patient's Home Pharmacy No Yes   Sig: Take 1 Tablet by mouth every morning on an empty stomach.   ondansetron (ZOFRAN) 4 MG Tab tablet 7/16/2025 Patient No No   Sig: Take 1 Tablet by mouth 1 time a day as needed for Nausea/Vomiting for up to 90 days.   predniSONE (DELTASONE) 50 MG Tab 7/20/2025 at  8:30 AM Patient's Home Pharmacy No Yes   Sig: Take 1 Tablet by mouth every day for 5 days.      Facility-Administered Medications: None     Physical Exam  Temp:  [36.1 °C (96.9 °F)-36.6 °C (97.8 °F)] 36.1 °C (96.9 °F)  Pulse:  [104-121] 104  Resp:  [18-29] 20  BP: ()/(47-56) 96/51  SpO2:  [88 %-97 %] 96 %  Blood Pressure : 97/56   Temperature: 36.6 °C (97.8 °F)   Pulse: (!) 107   Respiration: (!) 28   Pulse Oximetry: 96 %     Physical Exam  Constitutional:       General: She is not in acute distress.     Appearance: She is ill-appearing and diaphoretic.   HENT:      Head: Normocephalic and atraumatic.      Right Ear: External ear normal.      Left Ear: External ear normal.      Nose: No congestion or rhinorrhea.      Mouth/Throat:      Mouth: Mucous membranes are moist.      Pharynx: No oropharyngeal exudate or posterior oropharyngeal erythema.   Eyes:      General: No scleral icterus.        Right eye: No discharge.         Left eye: No discharge.      Conjunctiva/sclera: Conjunctivae normal.      Pupils: Pupils are equal, round, and reactive to light.   Cardiovascular:      Rate and Rhythm: Regular rhythm. Tachycardia present.      Heart sounds:      No friction rub. No gallop.   Pulmonary:      Breath sounds: Rhonchi and rales present.      Comments: Requiring 3 L of oxygen to achieve adequate saturation.  Reduced air entry bilaterally, worse on the left side.  Abdominal:      General: Abdomen is flat.  "There is no distension.      Tenderness: There is no guarding.   Musculoskeletal:         General: No swelling.      Cervical back: Neck supple. No rigidity. No muscular tenderness.      Right lower leg: No edema.      Left lower leg: No edema.   Skin:     Capillary Refill: Capillary refill takes 2 to 3 seconds.      Coloration: Skin is not jaundiced or pale.      Findings: No bruising or erythema.   Neurological:      Mental Status: She is alert and oriented to person, place, and time.   Psychiatric:         Mood and Affect: Mood normal.         Judgment: Judgment normal.       Laboratory:  Recent Labs     07/20/25  1311   WBC 36.6*   RBC 4.16*   HEMOGLOBIN 12.8   HEMATOCRIT 39.5   MCV 95.0   MCH 30.8   MCHC 32.4   RDW 52.4*   PLATELETCT 595*   MPV 9.8     Recent Labs     07/20/25  1311   SODIUM 133*   POTASSIUM 3.4*   CHLORIDE 96   CO2 22   GLUCOSE 122*   BUN 10   CREATININE 0.64   CALCIUM 8.7     Recent Labs     07/20/25  1311   ALTSGPT 23   ASTSGOT 70*   ALKPHOSPHAT 223*   TBILIRUBIN 1.7*   GLUCOSE 122*         No results for input(s): \"NTPROBNP\" in the last 72 hours.      No results for input(s): \"TROPONINT\" in the last 72 hours.    Imaging:  DX-CHEST-PORTABLE (1 VIEW)   Final Result      1.  Worsening left lower lobe parenchymal consolidation, associated with a small left pleural effusion.   2.  The remainder is stable.        Assessment/Plan:  Justification for Admission Status  I anticipate this patient will require at least two midnights for appropriate medical management, necessitating inpatient admission because patient has multiple acute medical problems including pneumonia, septic shock.  She will require intravenous antibiotics, intravenous fluids and close clinical monitoring.    Patient will need a Telemetry bed on MEDICAL service.      * Septic shock (HCC)- (present on admission)  Assessment & Plan  This is Sepsis Present on admission  SIRS criteria identified on my evaluation include: Tachycardia, " with heart rate greater than 90 BPM, Tachypnea, with respirations greater than 20 per minute, and Leukocytosis, with WBC greater than 12,000  Clinical indicators of end organ dysfunction include Hypotension with systolic blood pressure less than 90 or MAP less than 65, Hypotension with decrease in systolic blood pressure of more than 40mmHg, and Lactic Acid greater than 2  Source is pneumonia  Sepsis protocol initiated  I will start stress dose steroids given her steroid use  Crystalloid Fluid Administration: Fluid resuscitation ordered per standard protocol - 30 mL/kg per current or ideal body weight  IV antibiotics as appropriate for source of sepsis  Reassessment: I have reassessed the patient's hemodynamic status     Acute hypoxemic respiratory failure (HCC)- (present on admission)  Assessment & Plan  Secondary to pneumonia  I will start intravenous antibiotics with Zosyn and Zyvox.  Oxygen as needed, Respiratory protocol, Bronchodilators, Incentive spirometry.      Steroid use (HCC)- (present on admission)  Assessment & Plan  I will start stress dose steroids given her septic shock    Pneumonia of left lower lobe due to infectious organism- (present on admission)  Assessment & Plan  X-ray shows worsening left lower lobe parenchymal consolidation, associated with a small left pleural effusion.   Complicated by septic shock  I will start Zosyn and Zyvox, follow on cultures and sensitivities    Lobar pneumonia (HCC)- (present on admission)  Assessment & Plan  I will start zyvox and zosyn   Follow on cultures and sensitivities      Sepsis (HCC)- (present on admission)  Assessment & Plan  This is Sepsis Present on admission  SIRS criteria identified on my evaluation include: Tachycardia, with heart rate greater than 90 BPM, Tachypnea, with respirations greater than 20 per minute, and Leukocytosis, with WBC greater than 12,000  Clinical indicators of end organ dysfunction include Hypotension with systolic blood  pressure less than 90 or MAP less than 65, Hypotension with decrease in systolic blood pressure of more than 40mmHg, and Lactic Acid greater than 2  Source is pneumonia  Sepsis protocol initiated  Crystalloid Fluid Administration: Fluid resuscitation ordered per standard protocol - 30 mL/kg per current or ideal body weight  IV antibiotics as appropriate for source of sepsis  Reassessment: I have reassessed the patient's hemodynamic status    Bed bug bite- (present on admission)  Assessment & Plan  Pyrethrins shampoo     Hyponatremia- (present on admission)  Assessment & Plan  Hypovolemic hyponatremia  I expect Na to improve with IVF     Acquired hypothyroidism- (present on admission)  Assessment & Plan  I will start levothyroxine    ACP (advance care planning)- (present on admission)  Assessment & Plan  I had a discussion with the patient regarding goals of care, diagnoses, prognosis, and CODE STATUS. We discussed her prognosis and comorbidities. The patient has advanced age of 75 years. She has very few chronic medical problems including hypothyroidism.  Patient is presenting with multiple acute medical problems including sepsis, septic shock and pneumonia.  I discussed cardiopulmonary station, intubation mechanical ventilation.  Currently the patient was to maintain a full code.  She is open to all forms of invasive or noninvasive diagnostic and therapeutic interventions.      Dyslipidemia- (present on admission)  Assessment & Plan  Cardiac diet    Hypothyroidism due to acquired atrophy of thyroid- (present on admission)  Assessment & Plan  Will start levothyroxine      VTE prophylaxis: SCDs/TEDs and heparin ppx    I had a discussion with the patient regarding goals of care, diagnoses, prognosis, and CODE STATUS. We discussed her prognosis and comorbidities. The patient has advanced age of 75 years. She has very few chronic medical problems including hypothyroidism.  Patient is presenting with multiple acute  medical problems including sepsis, septic shock and pneumonia.  I discussed cardiopulmonary station, intubation mechanical ventilation.  Currently the patient was to maintain a full code.  She is open to all forms of invasive or noninvasive diagnostic and therapeutic interventions. I spent 16 minutes on advanced care planning.            [1] No Known Allergies

## 2025-07-20 NOTE — ASSESSMENT & PLAN NOTE
Sepsis was  Present on admission. Secondary to pneumonia. Currently afebrile and hemodynamically stable. WBCs have improved to 18.5 K.  Continue Zyvox and Zosyn for now.  Cultures pending

## 2025-07-20 NOTE — ED PROVIDER NOTES
ED Provider Note    CHIEF COMPLAINT  Chief Complaint   Patient presents with    N/V     Was seen here 7/17/2025 for same  not getting better   was let go from ED   was not admitted     Weakness     Gradually getting worse    not able to tolerate food        EXTERNAL RECORDS REVIEWED  Outpatient Notes office visit on 7/2/2025 for patient with flulike symptoms that started several days ago, cough and wheeze, elevated heart rate, patient was treated with Augmentin and doxycycline for suspected community-acquired pneumonia    HPI/ROS  LIMITATION TO HISTORY   Select: : None  OUTSIDE HISTORIAN(S):    Vaishali Soares is a 75 y.o. female who presents with nausea vomiting that started several weeks ago.  Patient reports that she is not getting better.  Patient was seen at this facility 3 days ago for similar symptoms.  Patient reports that she is becoming more weak and has decreased oral intake and less of an appetite.  Patient reports the cough is getting worse, shortness of breath is getting worse.  She is unsure if she has had a fever.    PAST MEDICAL HISTORY   has a past medical history of Acute respiratory failure with hypoxia ([facility]) (04/10/2023), Anemia, Anesthesia, Cough, Emphysema, Hypothyroid, Osteoporosis, Pneumonia (2015), Sputum production, and Urinary retention.    SURGICAL HISTORY   has a past surgical history that includes other orthopedic surgery and vicki by laparoscopy (Bilateral, 9/11/2018).    FAMILY HISTORY  Family History   Problem Relation Age of Onset    Diabetes Sister         DMI    Other Sister         Kidney Failure    Diabetes Brother         DM2. no meds    Stroke Father 54    Cancer Maternal Grandmother         Uterine    Diabetes Paternal Grandmother     No Known Problems Mother     No Known Problems Maternal Grandfather     No Known Problems Paternal Grandfather     Cancer Maternal Uncle     Cancer Paternal Uncle     Arthritis Son         Arthritis, gout    Asthma Son     Alcohol/Drug Neg  "Hx        SOCIAL HISTORY  Social History     Tobacco Use    Smoking status: Former     Current packs/day: 0.00     Average packs/day: 1 pack/day for 40.0 years (40.0 ttl pk-yrs)     Types: Cigarettes     Start date: 1967     Quit date: 2007     Years since quittin.5    Smokeless tobacco: Never   Vaping Use    Vaping status: Never Used   Substance and Sexual Activity    Alcohol use: Yes     Comment: rare     Drug use: No    Sexual activity: Never       CURRENT MEDICATIONS  Home Medications       Reviewed by Jacoby Philip (Pharmacy Tech) on 25 at 1406  Med List Status: Complete     Medication Last Dose Status   albuterol 108 (90 Base) MCG/ACT Aero Soln inhalation aerosol 2025 Active   amoxicillin-clavulanate (AUGMENTIN) 875-125 MG Tab 2025 Active   Cyanocobalamin (VITAMIN B-12 PO) 2025 Active   doxycycline monohydrate (ADOXA) 100 MG tablet 2025 Active   levothyroxine (SYNTHROID) 75 MCG Tab New Rx Active   ondansetron (ZOFRAN) 4 MG Tab tablet 2025 Active   predniSONE (DELTASONE) 50 MG Tab 2025 Active   Tiotropium Bromide-Olodaterol (STIOLTO RESPIMAT) 2.5-2.5 MCG/ACT Aero Soln New Rx Active   VITAMIN D PO 2025 Active                  Audit from Redirected Encounters    **Home medications have not yet been reviewed for this encounter**         ALLERGIES  Allergies[1]    PHYSICAL EXAM  VITAL SIGNS: /55   Pulse (!) 107   Temp 36.6 °C (97.8 °F) (Temporal)   Resp (!) 23   Ht 1.626 m (5' 4\")   Wt 53 kg (116 lb 13.5 oz)   SpO2 97%   BMI 20.06 kg/m²    Vitals and nursing note reviewed.   Constitutional:       Comments: Patient is lying in bed supine, appears fatigued, elderly appearing  HENT:      Head: Normocephalic and atraumatic.   Eyes:      Extraocular Movements: Extraocular movements intact.      Conjunctiva/sclera: Conjunctivae normal.      Pupils: Pupils are equal, round, and reactive to light.   Cardiovascular:      Pulses: Normal pulses.      " Comments:   Pulmonary:   Bilateral inspiratory rales, 2 L home O2  Abdominal:      Comments: Abdomen is soft, non-tender, non-distended, non-rigid, no rebound, guarding, masses, no McBurney's point tenderness, no peritoneal signs, negative Rovsing sign, negative Camilo sign.  No CVA tenderness to palpation. Benign abdomen.   Musculoskeletal:         General: No swelling. Normal range of motion.      Cervical back: Normal range of motion. No rigidity.   Skin:     General: Skin is warm and dry.      Capillary Refill: Capillary refill takes less than 2 seconds.   Neurological:      Mental Status: Alert.     RADIOLOGY/PROCEDURES   I have independently interpreted the diagnostic imaging associated with this visit and am waiting the final reading from the radiologist.   My preliminary interpretation is as follows: Left pleural effusion    Radiologist interpretation:  DX-CHEST-PORTABLE (1 VIEW)   Final Result      1.  Worsening left lower lobe parenchymal consolidation, associated with a small left pleural effusion.   2.  The remainder is stable.          COURSE & MEDICAL DECISION MAKING    ASSESSMENT, COURSE AND PLAN  Care Narrative: CBC to evaluate for acute anemia and leukocytosis.  CMP to evaluate for acute electrolyte abnormality, acute kidney injury, acute liver failure or dysfunction.  Chest x-ray demonstrates significant left pleural effusion.  Septic workup ordered given for significant hypotension.  CBC to evaluate for acute anemia and leukocytosis.  CMP to evaluate for acute electrolyte abnormality, acute kidney injury, acute liver failure or dysfunction.  Lasix evaluate for UTI.  Lactate, blood cultures to evaluate for sepsis.  IV antibiotics and fluids have been ordered.    Electronically signed by: Xander Marinelli M.D., 7/20/2025 1:59 PM    Significant leukocytosis on lab work, thrombocytosis as well.  Patient has elevated LFT.  Patient's lactic acid is elevated consistent with septic shock.   Unclear source of infection, likely pulmonary but cannot rule out urinary at this time.  IV Rocephin has already been given.  Disposition to the hospital medicine service.    This dictation has been created using voice recognition software. I am continuously working with the software to minimize the number of voice recognition errors and I have made every attempt to manually correct the errors within my dictation. However errors  related to this voice recognition software may still exist and should be interpreted within the appropriate context.     Electronically signed by: Xander Marinelli M.D., 7/20/2025 3:05 PM    CRITICAL CARE  The very real possibilty of a deterioration of this patient's condition required the highest level of my preparedness for sudden, emergent intervention.  I provided critical care services, which included medication orders, frequent reevaluations of the patient's condition and response to treatment, ordering and reviewing test results, and discussing the case with various consultants.  The critical care time associated with the care of the patient was 35 minutes. Review chart for interventions. This time is exclusive of any other billable procedures.       Hydration: Based on the patient's presentation of Hypotension the patient was given IV fluids. IV Hydration was used because oral hydration was not adequate alone. Upon recheck following hydration, the patient was improved.  Sepsis: Infection was suspected 1:05 AM (Time). Sepsis pathway was initiated. 30cc/kg bolus given. Antibiotics were given per protocol.        FINAL DIAGNOSIS  1. Septic shock (HCC)    2. Pleural effusion on left         Electronically signed by: Xander Marinelli M.D., 7/20/2025 1:57 PM           [1] No Known Allergies

## 2025-07-20 NOTE — PROGRESS NOTES
Bed bug found on patient's gurney on arrival to unit and collected in specimen cup. Code CLEAR called. Charge RN aware. Dr. Segundo updated. Patient showered and move into new room. Specimen sent to lab.

## 2025-07-20 NOTE — ED NOTES
Repeat lactic sent to lab  Pt updated on plan of care with all questions answered.   Transport ordered.

## 2025-07-20 NOTE — ED NOTES
Medication history reviewed with pt and pts pharmacy ([facility]). Med rec is complete.  Allergies reviewed, per pt    Pt reports that she took her PREDNISONE 50MG, VITAMIN D, and B-12 today but threw it back up.    Pt reports that she does not think she picked up her STIOLTO 2.5-2.5MCG/ACT from the pharmacy it was to expensive.  Pt reports that [facility] would not let her  her LEVOTHYROXINE  75MG.    Called [facility] at [phone] to see if pt  her STIOLTO 2.5-2.5MCG/ACT.  Per [facility] pharmacy pt did not  her STIOLTO 2.5-2.5MCG/ACT or her LEVOTHYROXINE 75MCG they are on hold.    Any outpatient anti-MICROBIAL in the last 30 days? YES, pt started AUGMENTIN 875-125MG and DOXYCYCLINE 100MG on 7/2/2025 for 5 day course, per pt did finish course of antibiotics     Pt is not on any anticoagulants

## 2025-07-21 LAB
ALBUMIN SERPL BCP-MCNC: 2.3 G/DL (ref 3.2–4.9)
ALBUMIN/GLOB SERPL: 0.7 G/DL
ALP SERPL-CCNC: 167 U/L (ref 30–99)
ALT SERPL-CCNC: 19 U/L (ref 2–50)
ANION GAP SERPL CALC-SCNC: 8 MMOL/L (ref 7–16)
AST SERPL-CCNC: 48 U/L (ref 12–45)
B PARAP IS1001 DNA NPH QL NAA+NON-PROBE: NOT DETECTED
B PERT.PT PRMT NPH QL NAA+NON-PROBE: NOT DETECTED
BILIRUB SERPL-MCNC: 1.2 MG/DL (ref 0.1–1.5)
BUN SERPL-MCNC: 7 MG/DL (ref 8–22)
C PNEUM DNA NPH QL NAA+NON-PROBE: NOT DETECTED
CALCIUM ALBUM COR SERPL-MCNC: 8.8 MG/DL (ref 8.5–10.5)
CALCIUM SERPL-MCNC: 7.4 MG/DL (ref 8.5–10.5)
CHLORIDE SERPL-SCNC: 106 MMOL/L (ref 96–112)
CO2 SERPL-SCNC: 22 MMOL/L (ref 20–33)
CREAT SERPL-MCNC: 0.44 MG/DL (ref 0.5–1.4)
ERYTHROCYTE [DISTWIDTH] IN BLOOD BY AUTOMATED COUNT: 51.5 FL (ref 35.9–50)
FLUAV RNA NPH QL NAA+NON-PROBE: NOT DETECTED
FLUBV RNA NPH QL NAA+NON-PROBE: NOT DETECTED
GFR SERPLBLD CREATININE-BSD FMLA CKD-EPI: 99 ML/MIN/1.73 M 2
GLOBULIN SER CALC-MCNC: 3.2 G/DL (ref 1.9–3.5)
GLUCOSE SERPL-MCNC: 115 MG/DL (ref 65–99)
HADV DNA NPH QL NAA+NON-PROBE: NOT DETECTED
HCOV 229E RNA NPH QL NAA+NON-PROBE: NOT DETECTED
HCOV HKU1 RNA NPH QL NAA+NON-PROBE: NOT DETECTED
HCOV NL63 RNA NPH QL NAA+NON-PROBE: NOT DETECTED
HCOV OC43 RNA NPH QL NAA+NON-PROBE: NOT DETECTED
HCT VFR BLD AUTO: 31 % (ref 37–47)
HGB BLD-MCNC: 10.1 G/DL (ref 12–16)
HMPV RNA NPH QL NAA+NON-PROBE: NOT DETECTED
HPIV1 RNA NPH QL NAA+NON-PROBE: NOT DETECTED
HPIV2 RNA NPH QL NAA+NON-PROBE: NOT DETECTED
HPIV3 RNA NPH QL NAA+NON-PROBE: NOT DETECTED
HPIV4 RNA NPH QL NAA+NON-PROBE: NOT DETECTED
LACTATE SERPL-SCNC: 1.3 MMOL/L (ref 0.5–2)
LACTATE SERPL-SCNC: 2.7 MMOL/L (ref 0.5–2)
LACTATE SERPL-SCNC: 2.9 MMOL/L (ref 0.5–2)
LACTATE SERPL-SCNC: 3.7 MMOL/L (ref 0.5–2)
LACTATE SERPL-SCNC: 4.2 MMOL/L (ref 0.5–2)
M PNEUMO DNA NPH QL NAA+NON-PROBE: NOT DETECTED
MAGNESIUM SERPL-MCNC: 1.7 MG/DL (ref 1.5–2.5)
MCH RBC QN AUTO: 31 PG (ref 27–33)
MCHC RBC AUTO-ENTMCNC: 32.6 G/DL (ref 32.2–35.5)
MCV RBC AUTO: 95.1 FL (ref 81.4–97.8)
PLATELET # BLD AUTO: 347 K/UL (ref 164–446)
PMV BLD AUTO: 9.4 FL (ref 9–12.9)
POTASSIUM SERPL-SCNC: 4 MMOL/L (ref 3.6–5.5)
PROT SERPL-MCNC: 5.5 G/DL (ref 6–8.2)
RBC # BLD AUTO: 3.26 M/UL (ref 4.2–5.4)
RSV RNA NPH QL NAA+NON-PROBE: NOT DETECTED
RV+EV RNA NPH QL NAA+NON-PROBE: NOT DETECTED
SARS-COV-2 RNA NPH QL NAA+NON-PROBE: NOTDETECTED
SCCMEC + MECA PNL NOSE NAA+PROBE: NEGATIVE
SODIUM SERPL-SCNC: 136 MMOL/L (ref 135–145)
WBC # BLD AUTO: 18.5 K/UL (ref 4.8–10.8)

## 2025-07-21 PROCEDURE — 83605 ASSAY OF LACTIC ACID: CPT

## 2025-07-21 PROCEDURE — A9270 NON-COVERED ITEM OR SERVICE: HCPCS | Performed by: HOSPITALIST

## 2025-07-21 PROCEDURE — 700101 HCHG RX REV CODE 250: Performed by: HOSPITALIST

## 2025-07-21 PROCEDURE — 99233 SBSQ HOSP IP/OBS HIGH 50: CPT | Performed by: INTERNAL MEDICINE

## 2025-07-21 PROCEDURE — 85027 COMPLETE CBC AUTOMATED: CPT

## 2025-07-21 PROCEDURE — 700105 HCHG RX REV CODE 258: Performed by: INTERNAL MEDICINE

## 2025-07-21 PROCEDURE — 700111 HCHG RX REV CODE 636 W/ 250 OVERRIDE (IP): Mod: JZ | Performed by: HOSPITALIST

## 2025-07-21 PROCEDURE — 700105 HCHG RX REV CODE 258: Performed by: HOSPITALIST

## 2025-07-21 PROCEDURE — 700102 HCHG RX REV CODE 250 W/ 637 OVERRIDE(OP): Performed by: HOSPITALIST

## 2025-07-21 PROCEDURE — 97163 PT EVAL HIGH COMPLEX 45 MIN: CPT

## 2025-07-21 PROCEDURE — 80053 COMPREHEN METABOLIC PANEL: CPT

## 2025-07-21 PROCEDURE — 36415 COLL VENOUS BLD VENIPUNCTURE: CPT

## 2025-07-21 PROCEDURE — 700111 HCHG RX REV CODE 636 W/ 250 OVERRIDE (IP): Performed by: HOSPITALIST

## 2025-07-21 PROCEDURE — 83735 ASSAY OF MAGNESIUM: CPT

## 2025-07-21 PROCEDURE — 770020 HCHG ROOM/CARE - TELE (206)

## 2025-07-21 PROCEDURE — 94760 N-INVAS EAR/PLS OXIMETRY 1: CPT

## 2025-07-21 PROCEDURE — 97165 OT EVAL LOW COMPLEX 30 MIN: CPT

## 2025-07-21 RX ORDER — HYDROCORTISONE SODIUM SUCCINATE 100 MG/2ML
50 INJECTION INTRAMUSCULAR; INTRAVENOUS EVERY 6 HOURS
Status: DISCONTINUED | OUTPATIENT
Start: 2025-07-21 | End: 2025-07-22

## 2025-07-21 RX ORDER — SODIUM CHLORIDE, SODIUM LACTATE, POTASSIUM CHLORIDE, AND CALCIUM CHLORIDE .6; .31; .03; .02 G/100ML; G/100ML; G/100ML; G/100ML
1000 INJECTION, SOLUTION INTRAVENOUS ONCE
Status: COMPLETED | OUTPATIENT
Start: 2025-07-21 | End: 2025-07-21

## 2025-07-21 RX ORDER — FLUDROCORTISONE ACETATE 0.1 MG/1
0.05 TABLET ORAL EVERY MORNING
Status: DISCONTINUED | OUTPATIENT
Start: 2025-07-21 | End: 2025-07-22

## 2025-07-21 RX ADMIN — HYDROCORTISONE SODIUM SUCCINATE 50 MG: 100 INJECTION, POWDER, FOR SOLUTION INTRAMUSCULAR; INTRAVENOUS at 17:06

## 2025-07-21 RX ADMIN — SODIUM CHLORIDE, POTASSIUM CHLORIDE, SODIUM LACTATE AND CALCIUM CHLORIDE 1000 ML: 600; 310; 30; 20 INJECTION, SOLUTION INTRAVENOUS at 18:17

## 2025-07-21 RX ADMIN — PIPERACILLIN AND TAZOBACTAM 3.38 G: 3; .375 INJECTION, POWDER, FOR SOLUTION INTRAVENOUS at 21:59

## 2025-07-21 RX ADMIN — HEPARIN SODIUM 5000 UNITS: 5000 INJECTION, SOLUTION INTRAVENOUS; SUBCUTANEOUS at 22:03

## 2025-07-21 RX ADMIN — HYDROCORTISONE SODIUM SUCCINATE 50 MG: 100 INJECTION, POWDER, FOR SOLUTION INTRAMUSCULAR; INTRAVENOUS at 11:51

## 2025-07-21 RX ADMIN — FLUDROCORTISONE ACETATE 0.05 MG: 0.1 TABLET ORAL at 05:11

## 2025-07-21 RX ADMIN — HYDROCORTISONE SODIUM SUCCINATE 50 MG: 100 INJECTION, POWDER, FOR SOLUTION INTRAMUSCULAR; INTRAVENOUS at 00:05

## 2025-07-21 RX ADMIN — LINEZOLID 600 MG: 600 TABLET, FILM COATED ORAL at 05:11

## 2025-07-21 RX ADMIN — LEVOTHYROXINE SODIUM 75 MCG: 0.07 TABLET ORAL at 05:11

## 2025-07-21 RX ADMIN — HYDROCORTISONE SODIUM SUCCINATE 50 MG: 100 INJECTION, POWDER, FOR SOLUTION INTRAMUSCULAR; INTRAVENOUS at 05:08

## 2025-07-21 RX ADMIN — PIPERACILLIN AND TAZOBACTAM 3.38 G: 3; .375 INJECTION, POWDER, FOR SOLUTION INTRAVENOUS at 11:55

## 2025-07-21 RX ADMIN — PIPERACILLIN AND TAZOBACTAM 3.38 G: 3; .375 INJECTION, POWDER, FOR SOLUTION INTRAVENOUS at 05:14

## 2025-07-21 RX ADMIN — POTASSIUM CHLORIDE AND SODIUM CHLORIDE: 900; 150 INJECTION, SOLUTION INTRAVENOUS at 08:07

## 2025-07-21 ASSESSMENT — COGNITIVE AND FUNCTIONAL STATUS - GENERAL
HELP NEEDED FOR BATHING: A LITTLE
SUGGESTED CMS G CODE MODIFIER DAILY ACTIVITY: CI
TURNING FROM BACK TO SIDE WHILE IN FLAT BAD: A LITTLE
MOVING TO AND FROM BED TO CHAIR: A LITTLE
SUGGESTED CMS G CODE MODIFIER MOBILITY: CK
WALKING IN HOSPITAL ROOM: A LITTLE
DAILY ACTIVITIY SCORE: 23
MOVING FROM LYING ON BACK TO SITTING ON SIDE OF FLAT BED: A LITTLE
CLIMB 3 TO 5 STEPS WITH RAILING: A LITTLE
MOBILITY SCORE: 18
STANDING UP FROM CHAIR USING ARMS: A LITTLE

## 2025-07-21 ASSESSMENT — PAIN DESCRIPTION - PAIN TYPE
TYPE: ACUTE PAIN
TYPE: ACUTE PAIN

## 2025-07-21 ASSESSMENT — ENCOUNTER SYMPTOMS
MUSCULOSKELETAL NEGATIVE: 1
EYES NEGATIVE: 1
NEUROLOGICAL NEGATIVE: 1
PSYCHIATRIC NEGATIVE: 1
GASTROINTESTINAL NEGATIVE: 1
RESPIRATORY NEGATIVE: 1
CARDIOVASCULAR NEGATIVE: 1

## 2025-07-21 ASSESSMENT — ACTIVITIES OF DAILY LIVING (ADL): TOILETING: INDEPENDENT

## 2025-07-21 ASSESSMENT — GAIT ASSESSMENTS
DEVIATION: SHUFFLED GAIT;DECREASED HEEL STRIKE;DECREASED TOE OFF
GAIT LEVEL OF ASSIST: SUPERVISED
DISTANCE (FEET): 100

## 2025-07-21 ASSESSMENT — FIBROSIS 4 INDEX: FIB4 SCORE: 2.44

## 2025-07-21 NOTE — PROGRESS NOTES
Telemetry Shift Summary     Rhythm SR  HR Range 64-93  Ectopy no ectopy  Measurements  0.20/0.08/0.36  Per strip printed 0400     Normal Values  Rhythm SR  HR Range    Measurements 0.12-0.20 / 0.06-0.10  / 0.30-0.52

## 2025-07-21 NOTE — THERAPY
Physical Therapy   Initial Evaluation     Patient Name:  Vaishali Soares  Age:  75 y.o., Sex:  female  Medical Record #:  [id number]  Today's Date: 7/21/2025     Precautions  Medical: (P) Fall Risk  Communication/Language: (P) Hard of Hearing    Assessment  Patient is 75 y.o. female with a diagnosis of Septic shock and   Acute hypoxemic respiratory failure due to PNA.  Pt demonstrating the ability to ambulate for functional home distances w/o AD. Pt had family assist at home and all required DME. No skilled inpatient or post acute needs at this time   Plan    Physical Therapy Initial Treatment Plan   Duration: (P) Evaluation only    DC Equipment Recommendations: (P) None  Discharge Recommendations: (P) Anticipate that the patient will have no further physical therapy needs after discharge from the hospital         Initial Contact Note    Initial Contact Note Order Received and Verified, Physical Therapy Evaluation in Progress with Full Report to Follow.   Precautions   Medical Fall Risk   Communication/Language Hard of Hearing   Vitals   Pulse 66   Pulse Oximetry 91 %   O2 (LPM) 1   O2 Delivery Device Silicone Nasal Cannula   Pain 0 - 10 Group   Location Foot   Location Orientation Right;Left   Description Aching;Pressure   Therapist Pain Assessment During Activity;0  (toe nails pressing into toes.)   Prior Living Situation   Prior Services None   Housing / Facility 1 Story House   Steps Into Home 2   Steps In Home 0   Equipment Owned None;Oxygen  (states she as an O2 concentrator that belonged to her mother.)   Lives with - Patient's Self Care Capacity Adult Children   Comments 2 platform stepts to enter.   Prior Level of Functional Mobility   Bed Mobility Independent   Transfer Status Independent   Ambulation Independent   Ambulation Distance household   Assistive Devices Used None   Comments pt states she still drives, cares for self.   History of Falls   History of Falls No   Date of Last Fall   (denies)    Cognition    Level of Consciousness Alert   Active ROM Lower Body    Active ROM Lower Body  X   Gross Active ROM Gross Active Range of Motion Impaired,  but Appears Adequate for Functional Mobility.   Strength Lower Body   Lower Body Strength  X   Gross Strength Generalized Weakness, Equal Bilaterally   Balance Assessment   Sitting Balance (Static) Good   Sitting Balance (Dynamic) Good   Standing Balance (Static) Good   Standing Balance (Dynamic) Fair +   Weight Shift Sitting Good   Weight Shift Standing Good   Comments w/o AD   Bed Mobility    Supine to Sit Independent   Sit to Supine Independent   Gait Analysis   Gait Level Of Assist Supervised   Assistive Device None   Distance (Feet) 100   # of Times Distance was Traveled 1   Deviation Shuffled Gait;Decreased Heel Strike;Decreased Toe Off   # of Stairs Climbed 0   Weight Bearing Status no restrictions.   Comments SpO2 91 %.   Functional Mobility   Sit to Stand Independent   Bed, Chair, Wheelchair Transfer Independent   Transfer Method Stand Step   6 Clicks Assessment - How much HELP from another person do you currently need... (If the patient hasn't done an activity recently, how much help from another person do you think he/she would need if he/she tried?)   Turning from your back to your side while in a flat bed without using bedrails? 3   Moving from lying on your back to sitting on the side of a flat bed without using bedrails? 3   Moving to and from a bed to a chair (including a wheelchair)? 3   Standing up from a chair using your arms (e.g., wheelchair, or bedside chair)? 3   Walking in hospital room? 3   Climbing 3-5 steps with a railing? 3   6 clicks Mobility Score 18   Education Group   Education Provided Role of Physical Therapist   Role of Physical Therapist Patient Response Patient;Acceptance;Explanation;Verbal Demonstration   Physical Therapy Initial Treatment Plan    Duration Evaluation only   Problem List    Problems Decreased Activity  Tolerance;Pain   Anticipated Discharge Equipment and Recommendations   DC Equipment Recommendations None   Discharge Recommendations Anticipate that the patient will have no further physical therapy needs after discharge from the hospital   Interdisciplinary Plan of Care Collaboration   IDT Collaboration with  Nursing   Patient Position at End of Therapy In Bed;Call Light within Reach;Tray Table within Reach;Phone within Reach   Collaboration Comments staff updated.   Session Information   Date / Session Number  7/21- Eval only.

## 2025-07-21 NOTE — PROGRESS NOTES
"Patient states she wears Oxygen at home but does not know how many liters. \"I just throw the O2 on and turn the machine up\"  "

## 2025-07-21 NOTE — PROGRESS NOTES
Microbiology called to ask questions regarding the bug identification. Tech then informed nurse that the bug specification is underway and results should be up soon.

## 2025-07-21 NOTE — ASSESSMENT & PLAN NOTE
WBC count continues to improve, down to 9.7  If she were on the stress dose of steroids that could explain the high white count of 36 on admission  De-escalate to Unasyn

## 2025-07-21 NOTE — PROGRESS NOTES
4 Eyes Skin Assessment Completed by JAZMINE Jessica and JAZMINE Weiss.    Skin assessment is primarily focused on high risk bony prominences. Pay special attention to skin beneath and around medical devices, high risk bony prominences, skin to skin areas and areas where the patient lacks sensation to feel pain and areas where the patient previously had breakdown.     Head (Occipital):  WDL   Ears (Under Medical Devices): WDL   Nose (Under Medical Devices): WDL   Mouth:  WDL   Neck: WDL   Breast/Chest:  WDL   Shoulder Blades:  WDL   Spine:   WDL   (R) Arm/Elbow/Hand: Scab   (L) Arm/Elbow/Hand: Scab   Abdomen: WDL   Pannus/Groin:  WDL   Sacrum/Coccyx:   WDL   (R) Ischial Tuberosity (Sit Bones):  WDL   (L) Ischial Tuberosity (Sit Bones):  WDL   (R) Leg:  Scab   (L) Leg:  Scab   (R) Heel:  WDL   (R) Foot/Toe: WDL   (L) Heel: WDL   (L) Foot/Toe:  WDL       DEVICES IN USE:   Respiratory Devices:  Nasal cannula and Pulse ox  Feeding Devices:  N/A   Lines & BP Monitoring Devices:  Peripheral IV, BP cuff, and Pulse ox    Orthopedic Devices:  N/A  Miscellaneous Devices:  Telemetry monitor    PROTOCOL INTERVENTIONS:   Standard/Trauma Bed:  Already in place    WOUND PHOTOS:   N/A no wounds identified    WOUND CONSULT:   N/A, no advanced wound care needs identified

## 2025-07-21 NOTE — CARE PLAN
Problem: Hemodynamics  Goal: Patient's hemodynamics, fluid balance and neurologic status will be stable or improve  Outcome: Progressing     Problem: Fluid Volume  Goal: Fluid volume balance will be maintained  Outcome: Progressing     The patient is Watcher - Medium risk of patient condition declining or worsening    Shift Goals  Clinical Goals: Monitor labs and vital signs, saftey  Patient Goals: Patient ambulate with PT/OT on 1L oxygen. BM today.    Progress made toward(s) clinical / shift goals:    Problem: Knowledge Deficit - Standard  Goal: Patient and family/care givers will demonstrate understanding of plan of care, disease process/condition, diagnostic tests and medications  Outcome: Progressing       Patient is not progressing towards the following goals:

## 2025-07-21 NOTE — PROGRESS NOTES
Charge nurse updated of microbiology lab result at 2155. Dr. Martinez notified of lab result at 2205 and NAM notified of lab result at 2213. NAM stated no new precautions necessary. Continue to keep old room blocked until cleaned.

## 2025-07-21 NOTE — THERAPY
Occupational Therapy   Initial Evaluation     Patient Name:  Vaishali Soares  Age:  75 y.o., Sex:  female  Medical Record #:  [id number]  Today's Date:  7/21/2025     Assessment  Patient is 75 y.o. female admitted with N/V, weakness. Diagnosis of sepsis, PNA. Additional factors influencing patient status / progress: Lives with son, IPLOF; no AD use. Hx of COPD, home O2 use. A&Ox3, [place]. Able to perform AM ADL's with Spv/Indep. Steady with ambulation. Reviewed home safety during ADL's. No further acute OT needs.     Plan  1x only    DC Equipment Recommendations: None  Discharge Recommendations: Anticipate that the patient will have no further occupational therapy needs after discharge from the hospital     Subjective  Agreeable     Objective   07/21/25 0941   Prior Living Situation   Prior Services None   Housing / Facility 1 Story House   Steps Into Home 2   Steps In Home 0   Bathroom Set up Walk In Shower;Shower Chair   Equipment Owned Tub / Shower Seat;Oxygen   Lives with - Patient's Self Care Capacity Adult Children  (son)   Prior Level of ADL Function   Self Feeding Independent   Grooming / Hygiene Independent   Bathing Independent   Dressing Independent   Toileting Independent   Prior Level of IADL Function   Medication Management Unable To Determine At This Time   Laundry Unable To Determine At This Time   Kitchen Mobility Independent   Finances Unable To Determine At This Time   Home Management Unable To Determine At This Time   Shopping Unable To Determine At This Time   Prior Level Of Mobility Independent Without Device in Home   Driving / Transportation Relatives / Others Provide Transportation   Occupation (Pre-Hospital Vocational) Not Employed   Leisure Interests Television   History of Falls   History of Falls No   Vitals   O2 (LPM) 1   O2 Delivery Device Silicone Nasal Cannula   Pain 0 - 10 Group   Therapist Pain Assessment 0;Nurse Notified   Cognition    Cognition / Consciousness WDL   Level of  Consciousness Alert   Comments [place]. Ox3. Thought she was at [facility] initially. Intermittent mild confusion; may be due to poor hearing.   Passive ROM Upper Body   Passive ROM Upper Body WDL   Active ROM Upper Body   Active ROM Upper Body  WDL   Strength Upper Body   Upper Body Strength  WDL   Sensation Upper Body   Upper Extremity Sensation  WDL   Upper Body Muscle Tone   Upper Body Muscle Tone  WDL   Coordination Upper Body   Coordination WDL   Balance Assessment   Sitting Balance (Static) Good   Sitting Balance (Dynamic) Good   Standing Balance (Static) Good   Standing Balance (Dynamic) Fair +   Weight Shift Sitting Good   Weight Shift Standing Good   Bed Mobility    Supine to Sit Independent   Sit to Supine Independent   ADL Assessment   Eating Independent   Grooming Supervision;Standing   Lower Body Dressing Supervision   Toileting Supervision   How much help from another person does the patient currently need...   Putting on and taking off regular lower body clothing? 4   Bathing (including washing, rinsing, and drying)? 3   Toileting, which includes using a toilet, bedpan, or urinal? 4   Putting on and taking off regular upper body clothing? 4   Taking care of personal grooming such as brushing teeth? 4   Eating meals? 4   6 Clicks Daily Activity Score 23   Functional Mobility   Sit to Stand Independent   Bed, Chair, Wheelchair Transfer Supervised   Toilet Transfers Supervised   Transfer Method Stand Step   Activity Tolerance   Comments functional   Education Group   Role of Occupational Therapist Patient Response Patient;Acceptance;Explanation;Verbal Demonstration   Anticipated Discharge Equipment and Recommendations   DC Equipment Recommendations None   Discharge Recommendations Anticipate that the patient will have no further occupational therapy needs after discharge from the hospital   Interdisciplinary Plan of Care Collaboration   IDT Collaboration with  Nursing   Patient Position at End of Therapy  In Bed;Call Light within Reach;Tray Table within Reach;Phone within Reach   Collaboration Comments OT Eval

## 2025-07-21 NOTE — ASSESSMENT & PLAN NOTE
Unsure of baseline steroid use  Currently on Cortef and blood pressure is okay  Med rec shows that she was just on a 5-day course of prednisone  Will need further clarification

## 2025-07-21 NOTE — PROGRESS NOTES
Leia from Lab called with result of specimen at [phone]. Critical lab result read back to Leia. 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

## 2025-07-21 NOTE — CARE PLAN
The patient is Stable - Low risk of patient condition declining or worsening    Shift Goals  Clinical Goals: Monitor labs and vital signs, saftey  Patient Goals: BM    Progress made toward(s) clinical / shift goals:      Pt denies any complaints of pain. Vitals signs remain stable. No increases in oxygen demand. NaCl with 20mEq of Potassium changed from 200mL/hr to  125mL/hr. IV ABX therapy continued. Trending lactic acid and WBCs.    Problem: Pain - Standard  Goal: Alleviation of pain or a reduction in pain to the patient’s comfort goal  Outcome: Progressing     Problem: Hemodynamics  Goal: Patient's hemodynamics, fluid balance and neurologic status will be stable or improve  Outcome: Progressing     Problem: Fluid Volume  Goal: Fluid volume balance will be maintained  Outcome: Progressing     Problem: Respiratory  Goal: Patient will achieve/maintain optimum respiratory ventilation and gas exchange  Outcome: Progressing

## 2025-07-21 NOTE — DOCUMENTATION QUERY
[facility]                                                                       Query Response Note      PATIENT:               AMADOR CHEUNG  ACCT #:                  [id number]  MRN:                     [id number]  :                      1950  ADMIT DATE:       2025 12:57 PM  DISCH DATE:          RESPONDING  PROVIDER #:        [id number]           QUERY TEXT:    Lactic acid 3.4 is noted in the  Lab Results.  Can a diagnosis be specified to support this finding?    The patient's clinical indicators include:   Lactic acid 3.4, 2.3, 3.3   Lactic acid 1.3, 2.9  Risk Factors: Sepsis, septic shock  Treatment: Trend lactic acid, IVF    Important Note:  if new diagnosis or change to documentation made via query please include in daily documentation and/or DC Summary    Thank you,  Brad Moore RN, BSN, CCDS  Clinical   Connect via [place]  Options provided:   -- Lactic acidosis   -- Other explanation, (Please specify the other explanation)   -- Findings of no clinical significance   -- Unable to determine      Query created by: Brad Moore on 2025 10:35 AM    RESPONSE TEXT:    Lactic acidosis          Electronically signed by:  URI QUIROZ MD 2025 11:34 AM

## 2025-07-22 LAB
ANION GAP SERPL CALC-SCNC: 11 MMOL/L (ref 7–16)
BACTERIA BLD CULT: NORMAL
BACTERIA BLD CULT: NORMAL
BACTERIA UR CULT: NORMAL
BUN SERPL-MCNC: 7 MG/DL (ref 8–22)
CALCIUM SERPL-MCNC: 8 MG/DL (ref 8.5–10.5)
CHLORIDE SERPL-SCNC: 107 MMOL/L (ref 96–112)
CO2 SERPL-SCNC: 19 MMOL/L (ref 20–33)
CREAT SERPL-MCNC: 0.49 MG/DL (ref 0.5–1.4)
ERYTHROCYTE [DISTWIDTH] IN BLOOD BY AUTOMATED COUNT: 53.5 FL (ref 35.9–50)
GFR SERPLBLD CREATININE-BSD FMLA CKD-EPI: 97 ML/MIN/1.73 M 2
GLUCOSE SERPL-MCNC: 145 MG/DL (ref 65–99)
HCT VFR BLD AUTO: 29.6 % (ref 37–47)
HGB BLD-MCNC: 9.3 G/DL (ref 12–16)
LACTATE SERPL-SCNC: 2.6 MMOL/L (ref 0.5–2)
MCH RBC QN AUTO: 30 PG (ref 27–33)
MCHC RBC AUTO-ENTMCNC: 31.4 G/DL (ref 32.2–35.5)
MCV RBC AUTO: 95.5 FL (ref 81.4–97.8)
PLATELET # BLD AUTO: 379 K/UL (ref 164–446)
PMV BLD AUTO: 9.7 FL (ref 9–12.9)
POTASSIUM SERPL-SCNC: 3.7 MMOL/L (ref 3.6–5.5)
RBC # BLD AUTO: 3.1 M/UL (ref 4.2–5.4)
SIGNIFICANT IND 70042: NORMAL
SITE SITE: NORMAL
SODIUM SERPL-SCNC: 137 MMOL/L (ref 135–145)
SOURCE SOURCE: NORMAL
WBC # BLD AUTO: 9.7 K/UL (ref 4.8–10.8)

## 2025-07-22 PROCEDURE — 700111 HCHG RX REV CODE 636 W/ 250 OVERRIDE (IP): Mod: JZ | Performed by: INTERNAL MEDICINE

## 2025-07-22 PROCEDURE — 36415 COLL VENOUS BLD VENIPUNCTURE: CPT

## 2025-07-22 PROCEDURE — A9270 NON-COVERED ITEM OR SERVICE: HCPCS | Performed by: INTERNAL MEDICINE

## 2025-07-22 PROCEDURE — 700111 HCHG RX REV CODE 636 W/ 250 OVERRIDE (IP): Mod: JZ | Performed by: HOSPITALIST

## 2025-07-22 PROCEDURE — 80048 BASIC METABOLIC PNL TOTAL CA: CPT

## 2025-07-22 PROCEDURE — 700102 HCHG RX REV CODE 250 W/ 637 OVERRIDE(OP): Performed by: HOSPITALIST

## 2025-07-22 PROCEDURE — 700105 HCHG RX REV CODE 258: Performed by: INTERNAL MEDICINE

## 2025-07-22 PROCEDURE — 99233 SBSQ HOSP IP/OBS HIGH 50: CPT | Performed by: INTERNAL MEDICINE

## 2025-07-22 PROCEDURE — A9270 NON-COVERED ITEM OR SERVICE: HCPCS | Performed by: HOSPITALIST

## 2025-07-22 PROCEDURE — 94760 N-INVAS EAR/PLS OXIMETRY 1: CPT

## 2025-07-22 PROCEDURE — 700105 HCHG RX REV CODE 258: Performed by: HOSPITALIST

## 2025-07-22 PROCEDURE — 770020 HCHG ROOM/CARE - TELE (206)

## 2025-07-22 PROCEDURE — 85027 COMPLETE CBC AUTOMATED: CPT

## 2025-07-22 PROCEDURE — 700102 HCHG RX REV CODE 250 W/ 637 OVERRIDE(OP): Performed by: INTERNAL MEDICINE

## 2025-07-22 PROCEDURE — 83605 ASSAY OF LACTIC ACID: CPT

## 2025-07-22 RX ORDER — FLUDROCORTISONE ACETATE 0.1 MG/1
0.05 TABLET ORAL EVERY MORNING
Status: DISCONTINUED | OUTPATIENT
Start: 2025-07-23 | End: 2025-07-23 | Stop reason: HOSPADM

## 2025-07-22 RX ORDER — HYDROCORTISONE SODIUM SUCCINATE 100 MG/2ML
50 INJECTION INTRAMUSCULAR; INTRAVENOUS EVERY 8 HOURS
Status: COMPLETED | OUTPATIENT
Start: 2025-07-22 | End: 2025-07-23

## 2025-07-22 RX ORDER — GUAIFENESIN 200 MG/10ML
10 LIQUID ORAL EVERY 4 HOURS PRN
Status: DISCONTINUED | OUTPATIENT
Start: 2025-07-22 | End: 2025-07-23 | Stop reason: HOSPADM

## 2025-07-22 RX ADMIN — HYDROCORTISONE SODIUM SUCCINATE 50 MG: 100 INJECTION, POWDER, FOR SOLUTION INTRAMUSCULAR; INTRAVENOUS at 00:06

## 2025-07-22 RX ADMIN — PIPERACILLIN AND TAZOBACTAM 3.38 G: 3; .375 INJECTION, POWDER, FOR SOLUTION INTRAVENOUS at 12:08

## 2025-07-22 RX ADMIN — HYDROCORTISONE SODIUM SUCCINATE 50 MG: 100 INJECTION, POWDER, FOR SOLUTION INTRAMUSCULAR; INTRAVENOUS at 05:40

## 2025-07-22 RX ADMIN — AMPICILLIN AND SULBACTAM 3 G: 1; 2 INJECTION, POWDER, FOR SOLUTION INTRAMUSCULAR; INTRAVENOUS at 19:22

## 2025-07-22 RX ADMIN — GUAIFENESIN 200 MG: 100 LIQUID ORAL at 12:06

## 2025-07-22 RX ADMIN — LEVOTHYROXINE SODIUM 75 MCG: 0.07 TABLET ORAL at 10:26

## 2025-07-22 RX ADMIN — FLUDROCORTISONE ACETATE 0.05 MG: 0.1 TABLET ORAL at 10:27

## 2025-07-22 RX ADMIN — HYDROCORTISONE SODIUM SUCCINATE 50 MG: 100 INJECTION, POWDER, FOR SOLUTION INTRAMUSCULAR; INTRAVENOUS at 22:39

## 2025-07-22 RX ADMIN — HYDROCORTISONE SODIUM SUCCINATE 50 MG: 100 INJECTION, POWDER, FOR SOLUTION INTRAMUSCULAR; INTRAVENOUS at 12:06

## 2025-07-22 RX ADMIN — AMPICILLIN AND SULBACTAM 3 G: 1; 2 INJECTION, POWDER, FOR SOLUTION INTRAMUSCULAR; INTRAVENOUS at 23:30

## 2025-07-22 RX ADMIN — PIPERACILLIN AND TAZOBACTAM 3.38 G: 3; .375 INJECTION, POWDER, FOR SOLUTION INTRAVENOUS at 05:45

## 2025-07-22 ASSESSMENT — ENCOUNTER SYMPTOMS
SPUTUM PRODUCTION: 0
PHOTOPHOBIA: 0
MYALGIAS: 0
WHEEZING: 0
HEARTBURN: 0
WEAKNESS: 0
SHORTNESS OF BREATH: 1
HEADACHES: 0
VOMITING: 0
DEPRESSION: 0
NERVOUS/ANXIOUS: 0
FEVER: 0
COUGH: 1
DIZZINESS: 0
INSOMNIA: 0
CLAUDICATION: 0
SPEECH CHANGE: 0
ABDOMINAL PAIN: 0
SENSORY CHANGE: 0
CHILLS: 0
CONSTIPATION: 0
DIARRHEA: 0
BLURRED VISION: 0

## 2025-07-22 ASSESSMENT — PAIN DESCRIPTION - PAIN TYPE: TYPE: ACUTE PAIN

## 2025-07-22 NOTE — PROGRESS NOTES
Telemetry shift summary    Rhythm: SB/SR   HR: 54-61  Ectopy: r-oPAC, rPVC    Measurements: 0.13 / 0.11 / 0.42    Normal values  Rhythm SR  HR   Measurements: 0.12-0.20/0.08-0.10/0.30-0.52

## 2025-07-22 NOTE — CARE PLAN
The patient is Stable - Low risk of patient condition declining or worsening    Shift Goals  Clinical Goals: IV abx  Patient Goals: Rest    Progress made toward(s) clinical / shift goals: pt reports being very tired this am, refusing morning meds, refusing vitals. Mariano reattempt vitals at later time.     Problem: Pain - Standard  Goal: Alleviation of pain or a reduction in pain to the patient’s comfort goal  Outcome: Progressing     Problem: Knowledge Deficit - Standard  Goal: Patient and family/care givers will demonstrate understanding of plan of care, disease process/condition, diagnostic tests and medications  Outcome: Progressing     Problem: Hemodynamics  Goal: Patient's hemodynamics, fluid balance and neurologic status will be stable or improve  Outcome: Progressing     Problem: Respiratory  Goal: Patient will achieve/maintain optimum respiratory ventilation and gas exchange  Outcome: Progressing       Patient is not progressing towards the following goals:

## 2025-07-22 NOTE — PROGRESS NOTES
Ministerio from Lab called with critical result of LA of 4.2 at 1755. Critical lab result read back to Ministerio.   Dr. Guadarrama notified of critical lab result at 1803.  Critical lab result read back by Dr. Guadarrama.

## 2025-07-22 NOTE — PROGRESS NOTES
12-hour chart check complete.    Monitor Summary  Rhythm: SR  Rate: 60-80  Ectopy: R-O PVC, PAC  Measurements: 15/.09/.40

## 2025-07-22 NOTE — CARE PLAN
The patient is Stable - Low risk of patient condition declining or worsening    Shift Goals  Clinical Goals: Monitor vitals and labs, IV abx, safety, monitor lactic acid level  Patient Goals: Rest    Progress made toward(s) clinical / shift goals:  Continuing to monitor lactic acid levels. Patient on 1L NC. Patient verbalizes understanding of plan of care.     Problem: Knowledge Deficit - Standard  Goal: Patient and family/care givers will demonstrate understanding of plan of care, disease process/condition, diagnostic tests and medications  Outcome: Progressing     Problem: Respiratory  Goal: Patient will achieve/maintain optimum respiratory ventilation and gas exchange  Outcome: Progressing     Problem: Fall Risk  Goal: Patient will remain free from falls  Outcome: Progressing     Problem: Skin Integrity  Goal: Skin integrity is maintained or improved  Outcome: Progressing       Patient is not progressing towards the following goals:

## 2025-07-22 NOTE — HOSPITAL COURSE
Patient is a 75-year-old female who came in with shortness of breath cough and weakness over the last 2 weeks.  CT showed left lower lobe pneumonia and she was prescribed Augmentin and doxycycline 3 days prior to admission.  Patient also on steroids at that time.  Patient presented back to the emergency room tachypneic tachycardic and hypotensive.  There was concern of worsening left lower lobe pneumonia therefore she was admitted and started on empiric Zyvox and Zosyn and received pyrethrin shampoo secondary to lice.  I suspect her significant leukocytosis was due to the stress dose steroid.  She is responding well to the Zyvox and Zosyn and can be de-escalated to Unasyn.  Blood cultures negative.  MRSA swab negative therefore Zyvox discontinued.

## 2025-07-22 NOTE — PROGRESS NOTES
Hospital Medicine Daily Progress Note    Date of Service  7/22/2025    Chief Complaint  Vaishali Soares is a 75 y.o. female admitted 7/20/2025 with breath and low blood pressure    Hospital Course  Patient is a 75-year-old female who came in with shortness of breath cough and weakness over the last 2 weeks.  CT showed left lower lobe pneumonia and she was prescribed Augmentin and doxycycline 3 days prior to admission.  Patient also on steroids at that time.  Patient presented back to the emergency room tachypneic tachycardic and hypotensive.  There was concern of worsening left lower lobe pneumonia therefore she was admitted and started on empiric Zyvox and Zosyn and received pyrethrin shampoo secondary to lice.  I suspect her significant leukocytosis was due to the stress dose steroid.  She is responding well to the Zyvox and Zosyn and can be de-escalated to Unasyn.  Blood cultures negative.  MRSA swab negative therefore Zyvox discontinued.    Interval Problem Update  7/22 patient doing well.  Sleeping soundly at time of examination and did not wake to my exam.  Later she woke and requested of the RN cough medicine therefore Robitussin initiated.  I will de-escalate her antibiotics from Zosyn to Unasyn.  Patient does not have bedbugs but does have lice and is post permethrin shower.  Lactic acid is elevated but was normal after she received the sepsis bolus.  She is not tachycardic and I do not believe she needs further fluids at this time.  Will continue with antibiotics and anticipated discharge home once she is completely weaned from oxygen.    I have discussed this patient's plan of care and discharge plan at IDT rounds today with Case Management, Nursing, Nursing leadership, and other members of the IDT team.    Consultants/Specialty  none    Code Status  Full Code    Disposition  The patient is not medically cleared for discharge to home or a post-acute facility.  Anticipate discharge to: home with close  outpatient follow-up    I have placed the appropriate orders for post-discharge needs.    Review of Systems  Review of Systems   Constitutional:  Negative for chills and fever.   HENT:  Negative for congestion.    Eyes:  Negative for blurred vision and photophobia.   Respiratory:  Positive for cough and shortness of breath. Negative for sputum production and wheezing.    Cardiovascular:  Negative for chest pain, claudication and leg swelling.   Gastrointestinal:  Negative for abdominal pain, constipation, diarrhea, heartburn and vomiting.   Genitourinary:  Negative for dysuria and hematuria.   Musculoskeletal:  Negative for joint pain and myalgias.   Skin:  Negative for itching and rash.   Neurological:  Negative for dizziness, sensory change, speech change, weakness and headaches.   Psychiatric/Behavioral:  Negative for depression. The patient is not nervous/anxious and does not have insomnia.         Physical Exam  Temp:  [36.2 °C (97.2 °F)-36.9 °C (98.5 °F)] 36.4 °C (97.5 °F)  Pulse:  [] 65  Resp:  [16-18] 16  BP: ()/(50-60) 99/51  SpO2:  [92 %-96 %] 95 %    Physical Exam  Vitals and nursing note reviewed.   Constitutional:       General: She is not in acute distress.     Appearance: Normal appearance. She is not ill-appearing.   HENT:      Head: Normocephalic and atraumatic.      Nose: Nose normal.   Cardiovascular:      Rate and Rhythm: Normal rate and regular rhythm.      Heart sounds: Normal heart sounds. No murmur heard.  Pulmonary:      Effort: Pulmonary effort is normal.      Breath sounds: Normal breath sounds. No wheezing or rales.   Abdominal:      General: Bowel sounds are normal. There is no distension.      Palpations: Abdomen is soft.   Musculoskeletal:         General: No swelling or tenderness.      Cervical back: Neck supple.   Skin:     General: Skin is warm and dry.   Neurological:      General: No focal deficit present.      Mental Status: She is alert and oriented to person,  place, and time.   Psychiatric:         Mood and Affect: Mood normal.         Fluids  No intake or output data in the 24 hours ending 07/22/25 1423     Laboratory  Recent Labs     07/20/25  1311 07/21/25  0047 07/22/25  0019   WBC 36.6* 18.5* 9.7   RBC 4.16* 3.26* 3.10*   HEMOGLOBIN 12.8 10.1* 9.3*   HEMATOCRIT 39.5 31.0* 29.6*   MCV 95.0 95.1 95.5   MCH 30.8 31.0 30.0   MCHC 32.4 32.6 31.4*   RDW 52.4* 51.5* 53.5*   PLATELETCT 595* 347 379   MPV 9.8 9.4 9.7     Recent Labs     07/20/25  1311 07/21/25  0047 07/22/25  0019   SODIUM 133* 136 137   POTASSIUM 3.4* 4.0 3.7   CHLORIDE 96 106 107   CO2 22 22 19*   GLUCOSE 122* 115* 145*   BUN 10 7* 7*   CREATININE 0.64 0.44* 0.49*   CALCIUM 8.7 7.4* 8.0*                   Imaging  DX-CHEST-PORTABLE (1 VIEW)   Final Result      1.  Worsening left lower lobe parenchymal consolidation, associated with a small left pleural effusion.   2.  The remainder is stable.           Assessment/Plan  * Pneumonia of left lower lobe due to infectious organism- (present on admission)  Assessment & Plan  WBC count continues to improve, down to 9.7  If she were on the stress dose of steroids that could explain the high white count of 36 on admission  De-escalate to Unasyn    Acute hypoxemic respiratory failure (HCC)- (present on admission)  Assessment & Plan  Oxygen needs improved markedly  Down to 1 L    Acquired hypothyroidism- (present on admission)  Assessment & Plan  I will start levothyroxine    Steroid use (HCC)- (present on admission)  Assessment & Plan  Unsure of baseline steroid use  Currently on Cortef and blood pressure is okay  Med rec shows that she was just on a 5-day course of prednisone  Will need further clarification    Bed bug bite- (present on admission)  Assessment & Plan  Post permethrin shower  But identified as lice, per report grandchildren visited and had lice    Septic shock (HCC)- (present on admission)  Assessment & Plan  Resolved    Lobar pneumonia (HCC)-  (present on admission)  Assessment & Plan  Unasyn  Robitussin for cough  Continue Anoro Ellipta    Sepsis (HCC)- (present on admission)  Assessment & Plan  Sepsis was  Present on admission. Secondary to pneumonia. Currently afebrile and hemodynamically stable. WBCs have improved to 18.5 K.  Continue Zyvox and Zosyn for now.  Cultures pending    ACP (advance care planning)- (present on admission)  Assessment & Plan  Full Code    Dyslipidemia- (present on admission)  Assessment & Plan  Cardiac diet    Hypothyroidism due to acquired atrophy of thyroid- (present on admission)  Assessment & Plan  Continue home levothyroxine         VTE prophylaxis:   SCDs/TEDs      I have performed a physical exam and reviewed and updated ROS and Plan today (7/22/2025). In review of yesterday's note (7/21/2025), there are no changes except as documented above.    My total time spent caring for the patient on the day of the encounter was 55 minutes.   This does not include time spent on separately billable procedures/tests.

## 2025-07-23 ENCOUNTER — PHARMACY VISIT (OUTPATIENT)
Dept: PHARMACY | Facility: MEDICAL CENTER | Age: 75
End: 2025-07-23
Payer: COMMERCIAL

## 2025-07-23 VITALS
TEMPERATURE: 98.1 F | BODY MASS INDEX: 18.97 KG/M2 | OXYGEN SATURATION: 93 % | SYSTOLIC BLOOD PRESSURE: 135 MMHG | RESPIRATION RATE: 17 BRPM | DIASTOLIC BLOOD PRESSURE: 61 MMHG | HEIGHT: 64 IN | HEART RATE: 70 BPM | WEIGHT: 111.11 LBS

## 2025-07-23 LAB
ANION GAP SERPL CALC-SCNC: 12 MMOL/L (ref 7–16)
BUN SERPL-MCNC: 8 MG/DL (ref 8–22)
CALCIUM SERPL-MCNC: 7.9 MG/DL (ref 8.5–10.5)
CHLORIDE SERPL-SCNC: 106 MMOL/L (ref 96–112)
CO2 SERPL-SCNC: 21 MMOL/L (ref 20–33)
CREAT SERPL-MCNC: 0.44 MG/DL (ref 0.5–1.4)
ERYTHROCYTE [DISTWIDTH] IN BLOOD BY AUTOMATED COUNT: 53.7 FL (ref 35.9–50)
GFR SERPLBLD CREATININE-BSD FMLA CKD-EPI: 99 ML/MIN/1.73 M 2
GLUCOSE SERPL-MCNC: 135 MG/DL (ref 65–99)
HCT VFR BLD AUTO: 31 % (ref 37–47)
HGB BLD-MCNC: 9.8 G/DL (ref 12–16)
MCH RBC QN AUTO: 30.2 PG (ref 27–33)
MCHC RBC AUTO-ENTMCNC: 31.6 G/DL (ref 32.2–35.5)
MCV RBC AUTO: 95.4 FL (ref 81.4–97.8)
PLATELET # BLD AUTO: 391 K/UL (ref 164–446)
PMV BLD AUTO: 9.6 FL (ref 9–12.9)
POTASSIUM SERPL-SCNC: 3.3 MMOL/L (ref 3.6–5.5)
RBC # BLD AUTO: 3.25 M/UL (ref 4.2–5.4)
SODIUM SERPL-SCNC: 139 MMOL/L (ref 135–145)
WBC # BLD AUTO: 7 K/UL (ref 4.8–10.8)

## 2025-07-23 PROCEDURE — 700102 HCHG RX REV CODE 250 W/ 637 OVERRIDE(OP): Performed by: HOSPITALIST

## 2025-07-23 PROCEDURE — 700105 HCHG RX REV CODE 258: Performed by: INTERNAL MEDICINE

## 2025-07-23 PROCEDURE — A9270 NON-COVERED ITEM OR SERVICE: HCPCS | Performed by: INTERNAL MEDICINE

## 2025-07-23 PROCEDURE — 700111 HCHG RX REV CODE 636 W/ 250 OVERRIDE (IP): Mod: JZ | Performed by: INTERNAL MEDICINE

## 2025-07-23 PROCEDURE — 700102 HCHG RX REV CODE 250 W/ 637 OVERRIDE(OP): Performed by: INTERNAL MEDICINE

## 2025-07-23 PROCEDURE — 94760 N-INVAS EAR/PLS OXIMETRY 1: CPT

## 2025-07-23 PROCEDURE — 36415 COLL VENOUS BLD VENIPUNCTURE: CPT

## 2025-07-23 PROCEDURE — 80048 BASIC METABOLIC PNL TOTAL CA: CPT

## 2025-07-23 PROCEDURE — RXMED WILLOW AMBULATORY MEDICATION CHARGE: Performed by: INTERNAL MEDICINE

## 2025-07-23 PROCEDURE — 99239 HOSP IP/OBS DSCHRG MGMT >30: CPT | Performed by: INTERNAL MEDICINE

## 2025-07-23 PROCEDURE — 85027 COMPLETE CBC AUTOMATED: CPT

## 2025-07-23 PROCEDURE — A9270 NON-COVERED ITEM OR SERVICE: HCPCS | Performed by: HOSPITALIST

## 2025-07-23 RX ORDER — PREDNISONE 20 MG/1
40 TABLET ORAL DAILY
Qty: 6 TABLET | Refills: 0 | Status: SHIPPED | OUTPATIENT
Start: 2025-07-23 | End: 2025-07-26

## 2025-07-23 RX ADMIN — AMPICILLIN AND SULBACTAM 3 G: 1; 2 INJECTION, POWDER, FOR SOLUTION INTRAMUSCULAR; INTRAVENOUS at 12:56

## 2025-07-23 RX ADMIN — HYDROCORTISONE SODIUM SUCCINATE 50 MG: 100 INJECTION, POWDER, FOR SOLUTION INTRAMUSCULAR; INTRAVENOUS at 05:05

## 2025-07-23 RX ADMIN — GUAIFENESIN 200 MG: 100 LIQUID ORAL at 05:05

## 2025-07-23 RX ADMIN — ACETAMINOPHEN 650 MG: 325 TABLET, FILM COATED ORAL at 08:35

## 2025-07-23 RX ADMIN — AMPICILLIN AND SULBACTAM 3 G: 1; 2 INJECTION, POWDER, FOR SOLUTION INTRAMUSCULAR; INTRAVENOUS at 05:08

## 2025-07-23 RX ADMIN — FLUDROCORTISONE ACETATE 0.05 MG: 0.1 TABLET ORAL at 05:05

## 2025-07-23 RX ADMIN — LEVOTHYROXINE SODIUM 75 MCG: 0.07 TABLET ORAL at 05:06

## 2025-07-23 RX ADMIN — UMECLIDINIUM BROMIDE AND VILANTEROL TRIFENATATE 1 PUFF: 62.5; 25 POWDER RESPIRATORY (INHALATION) at 05:06

## 2025-07-23 RX ADMIN — HYDROCORTISONE SODIUM SUCCINATE 50 MG: 100 INJECTION, POWDER, FOR SOLUTION INTRAMUSCULAR; INTRAVENOUS at 14:00

## 2025-07-23 ASSESSMENT — PAIN DESCRIPTION - PAIN TYPE: TYPE: ACUTE PAIN

## 2025-07-23 NOTE — PROGRESS NOTES
Telemetry shift summary    Rhythm: SR/SA/SB  HR: 58-76  Ectopy: r-oPVC, rPAC    Measurements: 0.13 / 0.10 / 0.45    Normal values  Rhythm SR  HR   Measurements: 0.12-0.20/0.08-0.10/0.30-0.52

## 2025-07-23 NOTE — CARE PLAN
The patient is Stable - Low risk of patient condition declining or worsening    Shift Goals  Clinical Goals: IV ABX  Patient Goals: Rest    Progress made toward(s) clinical / shift goals:      Problem: Knowledge Deficit - Standard  Goal: Patient and family/care givers will demonstrate understanding of plan of care, disease process/condition, diagnostic tests and medications  Outcome: Progressing     Problem: Respiratory  Goal: Patient will achieve/maintain optimum respiratory ventilation and gas exchange  Outcome: Progressing     Problem: Fall Risk  Goal: Patient will remain free from falls  Outcome: Progressing       Patient is not progressing towards the following goals:

## 2025-07-23 NOTE — DISCHARGE SUMMARY
Discharge Summary    CHIEF COMPLAINT ON ADMISSION  Chief Complaint   Patient presents with    N/V     Was seen here 7/17/2025 for same  not getting better   was let go from ED   was not admitted     Weakness     Gradually getting worse    not able to tolerate food        Reason for Admission  N/V; Weakness     Admission Date  7/20/2025    CODE STATUS  Full Code    HPI & HOSPITAL COURSE  Patient is a 75-year-old female who came in with shortness of breath cough and weakness over the last 2 weeks.  CT showed left lower lobe pneumonia and she was prescribed Augmentin and doxycycline 3 days prior to admission.  Patient also on steroids at that time.  Patient presented back to the emergency room tachypneic tachycardic and hypotensive.  There was concern of worsening left lower lobe pneumonia therefore she was admitted and started on empiric Zyvox and Zosyn and received pyrethrin shampoo secondary to lice.  I suspect her significant leukocytosis was due to the stress dose steroid.  She is responding well to the Zyvox and Zosyn and can be de-escalated to Unasyn.  Blood cultures negative.  MRSA swab negative therefore Zyvox discontinued.     Patient states she feels much better and she is able to ambulate without needing any oxygen.  Given the improvement on Zosyn she can now transition back to Augmentin only.  She needs to follow-up with her primary care practitioner.  Because she was on stress dose steroids here in the hospital she will need 3 more days of prednisone 40 mg and then okay to discontinue thereafter.    Therefore, she is discharged in good and stable condition to home with close outpatient follow-up.    The patient met 2-midnight criteria for an inpatient stay at the time of discharge.    Discharge Date  7/23/25    FOLLOW UP ITEMS POST DISCHARGE  PCP-2 weeks    DISCHARGE DIAGNOSES  Principal Problem:    Pneumonia of left lower lobe due to infectious organism (POA: Yes)  Active Problems:    Hypothyroidism due  to acquired atrophy of thyroid (POA: Yes)    Dyslipidemia (POA: Yes)    ACP (advance care planning) (POA: Yes)    Sepsis (HCC) (POA: Yes)    Lobar pneumonia (HCC) (POA: Yes)    Septic shock (HCC) (POA: Yes)    Bed bug bite (POA: Yes)    Steroid use (HCC) (POA: Yes)    Acquired hypothyroidism (POA: Yes)    Acute hypoxemic respiratory failure (HCC) (POA: Yes)  Resolved Problems:    Hyponatremia (POA: Yes)      FOLLOW UP  Future Appointments   Date Time Provider Department Center   12/17/2025  3:00 PM ASHLYN [facility]ell [facility] None     Alberto Cisneros M.D.  [address] [place] [address] [address] [place] [id number] [phone]    Follow up        MEDICATIONS ON DISCHARGE     Medication List        CHANGE how you take these medications        Instructions   amoxicillin-clavulanate 875-125 MG Tabs  What changed: additional instructions  Commonly known as: Augmentin   Take 1 Tablet by mouth 2 times a day for 3 days.  Dose: 1 Tablet     predniSONE 20 MG Tabs  What changed:   medication strength  how much to take  Commonly known as: Deltasone   Doctor's comments:    Take 2 Tablets by mouth every day for 3 days.  Dose: 40 mg            CONTINUE taking these medications        Instructions   albuterol 108 (90 Base) MCG/ACT Aers inhalation aerosol   Inhale 2 Puffs every 6 hours as needed for Shortness of Breath. Indications: Worsening of Chronic Obstructive Lung Disease  Dose: 2 Puff     doxycycline monohydrate 100 MG tablet  Commonly known as: Adoxa   Take 100 mg by mouth 2 times a day. Pt started on 7/2/2025 for 5 day course   PRESCRIPTION COURSE WAS COMPLETED  Dose: 100 mg     levothyroxine 75 MCG Tabs  Commonly known as: Synthroid   Take 1 Tablet by mouth every morning on an empty stomach.  Dose: 75 mcg     Stiolto Respimat 2.5-2.5 MCG/ACT Aers  Generic drug: Tiotropium Bromide-Olodaterol   Inhale 1 Inhalation every day.  Dose: 1 Inhalation     VITAMIN B-12 PO   Take 1 Tablet by mouth every day.  Dose: 1 Tablet     VITAMIN D PO   Take 1  Capsule by mouth every day.  Dose: 1 Capsule            STOP taking these medications      ondansetron 4 MG Tabs tablet  Commonly known as: Zofran              Allergies  Allergies[1]    DIET  Orders Placed This Encounter   Procedures    Diet Order Diet: Regular     Standing Status:   Standing     Number of Occurrences:   1     Diet::   Regular [1]       ACTIVITY  As tolerated.  Weight bearing as tolerated    CONSULTATIONS  None    PROCEDURES  None    LABORATORY  Lab Results   Component Value Date    SODIUM 139 07/23/2025    POTASSIUM 3.3 (L) 07/23/2025    CHLORIDE 106 07/23/2025    CO2 21 07/23/2025    GLUCOSE 135 (H) 07/23/2025    BUN 8 07/23/2025    CREATININE 0.44 (L) 07/23/2025    CREATININE 0.7 05/13/2006        Lab Results   Component Value Date    WBC 7.0 07/23/2025    HEMOGLOBIN 9.8 (L) 07/23/2025    HEMATOCRIT 31.0 (L) 07/23/2025    PLATELETCT 391 07/23/2025        Total time of the discharge process exceeds 35 minutes.       [1] No Known Allergies

## 2025-07-23 NOTE — RESPIRATORY CARE
COPD EDUCATION by COPD CLINICAL EDUCATOR  7/23/2025 at 12:52 PM by Pavithra Hackett RRT     Patient reviewed by COPD education team. Patient does have a history or diagnosis of COPD and is a non-smoker.  Provided patient with a short intervention was completed with this patient covering: What is COPD (how the lungs work), daily medications rescue and maintenance, breathing techniques, infection prevention and oxygen safety were covered in detail.      COPD Screen  COPD Risk Screening  Do you have a history of COPD?: Yes (Per PFT 1/29/24 Severe is followed by Pulmonary)  Do you have a Pulmonologist?: Yes    COPD Assessment  COPD Clinical Specialists ONLY  COPD Education Initiated: Yes--Short Intervention (Per PFT 1/29/24 Severe is followed by Pulmonary, pt taking her inhalers daily has been stable no issues, pt was not interested in information discussed Action Plan, spacer use and nebulizer tx in detail has appt w/ Pulmonary)  Is this a COPD exacerbation patient?: No  DME Company: none  Physician Name: Alberto Cisneros M.D.  Pulmonary Follow Up Appointment: 12/17/25  Appt Time: 1500  Pulmonologist Name: Geni Ugarte  Referrals Initiated: Yes  Pulmonary Rehab: Declined  Smoking Cessation: N/A  Hospice: N/A  Home Health Care: N/A  Mobile Urgent Care Services: N/A  Geriatric Specialty Group: N/A  Private In-Home Care Agency: N/A  $ Demo/Eval of SVN's, MDI's and Aerosols: Yes  Interdisciplinary Rounds: Attendance at Rounds (30 Min)    PFT Results    1/29/24    PULMONARY FUNCTION TEST INTERPRETATION     Using ATS criteria, there is a severe reduction in the FEV1 and FVC   representing severe obstruction.  There is a negative bronchodilator response.     Notably, using NHANES criteria, this does not necessarily represent   obstruction.     Spirometry demonstrates scooping of the expiratory loop supporting   obstruction.     Full lung volumes demonstrate severe air trapping as well as a reduction in   ERV  "secondary to BMI of 30.     The DLCO is reduced.     SUMMARY: Findings are consistent with severe COPD with air trapping as well as likely emphysema as described.  The low DLCO should be correlated with recent hemoglobin level.  If the patient has edema on exam, consider ordering an echocardiogram.  Additionally, the patient should be considered for  long-acting inhalers   due to the finding of severe air trapping.    Meds to Beds  [facility] provides bedside medication delivery for all eligible patients at discharge and you have been automatically enrolled in the Meds to Beds Program. Would you like to opt out of this program for any reason?: No - Stay Opted In     MY COPD ACTION PLAN     It is recommended that patients and physicians/healthcare providers complete this action plan together. This plan should be discussed at each physician visit and updated as needed.    The green, yellow and red zones show groups of symptoms of COPD. This list of symptoms is not comprehensive, and you may experience other symptoms. In the \"Actions\" column, your healthcare provider has recommended actions for you to take based on your symptoms.    Patient Name: Vaishali Soares   YOB: 1950   Last Updated on: 7/23/2025 12:52 PM   Green Zone:  I am doing well today Actions     Usual activitiy and exercise level   Take daily medications     Usual amounts of cough and phlegm/mucus   Use oxygen as prescribed     Sleep well at night   Continue regular exercise/diet plan     Appetite is good   At all times avoid cigarette smoke, inhaled irritants     Daily Medications (these medications are taken every day):   Tiotropium and Olodaterol (Stiolto)  Albuterol/Ipratropium (Combivent, Duoneb) 2 Puffs  3mL via nebulizer Once daily  Four Times daily     Additional Information:  Rinse mouth after use of medications, in pt chart notes is says to use nebulizer 4 times a day as needed.    Patient instructed on importance of cleaning home " "nebulizer after every treatment to prevent infection.      Yellow Zone:  I am having a bad day or a COPD flare Actions     More breathless than usual   Continue daily medications     I have less energy for my daily activities   Use quick relief inhaler as ordered     Increased or thicker phlegm/mucus   Use oxygen as prescribed     Using quick relief inhaler/nebulizer more often   Get plenty of rest     Swelling of ankles more than usual   Use pursed lip breathing     More coughing than usual   At all times avoid cigarette smoke, inhaled irritants     I feel like I have a \"chest cold\"     Poor sleep and my symptoms woke me up     My appetite is not good     My medicine is not helping      Call provider immediately if symptoms don’t improve     Continue daily medications, add rescue medications:   Albuterol/Ipratropium (Combivent, Duoneb) 3mL via nebulizer Every 4 hours PRN       Medications to be used during a flare up, (as Discussed with Provider):           Additional Information:  Call provider if symptoms do not improve!    Patient instructed on importance of cleaning home nebulizer after every treatment to prevent infection.      Red Zone:  I need urgent medical care Actions     Severe shortness of breath even at rest   Call 911 or seek medical care immediately     Not able to do any activity because of breathing      Fever or shaking chills      Feeling confused or very drowsy       Chest pains      Coughing up blood                  "

## 2025-07-23 NOTE — DISCHARGE PLANNING
Case Management Discharge Planning    Admission Date: 7/20/2025  GMLOS: 4.9  ALOS: 3    6-Clicks ADL Score: 23  6-Clicks Mobility Score: 18      Anticipated Discharge Dispo: Discharge Disposition: Discharged to home/self care (01)  Discharge Address: [address] [place] [address]    DME Needed: No    Action(s) Taken:     Late entry for 07/22/2025:  Spoke to pt at bedside. Confirmed address and PCP listed on facehseet. Pt very [place]. Pt states she does not always use O2 at home but has a home concentrator that belonged to her mother. Per chart, PT/OT anticipate no further therapy needs upon DC.    07/23/2025:  RNCM attended IDT rounds and reviewed chart. Per IDT rounds discussion, pt does not require home O2.    Escalations Completed: None    Medically Clear: Yes    Next Steps:  Plan for DC home today.    Barriers to Discharge: None        Care Transition Team Assessment    Information Source  Orientation Level: Oriented X4  Information Given By: Patient  Informant's Name: Vaishali  Who is responsible for making decisions for patient? : Patient    Readmission Evaluation  Is this a readmission?: No    Elopement Risk  Legal Hold: No  Ambulatory or Self Mobile in Wheelchair: Yes  Disoriented: No  Elopement Risk: Not at Risk for Elopement    Interdisciplinary Discharge Planning  Lives with - Patient's Self Care Capacity: Adult Children  Patient or legal guardian wants to designate a caregiver: No  Support Systems: Children  Housing / Facility: 1 hospitals  Prior Services: None    Discharge Preparedness  What is your plan after discharge?: Home with help  What are your discharge supports?: Child    Finances  Financial Barriers to Discharge: No  Prescription Coverage: Yes    Vision / Hearing Impairment  Vision Impairment : No  Hearing Impairment : Yes  Hearing Impairment: Both Ears  Does Pt Need Special Equipment for the Hearing Impaired?: No    Advance Directive  Advance Directive?: None    Domestic Abuse  Have you ever  been the victim of abuse or violence?: No  Possible Abuse/Neglect Reported to:: Not Applicable    Discharge Risks or Barriers  Discharge risks or barriers?: No  Patient risk factors: Vulnerable adult    Anticipated Discharge Information  Discharge Disposition: Discharged to home/self care (01)  Discharge Address: [address] [place] [address]  [place] [address]

## 2025-07-23 NOTE — PROGRESS NOTES
Telemetry Shift Summary    Rhythm SB/Sr/ST  HR Range   Ectopy rpvc, rpac  Measurements 0.16/0.09/0.35        Normal Values  Rhythm SR  HR Range    Measurements 0.12-0.20 / 0.06-0.10  / 0.30-0.52

## 2025-07-23 NOTE — CARE PLAN
The patient is Stable - Low risk of patient condition declining or worsening    Shift Goals  Clinical Goals: IV abx, monitor vitals and labs  Patient Goals: Rest    Progress made toward(s) clinical / shift goals:        Problem: Knowledge Deficit - Standard  Goal: Patient and family/care givers will demonstrate understanding of plan of care, disease process/condition, diagnostic tests and medications  Outcome: Progressing     Problem: Hemodynamics  Goal: Patient's hemodynamics, fluid balance and neurologic status will be stable or improve  Outcome: Progressing     Problem: Fluid Volume  Goal: Fluid volume balance will be maintained  Outcome: Progressing     Problem: Respiratory  Goal: Patient will achieve/maintain optimum respiratory ventilation and gas exchange  Outcome: Progressing     Patient is not progressing towards the following goals:

## 2025-07-24 ENCOUNTER — TELEPHONE (OUTPATIENT)
Dept: HOME HEALTH SERVICES | Facility: HOME HEALTHCARE | Age: 75
End: 2025-07-24
Payer: MEDICARE

## 2025-07-25 ENCOUNTER — TELEPHONE (OUTPATIENT)
Dept: HEALTH INFORMATION MANAGEMENT | Facility: OTHER | Age: 75
End: 2025-07-25
Payer: MEDICARE

## 2025-07-28 ENCOUNTER — APPOINTMENT (OUTPATIENT)
Dept: RADIOLOGY | Facility: MEDICAL CENTER | Age: 75
End: 2025-07-28
Attending: STUDENT IN AN ORGANIZED HEALTH CARE EDUCATION/TRAINING PROGRAM
Payer: MEDICARE

## 2025-07-28 ENCOUNTER — HOSPITAL ENCOUNTER (EMERGENCY)
Facility: MEDICAL CENTER | Age: 75
End: 2025-07-28
Attending: STUDENT IN AN ORGANIZED HEALTH CARE EDUCATION/TRAINING PROGRAM
Payer: MEDICARE

## 2025-07-28 ASSESSMENT — FIBROSIS 4 INDEX: FIB4 SCORE: 2.17

## 2025-07-29 NOTE — ED NOTES
Pt was evaluated by MD  orders rec'ed and done  Po med's given and taken well by pt   SL placed w/ bld drawn, sent to lab   pt and daughter aware of POC   chest Xr being done now at [facility]

## 2025-07-29 NOTE — ED NOTES
Pt cleared for d/c  pt and daughter given dischg instructions  both verbally understands  d/c'ed to home in NAD  pt's son coming to pick pt and daughter up for ride home

## 2025-07-29 NOTE — ED TRIAGE NOTES
"Chief Complaint   Patient presents with    Pain     Pt describes it as all over pain. Pt is very unspecific about her pain. Pt states it started when she was discharged 7/23. Pt is not a great historian.      /64   Pulse (!) 129   Temp 36.2 °C (97.2 °F) (Temporal)   Resp 16   Ht 1.626 m (5' 4\")   Wt 50.3 kg (111 lb)   SpO2 90%   BMI 19.05 kg/m²       "

## 2025-07-29 NOTE — ED PROVIDER NOTES
ED Provider Note    CHIEF COMPLAINT  Chief Complaint   Patient presents with    Pain     Pt describes it as all over pain. Pt is very unspecific about her pain. Pt states it started when she was discharged 7/23. Pt is not a great historian.        EXTERNAL RECORDS REVIEWED  Reviewed inpatient admission discharge summary by Dr. Simmons patient was admitted to the hospital for left lower lobe pneumonia    HPI/ROS  LIMITATION TO HISTORY   Select: Hard of hearing  OUTSIDE HISTORIAN(S):  Friend/caregiver at bedside providing collateral history    Vaishali Soares is a 75 y.o. female with a past medical history of hypothyroidism presenting to the emergency department for body aches.  Patient was recently admitted to the hospital for left lower lobe pneumonia, she was discharged on the 23rd, she completed her 3 days of oral antibiotics (Augmentin and doxycycline). says that she has been having pain throughout her body including throughout her thorax abdomen and legs and in particular on the bottom of her feet.  Says that she could not handle the pain so she decided to come to the emergency department for evaluation.  She did not take anything for the pain as she is reticent to take pain medication at home.  She denies any worsening shortness of breath increased sputum production cough fever chills.  Says that she has been recovering from a respiratory standpoint.  Patient is accompanied by her friend.      PAST MEDICAL HISTORY   has a past medical history of Acute respiratory failure with hypoxia (HCC) (04/10/2023), Anemia, Anesthesia, Cough, Emphysema, Hypothyroid, Osteoporosis, Pneumonia (2015), Sputum production, and Urinary retention.    SURGICAL HISTORY   has a past surgical history that includes other orthopedic surgery and vicki by laparoscopy (Bilateral, 9/11/2018).    FAMILY HISTORY  Family History   Problem Relation Age of Onset    Diabetes Sister         DMI    Other Sister         Kidney Failure    Diabetes  "Brother         DM2. no meds    Stroke Father 54    Cancer Maternal Grandmother         Uterine    Diabetes Paternal Grandmother     No Known Problems Mother     No Known Problems Maternal Grandfather     No Known Problems Paternal Grandfather     Cancer Maternal Uncle     Cancer Paternal Uncle     Arthritis Son         Arthritis, gout    Asthma Son     Alcohol/Drug Neg Hx        SOCIAL HISTORY  Social History     Tobacco Use    Smoking status: Former     Current packs/day: 0.00     Average packs/day: 1 pack/day for 40.0 years (40.0 ttl pk-yrs)     Types: Cigarettes     Start date: 1967     Quit date: 2007     Years since quittin.5    Smokeless tobacco: Never   Vaping Use    Vaping status: Never Used   Substance and Sexual Activity    Alcohol use: Yes     Comment: rare     Drug use: No    Sexual activity: Never       CURRENT MEDICATIONS  Home Medications       Reviewed by Johnson Negrete R.N. (Registered Nurse) on 25 at 1742  Med List Status: Partial     Medication Last Dose Status   albuterol 108 (90 Base) MCG/ACT Aero Soln inhalation aerosol  Active   Cyanocobalamin (VITAMIN B-12 PO)  Active   doxycycline monohydrate (ADOXA) 100 MG tablet  Active   levothyroxine (SYNTHROID) 75 MCG Tab  Active   Tiotropium Bromide-Olodaterol (STIOLTO RESPIMAT) 2.5-2.5 MCG/ACT Aero Soln  Active   VITAMIN D PO  Active                    ALLERGIES  Allergies[1]    PHYSICAL EXAM  VITAL SIGNS: /61   Pulse 91   Temp 37.4 °C (99.3 °F) (Temporal)   Resp 16   Ht 1.626 m (5' 4\")   Wt 50.3 kg (111 lb)   SpO2 97%   BMI 19.05 kg/m²    General:  non-toxic, no acute distress  Neuro: oriented x 3, moving all extremities.   HEENT:   - Head: Normocephalic, atraumatic  - Eyes: PERRL  - Ears/Nose: normal external nose and ears  - Mouth: moist mucosal membranes  Resp: clear to auscultation, no increased work of breathing, no significant rhonchi or wheeze  CV: Regular rate and rhythm  Abd: Soft, non-tender, " non-distended  Back: No back tenderness.  Patient  Extremities: No peripheral edema she has some muscle tenderness to palpation along her legs, she has a nonspecific red rash to the base of bilateral feet, when I took off her shoe she had a bedbug in the right shoe, there is no evidence of cellulitis to the feet  Psych: Not agitated or aggressive.        DIAGNOSTIC STUDIES / PROCEDURES    LABS and ECG  Results for orders placed or performed during the hospital encounter of 07/28/25   CBC WITH DIFFERENTIAL    Collection Time: 07/28/25  7:45 PM   Result Value Ref Range    WBC 11.8 (H) 4.8 - 10.8 K/uL    RBC 4.54 4.20 - 5.40 M/uL    Hemoglobin 13.8 12.0 - 16.0 g/dL    Hematocrit 42.0 37.0 - 47.0 %    MCV 92.5 81.4 - 97.8 fL    MCH 30.4 27.0 - 33.0 pg    MCHC 32.9 32.2 - 35.5 g/dL    RDW 49.5 35.9 - 50.0 fL    Platelet Count 440 164 - 446 K/uL    MPV 8.8 (L) 9.0 - 12.9 fL    Neutrophils-Polys 72.40 (H) 44.00 - 72.00 %    Lymphocytes 18.70 (L) 22.00 - 41.00 %    Monocytes 8.00 0.00 - 13.40 %    Eosinophils 0.30 0.00 - 6.90 %    Basophils 0.10 0.00 - 1.80 %    Immature Granulocytes 0.50 0.00 - 0.90 %    Nucleated RBC 0.00 0.00 - 0.20 /100 WBC    Neutrophils (Absolute) 8.50 (H) 1.82 - 7.42 K/uL    Lymphs (Absolute) 2.20 1.00 - 4.80 K/uL    Monos (Absolute) 0.94 (H) 0.00 - 0.85 K/uL    Eos (Absolute) 0.04 0.00 - 0.51 K/uL    Baso (Absolute) 0.01 0.00 - 0.12 K/uL    Immature Granulocytes (abs) 0.06 0.00 - 0.11 K/uL    NRBC (Absolute) 0.00 K/uL   COMP METABOLIC PANEL    Collection Time: 07/28/25  7:45 PM   Result Value Ref Range    Sodium 137 135 - 145 mmol/L    Potassium 3.1 (L) 3.6 - 5.5 mmol/L    Chloride 96 96 - 112 mmol/L    Co2 26 20 - 33 mmol/L    Anion Gap 15.0 7.0 - 16.0    Glucose 115 (H) 65 - 99 mg/dL    Bun 10 8 - 22 mg/dL    Creatinine 0.52 0.50 - 1.40 mg/dL    Calcium 8.6 8.5 - 10.5 mg/dL    Correct Calcium 9.1 8.5 - 10.5 mg/dL    AST(SGOT) 61 (H) 12 - 45 U/L    ALT(SGPT) 45 2 - 50 U/L    Alkaline Phosphatase  269 (H) 30 - 99 U/L    Total Bilirubin 1.2 0.1 - 1.5 mg/dL    Albumin 3.4 3.2 - 4.9 g/dL    Total Protein 7.1 6.0 - 8.2 g/dL    Globulin 3.7 (H) 1.9 - 3.5 g/dL    A-G Ratio 0.9 g/dL   LIPASE    Collection Time: 25  7:45 PM   Result Value Ref Range    Lipase 31 11 - 82 U/L   TROPONIN    Collection Time: 25  7:45 PM   Result Value Ref Range    Troponin T 13 6 - 19 ng/L   ESTIMATED GFR    Collection Time: 25  7:45 PM   Result Value Ref Range    GFR (CKD-EPI) 95 >60 mL/min/1.73 m 2   EKG    Collection Time: 25  9:30 PM   Result Value Ref Range    Report       [facility] Emergency Dept.    Test Date:  2025  Pt Name:    AMADOR CHEUNG                Department: [facility]  MRN:        [id number]                      Room:  Gender:     Female                       Technician: [id number]  :        [id number]                   Requested By:ER TRIAGE PROTOCOL  Order #:    [id number]                    Reading MD: Nghia Perez    Measurements  Intervals                                Axis  Rate:       133                          P:          60  FL:         178                          QRS:        12  QRSD:       77                           T:          -88  QT:         285  QTc:        424    Interpretive Statements  Sinus tachycardia  Probable left atrial enlargement  Repolarization abnormality, prob rate related  No acute ischemic changes  Electronically Signed On 2025 21:30:00 PDT by Nghia Perez         RADIOLOGY  I have independently interpreted the diagnostic imaging associated with this visit and am waiting the final reading from the radiologist.   My preliminary interpretation is as follows:   - Film chest shows residual left basilar infiltrate  Radiologist interpretation:   DX-CHEST-PORTABLE (1 VIEW)   Final Result         1.  Hazy left lower lobe infiltrates, similar to prior study.   2.  Trace left pleural effusion, stable              MEDICAL DECISION  MAKING      ED COURSE AND PLAN    75-year-old female presenting to the emergency department for body aches.  Her pain is nonspecific, she was complaining of pain from her legs and from her feet, exam of her body was reassuring she has no signs of cellulitis or any acute injury.  She was recently admitted to the hospital for pneumonia but from a pneumonia standpoint she has been improving.  on arrival to the emergency department she was slightly tachycardic but this resolved with gentle fluids.  Chest x-ray shows persistent left basilar infiltrate, likely delayed radiographic resolution after clinical improvement from pneumonia.  Basic labs were obtained, CBC shows borderline leukocytosis 11.8.  Chemistry reveals mild hypokalemia 3.1.  Troponin and lipase are unremarkable.  Potassium was replenished.  After Tylenol and ibuprofen, patient says that her body aches had markedly improved and she felt comfortable going home.  We discussed strict return precautions to the emergency department for worsening shortness of breath or signs of pneumonia.  Patient is appropriate for discharge home.           Hydration: Based on the patient's presentation of Dehydration the patient was given IV fluids. IV Hydration was used because oral hydration was not adequate alone. Upon recheck following hydration, the patient was stable.    Procedures:      ----------------------------------------------------------------------------------  DISCUSSIONS    I have discussed management of the patient with the following physicians and ALCIRA's:      Discussion of management with other [facility] or appropriate source(s):     Escalation of care considered, and ultimately not performed: Considered readmission to the hospital    Barriers to care at this time, including but not limited to: Limited physical capacity    Decision tools and prescription drugs considered including, but not limited to:     FINAL IMPRESSION    1. Body aches    2. Hypokalemia         Discharge Medication List as of 7/28/2025  9:32 PM          No follow-up provider specified.      DISPOSITION    Discharge home, Stable        This chart was dictated using an electronic voice recognition software. The chart has been reviewed and edited but there is still possibility for dictation errors due to limitation of software.    Nghia Perez,  7/28/2025          [1] No Known Allergies

## 2025-07-29 NOTE — DISCHARGE INSTRUCTIONS
You were seen in the emergency department for muscle soreness.     Your potassium level was a bit low but otherwise your pneumonia seems to be stable.  There is no indication for further admission or further antibiotics.    Please keep yourself well-hydrated.  Take Tylenol 1000 mg and ibuprofen 400 mg every 6 hours for the next 2 to 3 days to help with this pain.    Please try to get up and out of bed as this will help reduce your muscle soreness.

## 2025-07-30 ENCOUNTER — APPOINTMENT (OUTPATIENT)
Dept: RADIOLOGY | Facility: MEDICAL CENTER | Age: 75
DRG: 193 | End: 2025-07-30
Payer: MEDICARE

## 2025-07-30 ENCOUNTER — HOSPITAL ENCOUNTER (INPATIENT)
Facility: MEDICAL CENTER | Age: 75
End: 2025-07-30
Attending: EMERGENCY MEDICINE | Admitting: STUDENT IN AN ORGANIZED HEALTH CARE EDUCATION/TRAINING PROGRAM
Payer: MEDICARE

## 2025-07-30 PROCEDURE — 71045 X-RAY EXAM CHEST 1 VIEW: CPT

## 2025-07-30 ASSESSMENT — FIBROSIS 4 INDEX: FIB4 SCORE: 1.59

## 2025-07-31 PROBLEM — D72.829 LEUKOCYTOSIS: Status: ACTIVE | Noted: 2025-07-31

## 2025-07-31 PROBLEM — A41.9 SEPSIS (HCC): Status: RESOLVED | Noted: 2025-07-20 | Resolved: 2025-07-31

## 2025-07-31 ASSESSMENT — FIBROSIS 4 INDEX
FIB4 SCORE: 2.83
FIB4 SCORE: 2.83

## 2025-07-31 ASSESSMENT — ENCOUNTER SYMPTOMS
COUGH: 1
VOMITING: 0
CHILLS: 1
ABDOMINAL PAIN: 0
PALPITATIONS: 0
SHORTNESS OF BREATH: 1
NAUSEA: 0
FEVER: 0

## 2025-07-31 ASSESSMENT — PAIN DESCRIPTION - PAIN TYPE: TYPE: ACUTE PAIN

## 2025-07-31 NOTE — ED NOTES
Pt given multiple warm blankets and socks placed on feet  Family at BS  Unable to est PIV but able to draw labs including first set of cultures  Pt on monitor w/ call light and watching TV  Fall risk precautions in place, bed alarm on and armed

## 2025-07-31 NOTE — ED TRIAGE NOTES
"Chief Complaint   Patient presents with    ALOC     BIBA from home as son reports pt not acting normally and more confused than normal, recent admission for PNA     /82   Pulse (!) 126   Temp (!) 38.1 °C (100.6 °F) (Temporal)   Resp 20   Ht 1.626 m (5' 4\")   Wt 50.3 kg (110 lb 14.3 oz)   SpO2 93%   BMI 19.03 kg/m²     Pt BIBA for above cc  BIBA from home, where pt lives w/ son?, son called reporting pt not acting normally and more confused, also incontinent which is also not baseline for her, reported increased weakness  Pt also placed on 4L NC by EMS for low room air sat, not normally on oxygen    Pt was admitted at [facility] from 7/20-7/23 for PNA and d/c home on abx and steroids- still had pills left in both RX reported by EMS    Pt sepsis score of 5- protocol ordered    Pt arrives A&O x1- to self only  Febrile, tachycardic, on 4L NC  Pt noted to still have productive cough UA    EMS est PIV and gave LR en route  "

## 2025-07-31 NOTE — ED PROVIDER NOTES
ER Provider Note    Scribed for Guevara Anand Ii, M.d. by Afua Dent. 7/30/2025  10:14 PM    Primary Care Provider: Alberto Cisneros M.D.    CHIEF COMPLAINT   Chief Complaint   Patient presents with    ALOC     BIBA from home as son reports pt not acting normally and more confused than normal, recent admission for PNA     EXTERNAL RECORDS REVIEWED  Outpatient Notes Patient was recently hospitalized on the 20th of this month and was treated for left lower lobe pneumonia    HPI/ROS  LIMITATION TO HISTORY   Select: Altered mental status / Confusion  OUTSIDE HISTORIAN(S):  Family Vaishali Soares is a 75 y.o. female who presents to the ED by EMS for evaluation of altered mental status onset today. Per EMS, patient's son called as patient has been confused more than baseline. In the ED, patient reports using oxygen at home intermittently. She is on 85% on room air. She presents associated symptoms of low grade fever. Patient has additional history of emphysema and COPD.       PAST MEDICAL HISTORY  Past Medical History[1]    SURGICAL HISTORY  Past Surgical History[2]    FAMILY HISTORY  Family History   Problem Relation Age of Onset    Diabetes Sister         DMI    Other Sister         Kidney Failure    Diabetes Brother         DM2. no meds    Stroke Father 54    Cancer Maternal Grandmother         Uterine    Diabetes Paternal Grandmother     No Known Problems Mother     No Known Problems Maternal Grandfather     No Known Problems Paternal Grandfather     Cancer Maternal Uncle     Cancer Paternal Uncle     Arthritis Son         Arthritis, gout    Asthma Son     Alcohol/Drug Neg Hx        SOCIAL HISTORY   reports that she quit smoking about 18 years ago. Her smoking use included cigarettes. She started smoking about 58 years ago. She has a 40 pack-year smoking history. She has never used smokeless tobacco. She reports current alcohol use. She reports that she does not use drugs.    CURRENT  "MEDICATIONS  Current Outpatient Medications   Medication Instructions    albuterol 108 (90 Base) MCG/ACT Aero Soln inhalation aerosol 2 Puffs, Inhalation, EVERY 6 HOURS PRN    Cyanocobalamin (VITAMIN B-12 PO) 1 Tablet, DAILY    doxycycline monohydrate (ADOXA) 100 mg, Oral, 2 TIMES DAILY, Pt started on 2025 for 5 day course   PRESCRIPTION COURSE WAS COMPLETED      levothyroxine (SYNTHROID) 75 mcg, Oral, EACH MORNING ON EMPTY STOMACH    Tiotropium Bromide-Olodaterol (STIOLTO RESPIMAT) 2.5-2.5 MCG/ACT Aero Soln 1 Inhalation, Inhalation, DAILY    VITAMIN D PO 1 Capsule, DAILY        ALLERGIES  Patient has no known allergies.    PHYSICAL EXAM  /82   Pulse (!) 126   Temp (!) 38.1 °C (100.6 °F) (Temporal)   Resp 20   Ht 1.626 m (5' 4\")   Wt 50.3 kg (110 lb 14.3 oz)   SpO2 93%   BMI 19.03 kg/m²     Physical Exam  Vitals and nursing note reviewed.   Constitutional:       Comments: Elderly woman in no distress   HENT:      Head: Normocephalic and atraumatic.   Pulmonary:      Effort: Pulmonary effort is normal.      Comments: Wheeze at right lung  Abdominal:      General: There is no distension.      Tenderness: There is no abdominal tenderness.   Musculoskeletal:         General: No swelling. Normal range of motion.   Skin:     General: Skin is warm.   Neurological:      Mental Status: She is alert.      Comments: Clear speech. Not oriented to time or situation. Does remember ambulance ride here. Moving all extremities with normal strength.            DIAGNOSTIC STUDIES    EKG/LABS  Results for orders placed or performed during the hospital encounter of 25   EKG    Collection Time: 25 10:11 PM   Result Value Ref Range    Report       [facility] Emergency Dept.    Test Date:  2025  Pt Name:    AMADOR CHEUNG                Department: ER  MRN:        [id number]                      Room:        16  Gender:     Female                       Technician: [id number]  :        " 1950                   Requested By:ER TRIAGE PROTOCOL  Order #:    [id number]                    Reading MD:    Measurements  Intervals                                Axis  Rate:       125                          P:          36  NM:         145                          QRS:        73  QRSD:       74                           T:          -73  QT:         322  QTc:        465    Interpretive Statements  Sinus tachycardia  Abnormal lateral Q waves  Anterior infarct, old  Borderline T abnormalities, inferior leads  Compared to ECG 07/28/2025 17:49:25  Q waves now present  Myocardial infarct finding now present  T-wave abnormality now present  Early repolarization no longer present  Possible ischemia no longer present     Blood Culture - Draw one from central line and one from peripheral site    Collection Time: 07/30/25 10:37 PM    Specimen: Line; Blood   Result Value Ref Range    Significant Indicator NEG     Source BLD     Site Peripheral     Culture Result       No Growth  Note: Blood cultures are incubated for 5 days and  are monitored continuously.Positive blood cultures  are called to the RN and reported as soon as  they are identified.     Procalcitonin    Collection Time: 07/30/25 10:37 PM   Result Value Ref Range    Procalcitonin 0.31 (H) <0.25 ng/mL   Sed Rate    Collection Time: 07/30/25 10:37 PM   Result Value Ref Range    Sed Rate Westergren 55 (H) 0 - 25 mm/hour   Phosphorus    Collection Time: 07/30/25 10:37 PM   Result Value Ref Range    Phosphorus 2.9 2.5 - 4.5 mg/dL   Magnesium    Collection Time: 07/30/25 10:37 PM   Result Value Ref Range    Magnesium 2.0 1.5 - 2.5 mg/dL   CBC WITH DIFFERENTIAL    Collection Time: 07/30/25 10:37 PM   Result Value Ref Range    WBC 23.9 (H) 4.8 - 10.8 K/uL    RBC 4.39 4.20 - 5.40 M/uL    Hemoglobin 13.0 12.0 - 16.0 g/dL    Hematocrit 40.5 37.0 - 47.0 %    MCV 92.3 81.4 - 97.8 fL    MCH 29.6 27.0 - 33.0 pg    MCHC 32.1 (L) 32.2 - 35.5 g/dL    RDW 50.8 (H) 35.9 -  50.0 fL    Platelet Count 413 164 - 446 K/uL    MPV 9.3 9.0 - 12.9 fL    Neutrophils-Polys 80.30 (H) 44.00 - 72.00 %    Lymphocytes 13.70 (L) 22.00 - 41.00 %    Monocytes 5.10 0.00 - 13.40 %    Eosinophils 0.00 0.00 - 6.90 %    Basophils 0.10 0.00 - 1.80 %    Immature Granulocytes 0.80 0.00 - 0.90 %    Nucleated RBC 0.00 0.00 - 0.20 /100 WBC    Neutrophils (Absolute) 19.16 (H) 1.82 - 7.42 K/uL    Lymphs (Absolute) 3.26 1.00 - 4.80 K/uL    Monos (Absolute) 1.21 (H) 0.00 - 0.85 K/uL    Eos (Absolute) 0.01 0.00 - 0.51 K/uL    Baso (Absolute) 0.03 0.00 - 0.12 K/uL    Immature Granulocytes (abs) 0.18 (H) 0.00 - 0.11 K/uL    NRBC (Absolute) 0.00 K/uL   Comp Metabolic Panel    Collection Time: 07/30/25 10:37 PM   Result Value Ref Range    Sodium 131 (L) 135 - 145 mmol/L    Potassium 4.8 3.6 - 5.5 mmol/L    Chloride 99 96 - 112 mmol/L    Co2 19 (L) 20 - 33 mmol/L    Anion Gap 13.0 7.0 - 16.0    Glucose 87 65 - 99 mg/dL    Bun 7 (L) 8 - 22 mg/dL    Creatinine 0.56 0.50 - 1.40 mg/dL    Calcium 8.6 8.5 - 10.5 mg/dL    Correct Calcium 9.2 8.5 - 10.5 mg/dL    AST(SGOT) 98 (H) 12 - 45 U/L    ALT(SGPT) 51 (H) 2 - 50 U/L    Alkaline Phosphatase 267 (H) 30 - 99 U/L    Total Bilirubin 1.4 0.1 - 1.5 mg/dL    Albumin 3.2 3.2 - 4.9 g/dL    Total Protein 7.1 6.0 - 8.2 g/dL    Globulin 3.9 (H) 1.9 - 3.5 g/dL    A-G Ratio 0.8 g/dL   LACTIC ACID    Collection Time: 07/30/25 10:37 PM   Result Value Ref Range    Lactic Acid 1.4 0.5 - 2.0 mmol/L   ESTIMATED GFR    Collection Time: 07/30/25 10:37 PM   Result Value Ref Range    GFR (CKD-EPI) 94 >60 mL/min/1.73 m 2   POC CoV-2, FLU A/B, RSV by PCR    Collection Time: 07/30/25 10:49 PM   Result Value Ref Range    POC Influenza A RNA, PCR NEGATIVE NEGATIVE    POC Influenza B RNA, PCR NEGATIVE NEGATIVE    POC RSV, by PCR NEGATIVE NEGATIVE    POC SARS-CoV-2, PCR NEGATIVE NEGATIVE   Blood Culture - Draw one from central line and one from peripheral site    Collection Time: 07/30/25 11:05 PM     Specimen: Peripheral; Blood   Result Value Ref Range    Significant Indicator NEG     Source BLD     Site PERIPHERAL     Culture Result       No Growth  Note: Blood cultures are incubated for 5 days and  are monitored continuously.Positive blood cultures  are called to the RN and reported as soon as  they are identified.     Urinalysis    Collection Time: 07/31/25 12:00 AM    Specimen: Urine   Result Value Ref Range    Color Yellow     Character Clear     Specific Gravity 1.011 <1.035    Ph 8.0 5.0 - 8.0    Glucose Negative Negative mg/dL    Ketones Negative Negative mg/dL    Protein Negative Negative mg/dL    Bilirubin Negative Negative    Urobilinogen, Urine 1.0 <=1.0 EU/dL    Nitrite Negative Negative    Leukocyte Esterase Negative Negative    Occult Blood Negative Negative    Micro Urine Req see below    Comp Metabolic Panel (CMP)    Collection Time: 07/31/25  3:18 AM   Result Value Ref Range    Sodium 137 135 - 145 mmol/L    Potassium 3.5 (L) 3.6 - 5.5 mmol/L    Chloride 108 96 - 112 mmol/L    Co2 18 (L) 20 - 33 mmol/L    Anion Gap 11.0 7.0 - 16.0    Glucose 92 65 - 99 mg/dL    Bun 7 (L) 8 - 22 mg/dL    Creatinine 0.50 0.50 - 1.40 mg/dL    Calcium 7.7 (L) 8.5 - 10.5 mg/dL    Correct Calcium 8.7 8.5 - 10.5 mg/dL    AST(SGOT) 77 (H) 12 - 45 U/L    ALT(SGPT) 37 2 - 50 U/L    Alkaline Phosphatase 215 (H) 30 - 99 U/L    Total Bilirubin 1.3 0.1 - 1.5 mg/dL    Albumin 2.8 (L) 3.2 - 4.9 g/dL    Total Protein 5.8 (L) 6.0 - 8.2 g/dL    Globulin 3.0 1.9 - 3.5 g/dL    A-G Ratio 0.9 g/dL   CBC with Differential    Collection Time: 07/31/25  3:18 AM   Result Value Ref Range    WBC 17.0 (H) 4.8 - 10.8 K/uL    RBC 3.66 (L) 4.20 - 5.40 M/uL    Hemoglobin 10.8 (L) 12.0 - 16.0 g/dL    Hematocrit 34.0 (L) 37.0 - 47.0 %    MCV 92.9 81.4 - 97.8 fL    MCH 29.5 27.0 - 33.0 pg    MCHC 31.8 (L) 32.2 - 35.5 g/dL    RDW 50.4 (H) 35.9 - 50.0 fL    Platelet Count 344 164 - 446 K/uL    MPV 9.0 9.0 - 12.9 fL    Neutrophils-Polys 82.50 (H)  44.00 - 72.00 %    Lymphocytes 11.70 (L) 22.00 - 41.00 %    Monocytes 4.90 0.00 - 13.40 %    Eosinophils 0.20 0.00 - 6.90 %    Basophils 0.10 0.00 - 1.80 %    Immature Granulocytes 0.60 0.00 - 0.90 %    Nucleated RBC 0.00 0.00 - 0.20 /100 WBC    Neutrophils (Absolute) 14.04 (H) 1.82 - 7.42 K/uL    Lymphs (Absolute) 1.99 1.00 - 4.80 K/uL    Monos (Absolute) 0.84 0.00 - 0.85 K/uL    Eos (Absolute) 0.03 0.00 - 0.51 K/uL    Baso (Absolute) 0.02 0.00 - 0.12 K/uL    Immature Granulocytes (abs) 0.10 0.00 - 0.11 K/uL    NRBC (Absolute) 0.00 K/uL   ESTIMATED GFR    Collection Time: 07/31/25  3:18 AM   Result Value Ref Range    GFR (CKD-EPI) 96 >60 mL/min/1.73 m 2       I have independently interpreted this EKG    RADIOLOGY/PROCEDURES   The attending emergency physician has independently interpreted the diagnostic imaging associated with this visit and am waiting the final reading from the radiologist.   My preliminary interpretation is a follows: Left lower lobe consolidation present    Radiologist interpretation:  DX-CHEST-PORTABLE (1 VIEW)   Final Result      Persistent but mildly improved left basilar opacity.          COURSE & MEDICAL DECISION MAKING     ASSESSMENT, COURSE AND PLAN  Care Narrative:     10:09 PM Ordered DX-chest, lactic acid, CBC with diff., CMP, UA, Urine culture, blood culture, and EKG for further evaluation per nursing protocol      10:15 PM - Patient seen and examined at bedside. There is no family present at bedside. This is a 75 year old female recently treated for pneumonia who presents to the ED for evaluation of altered mental status. Found to have fever, tachycardia. She is alert, pleasant, but disoriented. On 2L NC oxygen. Presentation concerning for sepsis. Sepsis orders placed by nursing on arrival. Discussed plan of care, including ordering labs, imaging, and medication administration. Patient  agree to plan of care due to acute condition.      Sepsis: Infection was suspected 10:19 PM  (Time). Sepsis pathway was initiated. Fluids not needed (no hypotension or lactate greater than or equal to 4). Antibiotics were given per protocol.    10:29 PM Patient will be treated with Unasyn 3 g and Zithromax 500 mg.WBC 23.9.  Ordered Magnesium, phosphorous, procalcitonin, sed rate for further evaluation.      10:37 PM Ordered lactic acid, CMP, and CBC for further evaluation.      11:42 PM Patient will be treated with Tylenol 1000 mg.      Hydration: Based on the patient's presentation of [place] ALOC, Sepsis, and Tachycardia the patient was given IV fluids. IV Hydration was used because oral hydration was not adequate alone. Upon recheck following hydration, the patient was not improved. Patient is tachycardic.    11:49 PM  I discussed the patient's case and the above findings with Dr. Walters  (Hospitalist) who agrees to evaluate patient upon admission.  Patient's care was transferred at this time.     11:54 PM Patient was reevaluated at bedside.Informed her that she will be admitted for further evaluation. There is no family at bedside. Nursing was instructed to do a cath UA.     PROBLEM LIST  #Sepsis with hypoxic respiratory failure    DISPOSITION AND DISCUSSIONS  I have discussed management of the patient with the following physicians and ALCIRA's:  Dr. Walters  (Hospitalist)     Discussion of management with other [facility] or appropriate source(s): RT         DISPOSITION:  Patient will be hospitalized by Dr. Walters in guarded condition.    FINAL DIANGOSIS  1. Sepsis with acute hypoxic respiratory failure without septic shock, due to unspecified organism (HCC)    2. Pneumonia of left lower lobe due to infectious organism           CRITICAL CARE  The very real possibilty of a deterioration of this patient's condition required the highest level of my preparedness for sudden, emergent intervention.  I provided critical care services, which included medication orders, frequent reevaluations of the patient's condition  and response to treatment, ordering and reviewing test results, and discussing the case with various consultants.  The critical care time associated with the care of the patient was 35 minutes. Review chart for interventions. This time is exclusive of any other billable procedures.        The note accurately reflects work and decisions made by me.  Guevara Anand II, M.D.  7/31/2025  5:21 AM         [1]   Past Medical History:  Diagnosis Date    Acute respiratory failure with hypoxia (HCC) 04/10/2023    Anemia     Anesthesia     PONV    Cough     Emphysema     COPD    Hypothyroid     Osteoporosis     Pneumonia 2015    Sputum production     Urinary retention    [2]   Past Surgical History:  Procedure Laterality Date    AFSANEH BY LAPAROSCOPY Bilateral 9/11/2018    Procedure: AFSANEH BY LAPAROSCOPY;  Surgeon: Mahesh Gordillo M.D.;  Location: SURGERY [facility];  Service: General    OTHER ORTHOPEDIC SURGERY      R shoulder surgery

## 2025-07-31 NOTE — ASSESSMENT & PLAN NOTE
Found to be saturating 85% on room air, chest x-ray showing persistent but mildly improved left basilar opacity  Cont on treatment of  HCAP, with vancomycin and cefepime  Wean off O2 as tolerated    no

## 2025-07-31 NOTE — ED NOTES
Bedside report received from off going RN/tech: Tristan LUCERO, assumed care of patient.  POC discussed with patient. Call light within reach, all needs addressed at this time.       Fall risk interventions in place: Move the patient closer to the nurse's station, Patient's personal possessions are with in their safe reach, Place socks on patient, Place fall risk sign on patient's door, Give patient urinal if applicable, Keep floor surfaces clean and dry, and Accompanied to restroom (all applicable per [place] Fall risk assessment)   Continuous monitoring: Cardiac Leads, Pulse Ox, or Blood Pressure  IVF/IV medications: Infusion per MAR (List Med(s)) NS  Oxygen: How many liters 2L  Bedside sitter: Not Applicable   Isolation: Not Applicable

## 2025-07-31 NOTE — H&P
Hospital Medicine History & Physical Note    Date of Service  7/30/2025    Primary Care Physician  Alberto Cisneros M.D.    Consultants  None    Code Status  Full Code    Chief Complaint  Chief Complaint   Patient presents with    ALOC     BIBA from home as son reports pt not acting normally and more confused than normal, recent admission for PNA       History of Presenting Illness  Vaishali Soares is a 75 y.o. female who presented 7/30/2025 with worsening mentation.    Patient was recently discharged from [facility] 1 week ago.  She was admitted for left lower lobe pneumonia and was given Augmentin and doxycycline 3 days prior to admission.  She initially was given Zyvox and Zosyn, but improved and was de-escalated to Unasyn and was discharged with Augmentin.    However, as she has been at home, she has been noted to have worsening mentation and lethargy, as witnessed by her family members.  She did not want to come to the ER for this, but her son convinced her to be brought in.  She is found to be hypoxic today at 85% on room air.  She was brought to the ER for further evaluation.    In ER, patient found to be tachycardic and febrile, hypoxic, tachypneic.  Pertinent labs include white blood cell count of 23,000 with left shift, mild transaminitis, normal lactic acid.  Chest x-ray showing persistent but mildly improved left basilar opacity.  Patient will be admitted for hypoxemia secondary to HCAP.    I discussed the plan of care with patient.    Review of Systems  ROS    Past Medical History   has a past medical history of Acute respiratory failure with hypoxia (HCC) (04/10/2023), Anemia, Anesthesia, Cough, Emphysema, Hypothyroid, Osteoporosis, Pneumonia (2015), Sputum production, and Urinary retention.    Surgical History   has a past surgical history that includes other orthopedic surgery and vicki by laparoscopy (Bilateral, 9/11/2018).     Family History  Family History   Problem Relation Age of Onset     Diabetes Sister         DMI    Other Sister         Kidney Failure    Diabetes Brother         DM2. no meds    Stroke Father 54    Cancer Maternal Grandmother         Uterine    Diabetes Paternal Grandmother     No Known Problems Mother     No Known Problems Maternal Grandfather     No Known Problems Paternal Grandfather     Cancer Maternal Uncle     Cancer Paternal Uncle     Arthritis Son         Arthritis, gout    Asthma Son     Alcohol/Drug Neg Hx         Family history reviewed with patient. There is no family history that is pertinent to the chief complaint.     Social History   reports that she quit smoking about 18 years ago. Her smoking use included cigarettes. She started smoking about 58 years ago. She has a 40 pack-year smoking history. She has never used smokeless tobacco. She reports current alcohol use. She reports that she does not use drugs.    Allergies  Allergies[1]    Medications  Prior to Admission Medications   Prescriptions Last Dose Informant Patient Reported? Taking?   Cyanocobalamin (VITAMIN B-12 PO)  Patient Yes No   Sig: Take 1 Tablet by mouth every day.   Tiotropium Bromide-Olodaterol (STIOLTO RESPIMAT) 2.5-2.5 MCG/ACT Aero Soln  Patient's Home Pharmacy No No   Sig: Inhale 1 Inhalation every day.   VITAMIN D PO  Patient Yes No   Sig: Take 1 Capsule by mouth every day.   albuterol 108 (90 Base) MCG/ACT Aero Soln inhalation aerosol  Patient Yes No   Sig: Inhale 2 Puffs every 6 hours as needed for Shortness of Breath. Indications: Worsening of Chronic Obstructive Lung Disease   doxycycline monohydrate (ADOXA) 100 MG tablet  Patient's Home Pharmacy Yes No   Sig: Take 100 mg by mouth 2 times a day. Pt started on 7/2/2025 for 5 day course   PRESCRIPTION COURSE WAS COMPLETED   levothyroxine (SYNTHROID) 75 MCG Tab  Patient's Home Pharmacy No No   Sig: Take 1 Tablet by mouth every morning on an empty stomach.      Facility-Administered Medications: None       Physical Exam  Temp:  [38.1 °C  "(100.6 °F)] 38.1 °C (100.6 °F)  Pulse:  [120-127] 120  Resp:  [20-27] 23  BP: (108-125)/(60-82) 119/63  SpO2:  [92 %-94 %] 92 %  Blood Pressure : 119/63   Temperature: (!) 38.1 °C (100.6 °F)   Pulse: (!) 120   Respiration: (!) 23   Pulse Oximetry: 92 %       Physical Exam  Constitutional:       Appearance: Normal appearance. She is normal weight.   HENT:      Head: Normocephalic.      Nose: Nose normal.      Mouth/Throat:      Mouth: Mucous membranes are moist.   Eyes:      Pupils: Pupils are equal, round, and reactive to light.   Cardiovascular:      Rate and Rhythm: Regular rhythm. Tachycardia present.   Pulmonary:      Effort: Pulmonary effort is normal.      Breath sounds: Rhonchi present.   Abdominal:      General: Abdomen is flat. Bowel sounds are normal.      Palpations: Abdomen is soft.   Musculoskeletal:         General: Normal range of motion.      Cervical back: Neck supple.   Skin:     General: Skin is warm.   Neurological:      General: No focal deficit present.      Mental Status: She is alert and oriented to person, place, and time. Mental status is at baseline.   Psychiatric:         Mood and Affect: Mood normal.         Behavior: Behavior normal.         Thought Content: Thought content normal.         Judgment: Judgment normal.         Laboratory:  Recent Labs     07/28/25 1945 07/30/25 2237   WBC 11.8* 23.9*   RBC 4.54 4.39   HEMOGLOBIN 13.8 13.0   HEMATOCRIT 42.0 40.5   MCV 92.5 92.3   MCH 30.4 29.6   MCHC 32.9 32.1*   RDW 49.5 50.8*   PLATELETCT 440 413   MPV 8.8* 9.3     Recent Labs     07/28/25 1945 07/30/25 2237   SODIUM 137 131*   POTASSIUM 3.1* 4.8   CHLORIDE 96 99   CO2 26 19*   GLUCOSE 115* 87   BUN 10 7*   CREATININE 0.52 0.56   CALCIUM 8.6 8.6     Recent Labs     07/28/25 1945 07/30/25 2237   ALTSGPT 45 51*   ASTSGOT 61* 98*   ALKPHOSPHAT 269* 267*   TBILIRUBIN 1.2 1.4   LIPASE 31  --    GLUCOSE 115* 87         No results for input(s): \"NTPROBNP\" in the last 72 hours.      Recent " Labs     07/28/25 1945   TROPONINT 13       Imaging:  DX-CHEST-PORTABLE (1 VIEW)   Final Result      Persistent but mildly improved left basilar opacity.          X-Ray:  I have personally reviewed the images and compared with prior images.    Assessment/Plan:  Justification for Admission Status  I anticipate this patient will require at least two midnights for appropriate medical management, necessitating inpatient admission because patient has acute hypoxemic respiratory failure    Patient will need a Med/Surg bed on EMERGENCY service .  The need is secondary to hypoxemia.    Acute hypoxemic respiratory failure (HCC)- (present on admission)  Assessment & Plan  Found to be saturating 85% on room air, chest x-ray showing persistent but mildly improved left basilar opacity  Will treat for HCAP, with vancomycin and cefepime  Fluids  Oxygen as needed, keep O2 over 90%    Hypothyroidism  Assessment & Plan  Synthroid         VTE prophylaxis: enoxaparin ppx       [1] No Known Allergies

## 2025-07-31 NOTE — ED NOTES
Updates given to daughter nikhil over the phone. Patient asleep at the moment. Family expresses no questions about plan of care.

## 2025-07-31 NOTE — PROGRESS NOTES
"Pharmacy Vancomycin Kinetics Note for 7/31/2025     75 y.o. female on Vancomycin day # 1     Vancomycin Indication (AUC Dosing): S. aureus pneumonia    Provider specified end date: 08/03/25    Active Antibiotics (From admission, onward)      Ordered     Ordering Provider       Thu Jul 31, 2025 12:16 AM    07/31/25 0016  vancomycin (Vancocin) 500 mg in  mL IVPB  (vancomycin (VANCOCIN) IV (LD + Maintenance))  EVERY 12 HOURS         Abdulkadir Walters M.D.       Thu Jul 31, 2025 12:10 AM    07/31/25 0010  piperacillin-tazobactam (Zosyn) 4.5 g in  mL IVPB  (piperacillin-tazobactam (ZOSYN) IV (Extended-infusion) PANEL )  ONCE        Placed in \"And\" Linked Group    Abdulkadir Walters M.D.    07/31/25 0010  piperacillin-tazobactam (Zosyn) 4.5 g in  mL IVPB  (piperacillin-tazobactam (ZOSYN) IV (Extended-infusion) PANEL )  EVERY 8 HOURS        Placed in \"And\" Linked Group    Abdulkadir Walters M.D.       Thu Jul 31, 2025 12:09 AM    07/31/25 0009  vancomycin (Vancocin) 1,250 mg in  mL IVPB  (vancomycin (VANCOCIN) IV (LD + Maintenance))  ONCE         Abdulkadir Walters M.D.       Wed Jul 30, 2025 11:51 PM    07/30/25 2351  MD Alert...Vancomycin per Pharmacy  (MD Alert...Vancomycin per Pharmacy)  PHARMACY TO DOSE        Question:  Indication(s) for vancomycin?  Answer:  Pneumonia    Abdulkadir Walters M.D.       Wed Jul 30, 2025 10:29 PM    07/30/25 2229  ampicillin/sulbactam (Unasyn) 3 g in  mL IVPB  (CAP (Simple Sepsis) )  ONCE         Guevara Anand II, M.D.            Dosing Weight: 50.3 kg (110 lb 14.3 oz)      Admission History: Admitted on 7/30/2025 for Sepsis (HCC) [A41.9]  Pertinent history: Patient presents post failure of outpatient antibiotics with worsening mentation.    Allergies:     Patient has no known allergies.     Pertinent cultures to date:     Results       Procedure Component Value Units Date/Time    Urinalysis [[id number]] Collected: 07/31/25 0000    Order Status: " "Completed Specimen: Urine Updated: 25 0013     Color Yellow     Character Clear     Specific Gravity 1.011     Ph 8.0     Glucose Negative mg/dL      Ketones Negative mg/dL      Protein Negative mg/dL      Bilirubin Negative     Urobilinogen, Urine 1.0 EU/dL      Nitrite Negative     Leukocyte Esterase Negative     Occult Blood Negative     Micro Urine Req see below     Comment: Microscopic examination not performed when specimen is clear  and chemically negative for protein, blood, leukocyte esterase  and nitrite.         Urine Culture (New) [[id number]] Collected: 25 0000    Order Status: Sent Specimen: Urine Updated: 25 0005    MRSA By PCR (Amp) [[id number]]     Order Status: Sent Specimen: Respirate from Nares     Blood Culture - Draw one from central line and one from peripheral site [[id number]] Collected: 25    Order Status: Sent Specimen: Blood from Peripheral Updated: 25    Blood Culture - Draw one from central line and one from peripheral site [[id number]] Collected: 25    Order Status: Sent Specimen: Blood from Line Updated: 25            Labs:     Estimated Creatinine Clearance: 66.8 mL/min (by C-G formula based on SCr of 0.56 mg/dL).  Recent Labs     25   WBC 11.8* 23.9*   NEUTSPOLYS 72.40* 80.30*     Recent Labs     25   BUN 10 7*   CREATININE 0.52 0.56   ALBUMIN 3.4 3.2     No intake or output data in the 24 hours ending 256   /63   Pulse (!) 120   Temp (!) 38.1 °C (100.6 °F) (Temporal)   Resp (!) 23   Ht 1.626 m (5' 4\")   Wt 50.3 kg (110 lb 14.3 oz)   SpO2 92%  Temp (24hrs), Av.1 °C (100.6 °F), Min:38.1 °C (100.6 °F), Max:38.1 °C (100.6 °F)      List concerns for Vancomycin clearance:          Pharmacokinetics:     AUC kinetics:   Ke (hr ^-1): 0.0598 hr^-1  Half life: 11.59 hr  Clearance: 1.955  Estimated TDD: 977.5  Estimated Dose: 554  Estimated interval: " 13.6    A/P:     -  Vancomycin dose: 1250 mg load, 500 mg every 12 hours maintenance.    -  Next vancomycin level(s):    None ordered at this time. Likely obtain levels after the 4th maintenance dose unless clinical status or renal function changes.       -  Predicted vancomycin AUC from initial AUC test calculator: 512 mg·hr/L    -  Comments: consider de escalation once MRSA nares results.    Reece Cesar, PharmD

## 2025-07-31 NOTE — PROGRESS NOTES
Pt admitted into room 701 on a hospital bed. She is on 2L NC ans sating well. Tele monitor attached and running SR @ 88. She denies pain at the time of admission. All needs have been met, call light within reach and bed in lo position with bed alarm on.

## 2025-07-31 NOTE — PROGRESS NOTES
Hospital Medicine Daily Progress Note    Date of Service  7/31/2025    Chief Complaint  Vaishali Soares is a 75 y.o. female admitted 7/30/2025 with SOB    Hospital Course  This is a 75 y.o. female who presented 7/30/2025 with worsening mentation.     Patient was recently discharged from [facility] 1 week ago.  She was admitted for left lower lobe pneumonia and was given Augmentin and doxycycline 3 days prior to admission.  She initially was given Zyvox and Zosyn, but improved and was de-escalated to Unasyn and was discharged with Augmentin.     However, as she has been at home, she has been noted to have worsening mentation and lethargy, as witnessed by her family members.  She did not want to come to the ER for this, but her son convinced her to be brought in.  She is found to be hypoxic today at 85% on room air.  She was brought to the ER for further evaluation.     In ER, patient found to be tachycardic and febrile, hypoxic, tachypneic.  Pertinent labs include white blood cell count of 23,000 with left shift, mild transaminitis, normal lactic acid.  Chest x-ray showing persistent but mildly improved left basilar opacity.  Patient will be admitted for hypoxemia secondary to HCAP.      Interval Problem Update  Patient seen and examined, afebrile,no nausea or vomiting, resting in bed,  Cont on IV abx wean off O2 as tolerated. Follow up cultures  Close monitor for decompensation   I have discussed this patient's plan of care and discharge plan at IDT rounds today with Case Management, Nursing, Nursing leadership, and other members of the IDT team.    Consultants/Specialty  None     Code Status  Full Code    Disposition  The patient is not medically cleared for discharge to home or a post-acute facility.      I have placed the appropriate orders for post-discharge needs.    Review of Systems  Review of Systems   Constitutional:  Positive for chills. Negative for fever.   Respiratory:  Positive for cough and  shortness of breath.    Cardiovascular:  Negative for chest pain and palpitations.   Gastrointestinal:  Negative for abdominal pain, nausea and vomiting.   Genitourinary:  Negative for dysuria and urgency.   All other systems reviewed and are negative.       Physical Exam  Temp:  [37.1 °C (98.7 °F)-38.1 °C (100.6 °F)] 37.1 °C (98.7 °F)  Pulse:  [] 81  Resp:  [14-27] 18  BP: ()/(51-82) 113/67  SpO2:  [92 %-97 %] 96 %    Physical Exam  Constitutional:       Appearance: Normal appearance. She is normal weight.   HENT:      Head: Normocephalic.      Nose: Nose normal.      Mouth/Throat:      Mouth: Mucous membranes are moist.   Eyes:      Pupils: Pupils are equal, round, and reactive to light.   Cardiovascular:      Rate and Rhythm: Regular rhythm. Tachycardia present.   Pulmonary:      Effort: Pulmonary effort is normal.      Breath sounds: Rhonchi present.   Abdominal:      General: Abdomen is flat. Bowel sounds are normal.      Palpations: Abdomen is soft.   Musculoskeletal:         General: Normal range of motion.      Cervical back: Neck supple.   Skin:     General: Skin is warm.   Neurological:      General: No focal deficit present.      Mental Status: She is alert and oriented to person, place, and time. Mental status is at baseline.   Psychiatric:         Mood and Affect: Mood normal.         Behavior: Behavior normal.         Thought Content: Thought content normal.         Judgment: Judgment normal.         Fluids    Intake/Output Summary (Last 24 hours) at 7/31/2025 1053  Last data filed at 7/31/2025 0048  Gross per 24 hour   Intake 1000 ml   Output --   Net 1000 ml        Laboratory  Recent Labs     07/28/25  1945 07/30/25 2237 07/31/25  0318   WBC 11.8* 23.9* 17.0*   RBC 4.54 4.39 3.66*   HEMOGLOBIN 13.8 13.0 10.8*   HEMATOCRIT 42.0 40.5 34.0*   MCV 92.5 92.3 92.9   MCH 30.4 29.6 29.5   MCHC 32.9 32.1* 31.8*   RDW 49.5 50.8* 50.4*   PLATELETCT 440 413 344   MPV 8.8* 9.3 9.0     Recent Labs      07/28/25 1945 07/30/25 2237 07/31/25  0318   SODIUM 137 131* 137   POTASSIUM 3.1* 4.8 3.5*   CHLORIDE 96 99 108   CO2 26 19* 18*   GLUCOSE 115* 87 92   BUN 10 7* 7*   CREATININE 0.52 0.56 0.50   CALCIUM 8.6 8.6 7.7*                   Imaging  DX-CHEST-PORTABLE (1 VIEW)   Final Result      Persistent but mildly improved left basilar opacity.           Assessment/Plan  Leukocytosis  Assessment & Plan  Related to her infection   Cont on abx   Trending down    Acute hypoxemic respiratory failure (HCC)- (present on admission)  Assessment & Plan  Found to be saturating 85% on room air, chest x-ray showing persistent but mildly improved left basilar opacity  Cont on treatment of  HCAP, with vancomycin and cefepime  Wean off O2 as tolerated     Hypothyroidism  Assessment & Plan  Synthroid     Hypokalemia- (present on admission)  Assessment & Plan  Replete as needed  Monitor          VTE prophylaxis: Lovenox ppx    Greater than 51 minutes spent prepping to see patient (e.g. review of tests) obtaining and/or reviewing separately obtained history. Performing a medically appropriate examination and/ evaluation.  Counseling and educating the patient/family/caregiver.  Ordering medications, tests, or procedures.  Referring and communicating with other health care professionals.  Documenting clinical information in EPIC.  Independently interpreting results and communicating results to patient/family/caregiver.  Care coordination.      I have performed a physical exam and reviewed and updated ROS and Plan today (7/31/2025). In review of yesterday's note (7/30/2025), there are no changes except as documented above.

## 2025-07-31 NOTE — ED NOTES
Medication history reviewed with patients son via phone (Nhan [phone]).     Med rec is complete    Allergies reviewed.     Patient was prescribed a 3 day course of Augmentin 875/125mg BID on 7/23/25.... Per previous ER  notes:    Pt was admitted at [facility] from 7/20-7/23 for PNA and d/c home on abx and steroids- still had pills left in both RX reported by EMS .    Patient son stated that he believed that patient did finish abx course.    Anticoagulants: No    Jacoby Alfaro

## 2025-07-31 NOTE — PROGRESS NOTES
4 Eyes Skin Assessment Completed by JAZMINE Guaman and JAZMINE Shay.    Skin assessment is primarily focused on high risk bony prominences. Pay special attention to skin beneath and around medical devices, high risk bony prominences, skin to skin areas and areas where the patient lacks sensation to feel pain and areas where the patient previously had breakdown.     Head (Occipital):  WDL   Ears (Under Medical Devices): WDL   Nose (Under Medical Devices): WDL   Mouth:  WDL   Neck: WDL   Breast/Chest:  WDL   Shoulder Blades:  WDL   Spine:   WDL   (R) Arm/Elbow/Hand: Abrasion and Light Purple   (L) Arm/Elbow/Hand: Abrasion and Light Purple   Abdomen: WDL   Pannus/Groin:  WDL   Sacrum/Coccyx:   WDL   (R) Ischial Tuberosity (Sit Bones):  WDL   (L) Ischial Tuberosity (Sit Bones):  WDL   (R) Leg:  WDL   (L) Leg:  WDL   (R) Heel:  Blanching and Boggy   (R) Foot/Toe: Pink and Blanching   (L) Heel: Pink and boggy    (L) Foot/Toe:  WDL       DEVICES IN USE:   Respiratory Devices:  Nasal cannula and Pulse ox  Feeding Devices:  N/A   Lines & BP Monitoring Devices:  Peripheral IV, BP cuff, and Pulse ox    Orthopedic Devices:  N/A  Miscellaneous Devices:  N/A    PROTOCOL INTERVENTIONS:   Standard/Trauma Bed:  Already in place  Float Heels with Pillows:  Already in place  Nasal Cannula with Gray Foams:  Already in place    WOUND PHOTOS:   N/A no wounds identified    WOUND CONSULT:   N/A, no advanced wound care needs identified

## 2025-08-01 ASSESSMENT — COGNITIVE AND FUNCTIONAL STATUS - GENERAL
TURNING FROM BACK TO SIDE WHILE IN FLAT BAD: TOTAL
SUGGESTED CMS G CODE MODIFIER MOBILITY: CK
MOVING TO AND FROM BED TO CHAIR: A LITTLE
MOVING FROM LYING ON BACK TO SITTING ON SIDE OF FLAT BED: A LITTLE
HELP NEEDED FOR BATHING: A LITTLE
SUGGESTED CMS G CODE MODIFIER DAILY ACTIVITY: CI
WALKING IN HOSPITAL ROOM: A LITTLE
DAILY ACTIVITIY SCORE: 23
STANDING UP FROM CHAIR USING ARMS: A LITTLE
CLIMB 3 TO 5 STEPS WITH RAILING: A LITTLE
MOBILITY SCORE: 16

## 2025-08-01 ASSESSMENT — PAIN DESCRIPTION - PAIN TYPE
TYPE: ACUTE PAIN

## 2025-08-01 ASSESSMENT — ENCOUNTER SYMPTOMS
COUGH: 1
FEVER: 0
VOMITING: 0
NAUSEA: 0
SHORTNESS OF BREATH: 1
CHILLS: 1
PALPITATIONS: 0
ABDOMINAL PAIN: 0

## 2025-08-01 ASSESSMENT — FIBROSIS 4 INDEX: FIB4 SCORE: 2.83

## 2025-08-01 NOTE — PROGRESS NOTES
Hospital Medicine Daily Progress Note    Date of Service  8/1/2025    Chief Complaint  Vaishali Soares is a 75 y.o. female admitted 7/30/2025 with SOB    Hospital Course  This is a 75 y.o. female who presented 7/30/2025 with worsening mentation.     Patient was recently discharged from [facility] 1 week ago.  She was admitted for left lower lobe pneumonia and was given Augmentin and doxycycline 3 days prior to admission.  She initially was given Zyvox and Zosyn, but improved and was de-escalated to Unasyn and was discharged with Augmentin.     However, as she has been at home, she has been noted to have worsening mentation and lethargy, as witnessed by her family members.  She did not want to come to the ER for this, but her son convinced her to be brought in.  She is found to be hypoxic today at 85% on room air.  She was brought to the ER for further evaluation.     In ER, patient found to be tachycardic and febrile, hypoxic, tachypneic.  Pertinent labs include white blood cell count of 23,000 with left shift, mild transaminitis, normal lactic acid.  Chest x-ray showing persistent but mildly improved left basilar opacity.  Patient will be admitted for hypoxemia secondary to HCAP.      Interval Problem Update  Patient seen and examined, afebrile,no nausea or vomiting switching abx to Unasyn today stopping vanco  One of blood cultures growing GPC, follow up on sensitivity and final identification   I have discussed this patient's plan of care and discharge plan at IDT rounds today with Case Management, Nursing, Nursing leadership, and other members of the IDT team.    Consultants/Specialty  None     Code Status  Full Code    Disposition  The patient is not medically cleared for discharge to home or a post-acute facility.      I have placed the appropriate orders for post-discharge needs.    Review of Systems  Review of Systems   Constitutional:  Positive for chills. Negative for fever.   Respiratory:   Positive for cough and shortness of breath.    Cardiovascular:  Negative for chest pain and palpitations.   Gastrointestinal:  Negative for abdominal pain, nausea and vomiting.   Genitourinary:  Negative for dysuria and urgency.   All other systems reviewed and are negative.       Physical Exam  Temp:  [36.2 °C (97.1 °F)-36.9 °C (98.4 °F)] 36.8 °C (98.2 °F)  Pulse:  [] 84  Resp:  [16-18] 18  BP: (103-127)/(59-73) 121/67  SpO2:  [91 %-99 %] 92 %    Physical Exam  Constitutional:       Appearance: Normal appearance. She is normal weight.   HENT:      Head: Normocephalic.      Nose: Nose normal.      Mouth/Throat:      Mouth: Mucous membranes are moist.   Eyes:      Pupils: Pupils are equal, round, and reactive to light.   Cardiovascular:      Rate and Rhythm: Regular rhythm. Tachycardia present.   Pulmonary:      Effort: Pulmonary effort is normal.      Breath sounds: Rhonchi present.   Abdominal:      General: Abdomen is flat. Bowel sounds are normal.      Palpations: Abdomen is soft.   Musculoskeletal:         General: Normal range of motion.      Cervical back: Neck supple.   Skin:     General: Skin is warm.   Neurological:      General: No focal deficit present.      Mental Status: She is alert and oriented to person, place, and time. Mental status is at baseline.   Psychiatric:         Mood and Affect: Mood normal.         Behavior: Behavior normal.         Thought Content: Thought content normal.         Judgment: Judgment normal.         Fluids    Intake/Output Summary (Last 24 hours) at 8/1/2025 1024  Last data filed at 8/1/2025 0800  Gross per 24 hour   Intake 558 ml   Output 300 ml   Net 258 ml        Laboratory  Recent Labs     07/30/25  2237 07/31/25  0318 08/01/25  0254   WBC 23.9* 17.0* 9.8   RBC 4.39 3.66* 3.48*   HEMOGLOBIN 13.0 10.8* 10.6*   HEMATOCRIT 40.5 34.0* 32.5*   MCV 92.3 92.9 93.4   MCH 29.6 29.5 30.5   MCHC 32.1* 31.8* 32.6   RDW 50.8* 50.4* 51.8*   PLATELETCT 413 344 297   MPV 9.3 9.0  9.4     Recent Labs     07/30/25  2237 07/31/25  0318 08/01/25  0254   SODIUM 131* 137 137   POTASSIUM 4.8 3.5* 3.9   CHLORIDE 99 108 109   CO2 19* 18* 18*   GLUCOSE 87 92 77   BUN 7* 7* 8   CREATININE 0.56 0.50 0.49*   CALCIUM 8.6 7.7* 7.8*                   Imaging  DX-CHEST-PORTABLE (1 VIEW)   Final Result      Persistent but mildly improved left basilar opacity.           Assessment/Plan  Leukocytosis  Assessment & Plan  Related to her infection   Cont on abx   Trending down    Acute hypoxemic respiratory failure (HCC)- (present on admission)  Assessment & Plan  Found to be saturating 85% on room air, chest x-ray showing persistent but mildly improved left basilar opacity  Cont on treatment of  HCAP, with vancomycin and cefepime  Wean off O2 as tolerated     Hypothyroidism  Assessment & Plan  Synthroid     Hypokalemia- (present on admission)  Assessment & Plan  Replete as needed  Monitor       Greater than 51 minutes spent prepping to see patient (e.g. review of tests) obtaining and/or reviewing separately obtained history. Performing a medically appropriate examination and/ evaluation.  Counseling and educating the patient/family/caregiver.  Ordering medications, tests, or procedures.  Referring and communicating with other health care professionals.  Documenting clinical information in EPIC.  Independently interpreting results and communicating results to patient/family/caregiver.  Care coordination.       VTE prophylaxis: Lovenox ppx  I have performed a physical exam and reviewed and updated ROS and Plan today (8/1/2025). In review of yesterday's note (7/31/2025), there are no changes except as documented above.

## 2025-08-01 NOTE — CARE PLAN
The patient is Stable - Low risk of patient condition declining or worsening    Shift Goals  Clinical Goals: VSS  Patient Goals: comfort  Family Goals: ANNABELLE.  No family present    Progress made toward(s) clinical / shift goals:    Problem: Knowledge Deficit - Standard  Goal: Patient and family/care givers will demonstrate understanding of plan of care, disease process/condition, diagnostic tests and medications  Outcome: Progressing     Problem: Urinary - Renal Perfusion  Goal: Ability to achieve and maintain adequate renal perfusion and functioning will improve  Outcome: Progressing     Problem: Respiratory  Goal: Patient will achieve/maintain optimum respiratory ventilation and gas exchange  Outcome: Progressing       Patient is not progressing towards the following goals:

## 2025-08-01 NOTE — DISCHARGE PLANNING
Care Transition Team Assessment    Patient is a 75 year-old female admitted for Leukocytosis. Please see patient's H&P for prior medical history. Patient was previously admitted on 7/20/25 to 7/23/25 for Pneumonia of left lower lobe due to infectious organism. LMSW met with patient at bedside to complete assessment. Patient is A&Ox4 and able to verify the information on the face sheet. Patient lives with evelia Justin in a 1 story house with 2 steps to enter, son Jaya Soares (p) [phone]; NOK and emergency contact. No Advance Directive on file. Prior to admission patient need assistance with ADL's and IADL’s. Patient does not use any DME at baseline. Patient reported that family is good support for when medically clear to discharge home. Patient receives monthly SSI deposits. The patient's PCP is Dr. Alberto Cisneros. Patient's preferred pharmacy is [facility]. Patient denies a history of SNF/IPR nor HHC use in the past. Patient denies any SA or MH concerns. Patient's confirmed medical coverage via [employer]. Patient has means of transportation when medically cleared and evelia Justin will provide transport once stable for discharge.     Patient was not agreeable to SNF or HH if recommended.    Information Source  Orientation Level: Oriented X4  Information Given By: Patient  Who is responsible for making decisions for patient? : Patient    Readmission Evaluation  Is this a readmission?: Yes - unplanned readmission    Elopement Risk  Legal Hold: No  Ambulatory or Self Mobile in Wheelchair: No-Not an Elopement Risk  Disoriented: No  Psychiatric Symptoms: None  History of Wandering: No  Elopement this Admit: No  Vocalizing Wanting to Leave: No  Displays Behaviors, Body Language Wanting to Leave: No-Not at Risk for Elopement  Elopement Risk: Not at Risk for Elopement    Discharge Preparedness  What is your plan after discharge?: Home with help  What are your discharge supports?: Child  Prior Functional  Level: Needs Assist with Activities of Daily Living  Difficulity with ADLs: Bathing  Difficulity with IADLs: None    Functional Assesment  Prior Functional Level: Needs Assist with Activities of Daily Living    Finances  Financial Barriers to Discharge: No  Prescription Coverage: Yes    Vision / Hearing Impairment  Right Eye Vision: Impaired, Wears Glasses  Left Eye Vision: Impaired, Wears Glasses  Hearing Impairment: Both Ears    Advance Directive  Advance Directive?: None  Advance Directive offered?: AD Booklet refused    Domestic Abuse  Have you ever been the victim of abuse or violence?: No    Psychological Assessment  History of Substance Abuse: None  History of Psychiatric Problems: No  Non-compliant with Treatment: No  Newly Diagnosed Illness: No    Discharge Risks or Barriers  Patient risk factors: Complex medical needs, Readmission, Vulnerable adult    Anticipated Discharge Information  Discharge Disposition: Discharged to home/self care (01)

## 2025-08-01 NOTE — PROGRESS NOTES
"Microbiology called with positive blood cultures of \"gram stain: gram positive cocci\" at 2158. Critical lab result read back to microbiology.   Dyana Juares notified of positive blood cultures at 2217. Dyana responded saying, \"Ok thank you\" at 2218.  No new orders at this time   "

## 2025-08-01 NOTE — CARE PLAN
Problem: Knowledge Deficit - Standard  Goal: Patient and family/care givers will demonstrate understanding of plan of care, disease process/condition, diagnostic tests and medications  Outcome: Progressing     Problem: Hemodynamics  Goal: Patient's hemodynamics, fluid balance and neurologic status will be stable or improve  Outcome: Progressing     Problem: Fluid Volume  Goal: Fluid volume balance will be maintained  Outcome: Progressing     Problem: Urinary - Renal Perfusion  Goal: Ability to achieve and maintain adequate renal perfusion and functioning will improve  Outcome: Progressing     Problem: Respiratory  Goal: Patient will achieve/maintain optimum respiratory ventilation and gas exchange  Outcome: Progressing     Problem: Communication  Goal: The ability to communicate needs accurately and effectively will improve  Outcome: Progressing     Problem: Discharge Barriers/Planning  Goal: Patient's continuum of care needs are met  Outcome: Progressing     Problem: Risk for Aspiration  Goal: Patient's risk for aspiration will be absent or decrease  Outcome: Progressing     Problem: Nutrition  Goal: Patient's nutritional and fluid intake will be adequate or improve  Outcome: Progressing  Goal: Enteral nutrition will be maintained or improve  Outcome: Progressing  Goal: Enteral nutrition will be maintained or improve  Outcome: Progressing     Problem: Urinary Elimination  Goal: Establish and maintain regular urinary output  Outcome: Progressing     Problem: Bowel Elimination  Goal: Establish and maintain regular bowel function  Outcome: Progressing     Problem: Mobility  Goal: Patient's capacity to carry out activities will improve  Outcome: Progressing     Problem: Self Care  Goal: Patient will have the ability to perform ADLs independently or with assistance (bathe, groom, dress, toilet and feed)  Outcome: Progressing     Problem: Infection - Standard  Goal: Patient will remain free from infection  Outcome:  Progressing     Problem: Fall Risk  Goal: Patient will remain free from falls  Outcome: Progressing     Problem: Skin Integrity  Goal: Skin integrity is maintained or improved  Outcome: Progressing   The patient is Stable - Low risk of patient condition declining or worsening    Shift Goals  Clinical Goals: Vital signs stable, rest/comfort, stable neuro status  Patient Goals: Rest and comfort  Family Goals: ANNABELLE.  No family present    Progress made toward(s) clinical / shift goals:  Patient tolerating IV antibiotics.  Pt was sating at 95% on 1 L of oxygen via nasal cannula.  Pt was taken off oxygen and pt is now sating at 93% on room air.  Pt's neuro status has remained stable/unchanged throughout the night.  Pt complaining of sacral pain.  Mepilex and barrier cream applied to scarum and low airloss pump attached to bed.      Patient is not progressing towards the following goals: N/A

## 2025-08-01 NOTE — PROGRESS NOTES
Received Bedside report. Assumed care at 1900. This pt is AOx4, ambulatory with x1 with a FWW, voiding adequately, 8/10 pain. Patient and RN discussed plan of care: questions answered. Labs noted, assessment complete, patient tolerating regular diet. Tele box in place. Pt is on 1 L of O2 via nasal cannula. Call light in place, fall precautions in place, patient educated on importance of calling for assistance. No additional needs at this time. VSS

## 2025-08-01 NOTE — RESPIRATORY CARE
COPD EDUCATION by COPD CLINICAL EDUCATOR  8/1/2025  at  1:07 PM by Lissette Philip RRT     Patient interviewed by education team.  Patient declined or is unable to participate in the full program.  Therefore, a short intervention has been conducted.  A comprehensive packet including information about COPD, types of treatments to manage their disease and safe home Oxygen usage was provided and reviewed with patient at the bedside.      Patient initially argued she did not have COPD but a cough that is triggered by strong winds. I explained this is Bronchiectasis and is in fact a type of COPD. We went over the COPD Action Plan and her COPD medications, patient admits she was only using her inhalers as needed. Provided the patient with a spacer with instruction, and encouraged the patient to take her inhalers as directed and know the difference between the maintenance and rescue medications.            COPD Assessment  COPD Clinical Specialists ONLY  COPD Education Initiated: Yes--Short Intervention (Given COPD booklet and spacer, reviewed Action Plan. No home O2 prior, [facility] apt 12/17/2025, quit smoking in 2007, + Bronchiectasis)  Is this a COPD exacerbation patient?: No  DME Company: none prior  DME Equipment Type: none prior  Physician Name: Alberto Cisneros M.D.  Pulmonary Follow Up Appointment: 12/17/25  Pulmonologist Name: [facility]  Pulmonary Rehab: Declined  Smoking Cessation: N/A (quit in 2007)  Hospice: N/A  Home Health Care: N/A  Mobile Urgent Care Services: N/A  Geriatric Specialty Group: N/A  Private In-Home Care Agency: N/A  $ Demo/Eval of SVN's, MDI's and Aerosols: Yes (spacer)  (OP) Pulmonary Function Testing: Yes (1/29/2024: FEV1 49%, ratio 68%, %, DLCO 46%)  Interdisciplinary Rounds: Attendance at Rounds (30 Min)    PFT Results    1/29/2024: FEV1 49%, ratio 68%, %, DLCO 46%            MY COPD ACTION PLAN     It is recommended that patients and physicians/healthcare providers  "complete this action plan together. This plan should be discussed at each physician visit and updated as needed.    The green, yellow and red zones show groups of symptoms of COPD. This list of symptoms is not comprehensive, and you may experience other symptoms. In the \"Actions\" column, your healthcare provider has recommended actions for you to take based on your symptoms.    Patient Name: Vaishali Soares   YOB: 1950   Last Updated on: 8/1/2025  1:07 PM   Green Zone:  I am doing well today Actions     Usual activitiy and exercise level   Take daily medications     Usual amounts of cough and phlegm/mucus   Use oxygen as prescribed     Sleep well at night   Continue regular exercise/diet plan     Appetite is good   At all times avoid cigarette smoke, inhaled irritants     Daily Medications (these medications are taken every day):   Tiotropium and Olodaterol (Stiolto) 2 Puffs Once daily     Additional Information:  This is your maintenance medication and should be taken EVERY day.          Yellow Zone:  I am having a bad day or a COPD flare Actions     More breathless than usual   Continue daily medications     I have less energy for my daily activities   Use quick relief inhaler as ordered     Increased or thicker phlegm/mucus   Use oxygen as prescribed     Using quick relief inhaler/nebulizer more often   Get plenty of rest     Swelling of ankles more than usual   Use pursed lip breathing     More coughing than usual   At all times avoid cigarette smoke, inhaled irritants     I feel like I have a \"chest cold\"     Poor sleep and my symptoms woke me up     My appetite is not good     My medicine is not helping      Call provider immediately if symptoms don’t improve     Continue daily medications, add rescue medications:   Albuterol 2 Puffs PRN       Medications to be used during a flare up, (as Discussed with Provider):           Additional Information:  Use the spacer with your rescue inhaler when " having difficulty breathing, this is your RESCUE medication and taken as needed.      Red Zone:  I need urgent medical care Actions     Severe shortness of breath even at rest   Call 911 or seek medical care immediately     Not able to do any activity because of breathing      Fever or shaking chills      Feeling confused or very drowsy       Chest pains      Coughing up blood

## 2025-08-02 PROBLEM — R78.81 BACTEREMIA: Status: ACTIVE | Noted: 2025-08-02

## 2025-08-02 PROBLEM — J18.9 PNEUMONIA: Status: ACTIVE | Noted: 2025-08-02

## 2025-08-02 ASSESSMENT — ENCOUNTER SYMPTOMS
NAUSEA: 0
SHORTNESS OF BREATH: 0
VOMITING: 0

## 2025-08-02 ASSESSMENT — PAIN DESCRIPTION - PAIN TYPE
TYPE: ACUTE PAIN

## 2025-08-02 ASSESSMENT — FIBROSIS 4 INDEX: FIB4 SCORE: 2.41

## 2025-08-02 NOTE — PROGRESS NOTES
Hospital Medicine Daily Progress Note    Date of Service  8/2/2025    Chief Complaint  Vaishali Soares is a 75 y.o. female admitted 7/30/2025 with shortness of breath    Hospital Course  75 female with past medical history of COPD on baseline oxygen at home presented with altered mental status.  Recently treated for pneumonia at outside facility.  On arrival to ED she was hypoxic, chest x-ray with left basilar opacity.  Initiated on IV antibiotics and admitted for fevers and treatment. 1/2 blood culture growing gram-positive cocci.  Final blood culture pending    Interval Problem Update    8/2/2025  Seen and examined at bedside  Vital stable, remained afebrile  Noted to be coughing  Otherwise denies shortness of breath  Reports has oxygen at home  Eager to go home  Labs with normal white count, hemoglobin 10.3, sodium 137 potassium 3.5, renal function stable  2/2 blood culture growing gram-positive cocci  Chest x-ray reviewed noted to have left basilar LUIS EDUARDO chart reviewed, H&P reviewed, meds reviewed    Plan  Repeat blood culture,  Continue on IV Unasyn  Continue Anoro Ellipta  Discontinue IV fluid  Repeat BMP in a.m. to monitor electrolytes and toxicity and renal function  Repeat CBC in a.m. to monitor white count and hemoglobin  Continue wean of oxygen  Requiring IV antibiotics, need close monitoring   High risk of deterioration into sepsis need close monitoring  Patient has a high medical complexity, complex decision making and is at high risk of complication, morbidity and mortality    I have discussed this patient's plan of care and discharge plan at IDT rounds today with Case Management, Nursing, Nursing leadership, and other members of the IDT team.        Code Status  Full Code    Disposition  The patient is not medically cleared for discharge to home or a post-acute facility.  Anticipate discharge to: home with close outpatient follow-up    I have placed the appropriate orders for post-discharge  needs.    Review of Systems  Review of Systems   Respiratory:  Negative for shortness of breath.    Cardiovascular:  Negative for chest pain.   Gastrointestinal:  Negative for nausea and vomiting.        Physical Exam  Temp:  [36.3 °C (97.3 °F)-37.2 °C (99 °F)] 36.3 °C (97.3 °F)  Pulse:  [] 99  Resp:  [18-20] 18  BP: (141-148)/(75-82) 141/82  SpO2:  [89 %-96 %] 92 %    Physical Exam  Constitutional:       Appearance: Normal appearance.   HENT:      Mouth/Throat:      Mouth: Mucous membranes are dry.   Cardiovascular:      Rate and Rhythm: Normal rate and regular rhythm.      Pulses: Normal pulses.   Pulmonary:      Breath sounds: Rhonchi present.   Abdominal:      General: Abdomen is flat.   Musculoskeletal:      Right lower leg: No edema.      Left lower leg: No edema.   Neurological:      General: No focal deficit present.      Mental Status: She is alert and oriented to person, place, and time.   Psychiatric:         Mood and Affect: Mood normal.         Fluids    Intake/Output Summary (Last 24 hours) at 8/2/2025 1141  Last data filed at 8/2/2025 0810  Gross per 24 hour   Intake 420 ml   Output 0 ml   Net 420 ml        Laboratory  Recent Labs     07/31/25  0318 08/01/25  0254 08/02/25  0351   WBC 17.0* 9.8 9.7   RBC 3.66* 3.48* 3.47*   HEMOGLOBIN 10.8* 10.6* 10.3*   HEMATOCRIT 34.0* 32.5* 32.2*   MCV 92.9 93.4 92.8   MCH 29.5 30.5 29.7   MCHC 31.8* 32.6 32.0*   RDW 50.4* 51.8* 51.2*   PLATELETCT 344 297 316   MPV 9.0 9.4 9.2     Recent Labs     07/31/25  0318 08/01/25  0254 08/02/25  0351   SODIUM 137 137 137   POTASSIUM 3.5* 3.9 3.5*   CHLORIDE 108 109 109   CO2 18* 18* 20   GLUCOSE 92 77 87   BUN 7* 8 3*   CREATININE 0.50 0.49* 0.40*   CALCIUM 7.7* 7.8* 7.8*                   Imaging  DX-CHEST-PORTABLE (1 VIEW)   Final Result      Persistent but mildly improved left basilar opacity.           Assessment/Plan  * Acute hypoxemic respiratory failure (HCC)- (present on admission)  Assessment & Plan  Found to  be saturating 85% on room air, chest x-ray showing persistent but mildly improved left basilar opacity  On IV antibiotics  Continue to wean oxygen    Bacteremia  Assessment & Plan   1/2 blood culture growing gram-positive cocci.    Repeat blood culture  Continue IV antibiotics        Pneumonia  Assessment & Plan  Repeat blood culture,  Continue on IV Unasyn  Continue Anoro Ellipta  Discontinue IV fluid  Repeat BMP in a.m. to monitor electrolytes and toxicity and renal function  Repeat CBC in a.m. to monitor white count and hemoglobin  Continue wean of oxygen  Requiring IV antibiotics, need close monitoring   High risk of deterioration into sepsis need close monitoring  Patient has a high medical complexity, complex decision making and is at high risk of complication, morbidity and mortality    Leukocytosis  Assessment & Plan  Resolved with antibiotics    Hypothyroidism  Assessment & Plan  Synthroid     Hypokalemia- (present on admission)  Assessment & Plan  Replete as needed  Monitor          VTE prophylaxis: lovenox    I have performed a physical exam and reviewed and updated ROS and Plan today (8/2/2025). In review of yesterday's note (8/1/2025), there are no changes except as documented above.

## 2025-08-02 NOTE — CARE PLAN
The patient is Stable - Low risk of patient condition declining or worsening    Shift Goals  Clinical Goals: IV abx, safety, monitor labs and vitals  Patient Goals: Comfort  Family Goals: ANNABELLE    Progress made toward(s) clinical / shift goals:    Problem: Knowledge Deficit - Standard  Goal: Patient and family/care givers will demonstrate understanding of plan of care, disease process/condition, diagnostic tests and medications  Outcome: Progressing     Problem: Hemodynamics  Goal: Patient's hemodynamics, fluid balance and neurologic status will be stable or improve  Outcome: Progressing     Problem: Fluid Volume  Goal: Fluid volume balance will be maintained  Outcome: Progressing     Problem: Infection - Standard  Goal: Patient will remain free from infection  Outcome: Progressing     Problem: Fall Risk  Goal: Patient will remain free from falls  Outcome: Progressing     Problem: Skin Integrity  Goal: Skin integrity is maintained or improved  Outcome: Progressing     Problem: Pain - Standard  Goal: Alleviation of pain or a reduction in pain to the patient’s comfort goal  Outcome: Progressing       Patient is not progressing towards the following goals:

## 2025-08-02 NOTE — ASSESSMENT & PLAN NOTE
Repeat blood culture,  Continue on IV Unasyn  Continue Anoro Ellipta  Discontinue IV fluid  Repeat BMP in a.m. to monitor electrolytes and toxicity and renal function  Repeat CBC in a.m. to monitor white count and hemoglobin  Continue wean of oxygen  Requiring IV antibiotics, need close monitoring   High risk of deterioration into sepsis need close monitoring  Patient has a high medical complexity, complex decision making and is at high risk of complication, morbidity and mortality

## 2025-08-02 NOTE — CARE PLAN
Problem: Knowledge Deficit - Standard  Goal: Patient and family/care givers will demonstrate understanding of plan of care, disease process/condition, diagnostic tests and medications  Outcome: Progressing     Problem: Hemodynamics  Goal: Patient's hemodynamics, fluid balance and neurologic status will be stable or improve  Outcome: Progressing     Problem: Fluid Volume  Goal: Fluid volume balance will be maintained  Outcome: Progressing     Problem: Urinary - Renal Perfusion  Goal: Ability to achieve and maintain adequate renal perfusion and functioning will improve  Outcome: Progressing     Problem: Respiratory  Goal: Patient will achieve/maintain optimum respiratory ventilation and gas exchange  Outcome: Progressing     Problem: Physical Regulation  Goal: Diagnostic test results will improve  Outcome: Progressing  Goal: Signs and symptoms of infection will decrease  Outcome: Progressing     Problem: Communication  Goal: The ability to communicate needs accurately and effectively will improve  Outcome: Progressing     Problem: Discharge Barriers/Planning  Goal: Patient's continuum of care needs are met  Outcome: Progressing     Problem: Risk for Aspiration  Goal: Patient's risk for aspiration will be absent or decrease  Outcome: Progressing     Problem: Nutrition  Goal: Patient's nutritional and fluid intake will be adequate or improve  Outcome: Progressing  Goal: Enteral nutrition will be maintained or improve  Outcome: Progressing  Goal: Enteral nutrition will be maintained or improve  Outcome: Progressing     Problem: Urinary Elimination  Goal: Establish and maintain regular urinary output  Outcome: Progressing     Problem: Bowel Elimination  Goal: Establish and maintain regular bowel function  Outcome: Progressing     Problem: Gastrointestinal Irritability  Goal: Nausea and vomiting will be absent or improve  Outcome: Progressing  Goal: Diarrhea will be absent or improved  Outcome: Progressing     Problem:  "Mobility  Goal: Patient's capacity to carry out activities will improve  Outcome: Progressing     Problem: Self Care  Goal: Patient will have the ability to perform ADLs independently or with assistance (bathe, groom, dress, toilet and feed)  Outcome: Progressing     Problem: Infection - Standard  Goal: Patient will remain free from infection  Outcome: Progressing     Problem: Wound/ / Incision Healing  Goal: Patient's wound/surgical incision will decrease in size and heals properly  Outcome: Progressing     Problem: Fall Risk  Goal: Patient will remain free from falls  Outcome: Progressing     Problem: Skin Integrity  Goal: Skin integrity is maintained or improved  Outcome: Progressing     Problem: Pain - Standard  Goal: Alleviation of pain or a reduction in pain to the patient’s comfort goal  Outcome: Progressing   The patient is Stable - Low risk of patient condition declining or worsening    Shift Goals  Clinical Goals: Rest and comfort, remain free from falls/injury. IV antibiotics  Patient Goals: Rest and comfort, \"go home in morning\"  Family Goals: Rest and comfort for patient    Progress made toward(s) clinical / shift goals:  Pt has remained free from falls/injury. Fall precautions in place.  Pt requires frequent reminders on the importance of calling for assistance prior to ambulation.  Pt tolerating IV antibiotics and IV fluids.      Patient is not progressing towards the following goals: N/A       "

## 2025-08-02 NOTE — ASSESSMENT & PLAN NOTE
1/2 blood culture growing gram-positive cocci.    Repeat blood culture  Continue IV antibiotics

## 2025-08-02 NOTE — PROGRESS NOTES
Bedside report received, assumed care of patient at change of shift. Chart, labs, and orders reviewed. Pt resting in bed, breathing even and unlabored, denies pain and in no acute distress. A&O x3 disoriented to situation. Tele monitoring in place. Fall precautions including bed alarm in place and education provided. Call light within reach, bed locked and in lowest position, denies other needs at this time.

## 2025-08-02 NOTE — PROGRESS NOTES
Received Bedside report. Assumed care at 1900. This pt is Aox4/confused at times, ambulatory with x1 assistance and a FWW, voiding adequately, 0/10 pain. Patient and RN discussed plan of care: questions answered. Labs noted, assessment complete, patient tolerating regular diet. Tele box in place. Pt is on room air. Call light in place, fall precautions in place, patient educated on importance of calling for assistance. No additional needs at this time. VSS

## 2025-08-03 ENCOUNTER — PHARMACY VISIT (OUTPATIENT)
Dept: PHARMACY | Facility: MEDICAL CENTER | Age: 75
End: 2025-08-03
Payer: COMMERCIAL

## 2025-08-03 PROCEDURE — RXMED WILLOW AMBULATORY MEDICATION CHARGE: Performed by: STUDENT IN AN ORGANIZED HEALTH CARE EDUCATION/TRAINING PROGRAM

## 2025-08-03 ASSESSMENT — PAIN DESCRIPTION - PAIN TYPE: TYPE: ACUTE PAIN

## 2025-08-03 ASSESSMENT — FIBROSIS 4 INDEX: FIB4 SCORE: 2.27

## 2025-08-03 NOTE — PROGRESS NOTES
Report received from day shift RN, assumed care of pt. Pt A&Ox 3, confused to situation. Plan of care discussed with pt, labs and chart reviewed. All needs met at this time. Tele box on. On 2L via NC. Call light within reach, bed locked and in lowest position. All fall precautions and hourly rounding in place.

## 2025-08-03 NOTE — DISCHARGE PLANNING
Case Management Discharge Planning    Admission Date: 7/30/2025  GMLOS: 4  ALOS: 4    6-Clicks ADL Score: 23  6-Clicks Mobility Score: 16  PT and/or OT Eval ordered: No  Post-acute Referrals Ordered: No  Post-acute Choice Obtained: No  Has referral(s) been sent to post-acute provider:  No      Anticipated Discharge Dispo: Discharge Disposition: Discharged to home/self care (01)    DME Needed: No    Action(s) Taken: LMSW went to meet with patient bedside to obtain signature for IMM and patient was no longer in the room. Unable to get signature.    Escalations Completed: None    Medically Clear: Yes    Next Steps: Case management to follow for further discharge needs.    Barriers to Discharge: None    Is the patient up for discharge tomorrow: No

## 2025-08-03 NOTE — DISCHARGE SUMMARY
Patient has oxygen at home.  Discharge Summary    CHIEF COMPLAINT ON ADMISSION  Chief Complaint   Patient presents with    ALOC     BIBA from home as son reports pt not acting normally and more confused than normal, recent admission for PNA       Reason for Admission  ems     Admission Date  7/30/2025    CODE STATUS  Full Code    HPI & HOSPITAL COURSE  75 female with past medical history of COPD on baseline oxygen at home presented with altered mental status.  Recently treated for pneumonia at outside facility.  On arrival to ED she was hypoxic, chest x-ray with left basilar opacity.  Initiated on IV antibiotics and admitted for fevers and treatment. 1/2 blood culture growing gram-positive cocci.  Final blood culture contaminated.  Repeat blood culture remain negative.  Patient to be discharged home.  Oral antibiotic to complete 7 days course for pneumonia.  Patient was able to maintain 90% oxygen on room air on ambulation.      Patient seen and examined at bedside on day of discharge.  She is on room air satting 90%.  Reports he has oxygen at home.  Labs reviewed noted abnormal white count.  Repeat blood culture remain negative, previous blood culture contaminated.  At the time of discharge patient remain hemodynamically stable ,asymptomatic ,labs remain unremarkable .  Patient will be discharged with close follow up with PCP .Discharge plan was discussed with patient in details .  Patient agreed with discharge plan  and  all questions answered.      I had extensive discussion with patient's son regarding discharge plan.  Patient has oxygen at home.  Currently seen he is on room air saturating over 90%.  Recommended to follow-up with PCP.    Therefore, she is discharged in good and stable condition to home with close outpatient follow-up.    The patient met 2-midnight criteria for an inpatient stay at the time of discharge.    Discharge Date  8/3/2025        DISCHARGE DIAGNOSES  Principal Problem:    Acute  hypoxemic respiratory failure (HCC) (POA: Yes)  Active Problems:    Hypokalemia (POA: Yes)    Hypothyroidism (POA: Unknown)    Leukocytosis (POA: Unknown)    Pneumonia (POA: Unknown)    Bacteremia (POA: Unknown)  Resolved Problems:    Sepsis (HCC) (POA: Yes)      FOLLOW UP  Future Appointments   Date Time Provider Department Center   12/17/2025  3:00 PM ASHLYN Jewell [facility] None     No follow-up provider specified.    MEDICATIONS ON DISCHARGE     Medication List        CHANGE how you take these medications        Instructions   amoxicillin-clavulanate 875-125 MG Tabs  What changed: additional instructions  Commonly known as: Augmentin   Take 1 Tablet by mouth 2 times a day for 3 days.  Dose: 1 Tablet            CONTINUE taking these medications        Instructions   albuterol 108 (90 Base) MCG/ACT Aers inhalation aerosol   Inhale 2 Puffs every 6 hours as needed for Shortness of Breath. Indications: Worsening of Chronic Obstructive Lung Disease  Dose: 2 Puff     aspirin 81 MG EC tablet   Take 81 mg by mouth every day.  Dose: 81 mg     levothyroxine 75 MCG Tabs  Commonly known as: Synthroid   Take 1 Tablet by mouth every morning on an empty stomach.  Dose: 75 mcg     Stiolto Respimat 2.5-2.5 MCG/ACT Aers  Generic drug: Tiotropium Bromide-Olodaterol   Inhale 1 Inhalation every day.  Dose: 1 Inhalation            STOP taking these medications      doxycycline monohydrate 100 MG tablet  Commonly known as: Adoxa     predniSONE 20 MG Tabs  Commonly known as: Deltasone              Allergies  Allergies[1]    DIET  Orders Placed This Encounter   Procedures    Diet Order Diet: Regular     Standing Status:   Standing     Number of Occurrences:   1     Diet::   Regular [1]       ACTIVITY  As tolerated.  Weight bearing as tolerated        PROCEDURES  DX-CHEST-PORTABLE (1 VIEW)   Final Result      Persistent but mildly improved left basilar opacity.            LABORATORY  Lab Results   Component Value Date    SODIUM  139 08/03/2025    POTASSIUM 3.8 08/03/2025    CHLORIDE 106 08/03/2025    CO2 21 08/03/2025    GLUCOSE 91 08/03/2025    BUN 5 (L) 08/03/2025    CREATININE 0.51 08/03/2025    CREATININE 0.7 05/13/2006        Lab Results   Component Value Date    WBC 7.2 08/03/2025    HEMOGLOBIN 10.3 (L) 08/03/2025    HEMATOCRIT 31.3 (L) 08/03/2025    PLATELETCT 331 08/03/2025        Total time of the discharge process exceeds 33 minutes.         [1] No Known Allergies

## 2025-08-03 NOTE — CARE PLAN
The patient is Stable - Low risk of patient condition declining or worsening    Shift Goals  Clinical Goals: IV abx, safety, VSS  Patient Goals: comfort  Family Goals: ANNABELLE    Progress made toward(s) clinical / shift goals:    Problem: Knowledge Deficit - Standard  Goal: Patient and family/care givers will demonstrate understanding of plan of care, disease process/condition, diagnostic tests and medications  Outcome: Progressing     Problem: Fall Risk  Goal: Patient will remain free from falls  Outcome: Progressing     Problem: Skin Integrity  Goal: Skin integrity is maintained or improved  Outcome: Progressing     Problem: Pain - Standard  Goal: Alleviation of pain or a reduction in pain to the patient’s comfort goal  Outcome: Progressing       Patient is not progressing towards the following goals:

## 2025-08-04 ENCOUNTER — TELEPHONE (OUTPATIENT)
Dept: HOME HEALTH SERVICES | Facility: HOME HEALTHCARE | Age: 75
End: 2025-08-04
Payer: MEDICARE

## 2025-08-05 ENCOUNTER — TELEPHONE (OUTPATIENT)
Dept: HOME HEALTH SERVICES | Facility: HOME HEALTHCARE | Age: 75
End: 2025-08-05
Payer: MEDICARE

## 2025-08-05 ENCOUNTER — TELEPHONE (OUTPATIENT)
Dept: HEALTH INFORMATION MANAGEMENT | Facility: OTHER | Age: 75
End: 2025-08-05
Payer: MEDICARE

## 2025-08-06 ENCOUNTER — TELEPHONE (OUTPATIENT)
Dept: HOME HEALTH SERVICES | Facility: HOME HEALTHCARE | Age: 75
End: 2025-08-06
Payer: MEDICARE

## 2025-08-20 ENCOUNTER — OFFICE VISIT (OUTPATIENT)
Dept: MEDICAL GROUP | Age: 75
End: 2025-08-20
Payer: MEDICARE

## 2025-08-20 ENCOUNTER — HOSPITAL ENCOUNTER (OUTPATIENT)
Facility: MEDICAL CENTER | Age: 75
End: 2025-08-20
Attending: STUDENT IN AN ORGANIZED HEALTH CARE EDUCATION/TRAINING PROGRAM
Payer: MEDICARE

## 2025-08-20 ENCOUNTER — HOSPITAL ENCOUNTER (OUTPATIENT)
Dept: RADIOLOGY | Facility: MEDICAL CENTER | Age: 75
End: 2025-08-20
Attending: STUDENT IN AN ORGANIZED HEALTH CARE EDUCATION/TRAINING PROGRAM
Payer: MEDICARE

## 2025-08-20 VITALS
HEIGHT: 64 IN | WEIGHT: 115.4 LBS | SYSTOLIC BLOOD PRESSURE: 95 MMHG | OXYGEN SATURATION: 93 % | DIASTOLIC BLOOD PRESSURE: 62 MMHG | HEART RATE: 115 BPM | TEMPERATURE: 97.9 F | BODY MASS INDEX: 19.7 KG/M2

## 2025-08-20 DIAGNOSIS — R09.89: ICD-10-CM

## 2025-08-20 DIAGNOSIS — R63.4 WEIGHT LOSS, UNINTENTIONAL: ICD-10-CM

## 2025-08-20 DIAGNOSIS — J44.9 COPD, SEVERE (HCC): ICD-10-CM

## 2025-08-20 DIAGNOSIS — R00.0 TACHYCARDIA: ICD-10-CM

## 2025-08-20 DIAGNOSIS — G93.89 BRAIN MASS: Primary | ICD-10-CM

## 2025-08-20 LAB
ALBUMIN SERPL BCP-MCNC: 3.2 G/DL (ref 3.2–4.9)
ALBUMIN/GLOB SERPL: 0.8 G/DL
ALP SERPL-CCNC: 189 U/L (ref 30–99)
ALT SERPL-CCNC: 16 U/L (ref 2–50)
ANION GAP SERPL CALC-SCNC: 15 MMOL/L (ref 7–16)
AST SERPL-CCNC: 39 U/L (ref 12–45)
BASOPHILS # BLD AUTO: 0.2 % (ref 0–1.8)
BASOPHILS # BLD: 0.03 K/UL (ref 0–0.12)
BILIRUB SERPL-MCNC: 0.7 MG/DL (ref 0.1–1.5)
BUN SERPL-MCNC: 11 MG/DL (ref 8–22)
CALCIUM ALBUM COR SERPL-MCNC: 9.6 MG/DL (ref 8.5–10.5)
CALCIUM SERPL-MCNC: 9 MG/DL (ref 8.5–10.5)
CHLORIDE SERPL-SCNC: 101 MMOL/L (ref 96–112)
CO2 SERPL-SCNC: 22 MMOL/L (ref 20–33)
CREAT SERPL-MCNC: 0.61 MG/DL (ref 0.5–1.4)
EOSINOPHIL # BLD AUTO: 0.02 K/UL (ref 0–0.51)
EOSINOPHIL NFR BLD: 0.2 % (ref 0–6.9)
ERYTHROCYTE [DISTWIDTH] IN BLOOD BY AUTOMATED COUNT: 46.1 FL (ref 35.9–50)
GFR SERPLBLD CREATININE-BSD FMLA CKD-EPI: 92 ML/MIN/1.73 M 2
GLOBULIN SER CALC-MCNC: 4.1 G/DL (ref 1.9–3.5)
GLUCOSE SERPL-MCNC: 161 MG/DL (ref 65–99)
HCT VFR BLD AUTO: 40.6 % (ref 37–47)
HGB BLD-MCNC: 13 G/DL (ref 12–16)
IMM GRANULOCYTES # BLD AUTO: 0.06 K/UL (ref 0–0.11)
IMM GRANULOCYTES NFR BLD AUTO: 0.5 % (ref 0–0.9)
LYMPHOCYTES # BLD AUTO: 2.9 K/UL (ref 1–4.8)
LYMPHOCYTES NFR BLD: 23.3 % (ref 22–41)
MCH RBC QN AUTO: 29.7 PG (ref 27–33)
MCHC RBC AUTO-ENTMCNC: 32 G/DL (ref 32.2–35.5)
MCV RBC AUTO: 92.9 FL (ref 81.4–97.8)
MONOCYTES # BLD AUTO: 0.75 K/UL (ref 0–0.85)
MONOCYTES NFR BLD AUTO: 6 % (ref 0–13.4)
NEUTROPHILS # BLD AUTO: 8.69 K/UL (ref 1.82–7.42)
NEUTROPHILS NFR BLD: 69.8 % (ref 44–72)
NRBC # BLD AUTO: 0 K/UL
NRBC BLD-RTO: 0 /100 WBC (ref 0–0.2)
PLATELET # BLD AUTO: 645 K/UL (ref 164–446)
PMV BLD AUTO: 9.5 FL (ref 9–12.9)
POTASSIUM SERPL-SCNC: 3.4 MMOL/L (ref 3.6–5.5)
PROT SERPL-MCNC: 7.3 G/DL (ref 6–8.2)
RBC # BLD AUTO: 4.37 M/UL (ref 4.2–5.4)
SODIUM SERPL-SCNC: 138 MMOL/L (ref 135–145)
T4 FREE SERPL-MCNC: 1.7 NG/DL (ref 0.93–1.7)
TSH SERPL DL<=0.005 MIU/L-ACNC: 0.2 UIU/ML (ref 0.38–5.33)
WBC # BLD AUTO: 12.5 K/UL (ref 4.8–10.8)

## 2025-08-20 PROCEDURE — 36415 COLL VENOUS BLD VENIPUNCTURE: CPT

## 2025-08-20 PROCEDURE — 71046 X-RAY EXAM CHEST 2 VIEWS: CPT

## 2025-08-20 PROCEDURE — 3074F SYST BP LT 130 MM HG: CPT | Performed by: STUDENT IN AN ORGANIZED HEALTH CARE EDUCATION/TRAINING PROGRAM

## 2025-08-20 PROCEDURE — 85025 COMPLETE CBC W/AUTO DIFF WBC: CPT

## 2025-08-20 PROCEDURE — 84443 ASSAY THYROID STIM HORMONE: CPT

## 2025-08-20 PROCEDURE — 3078F DIAST BP <80 MM HG: CPT | Performed by: STUDENT IN AN ORGANIZED HEALTH CARE EDUCATION/TRAINING PROGRAM

## 2025-08-20 PROCEDURE — 84439 ASSAY OF FREE THYROXINE: CPT

## 2025-08-20 PROCEDURE — 99214 OFFICE O/P EST MOD 30 MIN: CPT | Performed by: STUDENT IN AN ORGANIZED HEALTH CARE EDUCATION/TRAINING PROGRAM

## 2025-08-20 PROCEDURE — G2211 COMPLEX E/M VISIT ADD ON: HCPCS | Performed by: STUDENT IN AN ORGANIZED HEALTH CARE EDUCATION/TRAINING PROGRAM

## 2025-08-20 PROCEDURE — 80053 COMPREHEN METABOLIC PANEL: CPT

## 2025-08-20 RX ORDER — TIOTROPIUM BROMIDE AND OLODATEROL 3.124; 2.736 UG/1; UG/1
1 SPRAY, METERED RESPIRATORY (INHALATION) DAILY
Qty: 1 EACH | Refills: 3 | Status: SHIPPED | OUTPATIENT
Start: 2025-08-20

## 2025-08-20 RX ORDER — ALBUTEROL SULFATE 90 UG/1
2 INHALANT RESPIRATORY (INHALATION) EVERY 6 HOURS PRN
Qty: 8.5 G | Refills: 1 | Status: SHIPPED | OUTPATIENT
Start: 2025-08-20

## 2025-08-20 ASSESSMENT — ENCOUNTER SYMPTOMS
BACK PAIN: 0
ABDOMINAL PAIN: 0
PHOTOPHOBIA: 0
SHORTNESS OF BREATH: 0
HEADACHES: 0
COUGH: 0
WHEEZING: 0
ORTHOPNEA: 0
BLURRED VISION: 0
DEPRESSION: 0
FEVER: 0
BRUISES/BLEEDS EASILY: 0
BLOOD IN STOOL: 0
DIZZINESS: 0
PALPITATIONS: 0
DOUBLE VISION: 0
CHILLS: 0
WEAKNESS: 0

## 2025-08-20 ASSESSMENT — FIBROSIS 4 INDEX: FIB4 SCORE: 2.16

## 2025-08-20 ASSESSMENT — LIFESTYLE VARIABLES: SUBSTANCE_ABUSE: 0

## 2025-08-22 ENCOUNTER — TELEPHONE (OUTPATIENT)
Dept: MEDICAL GROUP | Age: 75
End: 2025-08-22
Payer: MEDICARE

## 2025-08-22 DIAGNOSIS — R63.4 WEIGHT LOSS, UNINTENTIONAL: ICD-10-CM

## 2025-08-22 DIAGNOSIS — G93.89 BRAIN MASS: Primary | ICD-10-CM

## (undated) DEVICE — HEAD HOLDER JUNIOR/ADULT

## (undated) DEVICE — CANNULA W/SEAL12X100ZTHREAD - (12/BX)

## (undated) DEVICE — KIT ANESTHESIA W/CIRCUIT & 3/LT BAG W/FILTER (20EA/CA)

## (undated) DEVICE — GLOVE SURGICAL PROTEXIS PI 8.0 LF - (50PR/BX)

## (undated) DEVICE — CHLORAPREP 26 ML APPLICATOR - ORANGE TINT(25/CA)

## (undated) DEVICE — KIT ROOM DECONTAMINATION

## (undated) DEVICE — GLOVE BIOGEL SZ 8.5 SURGICAL PF LTX - (50PR/BX 4BX/CA)

## (undated) DEVICE — GLOVE BIOGEL SZ 6.5 SURGICAL PF LTX (50PR/BX 4BX/CA)

## (undated) DEVICE — TUBE E-T HI-LO CUFF 7.0MM (10EA/PK)

## (undated) DEVICE — TUBING INSUFFLATION - (10/BX)

## (undated) DEVICE — NEPTUNE 4 PORT MANIFOLD - (20/PK)

## (undated) DEVICE — SUTURE GENERAL

## (undated) DEVICE — SUTURE 0 VICRYL PLUS CT-2 - 27 INCH (36/BX)

## (undated) DEVICE — GLOVE BIOGEL PI INDICATOR SZ 7.0 SURGICAL PF LF - (50/BX 4BX/CA)

## (undated) DEVICE — GAUZE PACKING STRIP PLAIN STERILE - 1/2 IN X 5 YD (12/CS)

## (undated) DEVICE — CANNULA W/SEAL 5X100 Z-THRE - ADED KII (12/BX)

## (undated) DEVICE — SPONGE GAUZESTER. 2X2 4-PL - (2/PK 50PK/BX 30BX/CS)

## (undated) DEVICE — ELECTRODE 5MM LHK LAPSCP STERILE DISP- MEGADYNE  (5/CA)

## (undated) DEVICE — ELECTRODE 850 FOAM ADHESIVE - HYDROGEL RADIOTRNSPRNT (50/PK)

## (undated) DEVICE — HEMOSTAT ARISTA PWD 3 GRAM - (5/CA)

## (undated) DEVICE — BAG, SPONGE COUNT 50600

## (undated) DEVICE — GOWN WARMING STANDARD FLEX - (30/CA)

## (undated) DEVICE — SENSOR SPO2 NEO LNCS ADHESIVE (20/BX) SEE USER NOTES

## (undated) DEVICE — TROCAR LAPSCP 100MM 12MM NTHRD - (6/BX)

## (undated) DEVICE — CANISTER SUCTION RIGID RED 1500CC (40EA/CA)

## (undated) DEVICE — SET SUCTION/IRRIGATION WITH DISPOSABLE TIP (6/CA )PART #0250-070-520 IS A SUB

## (undated) DEVICE — Device

## (undated) DEVICE — MASK ANESTHESIA ADULT  - (100/CA)

## (undated) DEVICE — HUMID-VENT HEAT AND MOISTURE EXCHANGE- (50/BX)

## (undated) DEVICE — TROCAR 5X100 BLADED Z-THREAD - KII (6/BX)

## (undated) DEVICE — SUCTION INSTRUMENT YANKAUER BULBOUS TIP W/O VENT (50EA/CA)

## (undated) DEVICE — SODIUM CHL IRRIGATION 0.9% 1000ML (12EA/CA)

## (undated) DEVICE — SUTURE 4-0 VICRYL PLUS FS-2 - 27 INCH (36/BX)

## (undated) DEVICE — WATER IRRIGATION STERILE 1000ML (12EA/CA)

## (undated) DEVICE — TUBE CONNECTING SUCTION - CLEAR PLASTIC STERILE 72 IN (50EA/CA)

## (undated) DEVICE — ELECTRODE DUAL RETURN W/ CORD - (50/PK)

## (undated) DEVICE — GLOVE, LITE (PAIR)

## (undated) DEVICE — SWABSTICK BENZOIN SINGLE (50EA/BX)

## (undated) DEVICE — PROTECTOR ULNA NERVE - (36PR/CA)

## (undated) DEVICE — GLOVE BIOGEL PI INDICATOR SZ 6.5 SURGICAL PF LF - (50/BX 4BX/CA)

## (undated) DEVICE — TROCAR Z THREAD 11 X 100 - BLADED (6/BX)

## (undated) DEVICE — DRESSING TRANSPARENT FILM TEGADERM 2.375 X 2.75"  (100EA/BX)"

## (undated) DEVICE — CLIP MED LG INTNL HRZN TI ESCP - (20/BX)